# Patient Record
Sex: FEMALE | Race: WHITE | NOT HISPANIC OR LATINO | Employment: FULL TIME | URBAN - METROPOLITAN AREA
[De-identification: names, ages, dates, MRNs, and addresses within clinical notes are randomized per-mention and may not be internally consistent; named-entity substitution may affect disease eponyms.]

---

## 2017-01-02 ENCOUNTER — MYC REFILL (OUTPATIENT)
Dept: FAMILY MEDICINE | Facility: CLINIC | Age: 57
End: 2017-01-02

## 2017-01-02 DIAGNOSIS — N95.1 MENOPAUSAL SYNDROME (HOT FLASHES): Primary | ICD-10-CM

## 2017-01-02 NOTE — TELEPHONE ENCOUNTER
Message from Naveggt:  Original authorizing provider: STEPHANIE RODRIGUEZ NP, APRN CNP    Radha Rodriguez would like a refill of the following medications:  estrogens, conjugated, (PREMARIN) 0.45 MG tablet [STEPHANIE RODRIGUEZ NP, APRN CNP]    Preferred pharmacy: [id number] - [place], [place] - [address] AT [facility] [address]    Comment:  Please send to [facility] in [place]. Address is [address] [facility] [address] # [phone]. I've been out for a week. Thank You! Radha Rodriguez

## 2017-01-02 NOTE — TELEPHONE ENCOUNTER
Patient is long overdue for office visit. She has used her courtesy refill. Please advise further refills. Thank you.  Mi Meza RN

## 2017-01-10 ENCOUNTER — OFFICE VISIT (OUTPATIENT)
Dept: FAMILY MEDICINE | Facility: CLINIC | Age: 57
End: 2017-01-10
Payer: COMMERCIAL

## 2017-01-10 ENCOUNTER — RADIANT APPOINTMENT (OUTPATIENT)
Dept: GENERAL RADIOLOGY | Facility: CLINIC | Age: 57
End: 2017-01-10
Attending: INTERNAL MEDICINE
Payer: COMMERCIAL

## 2017-01-10 VITALS
HEART RATE: 79 BPM | HEIGHT: 63 IN | WEIGHT: 183 LBS | BODY MASS INDEX: 32.43 KG/M2 | TEMPERATURE: 98.5 F | SYSTOLIC BLOOD PRESSURE: 116 MMHG | OXYGEN SATURATION: 98 % | DIASTOLIC BLOOD PRESSURE: 79 MMHG

## 2017-01-10 DIAGNOSIS — M25.561 PAIN OF MENISCUS OF RIGHT KNEE: Primary | ICD-10-CM

## 2017-01-10 DIAGNOSIS — M25.561 ACUTE PAIN OF RIGHT KNEE: ICD-10-CM

## 2017-01-10 PROCEDURE — 99214 OFFICE O/P EST MOD 30 MIN: CPT | Performed by: INTERNAL MEDICINE

## 2017-01-10 PROCEDURE — 73560 X-RAY EXAM OF KNEE 1 OR 2: CPT | Mod: RT

## 2017-01-10 RX ORDER — DICLOFENAC SODIUM 75 MG/1
75 TABLET, DELAYED RELEASE ORAL 2 TIMES DAILY PRN
Qty: 60 TABLET | Refills: 5 | Status: SHIPPED | OUTPATIENT
Start: 2017-01-10 | End: 2017-03-29

## 2017-01-10 NOTE — NURSING NOTE
"Chief Complaint   Patient presents with     Musculoskeletal Problem     right knee       Initial /79 mmHg  Pulse 79  Temp(Src) 98.5  F (36.9  C) (Oral)  Ht 5' 3\" (1.6 m)  Wt 183 lb (83.008 kg)  BMI 32.43 kg/m2  SpO2 98% Estimated body mass index is 32.43 kg/(m^2) as calculated from the following:    Height as of this encounter: 5' 3\" (1.6 m).    Weight as of this encounter: 183 lb (83.008 kg).  BP completed using cuff size: regular    Lorenzo Yuan MA      "

## 2017-01-10 NOTE — PATIENT INSTRUCTIONS
This summary includes the important diagnoses, test, medications and other important parts of your medical history.  Below are a few good we sites you can use to learn more about these.     Www.Okeyko.org : Up to date and easily searchable information on multiple topics.  Www.Okeyko."[employer]/Pharmacy/c_539084.asp : [facility] $4.99 medications  Www.medlineplus.gov : medication info, interactive tutorials, watch real surgeries online  Www.familydoctor.org : good info from the Academy of Family Physicians  Www.Timelinerinic.com : good info from the [facility]  Www.cdc.gov : public health info, travel advisories, epidemics (H1N1)  Www.aap.org : children's health info, normal development, vaccinations  Www.health.[facility].[place].us : [place] dept of heat, public health issues in [place], N1N1    Based on your medical history and these are the current health maintenance or preventive care services that you are due for (some may have been done at this visit:)  Health Maintenance Due   Topic Date Due     LIPID SCREEN Q5 YR FEMALE (SYSTEM ASSIGNED)  07/18/2016     MAMMO Q1 YR INBASKET MESSAGE  08/14/2016     INFLUENZA VACCINE (SYSTEM ASSIGNED)  09/01/2016     =================================================================================  Normal Values   Blood pressure  <140/90 for most adults    <130/80 for some chronic diseases (ask your care team about yours)    BMI (body mass index)  18.5-25 kg/m2 (based on height and weight)     Thank you for visiting [facility]    Normal or non-critical lab and imaging results will be communicated to you by MyChart, letter or phone within 7 days.  If you do not hear from us within 10 days, please call the clinic. If you have a critical or abnormal lab result, we will notify you by phone as soon as possible.     If you have any questions regarding your visit please contact:     Team Comfort:   Clinic Hours Telephone Number   Dr. Kristopher Reaves  Min Odom  Eloisa Oliveira   7am-5pm  Monday - Friday [phone]  [facility]   Pharmacy 8am-8pm Monday-Thursday      8am-6pm Friday  9am-5pm Saturday-Sunday [phone]   Urgent Care 11am-8pm Monday-Friday        9am-5pm Saturday-Sunday [phone]     After hours, weekend or if you need to make an appointment with your primary provider please call [phone].   After Hours nurse advise: call [facility]: [phone]    Medication Refills:  Call your pharmacy and they will forward the refill to us. Please allow 3 business days for your refills to be completed.    Use Primary Data (secure email communication and access to your chart) to send your primary care provider a message or make an appointment. Ask someone on your Team how to sign up for Primary Data. To log on to [employer] or for more information in [employer] please visit the website at [email or web address].  As of October 8, 2013, all password changes, disabled accounts, or ID changes in Primary Data/MyHealth will be done by our Access Services Department.   If you need help with your account or password, call: [phone]. Clinic staff no longer has the ability to change passwords.     Knee Pain with Possible Torn Meniscus    The meniscus is a tough cartilage pad that cushions the inside of the knee joint. It helps absorb the shock from movement. It also spreads the weight of your body evenly across the knee joint. This prevents excess wear and tear to the bones of that joint.  The most common causes of meniscal tears are injuries, especially related to sports and degenerative disease that happens with aging.  A meniscus tear commonly happens during a twisting injury when the knee is bent. This causes pain, swelling, reduced movement of the knee, and trouble walking. There may be popping, clicking, joint locking or inability to completely straighten the knee. Ligaments of the knee may also be injured.  A torn meniscus is  diagnosed by physical exam and X-rays. In the case of a severe injury, the knee may be too painful to examine fully. A more accurate exam can be done after the initial swelling goes down. An MRI may be ordered to make a final diagnosis.  If your healthcare provider suspects a meniscal injury, you will treat your knee with ice and rest and preventing movement of the knee. A splint or knee brace that keeps your leg straight may be put on to protect the joint. Depending on the severity of the injury, surgery may be needed. A cartilage injury may take 4-12 weeks to heal depending on how bad it is.  Home care    Stay off the injured leg as much as possible until you can walk on it without pain. If you have a lot of pain when walking, crutches or a walker may be prescribed. (These can be rented or bought at many pharmacies and surgical or orthopedic supply stores). Follow your healthcare provider's advice about when to begin putting weight on that leg.    Keep your leg elevated to reduce pain and swelling. When sleeping, place a pillow under the injured leg. When sitting, support the injured leg so it is level with your waist. This is very important during the first 48 hours.    Apply an ice pack over the injured area for 15 to 20 minutes every 3 to 6 hours. You should do this for the first 24 to 48 hours. You can make an ice pack by filling a plastic bag that seals at the top with ice cubes and then wrapping it with a thin towel. Continue to use ice packs for relief of pain and swelling as needed. As the ice melts, be careful to avoid getting your wrap, splint, or cast wet. After 48 hours, apply heat (warm shower or warm bath) for 15 to 20 minutes several times a day, or alternate ice and heat. You can place the ice pack directly over the splint. If you have to wear a hook-and-loop knee brace, you can open it to apply the ice pack, or heat, directly to the knee. Never put ice directly on the skin. Always wrap the ice in  a towel or other type of cloth.    You may use over-the-counter pain medicine to control pain, unless another pain medicine was prescribed. If you have chronic liver or kidney disease or ever had a stomach ulcer, talk with your healthcare provider before using these medicines.    If you were given a splint, keep it dry at all times. Bathe with your splint out of the water. Protect it with a large plastic bag that is rubber-banded at the top end. If a fiberglass splint gets wet, you can dry it with a hair dryer. If you have a hook-and-loop knee brace, you can remove this to bathe, unless told otherwise.    Check with your healthcare provider before returning to sports or full work duties.  Follow-up care  Follow up with your healthcare provider, or as advised. This is usually within 1-2 weeks. Further testing may be required to check the extent of your injury.  If X-rays were taken, you will be told of any new findings that may affect your care.  Call 911  Call 911 if you have:     Shortness of breath    Chest pain  When to seek medical advice  Call your healthcare provider right away if any of these occur:    Toes or foot gets swollen, cold, blue, numb, or tingly    Pain or swelling spreads over the knee or calf    Warmth or redness appears over the knee or calf    Fever of 100.4 F (38 C) or above lasting for 24 to 48 hours    8071-9853 [employer]. [address] [place] [address]. All rights reserved. This information is not intended as a substitute for professional medical care. Always follow your healthcare professional's instructions.        Treating Meniscus Problems  Pre-op checklist    Don't eat or drink10 hours before surgery.    Arrange for someone to drive you home after surgery.    Tell your doctor if you take any medicine, supplements, or herbal remedies.   The type of surgery you have depends on the nature of your tear. its size and location. Your surgeon may use arthroscopy, a  method that involves putting a tiny camera inside your knee, so that your doctor can clearly see your joint. Arthroscopy requires only small incisions (cuts), and you can usually go home the same day as surgery. During surgery, you may have local anesthesia (your doctor numbs your knee with medicine), a regional block (numbing your body from the waist down), or general anesthesia (your doctor gives you drugs to put you into a deep sleep so you do't feel pain.)         Repaired meniscus.     Repair  For certain tears, your surgeon will try to repair the meniscus. He or she will sew torn edges so they can heal properly. Or your surgeon will use special fasteners to repair damage. In some cases, repairs may require another incision at the back or side of your knee.  Removal  In most cases, your surgeon will remove the damaged part of your meniscus. The meniscus won't completely grow back, so your doctor will remove as little tissue as possible. Other tissue, called the articular cartilage, will take over the role as shock absorber for your knee joint.  After surgery  You'll spend some time in the recovery area and can go home when you've recovered from the anesthesia. Your knee will be bandaged, and you may have stitches, steri-strips, or staples. You may need crutches to keep weight off the knee and may have a splint for support.    9418-6414 [address], [place] [address]. All rights reserved. This information is not intended as a substitute for professional medical care. Always follow your healthcare professional's instructions. This information has been modified by your health care provider with permission from the publisher.      PATIENT INSTRUCTIONS:    1) Call [phone] to schedule MRI of right knee.

## 2017-01-10 NOTE — PROGRESS NOTES
SUBJECTIVE:                                                    Radha Rodriguez is a 56 year old female who presents to clinic today for the following health issues:    Joint Pain     Onset: 10 days     Description:   Location: medial aspect of right knee  Character: Sharp and throbbing    Intensity: severe    Progression of Symptoms: worse    Accompanying Signs & Symptoms:  Denies any swelling.   History:   Previous similar pain: no       Precipitating factors: Worse when supine or when sitting.  Trauma or overuse: no     Alleviating factors:  Improved by: nothing       Therapies Tried and outcome: ibuprofen, hot bath, massage and ice, no improvement          Problem list and histories reviewed & adjusted, as indicated.  Additional history: as documented    Patient Active Problem List   Diagnosis     Non-healing lesion on left nose     Viral warts     FEMALE STRESS INCONTINENCE post-hysterectomy     HYPERHIDROSIS on axilla and soles of feet     Distal sacrum pain     Malaise and fatigue     Abdominal pain, right lower quadrant     Dermatitis due to cosmetics     Contact dermatitis and other eczema, due to unspecified cause     Acquired acanthosis nigricans     LFT abnormalities and Urobilnogen high     Female pelvic pain     CARDIOVASCULAR SCREENING; LDL GOAL LESS THAN 160     Finger pain     Mechanical problems with limbs     Vitamin D deficiency     Inflammatory arthritis     High risk medications (not anticoagulants) long-term use     Osteoarthritis     Menopausal syndrome (hot flashes)     Past Surgical History   Procedure Laterality Date     Surgical history of -        Tubal Ligation     Surgical history of -        Appy     Surgical history of -        Tonsils     Surgical history of -   12/94     Anal Fistulotomy     Hysterectomy, vaginal  1/1/2000     TVH, fibroids (still has ovaries)     C replacement,ureter,bowel segment  10/01/2008     Part of her ureter was repaired.     Colonoscopy  10/14/2011      Procedure:COLONOSCOPY; Colonoscopy; Surgeon:SCOTTY LEDEZMA; Location:[facility]       Social History   Substance Use Topics     Smoking status: Never Smoker      Smokeless tobacco: Never Used     Alcohol Use: Yes      Comment: Occasional     Family History   Problem Relation Age of Onset     HEART DISEASE Father      Heart attack     C.A.D. Father      age 50     Hypertension Father      CANCER Maternal Grandfather      CANCER Paternal Grandfather      DIABETES No family hx of      Hypertension No family hx of      CEREBROVASCULAR DISEASE No family hx of      Breast Cancer No family hx of      Cancer - colorectal No family hx of      Prostate Cancer No family hx of          Current Outpatient Prescriptions   Medication Sig Dispense Refill     estrogens, conjugated, (PREMARIN) 0.45 MG tablet Take 1 tablet (0.45 mg) by mouth daily Last refill until seen 1/2/17 30 tablet 0     methotrexate 2.5 MG tablet Eight tablets once a week 40 tablet 5     folic acid (FOLVITE) 1 MG tablet Take 2 tablets (2 mg) by mouth daily 180 tablet 3     IBUPROFEN PO        Allergies   Allergen Reactions     Sulfa Drugs Rash     Clindamycin Rash     Codeine Hives     Recent Labs   Lab Test  12/12/16   1008  05/23/16   1157  11/23/15   1200   01/31/14   1534   07/18/11   1145  10/31/08   0806   LDL   --    --    --    --    --    --   130*  137*   HDL   --    --    --    --    --    --   41*  37*   TRIG   --    --    --    --    --    --   68  104   ALT  38  38  41   < >   --    < >   --   50   CR  0.80  0.73  0.76   < >   --    < >   --    --    GFRESTIMATED  75  83  79   < >   --    < >   --    --    GFRESTBLACK  >90   GFR Calc    >90   GFR Calc    >90   GFR Calc     < >   --    < >   --    --    POTASSIUM  4.4  4.2  3.9   < >   --    < >   --    --    TSH   --    --    --    --   1.24   --   0.56   --     < > = values in this interval not displayed.      BP Readings from Last 3  "Encounters:   01/10/17 116/79   12/12/16 120/74   10/31/16 122/81    Wt Readings from Last 3 Encounters:   01/10/17 183 lb (83.008 kg)   12/12/16 182 lb 3.2 oz (82.645 kg)   08/22/16 184 lb (83.462 kg)                  Labs reviewed in EPIC  Problem list, Medication list, Allergies, and Medical/Social/Surgical histories reviewed in [facility] and updated as appropriate.    ROS:  C: NEGATIVE for fever, chills, change in weight  I: NEGATIVE for worrisome rashes, moles or lesions  E: NEGATIVE for vision changes or irritation  E/M: NEGATIVE for ear, mouth and throat problems  R: NEGATIVE for significant cough or SOB  B: NEGATIVE for masses, tenderness or discharge  CV: NEGATIVE for chest pain, palpitations or peripheral edema  GI: NEGATIVE for nausea, abdominal pain, heartburn, or change in bowel habits  : NEGATIVE for frequency, dysuria, or hematuria  MUSCULOSKELETAL: POSITIVE for non-specific inflammatory arthritis (negative cyclic citrullinated peptide antibody and negative rheumatoid factor).  N: NEGATIVE for weakness, dizziness or paresthesias  E: NEGATIVE for temperature intolerance, skin/hair changes  H: NEGATIVE for bleeding problems  P: NEGATIVE for changes in mood or affect    OBJECTIVE:                                                    /79 mmHg  Pulse 79  Temp(Src) 98.5  F (36.9  C) (Oral)  Ht 5' 3\" (1.6 m)  Wt 183 lb (83.008 kg)  BMI 32.43 kg/m2  SpO2 98%  Body mass index is 32.43 kg/(m^2).  GENERAL: healthy, alert and no distress  EYES: Eyes grossly normal to inspection  MS: Tenderness on palpation of medial aspect of right knee without any swelling.  SKIN: no suspicious lesions or rashes  NEURO: Normal strength and tone, mentation intact and speech normal  PSYCH: mentation appears normal, affect normal/bright    Diagnostic Test Results:  Pending.     ASSESSMENT/PLAN:                                                            (M25.561) Pain of meniscus of right knee  (primary encounter " diagnosis)  Comment: Suspected based on limited exam. Recommend NSAIDs other than over-the-counter form.  Official x-ray results show linear density at the lateral tibial area, suspicious for avulsion fracture. However, the tender point on right knee does not correspond x-ray finding. Proceed with MRI of right knee for suspected medial meniscus tear.  Plan: MR Knee Right w/o Contrast, ORTHOPEDICS ADULT         REFERRAL, diclofenac (VOLTAREN) 75 MG EC tablet            (M25.561) Acute pain of right knee  Comment: Official x-ray results show linear density at the lateral tibial area, suspicious for avulsion fracture. However, the tender point on right knee does not correspond x-ray finding. Proceed with MRI of right knee for suspected medial meniscus tear.  Plan: XR Knee Standing Right 2 Views              FUTURE APPOINTMENTS:       - Follow-up visit with this provider if right knee pain worsens.    Temo Fletcher MD  [facility]

## 2017-01-10 NOTE — MR AVS SNAPSHOT
After Visit Summary   1/10/2017    Radha Rodriguez    MRN: [id number]           Patient Information     Date Of Birth          1960        Visit Information        Provider Department      1/10/2017 4:20 PM Temo Fletcher MD [facility]        Today's Diagnoses     Pain of meniscus of right knee    -  1     Acute pain of right knee           Care Instructions    This summary includes the important diagnoses, test, medications and other important parts of your medical history.  Below are a few good we sites you can use to learn more about these.     Www.[facility].org : Up to date and easily searchable information on multiple topics.  Www.[facility].ETC Education/Pharmacy/[email or web address] : [facility] $4.99 medications  Www.Webtrekk.gov : medication info, interactive tutorials, watch real surgeries online  Www.familydoctor.org : good info from the Academy of Family Physicians  Www.Enubila.A and A Travel Service : good info from the [facility]  Www.cdc.gov : public health info, travel advisories, epidemics (H1N1)  Www.aap.org : children's health info, normal development, vaccinations  Www.health.[facility].[place].us : [place] dept of heatlh, public health issues in [place], N1N1    Based on your medical history and these are the current health maintenance or preventive care services that you are due for (some may have been done at this visit:)  Health Maintenance Due   Topic Date Due     LIPID SCREEN Q5 YR FEMALE (SYSTEM ASSIGNED)  07/18/2016     MAMMO Q1 YR INBASKET MESSAGE  08/14/2016     INFLUENZA VACCINE (SYSTEM ASSIGNED)  09/01/2016     =================================================================================  Normal Values   Blood pressure  <140/90 for most adults    <130/80 for some chronic diseases (ask your care team about yours)    BMI (body mass index)  18.5-25 kg/m2 (based on height and weight)     Thank you for visiting [facility]    Normal or non-critical lab and imaging  results will be communicated to you by MyChart, letter or phone within 7 days.  If you do not hear from us within 10 days, please call the clinic. If you have a critical or abnormal lab result, we will notify you by phone as soon as possible.     If you have any questions regarding your visit please contact:     Team Comfort:   Clinic Hours Telephone Number   Dr. Kristopher Oliveira   7am-5pm  Monday - Friday [phone]  [facility]   Pharmacy 8am-8pm Monday-Thursday      8am-6pm Friday  9am-5pm Saturday-Sunday [phone]   Urgent Care 11am-8pm Monday-Friday        9am-5pm Saturday-Sunday [phone]     After hours, weekend or if you need to make an appointment with your primary provider please call [phone].   After Hours nurse advise: call [facility]: [phone]    Medication Refills:  Call your pharmacy and they will forward the refill to us. Please allow 3 business days for your refills to be completed.    Use Happlink (secure email communication and access to your chart) to send your primary care provider a message or make an appointment. Ask someone on your Team how to sign up for Happlink. To log on to [facility] or for more information in [place] please visit the website at [email or web address].  As of October 8, 2013, all password changes, disabled accounts, or ID changes in Happlink/MyHealth will be done by our Access Services Department.   If you need help with your account or password, call: [phone]. Clinic staff no longer has the ability to change passwords.     Knee Pain with Possible Torn Meniscus    The meniscus is a tough cartilage pad that cushions the inside of the knee joint. It helps absorb the shock from movement. It also spreads the weight of your body evenly across the knee joint. This prevents excess wear and tear to the bones of that joint.  The most common causes of meniscal tears are  injuries, especially related to sports and degenerative disease that happens with aging.  A meniscus tear commonly happens during a twisting injury when the knee is bent. This causes pain, swelling, reduced movement of the knee, and trouble walking. There may be popping, clicking, joint locking or inability to completely straighten the knee. Ligaments of the knee may also be injured.  A torn meniscus is diagnosed by physical exam and X-rays. In the case of a severe injury, the knee may be too painful to examine fully. A more accurate exam can be done after the initial swelling goes down. An MRI may be ordered to make a final diagnosis.  If your healthcare provider suspects a meniscal injury, you will treat your knee with ice and rest and preventing movement of the knee. A splint or knee brace that keeps your leg straight may be put on to protect the joint. Depending on the severity of the injury, surgery may be needed. A cartilage injury may take 4-12 weeks to heal depending on how bad it is.  Home care    Stay off the injured leg as much as possible until you can walk on it without pain. If you have a lot of pain when walking, crutches or a walker may be prescribed. (These can be rented or bought at many pharmacies and surgical or orthopedic supply stores). Follow your healthcare provider's advice about when to begin putting weight on that leg.    Keep your leg elevated to reduce pain and swelling. When sleeping, place a pillow under the injured leg. When sitting, support the injured leg so it is level with your waist. This is very important during the first 48 hours.    Apply an ice pack over the injured area for 15 to 20 minutes every 3 to 6 hours. You should do this for the first 24 to 48 hours. You can make an ice pack by filling a plastic bag that seals at the top with ice cubes and then wrapping it with a thin towel. Continue to use ice packs for relief of pain and swelling as needed. As the ice melts, be  careful to avoid getting your wrap, splint, or cast wet. After 48 hours, apply heat (warm shower or warm bath) for 15 to 20 minutes several times a day, or alternate ice and heat. You can place the ice pack directly over the splint. If you have to wear a hook-and-loop knee brace, you can open it to apply the ice pack, or heat, directly to the knee. Never put ice directly on the skin. Always wrap the ice in a towel or other type of cloth.    You may use over-the-counter pain medicine to control pain, unless another pain medicine was prescribed. If you have chronic liver or kidney disease or ever had a stomach ulcer, talk with your healthcare provider before using these medicines.    If you were given a splint, keep it dry at all times. Bathe with your splint out of the water. Protect it with a large plastic bag that is rubber-banded at the top end. If a fiberglass splint gets wet, you can dry it with a hair dryer. If you have a hook-and-loop knee brace, you can remove this to bathe, unless told otherwise.    Check with your healthcare provider before returning to sports or full work duties.  Follow-up care  Follow up with your healthcare provider, or as advised. This is usually within 1-2 weeks. Further testing may be required to check the extent of your injury.  If X-rays were taken, you will be told of any new findings that may affect your care.  Call 911  Call 911 if you have:     Shortness of breath    Chest pain  When to seek medical advice  Call your healthcare provider right away if any of these occur:    Toes or foot gets swollen, cold, blue, numb, or tingly    Pain or swelling spreads over the knee or calf    Warmth or redness appears over the knee or calf    Fever of 100.4 F (38 C) or above lasting for 24 to 48 hours    4622-1222 [employer]. [address] [place] [address], [place] [address]. All rights reserved. This information is not intended as a substitute for professional medical care. Always  follow your healthcare professional's instructions.        Treating Meniscus Problems  Pre-op checklist    Don't eat or drink10 hours before surgery.    Arrange for someone to drive you home after surgery.    Tell your doctor if you take any medicine, supplements, or herbal remedies.   The type of surgery you have depends on the nature of your tear. its size and location. Your surgeon may use arthroscopy, a method that involves putting a tiny camera inside your knee, so that your doctor can clearly see your joint. Arthroscopy requires only small incisions (cuts), and you can usually go home the same day as surgery. During surgery, you may have local anesthesia (your doctor numbs your knee with medicine), a regional block (numbing your body from the waist down), or general anesthesia (your doctor gives you drugs to put you into a deep sleep so you do't feel pain.)         Repaired meniscus.     Repair  For certain tears, your surgeon will try to repair the meniscus. He or she will sew torn edges so they can heal properly. Or your surgeon will use special fasteners to repair damage. In some cases, repairs may require another incision at the back or side of your knee.  Removal  In most cases, your surgeon will remove the damaged part of your meniscus. The meniscus won't completely grow back, so your doctor will remove as little tissue as possible. Other tissue, called the articular cartilage, will take over the role as shock absorber for your knee joint.  After surgery  You'll spend some time in the recovery area and can go home when you've recovered from the anesthesia. Your knee will be bandaged, and you may have stitches, steri-strips, or staples. You may need crutches to keep weight off the knee and may have a splint for support.    8931-8918 [facility], [address] [place] [address], [place] [address]. All rights reserved. This information is not intended as a substitute for professional medical care. Always follow your  healthcare professional's instructions. This information has been modified by your health care provider with permission from the publisher.      PATIENT INSTRUCTIONS:    1) Call [phone] to schedule MRI of right knee.        Follow-ups after your visit        Additional Services     ORTHOPEDICS ADULT REFERRAL       Your provider has referred you to: [facility]: [facility] [phone]   [email or web address]    Please be aware that coverage of these services is subject to the terms and limitations of your health insurance plan.  Call member services at your health plan with any benefit or coverage questions.      Please bring the following to your appointment:    >>   Any x-rays, CTs or MRIs which have been performed.  Contact the facility where they were done to arrange for  prior to your scheduled appointment.    >>   List of current medications   >>   This referral request   >>   Any documents/labs given to you for this referral                  Your next 10 appointments already scheduled     James 10, 2017  4:20 PM   [facility]    [address] [place] [id number] [phone]           Bring a current list of meds and any records pertaining to this visit.  For Physicals, please bring immunization records and any forms needing to be filled out.  Please arrive 10 minutes early to complete paperwork.            Feb 20, 2017  9:45 AM   Return Visit with Yobani Mccray MD   [facility]    [address] [place] [address] [phone]              Future tests that were ordered for you today     Open Future Orders        Priority Expected Expires Ordered    MR Knee Right w/o Contrast Routine  1/10/2018  "1/10/2017            Who to contact     If you have questions or need follow up information about today's clinic visit or your schedule please contact [facility] directly at [phone].  Normal or non-critical lab and imaging results will be communicated to you by MyChart, letter or phone within 4 business days after the clinic has received the results. If you do not hear from us within 7 days, please contact the clinic through Skouthart or phone. If you have a critical or abnormal lab result, we will notify you by phone as soon as possible.  Submit refill requests through [email or web address] or call your pharmacy and they will forward the refill request to us. Please allow 3 business days for your refill to be completed.          Additional Information About Your Visit        [email or web address] Information     [email or web address] gives you secure access to your electronic health record. If you see a primary care provider, you can also send messages to your care team and make appointments. If you have questions, please call your primary care clinic.  If you do not have a primary care provider, please call [phone] and they will assist you.        Care EveryWhere ID     This is your Care EveryWhere ID. This could be used by other organizations to access your [place] medical records  [id number]        Your Vitals Were     Pulse Temperature Height BMI (Body Mass Index) Pulse Oximetry       79 98.5  F (36.9  C) (Oral) 5' 3\" (1.6 m) 32.43 kg/m2 98%        Blood Pressure from Last 3 Encounters:   01/10/17 116/79   12/12/16 120/74   10/31/16 122/81    Weight from Last 3 Encounters:   01/10/17 183 lb (83.008 kg)   12/12/16 182 lb 3.2 oz (82.645 kg)   08/22/16 184 lb (83.462 kg)              We Performed the Following     ORTHOPEDICS ADULT REFERRAL     XR Knee Standing Right 2 Views          Today's Medication Changes          These changes are accurate as of: 1/10/17  4:07 PM.  If you have any questions, ask your nurse or doctor. "               Start taking these medicines.        Dose/Directions    diclofenac 75 MG EC tablet   Commonly known as:  VOLTAREN   Used for:  Pain of meniscus of right knee   Started by:  Temo Fletcher MD        Dose:  75 mg   Take 1 tablet (75 mg) by mouth 2 times daily as needed for moderate pain Take with food. Do not take Ibuprofen or Aleve.   Quantity:  60 tablet   Refills:  5            Where to get your medicines      These medications were sent to [id number] - [place], [place] - [address] AT [facility] [address], [facility] [address]     Phone:  [phone]    - diclofenac 75 MG EC tablet             Primary Care Provider Office Phone # Fax #    Temo Fletcher [phone]       [facility] [address]  [facility] [address]        Thank you!     Thank you for choosing [facility]  for your care. Our goal is always to provide you with excellent care. Hearing back from our patients is one way we can continue to improve our services. Please take a few minutes to complete the written survey that you may receive in the mail after your visit with us. Thank you!             Your Updated Medication List - Protect others around you: Learn how to safely use, store and throw away your medicines at www.disposemymeds.org.          This list is accurate as of: 1/10/17  4:07 PM.  Always use your most recent med list.                   Brand Name Dispense Instructions for use    diclofenac 75 MG EC tablet    VOLTAREN    60 tablet    Take 1 tablet (75 mg) by mouth 2 times daily as needed for moderate pain Take with food. Do not take Ibuprofen or Aleve.       estrogens (conjugated) 0.45 MG tablet    PREMARIN    30 tablet    Take 1 tablet (0.45 mg) by mouth daily Last refill until seen 1/2/17       folic acid 1 MG tablet    FOLVITE    180 tablet    Take 2 tablets (2 mg) by mouth daily       IBUPROFEN PO           methotrexate 2.5 MG tablet CHEMO     40 tablet    Eight tablets once a week

## 2017-01-11 ENCOUNTER — TELEPHONE (OUTPATIENT)
Dept: FAMILY MEDICINE | Facility: CLINIC | Age: 57
End: 2017-01-11

## 2017-01-11 NOTE — TELEPHONE ENCOUNTER
Notes Recorded by Temo Fletcher MD on 1/11/2017 at 1:23 PM  Official x-ray results show linear density at the lateral tibial area, suspicious for avulsion fracture. However, the tender point on right knee does not correspond to this x-ray finding. Proceed with MRI of right knee for suspected medial meniscus tear.    This writer attempted to contact Radha on 01/11/2017.    Was call answered?  No.  Left message on voicemail with information to call me back.    If patient calls back, please Contact Clinic RN team. If no one available, send encounter message.    Yamilka Pantoja RN

## 2017-01-13 ENCOUNTER — HOSPITAL ENCOUNTER (OUTPATIENT)
Dept: MRI IMAGING | Facility: CLINIC | Age: 57
Discharge: HOME | End: 2017-01-13
Attending: INTERNAL MEDICINE | Admitting: INTERNAL MEDICINE
Payer: COMMERCIAL

## 2017-01-13 DIAGNOSIS — M25.561 PAIN OF MENISCUS OF RIGHT KNEE: ICD-10-CM

## 2017-01-13 PROCEDURE — 73721 MRI JNT OF LWR EXTRE W/O DYE: CPT | Mod: RT

## 2017-01-17 ENCOUNTER — PRE VISIT (OUTPATIENT)
Dept: NURSING | Facility: CLINIC | Age: 57
End: 2017-01-17

## 2017-01-17 ENCOUNTER — TELEPHONE (OUTPATIENT)
Dept: FAMILY MEDICINE | Facility: CLINIC | Age: 57
End: 2017-01-17

## 2017-01-17 DIAGNOSIS — S83.241A: Primary | ICD-10-CM

## 2017-01-17 NOTE — TELEPHONE ENCOUNTER
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 Iliotibial band friction syndrome is mainly due to overuse injury of the lateral knee.Recommend Orthopedics consultation ( orders sent).    (call patient)    This writer attempted to contact Radha on 01/17/2017.    Was call answered?  No.  Left message on voicemail with information to call me back.    If patient calls back, please Contact Clinic RN team. If no one available, send encounter message.    Ame Oconnell RN

## 2017-01-17 NOTE — TELEPHONE ENCOUNTER
Patient called back and was informed of the results.    Ame Oconnell RN, [facility] Triage

## 2017-01-17 NOTE — TELEPHONE ENCOUNTER
PREVISIT INFORMATION                                                    Radha Rodriguez scheduled for future visit at [facility].    Patient is scheduled to see Dr. Chavez on 01/19/2017  Reason for visit: Right knee pain/injury  Referring provider Dr. Fletcher  Has patient seen previous specialist? No  Medical Records:  Available in chart.  Patient was previously seen at a [facility] facility.    REVIEW                                                      New patient packet mailed to patient: No  Medication reconciliation complete: Yes      Current Outpatient Prescriptions   Medication Sig Dispense Refill     diclofenac (VOLTAREN) 75 MG EC tablet Take 1 tablet (75 mg) by mouth 2 times daily as needed for moderate pain Take with food. Do not take Ibuprofen or Aleve. 60 tablet 5     estrogens, conjugated, (PREMARIN) 0.45 MG tablet Take 1 tablet (0.45 mg) by mouth daily Last refill until seen 1/2/17 30 tablet 0     methotrexate 2.5 MG tablet Eight tablets once a week 40 tablet 5     folic acid (FOLVITE) 1 MG tablet Take 2 tablets (2 mg) by mouth daily 180 tablet 3     IBUPROFEN PO          Allergies: Sulfa drugs; Clindamycin; and Codeine    Pt reports being seen for : Right knee pain- no injury  1. Do you have recent xrays (if not seen in EPIC)? Yes - Xray are in EPIC.  2. Do you have any MRI's (if not seen in EPIC)?Yes - Confirmed in EPIC.  3. Have you had surgery in the past for the same issue?No.   4. Are you being referred by another provider? Yes: Dr. Fletcher  If yes-Records in Epic or being obtained by: In Epic.  5. Is this work comp or MVA related? No.      PLAN/FOLLOW-UP NEEDED                                                      Previsit review complete.  Patient will see provider at future scheduled appointment.     Patient Reminders Given:  Please, make sure you bring an updated list of your medications.   If you are having a procedure, please,  present 15 minutes early.  If you need to cancel or reschedule,please call [phone].    Imelda Carter

## 2017-01-19 ENCOUNTER — OFFICE VISIT (OUTPATIENT)
Dept: ORTHOPEDICS | Facility: CLINIC | Age: 57
End: 2017-01-19
Payer: COMMERCIAL

## 2017-01-19 VITALS
WEIGHT: 183.2 LBS | BODY MASS INDEX: 30.52 KG/M2 | HEART RATE: 71 BPM | DIASTOLIC BLOOD PRESSURE: 74 MMHG | SYSTOLIC BLOOD PRESSURE: 120 MMHG | OXYGEN SATURATION: 98 % | HEIGHT: 65 IN

## 2017-01-19 DIAGNOSIS — M25.561 CHRONIC PAIN OF RIGHT KNEE: Primary | ICD-10-CM

## 2017-01-19 DIAGNOSIS — G89.29 CHRONIC PAIN OF RIGHT KNEE: Primary | ICD-10-CM

## 2017-01-19 PROCEDURE — 99203 OFFICE O/P NEW LOW 30 MIN: CPT | Mod: 25 | Performed by: ORTHOPAEDIC SURGERY

## 2017-01-19 PROCEDURE — 20610 DRAIN/INJ JOINT/BURSA W/O US: CPT | Mod: RT | Performed by: ORTHOPAEDIC SURGERY

## 2017-01-19 ASSESSMENT — PAIN SCALES - GENERAL: PAINLEVEL: MODERATE PAIN (5)

## 2017-01-19 NOTE — NURSING NOTE
"Radha Rodriguez's goals for this visit include: Find a solution for right knee pain  She requests these members of her care team be copied on today's visit information: yes    PCP: Temo Fletcher    Referring Provider:  Referred Self, MD  No address on file    Chief Complaint   Patient presents with     Consult     Knee Pain     Right knee meniscus consult       Initial /74 mmHg  Pulse 71  Ht 1.638 m (5' 4.5\")  Wt 83.099 kg (183 lb 3.2 oz)  BMI 30.97 kg/m2  SpO2 98% Estimated body mass index is 30.97 kg/(m^2) as calculated from the following:    Height as of this encounter: 1.638 m (5' 4.5\").    Weight as of this encounter: 83.099 kg (183 lb 3.2 oz).  BP completed using cuff size: large    "

## 2017-01-19 NOTE — PROGRESS NOTES
Patient seen and examined. Agree with history, physical and imaging as well as Assessment and Plan as detailed by Dr. De La Torre.    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 If injection helps, can repeat 2-3 times per year, no lifetime max. If not, will consider arthroscopy.    After written informed consent obtained and signed, after sufficient prepping and sterile technique, 40 mg of kenalog and , 8 cc of 1% lidocaine were injected without complication into the right knee. The patient tolerated the injection well and a sterile dressing was applied.      I was present with the resident during the history and exam.  I discussed the case with the resident and agree with the findings as documented in the assessment and plan.

## 2017-01-19 NOTE — PATIENT INSTRUCTIONS
Thanks for coming today.  Ortho/Sports Medicine Clinic  [address] [place] [address]    To schedule future appointments in Ortho Clinic, you may call [phone].    To schedule ordered imaging by your Provider: Call [facility] at [phone]    [employer] available online at:   [email or web address]    Please call if any further questions or concerns [phone] and ask for the Orthopedic Department. Clinic hours 8 am to 5 pm.    Return to clinic if symptoms worsen.

## 2017-01-19 NOTE — PROGRESS NOTES
REFERRING PROVIDER:  Dr. Temo Fletcher.      CHIEF COMPLAINT:  Right knee pain.      HISTORY OF PRESENT ILLNESS:  Ms. Radha Rodriguez is a pleasant 56-year-old female who presents with right-sided knee pain.  She denies any history of previous trauma to her knee in the past; however, in December of this past year she started having symptoms of aching and throbbing in her knee.  She feels like it is interfering with both her job and her life.  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  She feels like it is worse at night when she has a significant throbbing pain in her right knee.  She has tried ice and ibuprofen 600 mg 4 times daily, and is now on diclofenac.  These have only offered mild improvement.  She feels like her pain is made worse by twisting and lying down.  She has had x-rays and MRI, and she is here for treatment recommendations.      PAST MEDICAL HISTORY:  Irritable bowel syndrome, headaches and gastroenteritis.      PAST SURGICAL HISTORY:  Includes appendectomy, hysterectomy, and tonsils and adenoids.  No bleeding issues with those surgeries.      SOCIAL HISTORY:  She lives in [place].  She works as a dental hygienist.  She has never smoked.  She drinks 1 drink per week.      REVIEW OF SYSTEMS:  Negative for chest pain, shortness of breath, numbness, tingling, nausea, vomiting or weight loss.  She does have positive right-sided knee joint pain.      FAMILY HISTORY:  Significant for arthritis.      ALLERGIES:  Include clindamycin, sulfa drugs and codeine.      PHYSICAL EXAMINATION:  On exam, this is a pleasant female in no acute distress.  Skin is non-diaphoretic, non-labored breathing.  With examination of her right knee, she has a mild effusion that is present.  There is no bruising or surgical scars.  She gets to full extension and 125 degrees of flexion.  Stable to varus and valgus stress.  Anterior, posterior and Lachman's are normal.  She does have  some mild posterior medial joint line pain.  No significant lateral joint line pain.  Neurovascularly intact distally.      IMAGING:  X-rays reviewed and show a small density adjacent to the lateral tibial spine.  No significant medial or lateral joint space narrowing on the radiographs.  On the MRI that is reviewed, she has some medial compartment cartilage thinning with an associated posterior horn medial meniscal tear.  The remainder of the knee ACL, PCL, collateral ligaments, and lateral compartment appear to be without any pathology.      ASSESSMENT AND PLAN:  Ms. Radha Rodriguez is a 56-year-old female with right-sided knee pain, consistent with mild medial compartment osteoarthritis with also a small medial meniscal tear.  It is difficult to say which one is actually causing her symptoms of knee pain; however, we will offer her an injection into her knee to help manage her symptoms.      PLAN:  At this point, Ms. Rodriguez will get an injection of her knee today, and we will see how she does going forward.  If she does well, we can continue offering her injections.  If these are not being helpful, then we would consider diagnostic arthroscopy with partial medial meniscectomy.      PROCEDURE NOTE:  The patient's right knee was prepped and draped in routine sterile fashion.  She had consented for an injection of her right knee with steroid, and understand the risks and benefits including infection and thinning of the skin.  Despite these risks, she elected to go forward with the injection.  The medication was injection into her right knee without any adverse events; 9 cc of 1% lidocaine without epinephrine and 40 mg of Kenalog.   Dictated by Jose Luis De La Torre MD, Resident    I have personally examined this patient and have reviewed the clinical presentation and progress note with the resident.  I agree with the treatment plan as outlined.  The plan was formulated with the resident on the day of the resident's dictation.

## 2017-01-24 ENCOUNTER — TELEPHONE (OUTPATIENT)
Dept: ORTHOPEDICS | Facility: CLINIC | Age: 57
End: 2017-01-24

## 2017-01-24 NOTE — TELEPHONE ENCOUNTER
[facility] Call Center    Phone Message    Name of Caller: Radha    Phone Number: Cell number on file:    Telephone Information:   Mobile [phone]       Best time to return call: any    May a detailed message be left on voicemail: yes    Relation to patient: Self    Reason for Call: Other: Patient is returning clinics phone call. Please advise     Action Taken: Message routed to:  Adult Clinics: Orthopedics p [id number]

## 2017-01-24 NOTE — TELEPHONE ENCOUNTER
Patient states that she received a Cortisone injection on 01/19/2017. The pain is now worse and is waking her up 8-9 times a night. Radha is icing her knee but said that she was told not to elevate it and to be using it. I let Radha know that I would route her concerns to Dr. Chavez and his nurse.     Message routed to Jenn Rykert.

## 2017-01-24 NOTE — TELEPHONE ENCOUNTER
[facility] Call Center    Phone Message    Name of Caller: Radha    Phone Number: Cell number on file:    Telephone Information:   Mobile [phone]       Best time to return call: ANY    May a detailed message be left on voicemail: yes    Relation to patient: Self    Reason for Call: Other: Patient's pain is still terrible, even worse. Patient is up up to 8 times a night, can't sleep. Want's to know how to help with pain in the mean time until she can have possible surgery. As of yet she has had no relief,     Action Taken: Message routed to:  Adult Clinics: Orthopedics p [id number]

## 2017-01-24 NOTE — TELEPHONE ENCOUNTER
Called Radha and left a message.  Need to find out what she has been taking/doing to help the pain.  Let her know steroids typically take up to 2 weeks to work.  We can ask Dr. Chavez if he would have any other suggestions.  Fauzia Vance RN

## 2017-01-26 NOTE — PROGRESS NOTES
Patient called, doing poorly. Pain continues. Patient interested in arthroscopy.    OK to schedule.     I will call her to discuss and answer questions after schedule arranged.

## 2017-01-26 NOTE — TELEPHONE ENCOUNTER
Patient called wanting to check on what to do next.  Talked with her she states that she continues to wake up 4-6 times per night with throbbing pain and when she moves her leg she gets a sharp stabbing pain on the inside of her right knee about 1 in below the patella.  Currently taking IBU 600mg q6 hrs and icing.  They both help but do not take the pain away completely and does not help with the stabbing pain at night time. This was also happening before her injection but is worse since then.  She is not using a brace and has been doing the same amount of activity. She is wondering what she can do to help with this pain.  Will talk with Dr. Chavez and call patient back.  Fauzia Vance RN    Talked with Dr. Chavez. He would recommend a surgery if the injection is not helping and she is having an increase in pain or she can wait it out as it should get better after the injection takes fulls effect.    Called Radha, she would like to proceed with surgery.  Let Dr. Chavez know and he will place orders.  Fauzia Vance RN

## 2017-01-27 ENCOUNTER — TELEPHONE (OUTPATIENT)
Dept: ORTHOPEDICS | Facility: CLINIC | Age: 57
End: 2017-01-27

## 2017-01-27 NOTE — TELEPHONE ENCOUNTER
Procedure: Examination Under Anesthesia.  Knee arthroscopy, Partial meniscectomy..  Facility: [facility] or [facility]  Length: 60minute(s)  Surgeon: Scott WISE  Anesthesia: Choice  BMI: There is no weight on file to calculate BMI. (If over 43 for general or 45 for MAC cannot be scheduled at [facility])   Pre-op Appointments needed: H&P within 30 days  Post-op appointments needed:1 week   provider only 6 weeks   provider only   needed:  No  Surgery packet/instructions given to patient?  No  Special Considerations: none  Fauzia Vance RN

## 2017-01-30 NOTE — TELEPHONE ENCOUNTER
Date Scheduled: 2-23-17  Facility: [facility]  Surgeon: Dr. Chavez   Post-op appointment scheduled: 3-2-17 at 10:30   scheduled?: No  Surgery packet/instructions confirmed received?  Yes, mailed  Special Considerations:

## 2017-02-07 ENCOUNTER — MYC REFILL (OUTPATIENT)
Dept: FAMILY MEDICINE | Facility: CLINIC | Age: 57
End: 2017-02-07

## 2017-02-07 DIAGNOSIS — N95.1 MENOPAUSAL SYNDROME (HOT FLASHES): ICD-10-CM

## 2017-02-07 NOTE — TELEPHONE ENCOUNTER
Message from [place]:  Original authorizing provider: STEPHANIE RODRIGUEZ NP, APRN CNP    Radha Rodriguez would like a refill of the following medications:  estrogens, conjugated, (PREMARIN) 0.45 MG tablet [STEPHANIE RODRIGUEZ NP, APRN CNP]    Preferred pharmacy: [facility] [id number] [place] [address] - [address] AT [facility] [address]    Comment:  The last time this was filled it was only for 30 days, its the same price for 90 days, could it please be filled for that. Thank You Radha Rodriguez

## 2017-02-16 NOTE — TELEPHONE ENCOUNTER
Received message from the patient that she would like to cancel the surgery as her knee is feeling much better. She would still like to talk with a nurse to make sure that there aren't any problems with waiting. I left a message for the patient that I have cancelled her surgery and her post op appointment and that she can reschedule at anytime. I also stated that I would send a message to Dr. Chavez's nurse to give her a call to discuss.  Gwen Pantoja, Surgery Scheduling Coordinator

## 2017-02-17 NOTE — TELEPHONE ENCOUNTER
Called and left a message.  Let patient know that she could reschedule the surgery anytime, there is no time limit on when this needs to be done.  If she has not been seen in a while, she may need to see Dr. Chavez again.  She can call and let us know what she would like to reschedule.  Fauzia Vance RN

## 2017-03-29 ENCOUNTER — OFFICE VISIT (OUTPATIENT)
Dept: RHEUMATOLOGY | Facility: CLINIC | Age: 57
End: 2017-03-29
Payer: COMMERCIAL

## 2017-03-29 VITALS
SYSTOLIC BLOOD PRESSURE: 134 MMHG | TEMPERATURE: 98.1 F | BODY MASS INDEX: 30.59 KG/M2 | WEIGHT: 181 LBS | RESPIRATION RATE: 16 BRPM | DIASTOLIC BLOOD PRESSURE: 84 MMHG | HEART RATE: 69 BPM

## 2017-03-29 DIAGNOSIS — M19.90 INFLAMMATORY ARTHRITIS: Primary | ICD-10-CM

## 2017-03-29 DIAGNOSIS — M15.0 PRIMARY OSTEOARTHRITIS INVOLVING MULTIPLE JOINTS: ICD-10-CM

## 2017-03-29 LAB
ALBUMIN SERPL-MCNC: 3.7 G/DL (ref 3.4–5)
ALP SERPL-CCNC: 64 U/L (ref 40–150)
ALT SERPL W P-5'-P-CCNC: 31 U/L (ref 0–50)
ANION GAP SERPL CALCULATED.3IONS-SCNC: 6 MMOL/L (ref 3–14)
AST SERPL W P-5'-P-CCNC: 14 U/L (ref 0–45)
BASOPHILS # BLD AUTO: 0 10E9/L (ref 0–0.2)
BASOPHILS NFR BLD AUTO: 0.6 %
BILIRUB SERPL-MCNC: 0.5 MG/DL (ref 0.2–1.3)
BUN SERPL-MCNC: 13 MG/DL (ref 7–30)
CALCIUM SERPL-MCNC: 8.7 MG/DL (ref 8.5–10.1)
CHLORIDE SERPL-SCNC: 108 MMOL/L (ref 94–109)
CO2 SERPL-SCNC: 29 MMOL/L (ref 20–32)
CREAT SERPL-MCNC: 0.68 MG/DL (ref 0.52–1.04)
DIFFERENTIAL METHOD BLD: ABNORMAL
EOSINOPHIL # BLD AUTO: 0.2 10E9/L (ref 0–0.7)
EOSINOPHIL NFR BLD AUTO: 2.5 %
ERYTHROCYTE [DISTWIDTH] IN BLOOD BY AUTOMATED COUNT: 13.8 % (ref 10–15)
GFR SERPL CREATININE-BSD FRML MDRD: ABNORMAL ML/MIN/1.7M2
GLUCOSE SERPL-MCNC: 85 MG/DL (ref 70–99)
HCT VFR BLD AUTO: 41.3 % (ref 35–47)
HGB BLD-MCNC: 13.9 G/DL (ref 11.7–15.7)
LYMPHOCYTES # BLD AUTO: 1.3 10E9/L (ref 0.8–5.3)
LYMPHOCYTES NFR BLD AUTO: 18.3 %
MCH RBC QN AUTO: 33.3 PG (ref 26.5–33)
MCHC RBC AUTO-ENTMCNC: 33.7 G/DL (ref 31.5–36.5)
MCV RBC AUTO: 99 FL (ref 78–100)
MONOCYTES # BLD AUTO: 0.5 10E9/L (ref 0–1.3)
MONOCYTES NFR BLD AUTO: 7.2 %
NEUTROPHILS # BLD AUTO: 5.2 10E9/L (ref 1.6–8.3)
NEUTROPHILS NFR BLD AUTO: 71.4 %
PLATELET # BLD AUTO: 226 10E9/L (ref 150–450)
POTASSIUM SERPL-SCNC: 4.5 MMOL/L (ref 3.4–5.3)
PROT SERPL-MCNC: 6.7 G/DL (ref 6.8–8.8)
RBC # BLD AUTO: 4.17 10E12/L (ref 3.8–5.2)
SODIUM SERPL-SCNC: 143 MMOL/L (ref 133–144)
WBC # BLD AUTO: 7.3 10E9/L (ref 4–11)

## 2017-03-29 PROCEDURE — 85025 COMPLETE CBC W/AUTO DIFF WBC: CPT | Performed by: INTERNAL MEDICINE

## 2017-03-29 PROCEDURE — 20600 DRAIN/INJ JOINT/BURSA W/O US: CPT | Mod: FA | Performed by: INTERNAL MEDICINE

## 2017-03-29 PROCEDURE — 80053 COMPREHEN METABOLIC PANEL: CPT | Performed by: INTERNAL MEDICINE

## 2017-03-29 PROCEDURE — 36415 COLL VENOUS BLD VENIPUNCTURE: CPT | Performed by: INTERNAL MEDICINE

## 2017-03-29 PROCEDURE — 99213 OFFICE O/P EST LOW 20 MIN: CPT | Mod: 25 | Performed by: INTERNAL MEDICINE

## 2017-03-29 RX ORDER — METHYLPREDNISOLONE ACETATE 80 MG/ML
20 INJECTION, SUSPENSION INTRA-ARTICULAR; INTRALESIONAL; INTRAMUSCULAR; SOFT TISSUE ONCE
Qty: 0.25 ML | Refills: 0 | OUTPATIENT
Start: 2017-03-29 | End: 2017-03-29

## 2017-03-29 RX ORDER — LIDOCAINE HYDROCHLORIDE 10 MG/ML
0.25 INJECTION, SOLUTION INFILTRATION; PERINEURAL ONCE
Qty: 2 ML | Refills: 0 | OUTPATIENT
Start: 2017-03-29 | End: 2017-03-29

## 2017-03-29 NOTE — NURSING NOTE
"Chief Complaint   Patient presents with     RECHECK     Inflammatory arthritis       Initial /84 (BP Location: Right arm, Patient Position: Chair, Cuff Size: Adult Large)  Pulse 69  Temp 98.1  F (36.7  C) (Oral)  Resp 16  Wt 181 lb (82.1 kg)  BMI 30.59 kg/m2 Estimated body mass index is 30.59 kg/(m^2) as calculated from the following:    Height as of 1/19/17: 5' 4.5\" (1.638 m).    Weight as of this encounter: 181 lb (82.1 kg).  Medication Reconciliation: complete   Hayley Horta LPN    "

## 2017-03-29 NOTE — NURSING NOTE
The following medication was given:     MEDICATION: 1% lidocaine  ROUTE: Intra-articular (given with steroid)  SITE: LEFT THUMB  DOSE: .25ML  LOT: [id number]  :  Lot18  EXPIRATION DATE:  07/2017  NDC#: 89225-521-24  Drawn by Hayley Horta LPN  / Administered by Dr. Mccray/ Documented by Hayley Horta LPN    The following medication was given:     MEDICATION: Methylprednisolone 80  ROUTE: Intra- articular  SITE: LEFT THUMB  DOSE: .25ML  LOT #: [id number]  : Nutricate  EXPIRATION DATE: 02/2018  NDC#: 1721-0066-78  Medication was drawn by Hayley Horta LPN   and administered by Dr. Mccray.   Documented by Hayley Horta LPN

## 2017-03-29 NOTE — PROGRESS NOTES
HISTORY OF PRESENT ILLNESS:  Ms. Radha Rodriguez is seen back in followup for her inflammatory arthritis.  She really has no complaints other than the base of her left thumb which she aggravated when she was bowling and after some discussion she would like to have it injected.  I think the first time I am looking at her hands they look somewhat better, really not seeing some of the swelling I am used to seeing on the fourth and fifth finger on the left hand.  She denies any side effects of methotrexate as nausea, vomiting or stomatitis.  There is no progressive dyspnea or chronic cough.      PHYSICAL EXAMINATION:   VITAL SIGNS:  Blood pressure is 134/84, pulse 69, weight 181.   MUSCULOSKELETAL:  There is no synovitis noted of the wrists, MCPs, or PIPs.  There is bony hypertrophy and tenderness to the base of the left thumb at the CMC joint.  There is also a bony hypertrophy consistent with osteoarthritis of several DIP and PIP joints.      IMPRESSION:     1.  Inflammatory arthritis, stable.   2.  Generalized osteoarthritis.      RECOMMENDATIONS:   1.  Continue methotrexate 20 mg weekly.   2.  Continue folic acid.   3.  Check methotrexate labs today.   4.  Follow up with me in 3 months.   5.  I will inject the thumb today.      PROCEDURE NOTE:  After informed verbal consent was obtained, under sterile technique, after the use of ethyl chloride for anesthetic, injected 40 mg of Medrol with 0.25 cc of lidocaine into the left first CMC joint personally without complications.  The patient tolerated the procedure well.  Routine follow up in 3 months.         CINTIA HAQ MD             D: 2017 12:40   T: 2017 14:22   MT: [id number]      Name:     RADHA RODRIGUEZ   MRN:      [id number]        Account:      [id number]   :      1960           Visit Date:   2017      Document: [id number]

## 2017-05-10 ENCOUNTER — OFFICE VISIT (OUTPATIENT)
Dept: FAMILY MEDICINE | Facility: CLINIC | Age: 57
End: 2017-05-10
Payer: COMMERCIAL

## 2017-05-10 VITALS
SYSTOLIC BLOOD PRESSURE: 120 MMHG | BODY MASS INDEX: 30.99 KG/M2 | OXYGEN SATURATION: 99 % | HEART RATE: 70 BPM | DIASTOLIC BLOOD PRESSURE: 80 MMHG | WEIGHT: 183.4 LBS | TEMPERATURE: 97.8 F

## 2017-05-10 DIAGNOSIS — L03.031 PARONYCHIA OF GREAT TOE, RIGHT: Primary | ICD-10-CM

## 2017-05-10 LAB
GRAM STN SPEC: ABNORMAL
MICRO REPORT STATUS: ABNORMAL
SPECIMEN SOURCE: ABNORMAL

## 2017-05-10 PROCEDURE — 87186 SC STD MICRODIL/AGAR DIL: CPT | Performed by: PHYSICIAN ASSISTANT

## 2017-05-10 PROCEDURE — 87077 CULTURE AEROBIC IDENTIFY: CPT | Performed by: PHYSICIAN ASSISTANT

## 2017-05-10 PROCEDURE — 87070 CULTURE OTHR SPECIMN AEROBIC: CPT | Performed by: PHYSICIAN ASSISTANT

## 2017-05-10 PROCEDURE — 87205 SMEAR GRAM STAIN: CPT | Performed by: PHYSICIAN ASSISTANT

## 2017-05-10 PROCEDURE — 99214 OFFICE O/P EST MOD 30 MIN: CPT | Performed by: PHYSICIAN ASSISTANT

## 2017-05-10 RX ORDER — GINSENG 100 MG
CAPSULE ORAL 2 TIMES DAILY
Qty: 14 G | Refills: 0 | Status: SHIPPED | OUTPATIENT
Start: 2017-05-10 | End: 2017-07-05

## 2017-05-10 RX ORDER — AMOXICILLIN 500 MG/1
CAPSULE ORAL
Refills: 0 | COMMUNITY
Start: 2017-05-03 | End: 2017-07-05

## 2017-05-10 RX ORDER — DOXYCYCLINE HYCLATE 100 MG
100 TABLET ORAL 2 TIMES DAILY
Qty: 20 TABLET | Refills: 0 | Status: SHIPPED | OUTPATIENT
Start: 2017-05-10 | End: 2017-07-05

## 2017-05-10 NOTE — PROGRESS NOTES
SUBJECTIVE:                                                    Radha Rodriguez is a 57 year old female who presents to clinic today for the following health issues:    Concern - Infected toe     Onset: 1-2 weeks    Description:   After the pedicure- Infected right toe by nail, was given drops by nail salon but the drops did not improve, has throbbing pain     Intensity: mild, moderate    Progression of Symptoms:      Accompanying Signs & Symptoms:    Redness, swelling, yellow pus comes out        Previous history of similar problem:   no    Precipitating factors:   Worsened by: nothing    Alleviating factors:  Improved by: ibuprofen       Therapies Tried and outcome: epsom salt, peroxide do not improve symptoms, ibuprofen helps with pain    Started after she had a pedicure.  She also recently had a tooth pulled and was put on 10 day course of amoxicillin 500 mg TID (finished yesterday) and medrol dose pack (has also completed this).     Infection is worsening and pain is worsening.         Problem list and histories reviewed & adjusted, as indicated.  Additional history: as documented    Patient Active Problem List   Diagnosis     Non-healing lesion on left nose     Viral warts     FEMALE STRESS INCONTINENCE post-hysterectomy     HYPERHIDROSIS on axilla and soles of feet     Distal sacrum pain     Malaise and fatigue     Abdominal pain, right lower quadrant     Dermatitis due to cosmetics     Contact dermatitis and other eczema, due to unspecified cause     Acquired acanthosis nigricans     LFT abnormalities and Urobilnogen high     Female pelvic pain     CARDIOVASCULAR SCREENING; LDL GOAL LESS THAN 160     Finger pain     Mechanical problems with limbs     Vitamin D deficiency     Inflammatory arthritis     High risk medications (not anticoagulants) long-term use     Osteoarthritis     Menopausal syndrome (hot flashes)     Past Surgical History:   Procedure Laterality Date     C REPLACEMENT,URETER,BOWEL SEGMENT   10/01/2008    Part of her ureter was repaired.     COLONOSCOPY  10/14/2011    Procedure:COLONOSCOPY; Colonoscopy; Surgeon:SCOTTY LEDEZMA; Location:[facility]     HYSTERECTOMY, VAGINAL  1/1/2000    TVH, fibroids (still has ovaries)     SURGICAL HISTORY OF -       Tubal Ligation     SURGICAL HISTORY OF -       Appy     SURGICAL HISTORY OF -       Tonsils     SURGICAL HISTORY OF -   12/94    Anal Fistulotomy       Social History   Substance Use Topics     Smoking status: Never Smoker     Smokeless tobacco: Never Used     Alcohol use Yes      Comment: Occasional     Family History   Problem Relation Age of Onset     HEART DISEASE Father      Heart attack     C.A.D. Father      age 50     Hypertension Father      CANCER Maternal Grandfather      CANCER Paternal Grandfather      DIABETES No family hx of      Hypertension No family hx of      CEREBROVASCULAR DISEASE No family hx of      Breast Cancer No family hx of      Cancer - colorectal No family hx of      Prostate Cancer No family hx of          Current Outpatient Prescriptions   Medication Sig Dispense Refill     amoxicillin (AMOXIL) 500 MG capsule TK 1 C PO TID UNTIL FINISHED  0     doxycycline (VIBRA-TABS) 100 MG tablet Take 1 tablet (100 mg) by mouth 2 times daily 20 tablet 0     bacitracin 500 UNIT/GM OINT Apply topically 2 times daily 14 g 0     methotrexate 2.5 MG tablet CHEMO Eight tablets once a week 40 tablet 5     estrogens, conjugated, (PREMARIN) 0.45 MG tablet Take 1 tablet (0.45 mg) by mouth daily Last refill until seen 1/2/17 90 tablet 0     folic acid (FOLVITE) 1 MG tablet Take 2 tablets (2 mg) by mouth daily 180 tablet 3     IBUPROFEN PO        BP Readings from Last 3 Encounters:   05/10/17 120/80   03/29/17 134/84   01/19/17 120/74    Wt Readings from Last 3 Encounters:   05/10/17 183 lb 6.4 oz (83.2 kg)   03/29/17 181 lb (82.1 kg)   01/19/17 183 lb 3.2 oz (83.1 kg)                    Reviewed and updated as needed this visit by clinical  staff  Tobacco  Allergies  Meds  Med Hx  Surg Hx  Fam Hx  Soc Hx      Reviewed and updated as needed this visit by Provider         ROS:  Constitutional, HEENT, cardiovascular, pulmonary, gi and gu systems are negative, except as otherwise noted.    OBJECTIVE:                                                    /80 (BP Location: Left arm, Patient Position: Chair, Cuff Size: Adult Regular)  Pulse 70  Temp 97.8  F (36.6  C) (Oral)  Wt 183 lb 6.4 oz (83.2 kg)  SpO2 99%  BMI 30.99 kg/m2  Body mass index is 30.99 kg/(m^2).  GENERAL: healthy, alert and no distress  Right great toe with purulent drainage, swelling redness and tender to the touch along medial border of nail.  Full ROM of toe.      ASSESSMENT/PLAN:                                                          1. Paronychia of great toe, right  Recommend frequent warm water soaks to keep area clean, apply antibiotic ointment BID and monitor for signs of worsening infection.  I am concerned that this infection is worsening despite being on amoxicillin, so will cover for MRSA with doxy.  The steroid likely didn't help with the infection as well.   See patient instructions for home care recommendations.  Reviewed signs and symptoms that indicate worsening infection, she is to follow-up should those arise.     - Wound Culture Aerobic Bacterial  - Gram stain  - doxycycline (VIBRA-TABS) 100 MG tablet; Take 1 tablet (100 mg) by mouth 2 times daily  Dispense: 20 tablet; Refill: 0  - bacitracin 500 UNIT/GM OINT; Apply topically 2 times daily  Dispense: 14 g; Refill: 0    FUTURE APPOINTMENTS:       - Follow-up visit if no improvement as anticipated.     Marti Galvan PA-C  [facility]

## 2017-05-10 NOTE — NURSING NOTE
"No chief complaint on file.      Initial /80 (BP Location: Left arm, Patient Position: Chair, Cuff Size: Adult Regular)  Pulse 70  Temp 97.8  F (36.6  C) (Oral)  Wt 183 lb 6.4 oz (83.2 kg)  SpO2 99%  BMI 30.99 kg/m2 Estimated body mass index is 30.99 kg/(m^2) as calculated from the following:    Height as of 1/19/17: 5' 4.5\" (1.638 m).    Weight as of this encounter: 183 lb 6.4 oz (83.2 kg).  Medication Reconciliation: complete   Tigist Casas CMA      "

## 2017-05-10 NOTE — MR AVS SNAPSHOT
After Visit Summary   5/10/2017    Radha Rodriguez    MRN: [id number]           Patient Information     Date Of Birth          1960        Visit Information        Provider Department      5/10/2017 10:00 AM Marti Galvan PA-C [facility]        Today's Diagnoses     Paronychia of great toe, left    -  1       Follow-ups after your visit        Who to contact     If you have questions or need follow up information about today's clinic visit or your schedule please contact [facility] directly at [phone].  Normal or non-critical lab and imaging results will be communicated to you by CurTranhart, letter or phone within 4 business days after the clinic has received the results. If you do not hear from us within 7 days, please contact the clinic through Wavesatt or phone. If you have a critical or abnormal lab result, we will notify you by phone as soon as possible.  Submit refill requests through IQuum or call your pharmacy and they will forward the refill request to us. Please allow 3 business days for your refill to be completed.          Additional Information About Your Visit        MyChart Information     IQuum gives you secure access to your electronic health record. If you see a primary care provider, you can also send messages to your care team and make appointments. If you have questions, please call your primary care clinic.  If you do not have a primary care provider, please call [phone] and they will assist you.        Care EveryWhere ID     This is your Care EveryWhere ID. This could be used by other organizations to access your [place] medical records  [id number]        Your Vitals Were     Pulse Temperature Pulse Oximetry BMI (Body Mass Index)          70 97.8  F (36.6  C) (Oral) 99% 30.99 kg/m2         Blood Pressure from Last 3 Encounters:   05/10/17 120/80   03/29/17 134/84   01/19/17 120/74    Weight from Last 3 Encounters:    05/10/17 183 lb 6.4 oz (83.2 kg)   03/29/17 181 lb (82.1 kg)   01/19/17 183 lb 3.2 oz (83.1 kg)              We Performed the Following     Gram stain     Wound Culture Aerobic Bacterial          Today's Medication Changes          These changes are accurate as of: 5/10/17 10:47 AM.  If you have any questions, ask your nurse or doctor.               Start taking these medicines.        Dose/Directions    bacitracin 500 UNIT/GM Oint   Used for:  Paronychia of great toe, left   Started by:  Marti Galvan PA-C        Apply topically 2 times daily   Quantity:  14 g   Refills:  0       doxycycline 100 MG tablet   Commonly known as:  VIBRA-TABS   Used for:  Paronychia of great toe, left   Started by:  Marti Galvan PA-C        Dose:  100 mg   Take 1 tablet (100 mg) by mouth 2 times daily   Quantity:  20 tablet   Refills:  0            Where to get your medicines      These medications were sent to [facility] - [place], [place] - [address] AT [facility] [address], [facility] [address]     Phone:  [phone]     bacitracin 500 UNIT/GM Oint    doxycycline 100 MG tablet                Primary Care Provider Office Phone # Fax #    Temo Fletcher [phone]       [facility] [address]  [facility] [address]        Thank you!     Thank you for choosing [facility]  for your care. Our goal is always to provide you with excellent care. Hearing back from our patients is one way we can continue to improve our services. Please take a few minutes to complete the written survey that you may receive in the mail after your visit with us. Thank you!             Your Updated Medication List - Protect others around you: Learn how to safely use, store and throw away your medicines at www.disposemymeds.org.          This list is accurate as of: 5/10/17 10:47 AM.  Always use your most recent med list.                    Brand Name Dispense Instructions for use    amoxicillin 500 MG capsule    AMOXIL     TK 1 C PO TID UNTIL FINISHED       bacitracin 500 UNIT/GM Oint     14 g    Apply topically 2 times daily       doxycycline 100 MG tablet    VIBRA-TABS    20 tablet    Take 1 tablet (100 mg) by mouth 2 times daily       estrogens (conjugated) 0.45 MG tablet    PREMARIN    90 tablet    Take 1 tablet (0.45 mg) by mouth daily Last refill until seen 1/2/17       folic acid 1 MG tablet    FOLVITE    180 tablet    Take 2 tablets (2 mg) by mouth daily       IBUPROFEN PO          methotrexate 2.5 MG tablet CHEMO     40 tablet    Eight tablets once a week

## 2017-05-12 LAB
BACTERIA SPEC CULT: ABNORMAL
MICRO REPORT STATUS: ABNORMAL
MICROORGANISM SPEC CULT: ABNORMAL
SPECIMEN SOURCE: ABNORMAL

## 2017-05-16 ENCOUNTER — MYC MEDICAL ADVICE (OUTPATIENT)
Dept: FAMILY MEDICINE | Facility: CLINIC | Age: 57
End: 2017-05-16

## 2017-05-25 DIAGNOSIS — N95.1 MENOPAUSAL SYNDROME (HOT FLASHES): ICD-10-CM

## 2017-05-26 NOTE — TELEPHONE ENCOUNTER
Premarin 0.45mg      Last Written Prescription Date: 02/07/2017 #90 x 0  Last filled 02/08/2017  Last Office Visit with [facility], [facility] or [facility] prescribing provider: 04/01/2016 DIMPLE Martines       BP Readings from Last 3 Encounters:   05/10/17 120/80   03/29/17 134/84   01/19/17 120/74     Date of last Breast Exam: 06/27/2012

## 2017-05-26 NOTE — TELEPHONE ENCOUNTER
Routing refill request to provider for review/approval because:  Patient needs to be seen because it has been more than 1 year since last office visit.    Nabila Sherwood RN

## 2017-07-05 ENCOUNTER — OFFICE VISIT (OUTPATIENT)
Dept: RHEUMATOLOGY | Facility: CLINIC | Age: 57
End: 2017-07-05
Payer: COMMERCIAL

## 2017-07-05 ENCOUNTER — NURSE TRIAGE (OUTPATIENT)
Dept: NURSING | Facility: CLINIC | Age: 57
End: 2017-07-05

## 2017-07-05 VITALS
RESPIRATION RATE: 14 BRPM | HEART RATE: 93 BPM | SYSTOLIC BLOOD PRESSURE: 117 MMHG | BODY MASS INDEX: 31.77 KG/M2 | WEIGHT: 188 LBS | TEMPERATURE: 98.2 F | DIASTOLIC BLOOD PRESSURE: 69 MMHG

## 2017-07-05 DIAGNOSIS — M15.0 PRIMARY OSTEOARTHRITIS INVOLVING MULTIPLE JOINTS: Primary | ICD-10-CM

## 2017-07-05 DIAGNOSIS — M19.90 INFLAMMATORY ARTHRITIS: ICD-10-CM

## 2017-07-05 LAB
ALBUMIN SERPL-MCNC: 3.5 G/DL (ref 3.4–5)
ALP SERPL-CCNC: 69 U/L (ref 40–150)
ALT SERPL W P-5'-P-CCNC: 38 U/L (ref 0–50)
ANION GAP SERPL CALCULATED.3IONS-SCNC: 5 MMOL/L (ref 3–14)
AST SERPL W P-5'-P-CCNC: 27 U/L (ref 0–45)
BASOPHILS # BLD AUTO: 0.1 10E9/L (ref 0–0.2)
BASOPHILS NFR BLD AUTO: 0.6 %
BILIRUB SERPL-MCNC: 0.3 MG/DL (ref 0.2–1.3)
BUN SERPL-MCNC: 16 MG/DL (ref 7–30)
CALCIUM SERPL-MCNC: 8.9 MG/DL (ref 8.5–10.1)
CHLORIDE SERPL-SCNC: 105 MMOL/L (ref 94–109)
CO2 SERPL-SCNC: 29 MMOL/L (ref 20–32)
CREAT SERPL-MCNC: 0.66 MG/DL (ref 0.52–1.04)
DIFFERENTIAL METHOD BLD: ABNORMAL
EOSINOPHIL # BLD AUTO: 0.3 10E9/L (ref 0–0.7)
EOSINOPHIL NFR BLD AUTO: 2.9 %
ERYTHROCYTE [DISTWIDTH] IN BLOOD BY AUTOMATED COUNT: 13 % (ref 10–15)
GFR SERPL CREATININE-BSD FRML MDRD: ABNORMAL ML/MIN/1.7M2
GLUCOSE SERPL-MCNC: 119 MG/DL (ref 70–99)
HCT VFR BLD AUTO: 36 % (ref 35–47)
HGB BLD-MCNC: 11.9 G/DL (ref 11.7–15.7)
LYMPHOCYTES # BLD AUTO: 1.6 10E9/L (ref 0.8–5.3)
LYMPHOCYTES NFR BLD AUTO: 17.4 %
MCH RBC QN AUTO: 32.2 PG (ref 26.5–33)
MCHC RBC AUTO-ENTMCNC: 33.1 G/DL (ref 31.5–36.5)
MCV RBC AUTO: 98 FL (ref 78–100)
MONOCYTES # BLD AUTO: 0.5 10E9/L (ref 0–1.3)
MONOCYTES NFR BLD AUTO: 5.7 %
NEUTROPHILS # BLD AUTO: 6.6 10E9/L (ref 1.6–8.3)
NEUTROPHILS NFR BLD AUTO: 73.4 %
PLATELET # BLD AUTO: 210 10E9/L (ref 150–450)
POTASSIUM SERPL-SCNC: 3.5 MMOL/L (ref 3.4–5.3)
PROT SERPL-MCNC: 6.4 G/DL (ref 6.8–8.8)
RBC # BLD AUTO: 3.69 10E12/L (ref 3.8–5.2)
SODIUM SERPL-SCNC: 139 MMOL/L (ref 133–144)
WBC # BLD AUTO: 9 10E9/L (ref 4–11)

## 2017-07-05 PROCEDURE — 80053 COMPREHEN METABOLIC PANEL: CPT | Performed by: INTERNAL MEDICINE

## 2017-07-05 PROCEDURE — 20600 DRAIN/INJ JOINT/BURSA W/O US: CPT | Mod: TA | Performed by: INTERNAL MEDICINE

## 2017-07-05 PROCEDURE — 36415 COLL VENOUS BLD VENIPUNCTURE: CPT | Performed by: INTERNAL MEDICINE

## 2017-07-05 PROCEDURE — 85025 COMPLETE CBC W/AUTO DIFF WBC: CPT | Performed by: INTERNAL MEDICINE

## 2017-07-05 PROCEDURE — 99213 OFFICE O/P EST LOW 20 MIN: CPT | Mod: 25 | Performed by: INTERNAL MEDICINE

## 2017-07-05 RX ORDER — METHYLPREDNISOLONE ACETATE 80 MG/ML
20 INJECTION, SUSPENSION INTRA-ARTICULAR; INTRALESIONAL; INTRAMUSCULAR; SOFT TISSUE ONCE
Qty: 1 ML | Refills: 0 | OUTPATIENT
Start: 2017-07-05 | End: 2017-07-05

## 2017-07-05 RX ORDER — LIDOCAINE HYDROCHLORIDE 10 MG/ML
0.25 INJECTION, SOLUTION INFILTRATION; PERINEURAL ONCE
Qty: 2 ML | Refills: 0 | OUTPATIENT
Start: 2017-07-05 | End: 2017-07-05

## 2017-07-05 RX ORDER — FOLIC ACID 1 MG/1
2 TABLET ORAL DAILY
Qty: 180 TABLET | Refills: 3 | Status: SHIPPED | OUTPATIENT
Start: 2017-07-05 | End: 2017-10-11

## 2017-07-05 NOTE — NURSING NOTE
"Chief Complaint   Patient presents with     Recheck Medication     inflammatory arthritis       Initial /69 (BP Location: Left arm, Patient Position: Chair)  Pulse 93  Temp 98.2  F (36.8  C) (Oral)  Resp 14  Wt 188 lb (85.3 kg)  BMI 31.77 kg/m2 Estimated body mass index is 31.77 kg/(m^2) as calculated from the following:    Height as of 1/19/17: 5' 4.5\" (1.638 m).    Weight as of this encounter: 188 lb (85.3 kg).  Medication Reconciliation: complete   Hayley Horta LPN    "

## 2017-07-05 NOTE — TELEPHONE ENCOUNTER
Caller had an injection of methoprednisolone injected in wrist today; 1 1/2 hr later has severe wrist pain and distal thumb numbness and tingling;  Advised to apply warm pack and take analgesia; if pain persits after  2 hrs or worsens , call back.  Additional Information    Hand or wrist pain (all triage questions negative)    Protocols used: HAND AND WRIST PAIN-ADULT-  Geri Chu RN  FNA

## 2017-07-05 NOTE — NURSING NOTE
The following medication was given:     MEDICATION: 1% lidocaine  ROUTE: Intra-articular (given with steroid)  SITE: LEFT THUMB  DOSE: .25ML  LOT: [id number]  :  [employer]  EXPIRATION DATE:  11/2018  NDC#: 10391-206-94  Drawn by Hayley Horta LPN  / Administered by Dr. Mccray/ Documented by Hayley Horta LPN    The following medication was given:     MEDICATION: Methylprednisolone 80  ROUTE: Intra- articular  SITE: LEFT THUMB  DOSE: .25ML  LOT #: [id number]  : [employer]  EXPIRATION DATE: 02/2018  NDC#: 7861-1411-39  Medication was drawn by Hayley Horta LPN   and administered by Dr. Mccray.   Documented by Hayley Horta LPN

## 2017-07-05 NOTE — PROGRESS NOTES
Radha is seen back in follow-up for her Osteoarthritis and inflammatory arthritis. She is having pain especially at the left second DIP joint which started after she was doing a lot of staining. Also her left thumb is hurting. She's noticed perhaps a more nodules present in her hands and still has a little bit swelling in the MCP joints. 2 months ago she actually thought she was doing great over the last month there is an more pain so which she attributes to recent activity levels.    She denies any side effects from the methotrexate such as nausea, vomiting, diarrhea, some otitis. There is no progressive dyspnea or chronic cough. She has never been on the top dose of methotrexate and is willing to do this today. She would rather not be on prednisone but would like an injection for her thumb. She is due for labs today too.    Physical exam: Blood pressure 117/69, pulse 93, weight 188. There is tenderness and bony hypertrophy of the left first CMC joint. She clearly has a few more Heberden's nodules especially of the left second DIP joint where it looks like there is probably a small synovial cyst present that is resolving. She has Heberden's nodules of the bilateral second and third DIP joints. She is also getting a Maye's nodule at the right third PIP joint. There is some mild soft tissue swelling bilaterally without much tenderness of the 2-3 MCP joints. There may be synovitis of the right second PIP joint. Skin without lesions.    Impression: #1 inflammatory arthritis; #2 generalized Osteoarthritis    Recommendations: I will inject the thumb today.    Increase methotrexate to 25 mg weekly with folic acid 1 mg daily    Check labs today.  Follow-up an approximate 6-8 weeks    Procedure note: After informed verbal consent was obtained, under sterile technique, after the use of ethyl chloride for anesthetic, injected 40 mg of Medrol with a quarter cc of lidocaine into the left first CMC joint without  complications. Patient tolerated procedure well.

## 2017-07-06 ENCOUNTER — E-VISIT (OUTPATIENT)
Dept: FAMILY MEDICINE | Facility: CLINIC | Age: 57
End: 2017-07-06
Payer: COMMERCIAL

## 2017-07-06 DIAGNOSIS — H10.33 ACUTE BACTERIAL CONJUNCTIVITIS OF BOTH EYES: Primary | ICD-10-CM

## 2017-07-06 PROCEDURE — 99212 OFFICE O/P EST SF 10 MIN: CPT | Performed by: INTERNAL MEDICINE

## 2017-07-06 RX ORDER — GENTAMICIN SULFATE 3 MG/ML
2 SOLUTION/ DROPS OPHTHALMIC 4 TIMES DAILY
Qty: 5 ML | Refills: 2 | Status: SHIPPED | OUTPATIENT
Start: 2017-07-06 | End: 2017-07-12

## 2017-07-07 ENCOUNTER — OFFICE VISIT (OUTPATIENT)
Dept: OPHTHALMOLOGY | Facility: CLINIC | Age: 57
End: 2017-07-07
Payer: COMMERCIAL

## 2017-07-07 ENCOUNTER — OFFICE VISIT (OUTPATIENT)
Dept: FAMILY MEDICINE | Facility: CLINIC | Age: 57
End: 2017-07-07
Payer: COMMERCIAL

## 2017-07-07 ENCOUNTER — RADIANT APPOINTMENT (OUTPATIENT)
Dept: GENERAL RADIOLOGY | Facility: CLINIC | Age: 57
End: 2017-07-07
Attending: INTERNAL MEDICINE
Payer: COMMERCIAL

## 2017-07-07 VITALS
OXYGEN SATURATION: 98 % | SYSTOLIC BLOOD PRESSURE: 131 MMHG | TEMPERATURE: 99.1 F | HEIGHT: 65 IN | HEART RATE: 85 BPM | DIASTOLIC BLOOD PRESSURE: 81 MMHG | BODY MASS INDEX: 30.49 KG/M2 | WEIGHT: 183 LBS

## 2017-07-07 DIAGNOSIS — H10.33 ACUTE BACTERIAL CONJUNCTIVITIS OF BOTH EYES: Primary | ICD-10-CM

## 2017-07-07 DIAGNOSIS — H66.003 ACUTE SUPPURATIVE OTITIS MEDIA OF BOTH EARS WITHOUT SPONTANEOUS RUPTURE OF TYMPANIC MEMBRANES, RECURRENCE NOT SPECIFIED: ICD-10-CM

## 2017-07-07 DIAGNOSIS — H16.9 BILATERAL KERATITIS: ICD-10-CM

## 2017-07-07 DIAGNOSIS — J01.10 ACUTE NON-RECURRENT FRONTAL SINUSITIS: ICD-10-CM

## 2017-07-07 DIAGNOSIS — H10.33 ACUTE CONJUNCTIVITIS OF BOTH EYES, UNSPECIFIED ACUTE CONJUNCTIVITIS TYPE: Primary | ICD-10-CM

## 2017-07-07 PROCEDURE — 92002 INTRM OPH EXAM NEW PATIENT: CPT | Performed by: OPHTHALMOLOGY

## 2017-07-07 PROCEDURE — 99214 OFFICE O/P EST MOD 30 MIN: CPT | Performed by: INTERNAL MEDICINE

## 2017-07-07 PROCEDURE — 70220 X-RAY EXAM OF SINUSES: CPT

## 2017-07-07 RX ORDER — NEOMYCIN SULFATE, POLYMYXIN B SULFATE AND DEXAMETHASONE 3.5; 10000; 1 MG/ML; [USP'U]/ML; MG/ML
1 SUSPENSION/ DROPS OPHTHALMIC 4 TIMES DAILY
Qty: 1 BOTTLE | Refills: 1 | Status: SHIPPED | OUTPATIENT
Start: 2017-07-07 | End: 2017-10-11

## 2017-07-07 RX ORDER — CIPROFLOXACIN HYDROCHLORIDE 3.5 MG/ML
2 SOLUTION/ DROPS TOPICAL 4 TIMES DAILY
Qty: 5 ML | Refills: 1 | Status: SHIPPED | OUTPATIENT
Start: 2017-07-07 | End: 2017-07-12

## 2017-07-07 ASSESSMENT — VISUAL ACUITY
OS_SC+: -1
OD_SC: 20/70
OS_SC: 20/100
METHOD: SNELLEN - LINEAR
OD_PH_SC: 20/50-2
OD_SC+: -1
OS_PH_SC: 20/60-1

## 2017-07-07 NOTE — MR AVS SNAPSHOT
After Visit Summary   7/7/2017    Radha Rodriguez    MRN: [id number]           Patient Information     Date Of Birth          1960        Visit Information        Provider Department      7/7/2017 2:45 PM Woody Aly MD [facility]        Today's Diagnoses     Acute conjunctivitis of both eyes, unspecified acute conjunctivitis type    -  1      Care Instructions    Stop Gentamicin drops.  Start Pj/Dex drops both eyes four times daily.  Cold packs to eyes three times daily.  Use good hygiene.  OK to resume work when no significant mattering of eyes.  Woody Aly M.D.            Follow-ups after your visit        Your next 10 appointments already scheduled     Sep 13, 2017  9:30 AM CDT   [facility]    [address] [id number] [phone]              Who to contact     If you have questions or need follow up information about today's clinic visit or your schedule please contact [facility] directly at [phone].  Normal or non-critical lab and imaging results will be communicated to you by [place] Emergency Registryhart, letter or phone within 4 business days after the clinic has received the results. If you do not hear from us within 7 days, please contact the clinic through [employer] or phone. If you have a critical or abnormal lab result, we will notify you by phone as soon as possible.  Submit refill requests through [employer] or call your pharmacy and they will forward the refill request to us. Please allow 3 business days for your refill to be completed.          Additional Information About Your Visit        US Emergency Registryhart Information     [employer] gives you secure access to your electronic health record. If you see a primary care provider, you can also send messages to your care team and make appointments. If you have questions, please call your primary care clinic.  If you  do not have a primary care provider, please call [phone] and they will assist you.        Care EveryWhere ID     This is your Care EveryWhere ID. This could be used by other organizations to access your [facility] medical records  [id number]         Blood Pressure from Last 3 Encounters:   07/07/17 131/81   07/05/17 117/69   05/10/17 120/80    Weight from Last 3 Encounters:   07/07/17 83 kg (183 lb)   07/05/17 85.3 kg (188 lb)   05/10/17 83.2 kg (183 lb 6.4 oz)              Today, you had the following     No orders found for display         Today's Medication Changes          These changes are accurate as of: 7/7/17  3:58 PM.  If you have any questions, ask your nurse or doctor.               Start taking these medicines.        Dose/Directions    amoxicillin-clavulanate 875-125 MG per tablet   Commonly known as:  AUGMENTIN   Used for:  Acute non-recurrent frontal sinusitis, Acute suppurative otitis media of both ears without spontaneous rupture of tympanic membranes, recurrence not specified   Started by:  Temo Fletcher MD        Dose:  1 tablet   Take 1 tablet by mouth 2 times daily for 10 days   Quantity:  20 tablet   Refills:  1       ciprofloxacin 0.3 % ophthalmic solution   Commonly known as:  CILOXAN   Used for:  Acute bacterial conjunctivitis of both eyes   Started by:  Temo Fletcher MD        Dose:  2 drop   Place 2 drops into both eyes 4 times daily for 7 days   Quantity:  5 mL   Refills:  1       neomycin-polymyxin-dexamethasone 3.5-96187-2.1 Susp ophthalmic susp   Commonly known as:  MAXITROL   Used for:  Acute conjunctivitis of both eyes, unspecified acute conjunctivitis type   Started by:  Woody Aly MD        Dose:  1 drop   Place 1 drop into both eyes 4 times daily   Quantity:  1 Bottle   Refills:  1            Where to get your medicines      These medications were sent to [facility] [place] [place]  [place], [place] - [address]  [address]  [place] [address]     Phone:  [phone]     amoxicillin-clavulanate 875-125 MG per tablet    ciprofloxacin 0.3 % ophthalmic solution         These medications were sent to [facility] [place], [place] - [address]  [address], [place] [address]     Phone:  [phone]     neomycin-polymyxin-dexamethasone 3.5-92239-9.1 Susp ophthalmic susp                Primary Care Provider    None       No address on file        Equal Access to Services     [facility] : Hadii aad ku hadasho Soomaali, waaxda luqadaha, qaybta kaalmada adeegyada, waxay idiin hayaan adeeg kharanaty laobi . So [facility] [phone].    ATENCIÓN: Si habla español, tiene a guerra disposición servicios gratuitos de asistencia lingüística. [facility] [phone].    We comply with applicable federal civil rights laws and [place] laws. We do not discriminate on the basis of race, color, national origin, age, disability sex, sexual orientation or gender identity.            Thank you!     Thank you for choosing [facility]  for your care. Our goal is always to provide you with excellent care. Hearing back from our patients is one way we can continue to improve our services. Please take a few minutes to complete the written survey that you may receive in the mail after your visit with us. Thank you!             Your Updated Medication List - Protect others around you: Learn how to safely use, store and throw away your medicines at www.disposemymeds.org.          This list is accurate as of: 7/7/17  3:58 PM.  Always use your most recent med list.                   Brand Name Dispense Instructions for use Diagnosis    amoxicillin-clavulanate 875-125 MG per tablet    AUGMENTIN    20 tablet    Take 1 tablet by mouth 2 times daily for 10 days    Acute non-recurrent frontal sinusitis, Acute suppurative otitis media of both ears without spontaneous rupture of tympanic membranes, recurrence not specified        ciprofloxacin 0.3 % ophthalmic solution    CILOXAN    5 mL    Place 2 drops into both eyes 4 times daily for 7 days    Acute bacterial conjunctivitis of both eyes       estrogens (conjugated) 0.45 MG tablet    PREMARIN    90 tablet    Take 1 tablet (0.45 mg) by mouth daily Last refill until seen  5/26/17    Menopausal syndrome (hot flashes)       folic acid 1 MG tablet    FOLVITE    180 tablet    Take 2 tablets (2 mg) by mouth daily    Inflammatory arthritis       gentamicin 0.3 % ophthalmic solution    GARAMYCIN    5 mL    Place 2 drops into both eyes 4 times daily for 7 days    Acute bacterial conjunctivitis of both eyes       IBUPROFEN PO           methotrexate 2.5 MG tablet CHEMO     50 tablet    10 tablets once a week    Inflammatory arthritis       neomycin-polymyxin-dexamethasone 3.5-70255-2.1 Susp ophthalmic susp    MAXITROL    1 Bottle    Place 1 drop into both eyes 4 times daily    Acute conjunctivitis of both eyes, unspecified acute conjunctivitis type

## 2017-07-07 NOTE — MR AVS SNAPSHOT
After Visit Summary   7/7/2017    Radha Rodriguez    MRN: [id number]           Patient Information     Date Of Birth          1960        Visit Information        Provider Department      7/7/2017 1:20 PM Temo Fletcher MD [facility]        Today's Diagnoses     Acute bacterial conjunctivitis of both eyes    -  1    Bilateral keratitis        Acute non-recurrent frontal sinusitis        Acute suppurative otitis media of both ears without spontaneous rupture of tympanic membranes, recurrence not specified          Care Instructions    This summary includes the important diagnoses, test, medications and other important parts of your medical history.  Below are a few good we sites you can use to learn more about these.     Www.[employer].Predictry : Up to date and easily searchable information on multiple topics.  Www.[employer].Predictry/Pharmacy/c_539084.asp : [facility] $4.99 medications  Www.The [employer].gov : medication info, interactive tutorials, watch real surgeries online  Www.familydoctor.org : good info from the Academy of Family Physicians  Www.Material Mix.Zweemie : good info from the [facility]  Www.cdc.gov : public health info, travel advisories, epidemics (H1N1)  Www.aap.org : children's health info, normal development, vaccinations  Www.health.state.[place].us : [place] dept of heatlh, public health issues in [place], N1N1    Based on your medical history and these are the current health maintenance or preventive care services that you are due for (some may have been done at this visit:)  Health Maintenance Due   Topic Date Due     ADVANCE DIRECTIVE PLANNING Q5 YRS  03/20/1980     LIPID SCREEN Q5 YR FEMALE (SYSTEM ASSIGNED)  07/18/2016     MAMMO Q1 YR  08/14/2016     =================================================================================  Normal Values   Blood pressure  <140/90 for most adults    <130/80 for some chronic diseases (ask your care team about yours)    BMI (body  mass index)  18.5-25 kg/m2 (based on height and weight)     Thank you for visiting [facility]    Normal or non-critical lab and imaging results will be communicated to you by MyChart, letter or phone within 7 days.  If you do not hear from us within 10 days, please call the clinic. If you have a critical or abnormal lab result, we will notify you by phone as soon as possible.     If you have any questions regarding your visit please contact:     Team Comfort:   Clinic Hours Telephone Number   Dr. Kristopher Oliveira   7am-5pm  Monday - Friday [phone]  [facility]   Pharmacy 8am-8pm Monday-Thursday      8am-6pm Friday  9am-5pm Saturday-Sunday [phone]   Urgent Care 11am-8pm Monday-Friday        9am-5pm Saturday-Sunday [phone]     After hours, weekend or if you need to make an appointment with your primary provider please call [phone].   After Hours nurse advise: call [facility]: [phone]    Medication Refills:  Call your pharmacy and they will forward the refill to us. Please allow 3 business days for your refills to be completed.    Use [employer] (secure email communication and access to your chart) to send your primary care provider a message or make an appointment. Ask someone on your Team how to sign up for [employer]. To log on to FriendFeed or for more information in [employer] please visit the website at [email or web address].  As of October 8, 2013, all password changes, disabled accounts, or ID changes in Nexmo/MyHealth will be done by our Access Services Department.   If you need help with your account or password, call: [phone]. Clinic staff no longer has the ability to change passwords.     What Is Conjunctivitis?    Conjunctivitis is an irritation or infection. It affects the membrane that covers the white of your eye and the inside of your eyelid (conjunctiva). It can happen  to one or both eyes. The membrane swells and the blood vessels enlarge (dilate). This makes your eye red. That's why conjunctivitis is sometimes called red eye or pink eye.  What are the symptoms?  If you have one or more of these symptoms, see an eye doctor:    Redness in and around your eye    Eyes that are puffy and sore    Itching, burning, or stinging eyes    Watery eyes or discharge from your eye    Eyelids that are crusty or stuck together when you wake up in the morning    Pink color in the whites of one or both eyes  Getting treatment quickly can help prevent damage to your eyes.  How is it diagnosed?  Conjunctivitis is usually a minor eye infection. But it can sometimes become a more serious problem. Some more serious eye diseases have symptoms that look like conjunctivitis. So it's important for an eye doctor to diagnose you. Your eye doctor will ask about your symptoms and any medicines you take. He or she will ask about any illnesses or medical conditions you may have. The doctor will also check your eyes with a hand-held light and a special microscope called a slit lamp.  Date Last Reviewed: 6/11/2015 2000-2017 [employer]. [address] [place] [address]. All rights reserved. This information is not intended as a substitute for professional medical care. Always follow your healthcare professional's instructions.                Follow-ups after your visit        Additional Services     OPHTHALMOLOGY ADULT REFERRAL       Your provider has referred you to: [facility]: [facility] [phone]   [email or web address]    Please be aware that coverage of these services is subject to the terms and limitations of your health insurance plan.  Call member services at your health plan with any benefit or coverage questions.      Please bring the following with you to your appointment:    (1) Any X-Rays, CTs or MRIs which have been performed.  Contact  the facility where they were done to arrange for  prior to your scheduled appointment.    (2) List of current medications  (3) This referral request   (4) Any documents/labs given to you for this referral                  Your next 10 appointments already scheduled     Jul 07, 2017  2:45 PM CDT   [facility]    [address]  [place] [address]   [phone]            Sep 13, 2017  9:30 AM CDT   [facility]    [address] [address] [phone]              Who to contact     If you have questions or need follow up information about today's clinic visit or your schedule please contact [facility] directly at [phone].  Normal or non-critical lab and imaging results will be communicated to you by MyChart, letter or phone within 4 business days after the clinic has received the results. If you do not hear from us within 7 days, please contact the clinic through [employer] or phone. If you have a critical or abnormal lab result, we will notify you by phone as soon as possible.  Submit refill requests through [employer] or call your pharmacy and they will forward the refill request to us. Please allow 3 business days for your refill to be completed.          Additional Information About Your Visit        [employer] Information     [employer] gives you secure access to your electronic health record. If you see a primary care provider, you can also send messages to your care team and make appointments. If you have questions, please call your primary care clinic.  If you do not have a primary care provider, please call [phone] and they will assist you.        Care EveryWhere ID     This is your Care EveryWhere ID. This could be used by other organizations to access your [facility]  "records  [id number]        Your Vitals Were     Pulse Temperature Height Pulse Oximetry BMI (Body Mass Index)       85 99.1  F (37.3  C) (Oral) 5' 4.5\" (1.638 m) 98% 30.93 kg/m2        Blood Pressure from Last 3 Encounters:   07/07/17 131/81   07/05/17 117/69   05/10/17 120/80    Weight from Last 3 Encounters:   07/07/17 183 lb (83 kg)   07/05/17 188 lb (85.3 kg)   05/10/17 183 lb 6.4 oz (83.2 kg)              We Performed the Following     OPHTHALMOLOGY ADULT REFERRAL     XR Sinus Complete G/E 3 Views          Today's Medication Changes          These changes are accurate as of: 7/7/17  1:59 PM.  If you have any questions, ask your nurse or doctor.               Start taking these medicines.        Dose/Directions    amoxicillin-clavulanate 875-125 MG per tablet   Commonly known as:  AUGMENTIN   Used for:  Acute non-recurrent frontal sinusitis, Acute suppurative otitis media of both ears without spontaneous rupture of tympanic membranes, recurrence not specified   Started by:  Temo Fletcher MD        Dose:  1 tablet   Take 1 tablet by mouth 2 times daily for 10 days   Quantity:  20 tablet   Refills:  1       ciprofloxacin 0.3 % ophthalmic solution   Commonly known as:  CILOXAN   Used for:  Acute bacterial conjunctivitis of both eyes   Started by:  Temo Fletcher MD        Dose:  2 drop   Place 2 drops into both eyes 4 times daily for 7 days   Quantity:  5 mL   Refills:  1            Where to get your medicines      These medications were sent to [facility] [place], [place] - [address]  [address], [facility] [address]     Phone:  [phone]     amoxicillin-clavulanate 875-125 MG per tablet    ciprofloxacin 0.3 % ophthalmic solution                Primary Care Provider    None       No address on file        Equal Access to Services     MICHELLE SAM AH: Lidya Andrade, mamadou culp, qaybta kaaljuan a carbajal, antwan lozada " laobi shaikh. So [facility] [phone].    ATENCIÓN: Si habla hilton, tiene a guerra disposición servicios gratuitos de asistencia lingüística. Celso sanz [phone].    We comply with applicable federal civil rights laws and [place] laws. We do not discriminate on the basis of race, color, national origin, age, disability sex, sexual orientation or gender identity.            Thank you!     Thank you for choosing [facility]  for your care. Our goal is always to provide you with excellent care. Hearing back from our patients is one way we can continue to improve our services. Please take a few minutes to complete the written survey that you may receive in the mail after your visit with us. Thank you!             Your Updated Medication List - Protect others around you: Learn how to safely use, store and throw away your medicines at www.disposemymeds.org.          This list is accurate as of: 7/7/17  1:59 PM.  Always use your most recent med list.                   Brand Name Dispense Instructions for use Diagnosis    amoxicillin-clavulanate 875-125 MG per tablet    AUGMENTIN    20 tablet    Take 1 tablet by mouth 2 times daily for 10 days    Acute non-recurrent frontal sinusitis, Acute suppurative otitis media of both ears without spontaneous rupture of tympanic membranes, recurrence not specified       ciprofloxacin 0.3 % ophthalmic solution    CILOXAN    5 mL    Place 2 drops into both eyes 4 times daily for 7 days    Acute bacterial conjunctivitis of both eyes       estrogens (conjugated) 0.45 MG tablet    PREMARIN    90 tablet    Take 1 tablet (0.45 mg) by mouth daily Last refill until seen  5/26/17    Menopausal syndrome (hot flashes)       folic acid 1 MG tablet    FOLVITE    180 tablet    Take 2 tablets (2 mg) by mouth daily    Inflammatory arthritis       gentamicin 0.3 % ophthalmic solution    GARAMYCIN    5 mL    Place 2 drops into both eyes 4 times daily for 7 days    Acute bacterial  conjunctivitis of both eyes       IBUPROFEN PO           methotrexate 2.5 MG tablet CHEMO     50 tablet    10 tablets once a week    Inflammatory arthritis

## 2017-07-07 NOTE — PATIENT INSTRUCTIONS
Stop Gentamicin drops.  Start Pj/Dex drops both eyes four times daily.  Cold packs to eyes three times daily.  Use good hygiene.  OK to resume work when no significant mattering of eyes.  Woody Aly M.D.

## 2017-07-07 NOTE — PROGRESS NOTES
SUBJECTIVE:                                                    Radha Rodriguez is a 57 year old female who presents to clinic today for the following health issues:    Eye(s) Problem  Onset: 1 day    Description:   Location: bilateral  Pain: YES  Redness: YES    Accompanying Signs & Symptoms:  Discharge/mattering: YES  Swelling: YES  Visual changes: YES- blurry  Fever: no  Nasal Congestion: YES  Bothered by bright lights: YES    History:   Trauma: no   Foreign body exposure: no    Precipitating factors:   Wearing contacts: no    Alleviating factors:  Improved by:   Therapies Tried and outcome: Gentamicin eye drops, no relief    Upper respiratory tract symptoms      Duration: acute    Description (location/character/radiation): sinus pressure, nasal congestion, red eyes and bilateral ear pain    Intensity:  moderate    Accompanying signs and symptoms: denies any fever/chills or diarrhea    History (similar episodes/previous evaluation): none    Precipitating or alleviating factors: allergies versus viral infection.    Therapies tried and outcome: none         Problem list and histories reviewed & adjusted, as indicated.  Additional history: as documented    Patient Active Problem List   Diagnosis     Non-healing lesion on left nose     Viral warts     FEMALE STRESS INCONTINENCE post-hysterectomy     HYPERHIDROSIS on axilla and soles of feet     Distal sacrum pain     Malaise and fatigue     Abdominal pain, right lower quadrant     Dermatitis due to cosmetics     Contact dermatitis and other eczema, due to unspecified cause     Acquired acanthosis nigricans     LFT abnormalities and Urobilnogen high     Female pelvic pain     CARDIOVASCULAR SCREENING; LDL GOAL LESS THAN 160     Finger pain     Mechanical problems with limbs     Vitamin D deficiency     Inflammatory arthritis     High risk medications (not anticoagulants) long-term use     Osteoarthritis     Menopausal syndrome (hot flashes)     Past Surgical History:    Procedure Laterality Date     C REPLACEMENT,URETER,BOWEL SEGMENT  10/01/2008    Part of her ureter was repaired.     CATARACT IOL, RT/LT       COLONOSCOPY  10/14/2011    Procedure:COLONOSCOPY; Colonoscopy; Surgeon:SCOTTY LEDEZMA; Location:[facility]     ENHANCE LASER REFRACTIVE BILATERAL EXISTING PT IN PARAMETERS       HYSTERECTOMY, VAGINAL  1/1/2000    TVH, fibroids (still has ovaries)     SURGICAL HISTORY OF -       Tubal Ligation     SURGICAL HISTORY OF -       Appy     SURGICAL HISTORY OF -       Tonsils     SURGICAL HISTORY OF -   12/94    Anal Fistulotomy       Social History   Substance Use Topics     Smoking status: Never Smoker     Smokeless tobacco: Never Used     Alcohol use Yes      Comment: Occasional     Family History   Problem Relation Age of Onset     HEART DISEASE Father      Heart attack     C.A.D. Father      age 50     Hypertension Father      CANCER Maternal Grandfather      CANCER Paternal Grandfather      DIABETES No family hx of      CEREBROVASCULAR DISEASE No family hx of      Breast Cancer No family hx of      Cancer - colorectal No family hx of      Prostate Cancer No family hx of          Allergies   Allergen Reactions     Sulfa Drugs Rash     Clindamycin Rash     Codeine Hives     Recent Labs   Lab Test  07/05/17   1639  03/29/17   1148  12/12/16   1008   01/31/14   1534   07/18/11   1145   LDL   --    --    --    --    --    --   130*   HDL   --    --    --    --    --    --   41*   TRIG   --    --    --    --    --    --   68   ALT  38  31  38   < >   --    < >   --    CR  0.66  0.68  0.80   < >   --    < >   --    GFRESTIMATED  >90  Non  GFR Calc    >90  Non  GFR Calc    75   < >   --    < >   --    GFRESTBLACK  >90   GFR Calc    >90   GFR Calc    >90   GFR Calc     < >   --    < >   --    POTASSIUM  3.5  4.5  4.4   < >   --    < >   --    TSH   --    --    --    --   1.24   --   0.56    < > =  "values in this interval not displayed.      BP Readings from Last 3 Encounters:   07/07/17 131/81   07/05/17 117/69   05/10/17 120/80    Wt Readings from Last 3 Encounters:   07/07/17 183 lb (83 kg)   07/05/17 188 lb (85.3 kg)   05/10/17 183 lb 6.4 oz (83.2 kg)                    ROS:  C: NEGATIVE for fever, chills, change in weight  I: NEGATIVE for worrisome rashes, moles or lesions  EYES: As above.  ENT/MOUTH: NEGATIVE for epistaxis and hoarseness  R: NEGATIVE for significant cough or SOB  B: NEGATIVE for masses, tenderness or discharge  CV: NEGATIVE for chest pain, palpitations or peripheral edema  GI: NEGATIVE for nausea, abdominal pain, heartburn, or change in bowel habits  : NEGATIVE for frequency, dysuria, or hematuria  M: NEGATIVE for significant arthralgias or myalgia  N: NEGATIVE for weakness, dizziness or paresthesias  E: NEGATIVE for temperature intolerance, skin/hair changes  H: NEGATIVE for bleeding problems  P: NEGATIVE for changes in mood or affect    OBJECTIVE:     /81 (BP Location: Left arm, Patient Position: Chair, Cuff Size: Adult Regular)  Pulse 85  Temp 99.1  F (37.3  C) (Oral)  Ht 5' 4.5\" (1.638 m)  Wt 183 lb (83 kg)  SpO2 98%  BMI 30.93 kg/m2  Body mass index is 30.93 kg/(m^2).  GENERAL: alert, in mild distress and over weight  EYES: eyelids- are swollen and conjunctiva/corneas- conjunctival injection OU  HENT: both ears: mucopurulent effusion, oropharynx clear and oral mucous membranes moist  NECK: no adenopathy and thyroid normal to palpation  RESP: lungs clear to auscultation - no rales, rhonchi or wheezes  CV: regular rate and rhythm, normal S1 S2, no S3 or S4, no murmur, click or rub, no peripheral edema and peripheral pulses strong  MS: no gross musculoskeletal defects noted, no edema  SKIN: no suspicious lesions or rashes  NEURO: Normal strength and tone, mentation intact and speech normal  PSYCH: mentation appears normal, affect normal/bright    Diagnostic Test " Results:  Pending.    ASSESSMENT/PLAN:       (H10.33) Acute bacterial conjunctivitis of both eyes  (primary encounter diagnosis)  Comment: Now questionable due to complaints of blurred vision. Lack of response to Gentamicin eye drops.  Plan:  ciprofloxacin (CILOXAN) 0.3 % ophthalmic solution            (H16.9) Bilateral keratitis  Comment: most probable condition due associated blurred vision.  Plan: OPHTHALMOLOGY ADULT REFERRAL    (J01.10) Acute non-recurrent frontal sinusitis  Comment: Most probable condition, after sinus x-rays was further reviewed by this provider, notwithstanding official results.  Plan: XR Sinus Complete G/E 3 Views,         amoxicillin-clavulanate (AUGMENTIN) 875-125 MG         per tablet            (H66.003) Acute suppurative otitis media of both ears without spontaneous rupture of tympanic membranes, recurrence not specified  Comment: Presence of mucopurulent effusion bilaterally. Consequence of sinusitis.  Plan: amoxicillin-clavulanate (AUGMENTIN) 875-125 MG         per tablet              Follow-up visit if condition worsens.    Temo Fletcher MD  [facility]

## 2017-07-07 NOTE — PROGRESS NOTES
Current Eye Medications: Gentamycin used QID       Subjective: was around someone with Pink eye 1 week ago, 4 year old.  Eyes started to be red and itchy yesterday.  Has a double ear infection and sinus infection, got Augmentin today, hasn't started yet. Did E visit, was given Gentamycin drops, used four times and eyes are more swollen today. 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    Had refractive lens exchanges with [place].     Objective:  See Ophthalmology Exam.       Assessment:  Conjunctivitis versus reaction to Gentamicin drops.      Plan:  Stop Gentamicin drops.  Start Pj/Dex drops both eyes four times daily.  Cold packs to eyes three times daily.  Use good hygiene.  OK to resume work when no significant mattering of eyes.  Woody Aly M.D.

## 2017-07-07 NOTE — NURSING NOTE
"Chief Complaint   Patient presents with     Eye Problem     both eye swollen and blurry vision       Initial /81 (BP Location: Left arm, Patient Position: Chair, Cuff Size: Adult Regular)  Pulse 85  Temp 99.1  F (37.3  C) (Oral)  Ht 5' 4.5\" (1.638 m)  Wt 183 lb (83 kg)  SpO2 98%  BMI 30.93 kg/m2 Estimated body mass index is 30.93 kg/(m^2) as calculated from the following:    Height as of this encounter: 5' 4.5\" (1.638 m).    Weight as of this encounter: 183 lb (83 kg).  Medication Reconciliation: complete     Pa Hansel Yuan MA      "

## 2017-07-07 NOTE — PATIENT INSTRUCTIONS
This summary includes the important diagnoses, test, medications and other important parts of your medical history.  Below are a few good we sites you can use to learn more about these.     Www.Zomato.org : Up to date and easily searchable information on multiple topics.  Www.Zomato.org/Pharmacy/c_539084.asp : [facility] $4.99 medications  Www.medlineplus.gov : medication info, interactive tutorials, watch real surgeries online  Www.familydoctor.org : good info from the Academy of Family Physicians  Www.Vidtelinic.com : good info from the [facility]  Www.cdc.gov : public health info, travel advisories, epidemics (H1N1)  Www.aap.org : children's health info, normal development, vaccinations  Www.health.[facility].[place].us : [place] dept of heat, public health issues in [place], N1N1    Based on your medical history and these are the current health maintenance or preventive care services that you are due for (some may have been done at this visit:)  Health Maintenance Due   Topic Date Due     ADVANCE DIRECTIVE PLANNING Q5 YRS  03/20/1980     LIPID SCREEN Q5 YR FEMALE (SYSTEM ASSIGNED)  07/18/2016     MAMMO Q1 YR  08/14/2016     =================================================================================  Normal Values   Blood pressure  <140/90 for most adults    <130/80 for some chronic diseases (ask your care team about yours)    BMI (body mass index)  18.5-25 kg/m2 (based on height and weight)     Thank you for visiting [facility]    Normal or non-critical lab and imaging results will be communicated to you by MyChart, letter or phone within 7 days.  If you do not hear from us within 10 days, please call the clinic. If you have a critical or abnormal lab result, we will notify you by phone as soon as possible.     If you have any questions regarding your visit please contact:     Team Comfort:   Clinic Hours Telephone Number   Dr. Kristopher Love  Joshua Odom  Eloisa Oliveira   7am-5pm  Monday - Friday [phone]  Mario LUA   Pharmacy 8am-8pm Monday-Thursday      8am-6pm Friday  9am-5pm Saturday-Sunday [phone]   Urgent Care 11am-8pm Monday-Friday        9am-5pm Saturday-Sunday [phone]     After hours, weekend or if you need to make an appointment with your primary provider please call [phone].   After Hours nurse advise: call [facility]: [phone]    Medication Refills:  Call your pharmacy and they will forward the refill to us. Please allow 3 business days for your refills to be completed.    Use [employer] (secure email communication and access to your chart) to send your primary care provider a message or make an appointment. Ask someone on your Team how to sign up for [employer]. To log on to [employer] or for more information in [employer] please visit the website at [email or web address].  As of October 8, 2013, all password changes, disabled accounts, or ID changes in [employer]/MyHealth will be done by our Access Services Department.   If you need help with your account or password, call: [phone]. Clinic staff no longer has the ability to change passwords.     What Is Conjunctivitis?    Conjunctivitis is an irritation or infection. It affects the membrane that covers the white of your eye and the inside of your eyelid (conjunctiva). It can happen to one or both eyes. The membrane swells and the blood vessels enlarge (dilate). This makes your eye red. That's why conjunctivitis is sometimes called red eye or pink eye.  What are the symptoms?  If you have one or more of these symptoms, see an eye doctor:    Redness in and around your eye    Eyes that are puffy and sore    Itching, burning, or stinging eyes    Watery eyes or discharge from your eye    Eyelids that are crusty or stuck together when you wake up in the morning    Pink color in the whites of one or both eyes  Getting treatment quickly can help prevent  damage to your eyes.  How is it diagnosed?  Conjunctivitis is usually a minor eye infection. But it can sometimes become a more serious problem. Some more serious eye diseases have symptoms that look like conjunctivitis. So it's important for an eye doctor to diagnose you. Your eye doctor will ask about your symptoms and any medicines you take. He or she will ask about any illnesses or medical conditions you may have. The doctor will also check your eyes with a hand-held light and a special microscope called a slit lamp.  Date Last Reviewed: 6/11/2015 2000-2017 The [employer]. [address] [place] [address]. All rights reserved. This information is not intended as a substitute for professional medical care. Always follow your healthcare professional's instructions.

## 2017-07-08 ENCOUNTER — HEALTH MAINTENANCE LETTER (OUTPATIENT)
Age: 57
End: 2017-07-08

## 2017-07-12 ENCOUNTER — TELEPHONE (OUTPATIENT)
Dept: OPHTHALMOLOGY | Facility: CLINIC | Age: 57
End: 2017-07-12

## 2017-07-12 ENCOUNTER — OFFICE VISIT (OUTPATIENT)
Dept: OPHTHALMOLOGY | Facility: CLINIC | Age: 57
End: 2017-07-12
Payer: COMMERCIAL

## 2017-07-12 DIAGNOSIS — H16.149 PUNCTATE KERATOPATHY: Primary | ICD-10-CM

## 2017-07-12 DIAGNOSIS — H10.33 ACUTE CONJUNCTIVITIS OF BOTH EYES, UNSPECIFIED ACUTE CONJUNCTIVITIS TYPE: ICD-10-CM

## 2017-07-12 PROCEDURE — 92012 INTRM OPH EXAM EST PATIENT: CPT | Performed by: OPHTHALMOLOGY

## 2017-07-12 ASSESSMENT — VISUAL ACUITY
METHOD: SNELLEN - LINEAR
OD_PH_SC: 20/30
OD_SC+: -1
OS_SC: 20/150
OS_PH_SC: 20/50-2
OD_SC: 20/60

## 2017-07-12 NOTE — TELEPHONE ENCOUNTER
7/12/17    Radha called and was seen last week and was told that it would take about 10 days for her eyes to get better and they were until yesterday when her eyes were becoming blurry and she is getting concerend since today it is getting worse then yesterday.  Please give her a call and assess this with her.    Sofia Fitch  Clinical

## 2017-07-12 NOTE — TELEPHONE ENCOUNTER
Patient called, is still using her Pj/dex drops QID both eyes and cold compresses TID.  The vision, redness and swelling of the eyes was better on Monday then yesterday woke with sticky eyelids and vision decrease.  Now today the eyes are swollen again and the vision is very poor.  Like before she had her lenses replaced. Asked her to get a ride in if she is that blurred and she will be here by 11:15 am to see Dr. Aly

## 2017-07-12 NOTE — MR AVS SNAPSHOT
After Visit Summary   7/12/2017    Radha Rodriguez    MRN: [id number]           Patient Information     Date Of Birth          1960        Visit Information        Provider Department      7/12/2017 11:00 AM Woody Aly MD [facility]        Care Instructions    Stop Pj/Dex drops.  Use frequent preservative free artificial tears.  Continue intermittent cold packs.  Return visit if problems.  Woody Aly M.D.            Follow-ups after your visit        Your next 10 appointments already scheduled     Sep 13, 2017  9:30 AM CDT   Return Visit with [facility]ccray MD   [facility]    [address] [address] [phone]              Who to contact     If you have questions or need follow up information about today's clinic visit or your schedule please contact [facility] directly at [phone].  Normal or non-critical lab and imaging results will be communicated to you by SunLinkhart, letter or phone within 4 business days after the clinic has received the results. If you do not hear from us within 7 days, please contact the clinic through [employer] or phone. If you have a critical or abnormal lab result, we will notify you by phone as soon as possible.  Submit refill requests through FetchBack or call your pharmacy and they will forward the refill request to us. Please allow 3 business days for your refill to be completed.          Additional Information About Your Visit        SunLinkhart Information     FetchBack gives you secure access to your electronic health record. If you see a primary care provider, you can also send messages to your care team and make appointments. If you have questions, please call your primary care clinic.  If you do not have a primary care provider, please call [phone] and they will assist you.        Care EveryWhere ID     This is your Care EveryWhere ID. This could  be used by other organizations to access your [place] medical records  [id number]         Blood Pressure from Last 3 Encounters:   07/07/17 131/81   07/05/17 117/69   05/10/17 120/80    Weight from Last 3 Encounters:   07/07/17 83 kg (183 lb)   07/05/17 85.3 kg (188 lb)   05/10/17 83.2 kg (183 lb 6.4 oz)              Today, you had the following     No orders found for display       Primary Care Provider    None       No address on file        Equal Access to Services     MICHELLE SAM : Hadii aad ku hadasho Soomaali, waaxda luqadaha, qaybta kaalmada adepriscillayadalila, antwan abreu . So [facility] [phone].    ATENCIÓN: Si habla español, tiene a guerra disposición servicios gratuitos de asistencia lingüística. Llame al [phone].    We comply with applicable federal civil rights laws and [place] laws. We do not discriminate on the basis of race, color, national origin, age, disability sex, sexual orientation or gender identity.            Thank you!     Thank you for choosing [facility]  for your care. Our goal is always to provide you with excellent care. Hearing back from our patients is one way we can continue to improve our services. Please take a few minutes to complete the written survey that you may receive in the mail after your visit with us. Thank you!             Your Updated Medication List - Protect others around you: Learn how to safely use, store and throw away your medicines at www.disposemymeds.org.          This list is accurate as of: 7/12/17 11:59 AM.  Always use your most recent med list.                   Brand Name Dispense Instructions for use Diagnosis    amoxicillin-clavulanate 875-125 MG per tablet    AUGMENTIN    20 tablet    Take 1 tablet by mouth 2 times daily for 10 days    Acute non-recurrent frontal sinusitis, Acute suppurative otitis media of both ears without spontaneous rupture of tympanic membranes, recurrence not specified       estrogens  (conjugated) 0.45 MG tablet    PREMARIN    90 tablet    Take 1 tablet (0.45 mg) by mouth daily Last refill until seen  5/26/17    Menopausal syndrome (hot flashes)       folic acid 1 MG tablet    FOLVITE    180 tablet    Take 2 tablets (2 mg) by mouth daily    Inflammatory arthritis       IBUPROFEN PO           methotrexate 2.5 MG tablet CHEMO     50 tablet    10 tablets once a week    Inflammatory arthritis       neomycin-polymyxin-dexamethasone [id number] Susp ophthalmic susp    MAXITROL    1 Bottle    Place 1 drop into both eyes 4 times daily    Acute conjunctivitis of both eyes, unspecified acute conjunctivitis type

## 2017-07-12 NOTE — PROGRESS NOTES
Current Eye Medications:  Pj/dex QID both eyes, cold compresses BID to TID both eyes     Subjective: here for MD check today for vision decrease. Still using her Pj/dex drops QID both eyes and cold compresses TID.  The vision, redness and swelling of the eyes was better on Monday then yesterday woke with sticky eyelids and vision decrease.  Now today the eyes are swollen again and the vision is very poor.  Like before she had her lenses replaced.      Objective:  See Ophthalmology Exam.       Assessment:  Punctate keratopathy both eyes: reaction to drops versus recent conjunctivitis.      Plan: Stop Pj/Dex drops.  Use frequent preservative free artificial tears.  Continue intermittent cold packs.  Return visit if problems.  Woody Aly M.D.

## 2017-07-12 NOTE — PATIENT INSTRUCTIONS
Stop Pj/Dex drops.  Use frequent preservative free artificial tears.  Continue intermittent cold packs.  Return visit if problems.  Woody Aly M.D.

## 2017-07-15 PROBLEM — H10.33 ACUTE CONJUNCTIVITIS OF BOTH EYES, UNSPECIFIED ACUTE CONJUNCTIVITIS TYPE: Status: ACTIVE | Noted: 2017-07-15

## 2017-07-15 ASSESSMENT — SLIT LAMP EXAM - LIDS
COMMENTS: MILD EDEMA
COMMENTS: MILD EDEMA

## 2017-07-15 ASSESSMENT — CUP TO DISC RATIO
OD_RATIO: 0.1
OS_RATIO: 0.1

## 2017-07-15 ASSESSMENT — EXTERNAL EXAM - RIGHT EYE: OD_EXAM: NORMAL

## 2017-07-15 ASSESSMENT — EXTERNAL EXAM - LEFT EYE: OS_EXAM: NORMAL

## 2017-07-29 ASSESSMENT — EXTERNAL EXAM - LEFT EYE: OS_EXAM: NO ENLARGED PAN

## 2017-07-29 ASSESSMENT — EXTERNAL EXAM - RIGHT EYE: OD_EXAM: NO ENLARGED PAN

## 2017-08-03 ENCOUNTER — OFFICE VISIT (OUTPATIENT)
Dept: ORTHOPEDICS | Facility: CLINIC | Age: 57
End: 2017-08-03
Payer: COMMERCIAL

## 2017-08-03 VITALS — SYSTOLIC BLOOD PRESSURE: 119 MMHG | HEART RATE: 73 BPM | OXYGEN SATURATION: 97 % | DIASTOLIC BLOOD PRESSURE: 75 MMHG

## 2017-08-03 DIAGNOSIS — M25.561 CHRONIC PAIN OF RIGHT KNEE: Primary | ICD-10-CM

## 2017-08-03 DIAGNOSIS — G89.29 CHRONIC PAIN OF RIGHT KNEE: Primary | ICD-10-CM

## 2017-08-03 PROCEDURE — 99213 OFFICE O/P EST LOW 20 MIN: CPT | Mod: 25 | Performed by: ORTHOPAEDIC SURGERY

## 2017-08-03 PROCEDURE — 20610 DRAIN/INJ JOINT/BURSA W/O US: CPT | Mod: RT | Performed by: ORTHOPAEDIC SURGERY

## 2017-08-03 ASSESSMENT — PAIN SCALES - GENERAL: PAINLEVEL: MODERATE PAIN (5)

## 2017-08-03 NOTE — PROGRESS NOTES
HISTORY OF PRESENT ILLNESS:  Radha Rodriguez returns to my clinic today for interval followup.  She is a very pleasant 57-year-old woman who I performed a corticosteroid injection to her knee in January of this year, excellent relief.  Very happy.  Much improved.  It took her about 3 weeks to get better but it was tremendously effective.  She would like a repeat injection today.      PHYSICAL EXAMINATION:  A pleasant woman in no acute distress, articulate and interactive.  Visual inspection shows no redness, no drainage, no suggestion of infection.  Ligament stable, neurovascularly intact.      CLINICAL ASSESSMENT:  Medial compartment osteoarthritis.      PLAN:  I had a long discussion today with Radha.  At this time, I offered a repeat injection.      PROCEDURE:  After informed consent was obtained, under sterile technique, 40 mg of Kenalog were injected without complication into her right knee.  The patient tolerated the injection well.  Sterile dressings were applied.        I will plan to see her again on an as-needed basis.  She can continue to have a 3-4 injections per year.  No lifetime max.

## 2017-08-03 NOTE — MR AVS SNAPSHOT
After Visit Summary   8/3/2017    Radha Rodriguez    MRN: [id number]           Patient Information     Date Of Birth          1960        Visit Information        Provider Department      8/3/2017 3:15 PM Favio Chavez MD [facility]        Today's Diagnoses     Chronic pain of right knee    -  1      Care Instructions    Thanks for coming today.  Ortho/Sports Medicine Clinic  [address] [place] [address]    To schedule future appointments in Ortho Clinic, you may call [phone].    To schedule ordered imaging by your Provider: Call [facility] at [phone]    [employer] available online at:   [email or web address]    Please call if any further questions or concerns [phone] and ask for the Orthopedic Department. Clinic hours 8 am to 5 pm.    Return to clinic if symptoms worsen.            Follow-ups after your visit        Your next 10 appointments already scheduled     Sep 13, 2017  9:30 AM CDT   Return Visit with Yobani Mccray MD   [facility]    [address] [facility] [address] [phone]              Who to contact     If you have questions or need follow up information about today's clinic visit or your schedule please contact [facility] directly at [phone].  Normal or non-critical lab and imaging results will be communicated to you by MyChart, letter or phone within 4 business days after the clinic has received the results. If you do not hear from us within 7 days, please contact the clinic through MyChart or phone. If you have a critical or abnormal lab result, we will notify you by phone as soon as possible.  Submit refill requests through [employer] or call your pharmacy and they will forward the refill request to us. Please allow 3 business days for your refill to be completed.          Additional Information About Your Visit         "[employer]hart Information     knowNormal gives you secure access to your electronic health record. If you see a primary care provider, you can also send messages to your care team and make appointments. If you have questions, please call your primary care clinic.  If you do not have a primary care provider, please call [phone] and they will assist you.      knowNormal is an electronic gateway that provides easy, online access to your medical records. With knowNormal, you can request a clinic appointment, read your test results, renew a prescription or communicate with your care team.     To access your existing account, please contact your [facility] or call [phone] for assistance.        Care EveryWhere ID     This is your Care EveryWhere ID. This could be used by other organizations to access your [place] medical records  [id number]        Your Vitals Were     Pulse Pulse Oximetry                73 97%           Blood Pressure from Last 3 Encounters:   08/03/17 119/75   07/07/17 131/81   07/05/17 117/69    Weight from Last 3 Encounters:   07/07/17 83 kg (183 lb)   07/05/17 85.3 kg (188 lb)   05/10/17 83.2 kg (183 lb 6.4 oz)              We Performed the Following     DRAIN/INJECT LARGE JOINT/BURSA     KENALOG PER 10 MG        Primary Care Provider Office Phone # Fax #    Temo Fletcher [phone]       [facility] [address]  [facility] [address]        Equal Access to Services     MICHELLE SAM AH: Hadii aad ku hadasho Soomaali, waaxda luqadaha, qaybta kaalmada adeegyada, waxgaurav lorelei shaikh. So [facility] [phone].    ATENCIÓN: Si habla español, tiene a guerra disposición servicios gratuitos de asistencia lingüística. Llame al [phone].    We comply with applicable federal civil rights laws and [place] laws. We do not discriminate on the basis of race, color, national origin, age, disability sex, sexual  orientation or gender identity.            Thank you!     Thank you for choosing [facility]  for your care. Our goal is always to provide you with excellent care. Hearing back from our patients is one way we can continue to improve our services. Please take a few minutes to complete the written survey that you may receive in the mail after your visit with us. Thank you!             Your Updated Medication List - Protect others around you: Learn how to safely use, store and throw away your medicines at www.disposemymeds.org.          This list is accurate as of: 8/3/17 11:59 PM.  Always use your most recent med list.                   Brand Name Dispense Instructions for use Diagnosis    estrogens (conjugated) 0.45 MG tablet    PREMARIN    90 tablet    Take 1 tablet (0.45 mg) by mouth daily Last refill until seen  5/26/17    Menopausal syndrome (hot flashes)       folic acid 1 MG tablet    FOLVITE    180 tablet    Take 2 tablets (2 mg) by mouth daily    Inflammatory arthritis       IBUPROFEN PO           methotrexate 2.5 MG tablet CHEMO     50 tablet    10 tablets once a week    Inflammatory arthritis       neomycin-polymyxin-dexamethasone [id number] Susp ophthalmic susp    MAXITROL    1 Bottle    Place 1 drop into both eyes 4 times daily    Acute conjunctivitis of both eyes, unspecified acute conjunctivitis type

## 2017-08-03 NOTE — NURSING NOTE
"Radha Rodriguez's goals for this visit include: Right knee pain possible injections  She requests these members of her care team be copied on today's visit information: yes    PCP: Temo lFetcher    Referring Provider:  No referring provider defined for this encounter.    Chief Complaint   Patient presents with     RECHECK     Right knee possible injection       Initial /75  Pulse 73  SpO2 97% Estimated body mass index is 30.93 kg/(m^2) as calculated from the following:    Height as of 7/7/17: 1.638 m (5' 4.5\").    Weight as of 7/7/17: 83 kg (183 lb).  Medication Reconciliation: complete    "

## 2017-08-03 NOTE — PATIENT INSTRUCTIONS
Thanks for coming today.  Ortho/Sports Medicine Clinic  [address] [place] [address]    To schedule future appointments in Ortho Clinic, you may call [phone].    To schedule ordered imaging by your Provider: Call [facility] at [phone]    Ask The Doctor available online at:   [email or web address]    Please call if any further questions or concerns [phone] and ask for the Orthopedic Department. Clinic hours 8 am to 5 pm.    Return to clinic if symptoms worsen.

## 2017-08-28 DIAGNOSIS — M19.90 INFLAMMATORY ARTHRITIS: ICD-10-CM

## 2017-08-28 NOTE — TELEPHONE ENCOUNTER
Called pt left message to return call.  Please inform pt that she has refills remaining on her Methotrexate.    Samira Mcknight  [facility] RN

## 2017-08-28 NOTE — TELEPHONE ENCOUNTER
Methotrexate         Last Written Prescription Date:  07-05-17  Last Fill Quantity: 50,   # refills: 5  Last Office Visit with [facility], [facility] or [facility] prescribing provider: 07-05-17  Future Office visit:    Next 5 appointments (look out 90 days)     Sep 13, 2017  9:30 AM CDT   Return Visit with Yobani Mccray MD   [facility] ([facility])    [address] [facility] [address]   [phone]                   Routing refill request to provider for review/approval because:  Drug not on the [facility], [facility] or [facility] refill protocol or controlled substance    Samira Mcknight  [facility] RN

## 2017-10-11 ENCOUNTER — RADIANT APPOINTMENT (OUTPATIENT)
Dept: GENERAL RADIOLOGY | Facility: CLINIC | Age: 57
End: 2017-10-11
Attending: INTERNAL MEDICINE
Payer: COMMERCIAL

## 2017-10-11 ENCOUNTER — OFFICE VISIT (OUTPATIENT)
Dept: RHEUMATOLOGY | Facility: CLINIC | Age: 57
End: 2017-10-11
Payer: COMMERCIAL

## 2017-10-11 VITALS
OXYGEN SATURATION: 97 % | RESPIRATION RATE: 14 BRPM | WEIGHT: 186 LBS | BODY MASS INDEX: 31.43 KG/M2 | HEART RATE: 67 BPM | SYSTOLIC BLOOD PRESSURE: 127 MMHG | DIASTOLIC BLOOD PRESSURE: 70 MMHG

## 2017-10-11 DIAGNOSIS — M19.90 INFLAMMATORY ARTHRITIS: ICD-10-CM

## 2017-10-11 LAB
ALBUMIN SERPL-MCNC: 3.9 G/DL (ref 3.4–5)
ALP SERPL-CCNC: 57 U/L (ref 40–150)
ALT SERPL W P-5'-P-CCNC: 44 U/L (ref 0–50)
ANION GAP SERPL CALCULATED.3IONS-SCNC: 5 MMOL/L (ref 3–14)
AST SERPL W P-5'-P-CCNC: 28 U/L (ref 0–45)
BASOPHILS # BLD AUTO: 0.1 10E9/L (ref 0–0.2)
BASOPHILS NFR BLD AUTO: 0.7 %
BILIRUB SERPL-MCNC: 0.7 MG/DL (ref 0.2–1.3)
BUN SERPL-MCNC: 18 MG/DL (ref 7–30)
CALCIUM SERPL-MCNC: 9.2 MG/DL (ref 8.5–10.1)
CHLORIDE SERPL-SCNC: 105 MMOL/L (ref 94–109)
CO2 SERPL-SCNC: 28 MMOL/L (ref 20–32)
CREAT SERPL-MCNC: 0.73 MG/DL (ref 0.52–1.04)
DIFFERENTIAL METHOD BLD: NORMAL
EOSINOPHIL # BLD AUTO: 0.2 10E9/L (ref 0–0.7)
EOSINOPHIL NFR BLD AUTO: 2.2 %
ERYTHROCYTE [DISTWIDTH] IN BLOOD BY AUTOMATED COUNT: 12.9 % (ref 10–15)
GFR SERPL CREATININE-BSD FRML MDRD: 82 ML/MIN/1.7M2
GLUCOSE SERPL-MCNC: 86 MG/DL (ref 70–99)
HCT VFR BLD AUTO: 39.4 % (ref 35–47)
HGB BLD-MCNC: 13.2 G/DL (ref 11.7–15.7)
LYMPHOCYTES # BLD AUTO: 1.4 10E9/L (ref 0.8–5.3)
LYMPHOCYTES NFR BLD AUTO: 15.5 %
MCH RBC QN AUTO: 32.8 PG (ref 26.5–33)
MCHC RBC AUTO-ENTMCNC: 33.5 G/DL (ref 31.5–36.5)
MCV RBC AUTO: 98 FL (ref 78–100)
MONOCYTES # BLD AUTO: 0.4 10E9/L (ref 0–1.3)
MONOCYTES NFR BLD AUTO: 4.2 %
NEUTROPHILS # BLD AUTO: 6.8 10E9/L (ref 1.6–8.3)
NEUTROPHILS NFR BLD AUTO: 77.4 %
PLATELET # BLD AUTO: 216 10E9/L (ref 150–450)
POTASSIUM SERPL-SCNC: 4.1 MMOL/L (ref 3.4–5.3)
PROT SERPL-MCNC: 6.9 G/DL (ref 6.8–8.8)
RBC # BLD AUTO: 4.03 10E12/L (ref 3.8–5.2)
SODIUM SERPL-SCNC: 138 MMOL/L (ref 133–144)
WBC # BLD AUTO: 8.8 10E9/L (ref 4–11)

## 2017-10-11 PROCEDURE — 73110 X-RAY EXAM OF WRIST: CPT | Mod: LT

## 2017-10-11 PROCEDURE — 80053 COMPREHEN METABOLIC PANEL: CPT | Performed by: INTERNAL MEDICINE

## 2017-10-11 PROCEDURE — 36415 COLL VENOUS BLD VENIPUNCTURE: CPT | Performed by: INTERNAL MEDICINE

## 2017-10-11 PROCEDURE — 20600 DRAIN/INJ JOINT/BURSA W/O US: CPT | Mod: FA | Performed by: INTERNAL MEDICINE

## 2017-10-11 PROCEDURE — 99213 OFFICE O/P EST LOW 20 MIN: CPT | Mod: 25 | Performed by: INTERNAL MEDICINE

## 2017-10-11 PROCEDURE — 85025 COMPLETE CBC W/AUTO DIFF WBC: CPT | Performed by: INTERNAL MEDICINE

## 2017-10-11 PROCEDURE — 73130 X-RAY EXAM OF HAND: CPT | Mod: LT

## 2017-10-11 RX ORDER — FOLIC ACID 1 MG/1
2 TABLET ORAL DAILY
Qty: 180 TABLET | Refills: 3 | Status: SHIPPED | OUTPATIENT
Start: 2017-10-11 | End: 2018-04-24

## 2017-10-11 NOTE — NURSING NOTE
"Chief Complaint   Patient presents with     Recheck Medication     inflammatory arthritis       Initial /70 (BP Location: Right arm, Patient Position: Chair)  Pulse 67  Resp 14  Wt 186 lb (84.4 kg)  SpO2 97%  BMI 31.43 kg/m2 Estimated body mass index is 31.43 kg/(m^2) as calculated from the following:    Height as of 7/7/17: 5' 4.5\" (1.638 m).    Weight as of this encounter: 186 lb (84.4 kg).  Medication Reconciliation: complete   Hayley Horta LPN    "

## 2017-10-11 NOTE — NURSING NOTE
The following medication was given:     MEDICATION: Methylprednisolone 80  ROUTE: Intra- articular  SITE: LEFT THUMB  DOSE: .25ML  LOT #: [id number]  : [employer]  EXPIRATION DATE: 02/2018  NDC#: 7163-0254-31  Medication was drawn by Hayley Horta LPN   and administered by Dr. Mccray.   Documented by Hayley Horta LPN    The following medication was given:     MEDICATION: 1% lidocaine  ROUTE: Intra-articular (given with steroid)  SITE: LEFT THUMB  DOSE: .25ML  LOT: [id number]  :  [employer]  EXPIRATION DATE:  03/2019  NDC#: 65499-469-47  Drawn by Hayley Horta LPN  / Administered by Dr. Mccray/ Documented by Hayley Horta LPN

## 2017-10-12 NOTE — PROGRESS NOTES
RHEUMATOLOGY SPECIALTY CLINIC FOLLOW-UP NOTE      SUBJECTIVE:  The patient Radha Rodriguez is seen in follow-up for her inflammatory arthritis and osteoarthritis.  She is having a great deal of pain in her left thumb and also notes that her  is getting weaker, especially on the left.  She has difficulty making a fist.  She remains on methotrexate 25 mg weekly without nausea, vomiting, diarrhea or stomatitis.  There is no progressive dyspnea or chronic cough.  We reviewed that we both felt that the methotrexate has been helpful for some of her symptoms, but clearly she has gotten worse since last seen.  Etiology could be worsening osteoarthritis or her inflammatory arthritis.      OBJECTIVE:    VITAL SIGNS:  Blood pressure 127/70, pulse 67, weight 186 pounds.   JOINTS:  There is more osteoarthritis with bony hypertrophy and tenderness of the left first carpometacarpal joint.  There is more osteoarthritis of the left fourth and fifth proximal interphalangeal joints.  I do not appreciate synovitis today.        IMAGING:  X-rays were performed and reviewed independently.  Joints all look pretty good.  There are certainly no signs of erosive disease.  I do think there is osteoarthritic spurring and joint space loss occurring of the left first carpometacarpal joint.      IMPRESSION:     1.  Osteoarthritis of the left thumb and other joints.   2.  Inflammatory arthritis that I think is reasonably well controlled today.      RECOMMENDATIONS:   1.  Continue methotrexate 25 mg weekly.   2.  Continue folic acid 1 mg daily.   3.  Check methotrexate laboratory studies today.   4.  We will inject the left thumb today.   4.  Follow up with me in 3 months.   5.  We have not checked x-rays for a while, and I suggested we do that to see if there are signs of progression.      PROCEDURE NOTE:  After informed verbal consent was obtained from the patient and under sterile technique and after the use of ethyl chloride for anesthetic  I injected 40 mg of Medrol and 0.5 cc of lidocaine into the left first carpometacarpal joint without complications.  The patient tolerated the procedure well.         CINTIA HAQ MD             D: 10/11/2017 12:52   T: 10/12/2017 06:13   MT: [id number]      Name:     DOUG LANCE   MRN:      [id number]        Account:      [id number]   :      1960           Visit Date:   10/11/2017      Document: [id number]       cc: Temo Fletcher MD

## 2017-10-13 RX ORDER — METHYLPREDNISOLONE ACETATE 80 MG/ML
20 INJECTION, SUSPENSION INTRA-ARTICULAR; INTRALESIONAL; INTRAMUSCULAR; SOFT TISSUE ONCE
Qty: 0.25 ML | Refills: 0 | OUTPATIENT
Start: 2017-10-13 | End: 2017-10-13

## 2017-10-13 RX ORDER — LIDOCAINE HYDROCHLORIDE 10 MG/ML
0.25 INJECTION, SOLUTION INFILTRATION; PERINEURAL ONCE
Qty: 20.25 ML | Refills: 0 | OUTPATIENT
Start: 2017-10-13 | End: 2017-10-13

## 2017-12-14 ENCOUNTER — OFFICE VISIT (OUTPATIENT)
Dept: FAMILY MEDICINE | Facility: CLINIC | Age: 57
End: 2017-12-14
Payer: COMMERCIAL

## 2017-12-14 VITALS
HEIGHT: 65 IN | BODY MASS INDEX: 31.29 KG/M2 | OXYGEN SATURATION: 98 % | HEART RATE: 75 BPM | WEIGHT: 187.8 LBS | DIASTOLIC BLOOD PRESSURE: 85 MMHG | SYSTOLIC BLOOD PRESSURE: 132 MMHG | TEMPERATURE: 98.4 F

## 2017-12-14 DIAGNOSIS — M19.90 INFLAMMATORY ARTHRITIS: ICD-10-CM

## 2017-12-14 DIAGNOSIS — J01.90 ACUTE SINUSITIS WITH SYMPTOMS > 10 DAYS: Primary | ICD-10-CM

## 2017-12-14 DIAGNOSIS — Z28.21 INFLUENZA VACCINATION DECLINED BY PATIENT: ICD-10-CM

## 2017-12-14 DIAGNOSIS — Z12.31 VISIT FOR SCREENING MAMMOGRAM: ICD-10-CM

## 2017-12-14 DIAGNOSIS — Z13.6 CARDIOVASCULAR SCREENING; LDL GOAL LESS THAN 160: ICD-10-CM

## 2017-12-14 PROCEDURE — 99213 OFFICE O/P EST LOW 20 MIN: CPT | Performed by: NURSE PRACTITIONER

## 2017-12-14 NOTE — LETTER
[address] [address]  Phone: [phone]    December 14, 2017        Radha Rodriguez  [address]  [facility] [address]          To whom it may concern:    RE: Radha Rodriguez    Patient was seen and treated today at our clinic and missed work.  Patient may return to work 12/18/17 with the following:  No working or lifting restrictions    Please contact me for questions or concerns.      Sincerely,        PHONG Cantu CNP

## 2017-12-14 NOTE — MR AVS SNAPSHOT
After Visit Summary   12/14/2017    Radha Rodriguez    MRN: [id number]           Patient Information     Date Of Birth          1960        Visit Information        Provider Department      12/14/2017 1:20 PM Elaine Tejeda APRN CNP [facility]        Today's Diagnoses     Acute sinusitis with symptoms > 10 days    -  1    Inflammatory arthritis        Visit for screening mammogram        CARDIOVASCULAR SCREENING; LDL GOAL LESS THAN 160        Influenza vaccination declined by patient          Care Instructions    At [facility], we strive to deliver an exceptional experience to you, every time we see you.  If you receive a survey in the mail, please send us back your thoughts. We really do value your feedback.    Based on your medical history, these are the current health maintenance/preventive care services that you are due for (some may have been done at this visit.)  Health Maintenance Due   Topic Date Due     LIPID SCREEN Q5 YR FEMALE (SYSTEM ASSIGNED)  07/18/2016     MAMMO Q1 YR  08/14/2016     ADVANCE DIRECTIVE PLANNING Q5 YRS  03/20/2017     INFLUENZA VACCINE (SYSTEM ASSIGNED)  09/01/2017         Suggested websites for health information:  Www.[employer].NeuString : Up to date and easily searchable information on multiple topics.  Www.medlineplus.gov : medication info, interactive tutorials, watch real surgeries online  Www.familydoctor.org : good info from the Academy of Family Physicians  Www.cdc.gov : public health info, travel advisories, epidemics (H1N1)  Www.aap.org : children's health info, normal development, vaccinations  Www.health.[facility].[place].us : [place] dept of health, public health issues in [place], N1N1    Your care team:                            Family Medicine Internal Medicine   MD Temo Lott MD Shantel Branch-Fleming, MD Katya Georgiev PA-C Nam Ho, MD Pediatrics   SURESH Morton, DAMIÁN Kent  MD Daija Rubio CNP, MD Deborah Mielke, MD Kim Thein, APRN CNP      Clinic hours: Monday - Thursday 7 am-7 pm; Fridays 7 am-5 pm.   Urgent care: Monday - Friday 11 am-9 pm; Saturday and Sunday 9 am-5 pm.  Pharmacy : Monday -Thursday 8 am-8 pm; Friday 8 am-6 pm; Saturday and Sunday 9 am-5 pm.     Clinic: [phone]   Pharmacy: [phone]      Sinusitis (Antibiotic Treatment)    The sinuses are air-filled spaces within the bones of the face. They connect to the inside of the nose. Sinusitis is an inflammation of the tissue lining the sinus cavity. Sinus inflammation can occur during a cold. It can also be due to allergies to pollens and other particles in the air. Sinusitis can cause symptoms of sinus congestion and fullness. A sinus infection causes fever, headache and facial pain. There is often green or yellow drainage from the nose or into the back of the throat (post-nasal drip). You have been given antibiotics to treat this condition.  Home care:    Take the full course of antibiotics as instructed. Do not stop taking them, even if you feel better.    Drink plenty of water, hot tea, and other liquids. This may help thin mucus. It also may promote sinus drainage.    Heat may help soothe painful areas of the face. Use a towel soaked in hot water. Or,  the shower and direct the hot spray onto your face. Using a vaporizer along with a menthol rub at night may also help.     An expectorant containing guaifenesin may help thin the mucus and promote drainage from the sinuses.    Over-the-counter decongestants may be used unless a similar medicine was prescribed. Nasal sprays work the fastest. Use one that contains phenylephrine or oxymetazoline. First blow the nose gently. Then use the spray. Do not use these medicines more often than directed on the label or symptoms may get worse. You may also use tablets containing pseudoephedrine. Avoid products that combine  ingredients, because side effects may be increased. Read labels. You can also ask the pharmacist for help. (NOTE: Persons with high blood pressure should not use decongestants. They can raise blood pressure.)    Over-the-counter antihistamines may help if allergies contributed to your sinusitis.      Do not use nasal rinses or irrigation during an acute sinus infection, unless told to by your health care provider. Rinsing may spread the infection to other sinuses.    Use acetaminophen or ibuprofen to control pain, unless another pain medicine was prescribed. (If you have chronic liver or kidney disease or ever had a stomach ulcer, talk with your doctor before using these medicines. Aspirin should never be used in anyone under 18 years of age who is ill with a fever. It may cause severe liver damage.)    Don't smoke. This can worsen symptoms.  Follow-up care  Follow up with your healthcare provider or our staff if you are not improving within the next week.  When to seek medical advice  Call your healthcare provider if any of these occur:    Facial pain or headache becoming more severe    Stiff neck    Unusual drowsiness or confusion    Swelling of the forehead or eyelids    Vision problems, including blurred or double vision    Fever of 100.4 F (38 C) or higher, or as directed by your healthcare provider    Seizure    Breathing problems    Symptoms not resolving within 10 days  Date Last Reviewed: 4/13/2015 2000-2017 [employer]. [address] [place] [address], [place] [address]. All rights reserved. This information is not intended as a substitute for professional medical care. Always follow your healthcare professional's instructions.                Follow-ups after your visit        Future tests that were ordered for you today     Open Future Orders        Priority Expected Expires Ordered    MA SCREENING DIGITAL BILAT - Future  (s+30) Routine  12/14/2018 12/14/2017            Who to contact     If you  "have questions or need follow up information about today's clinic visit or your schedule please contact [facility] [place] directly at [phone].  Normal or non-critical lab and imaging results will be communicated to you by [employer], letter or phone within 4 business days after the clinic has received the results. If you do not hear from us within 7 days, please contact the clinic through [employer] or phone. If you have a critical or abnormal lab result, we will notify you by phone as soon as possible.  Submit refill requests through [employer] or call your pharmacy and they will forward the refill request to us. Please allow 3 business days for your refill to be completed.          Additional Information About Your Visit        [employer] Information     [employer] gives you secure access to your electronic health record. If you see a primary care provider, you can also send messages to your care team and make appointments. If you have questions, please call your primary care clinic.  If you do not have a primary care provider, please call [phone] and they will assist you.        Care EveryWhere ID     This is your Care EveryWhere ID. This could be used by other organizations to access your [place] medical records  [id number]        Your Vitals Were     Pulse Temperature Height Pulse Oximetry BMI (Body Mass Index)       75 98.4  F (36.9  C) (Oral) 5' 4.5\" (1.638 m) 98% 31.74 kg/m2        Blood Pressure from Last 3 Encounters:   12/14/17 132/85   10/11/17 127/70   08/03/17 119/75    Weight from Last 3 Encounters:   12/14/17 187 lb 12.8 oz (85.2 kg)   10/11/17 186 lb (84.4 kg)   07/07/17 183 lb (83 kg)              We Performed the Following     Lipid panel reflex to direct LDL Fasting          Today's Medication Changes          These changes are accurate as of: 12/14/17  1:33 PM.  If you have any questions, ask your nurse or doctor.               Start taking these medicines.        Dose/Directions    " amoxicillin-clavulanate 875-125 MG per tablet   Commonly known as:  AUGMENTIN   Used for:  Acute sinusitis with symptoms > 10 days   Started by:  Elaine Tejeda APRN CNP        Dose:  1 tablet   Take 1 tablet by mouth 2 times daily   Quantity:  20 tablet   Refills:  0            Where to get your medicines      These medications were sent to [facility] - [facility], [place] - [address] AT [facility] [address], [place] [address]     Phone:  [phone]     amoxicillin-clavulanate 875-125 MG per tablet                Primary Care Provider Office Phone # Fax #    Temo Fletcher [phone] [address]  [facility] [address]        Equal Access to Services     [facility]: Hadii aleksey lomas hadasho Soomaali, waaxda luqadaha, qaybta kaalmada adeegyada, antwan abreu . So [facility] [phone].    ATENCIÓN: Si habla español, tiene a guerra disposición servicios gratuitos de asistencia lingüística. LlGrand [facility] [phone].    We comply with applicable federal civil rights laws and [place] laws. We do not discriminate on the basis of race, color, national origin, age, disability, sex, sexual orientation, or gender identity.            Thank you!     Thank you for choosing [facility]  for your care. Our goal is always to provide you with excellent care. Hearing back from our patients is one way we can continue to improve our services. Please take a few minutes to complete the written survey that you may receive in the mail after your visit with us. Thank you!             Your Updated Medication List - Protect others around you: Learn how to safely use, store and throw away your medicines at www.disposemymeds.org.          This list is accurate as of: 12/14/17  1:33 PM.  Always use your most recent med list.                   Brand Name Dispense Instructions for use Diagnosis    amoxicillin-clavulanate  875-125 MG per tablet    AUGMENTIN    20 tablet    Take 1 tablet by mouth 2 times daily    Acute sinusitis with symptoms > 10 days       folic acid 1 MG tablet    FOLVITE    180 tablet    Take 2 tablets (2 mg) by mouth daily    Inflammatory arthritis       IBUPROFEN PO           methotrexate 2.5 MG tablet CHEMO     50 tablet    10 tablets once a week    Inflammatory arthritis       PREMARIN 0.45 MG tablet   Generic drug:  estrogens (conjugated)     90 tablet    TAKE 1 TABLET(0.45 MG) BY MOUTH DAILY    Menopausal syndrome (hot flashes)

## 2017-12-14 NOTE — NURSING NOTE
"Chief Complaint   Patient presents with     URI       Initial /85 (BP Location: Left arm, Patient Position: Sitting, Cuff Size: Adult Regular)  Pulse 75  Temp 98.4  F (36.9  C) (Oral)  Ht 5' 4.5\" (1.638 m)  Wt 187 lb 12.8 oz (85.2 kg)  SpO2 98%  BMI 31.74 kg/m2 Estimated body mass index is 31.74 kg/(m^2) as calculated from the following:    Height as of this encounter: 5' 4.5\" (1.638 m).    Weight as of this encounter: 187 lb 12.8 oz (85.2 kg).  Medication Reconciliation: complete   Mimi Alexis MA      "

## 2017-12-14 NOTE — PATIENT INSTRUCTIONS
At [facility], we strive to deliver an exceptional experience to you, every time we see you.  If you receive a survey in the mail, please send us back your thoughts. We really do value your feedback.    Based on your medical history, these are the current health maintenance/preventive care services that you are due for (some may have been done at this visit.)  Health Maintenance Due   Topic Date Due     LIPID SCREEN Q5 YR FEMALE (SYSTEM ASSIGNED)  07/18/2016     MAMMO Q1 YR  08/14/2016     ADVANCE DIRECTIVE PLANNING Q5 YRS  03/20/2017     INFLUENZA VACCINE (SYSTEM ASSIGNED)  09/01/2017         Suggested websites for health information:  Www.[email or web address] : Up to date and easily searchable information on multiple topics.  Www.medlineplus.gov : medication info, interactive tutorials, watch real surgeries online  Www.familydoctor.org : good info from the Academy of Family Physicians  Www.cdc.gov : public health info, travel advisories, epidemics (H1N1)  Www.aap.org : children's health info, normal development, vaccinations  Www.health.[email or web address].[place].us : [place] dept of health, public health issues in [place], N1N1    Your care team:                            Family Medicine Internal Medicine   MD Temo Lott MD Shantel Branch-Fleming, MD Katya Georgiev PA-C Nam Ho, MD Pediatrics   SURESH Morton, DAMIÁN Kent APRERI CNP   MD Daija Ortiz MD Deborah Mielke, MD Kim Thein, APRN [facility]      Clinic hours: Monday - Thursday 7 am-7 pm; Fridays 7 am-5 pm.   Urgent care: Monday - Friday 11 am-9 pm; Saturday and Sunday 9 am-5 pm.  Pharmacy : Monday -Thursday 8 am-8 pm; Friday 8 am-6 pm; Saturday and Sunday 9 am-5 pm.     Clinic: [phone]   Pharmacy: [phone]      Sinusitis (Antibiotic Treatment)    The sinuses are air-filled spaces within the bones of the face. They connect to the inside of the nose. Sinusitis is an inflammation of the  tissue lining the sinus cavity. Sinus inflammation can occur during a cold. It can also be due to allergies to pollens and other particles in the air. Sinusitis can cause symptoms of sinus congestion and fullness. A sinus infection causes fever, headache and facial pain. There is often green or yellow drainage from the nose or into the back of the throat (post-nasal drip). You have been given antibiotics to treat this condition.  Home care:    Take the full course of antibiotics as instructed. Do not stop taking them, even if you feel better.    Drink plenty of water, hot tea, and other liquids. This may help thin mucus. It also may promote sinus drainage.    Heat may help soothe painful areas of the face. Use a towel soaked in hot water. Or,  the shower and direct the hot spray onto your face. Using a vaporizer along with a menthol rub at night may also help.     An expectorant containing guaifenesin may help thin the mucus and promote drainage from the sinuses.    Over-the-counter decongestants may be used unless a similar medicine was prescribed. Nasal sprays work the fastest. Use one that contains phenylephrine or oxymetazoline. First blow the nose gently. Then use the spray. Do not use these medicines more often than directed on the label or symptoms may get worse. You may also use tablets containing pseudoephedrine. Avoid products that combine ingredients, because side effects may be increased. Read labels. You can also ask the pharmacist for help. (NOTE: Persons with high blood pressure should not use decongestants. They can raise blood pressure.)    Over-the-counter antihistamines may help if allergies contributed to your sinusitis.      Do not use nasal rinses or irrigation during an acute sinus infection, unless told to by your health care provider. Rinsing may spread the infection to other sinuses.    Use acetaminophen or ibuprofen to control pain, unless another pain medicine was prescribed. (If  you have chronic liver or kidney disease or ever had a stomach ulcer, talk with your doctor before using these medicines. Aspirin should never be used in anyone under 18 years of age who is ill with a fever. It may cause severe liver damage.)    Don't smoke. This can worsen symptoms.  Follow-up care  Follow up with your healthcare provider or our staff if you are not improving within the next week.  When to seek medical advice  Call your healthcare provider if any of these occur:    Facial pain or headache becoming more severe    Stiff neck    Unusual drowsiness or confusion    Swelling of the forehead or eyelids    Vision problems, including blurred or double vision    Fever of 100.4 F (38 C) or higher, or as directed by your healthcare provider    Seizure    Breathing problems    Symptoms not resolving within 10 days  Date Last Reviewed: 4/13/2015 2000-2017 [employer]. [address] [place] [address], [place] [address]. All rights reserved. This information is not intended as a substitute for professional medical care. Always follow your healthcare professional's instructions.

## 2017-12-14 NOTE — PROGRESS NOTES
SUBJECTIVE:   Radha Rodriguez is a 57 year old female who presents to clinic today for the following health issues:      ENT Symptoms             Symptoms: cc Present Absent Comment   Fever/Chills  x  Low grade fever, chills, and sweats    Fatigue  x     Muscle Aches  x     Eye Irritation   x    Sneezing  x     Nasal Octavio/Drg  x  Thick green, occasionally bloody   Sinus Pressure/Pain  x     Loss of smell  x     Dental pain   x    Sore Throat  x     Swollen Glands  x     Ear Pain/Fullness  x  Right ear    Cough  x  Vomited twice after coughing    Wheeze  x     Chest Pain  x  Tightness and burning    Shortness of breath  x     Rash   x    Other         Symptom duration:  Three weeks    Symptom severity:  Moderate    Treatments tried:  Day/Niteqiul, cough drops   Contacts:  Yes- work      Patient had URI but it got much worse about 4 days ago. She had posttussive vomiitntg 2 days ago, complains of significant fatigue, is not sleeping well due to difficultyb reathing, is sleeping in a chair to help with breathing. No history of asthma or JOAN. Patient continues on MTX  For inflammatory arthritis.      Problem list and histories reviewed & adjusted, as indicated.  Additional history: as documented    Patient Active Problem List   Diagnosis     Non-healing lesion on left nose     Viral warts     FEMALE STRESS INCONTINENCE post-hysterectomy     HYPERHIDROSIS on axilla and soles of feet     Distal sacrum pain     Malaise and fatigue     Abdominal pain, right lower quadrant     Dermatitis due to cosmetics     Contact dermatitis and other eczema, due to unspecified cause     Acquired acanthosis nigricans     LFT abnormalities and Urobilnogen high     Female pelvic pain     CARDIOVASCULAR SCREENING; LDL GOAL LESS THAN 160     Finger pain     Mechanical problems with limbs     Vitamin D deficiency     Inflammatory arthritis     High risk medications (not anticoagulants) long-term use     Osteoarthritis     Menopausal syndrome  "(hot flashes)     Acute conjunctivitis of both eyes, unspecified acute conjunctivitis type     Past Surgical History:   Procedure Laterality Date     C REPLACEMENT,URETER,BOWEL SEGMENT  10/01/2008    Part of her ureter was repaired.     CATARACT IOL, RT/LT       COLONOSCOPY  10/14/2011    Procedure:COLONOSCOPY; Colonoscopy; Surgeon:SCOTTY LEDEZMA; Location:[facility]     ENHANCE LASER REFRACTIVE BILATERAL EXISTING PT IN PARAMETERS       HYSTERECTOMY, VAGINAL  1/1/2000    TVH, fibroids (still has ovaries)     SURGICAL HISTORY OF -       Tubal Ligation     SURGICAL HISTORY OF -       Appy     SURGICAL HISTORY OF -       Tonsils     SURGICAL HISTORY OF -   12/94    Anal Fistulotomy       Social History   Substance Use Topics     Smoking status: Never Smoker     Smokeless tobacco: Never Used     Alcohol use Yes      Comment: Occasional     Family History   Problem Relation Age of Onset     HEART DISEASE Father      Heart attack     C.A.D. Father      age 50     Hypertension Father      CANCER Maternal Grandfather      CANCER Paternal Grandfather      DIABETES No family hx of      CEREBROVASCULAR DISEASE No family hx of      Breast Cancer No family hx of      Cancer - colorectal No family hx of      Prostate Cancer No family hx of          BP Readings from Last 3 Encounters:   12/14/17 132/85   10/11/17 127/70   08/03/17 119/75    Wt Readings from Last 3 Encounters:   12/14/17 187 lb 12.8 oz (85.2 kg)   10/11/17 186 lb (84.4 kg)   07/07/17 183 lb (83 kg)                  Labs reviewed in EPIC        Reviewed and updated as needed this visit by clinical staff       Reviewed and updated as needed this visit by Provider         ROS:  Constitutional, HEENT, cardiovascular, pulmonary, gi and gu systems are negative, except as otherwise noted.      OBJECTIVE:   /85 (BP Location: Left arm, Patient Position: Sitting, Cuff Size: Adult Regular)  Pulse 75  Temp 98.4  F (36.9  C) (Oral)  Ht 5' 4.5\" (1.638 m)  Wt 187 " "lb 12.8 oz (85.2 kg)  SpO2 98%  BMI 31.74 kg/m2  Body mass index is 31.74 kg/(m^2).  GENERAL: healthy, alert and no distress  EYES: Eyes grossly normal to inspection, PERRL and conjunctivae and sclerae normal  HENT: ear canals and TM's normal, nose and mouth without ulcers or lesions  NECK: no adenopathy, no asymmetry, masses, or scars and thyroid normal to palpation  RESP: lungs clear to auscultation - no rales, rhonchi or wheezes  CV: regular rate and rhythm, normal S1 S2, no S3 or S4, no murmur, click or rub, no peripheral edema and peripheral pulses strong  ABDOMEN: soft, nontender, no hepatosplenomegaly, no masses and bowel sounds normal  MS: no gross musculoskeletal defects noted, no edema  SKIN: no suspicious lesions or rashes  NEURO: Normal strength and tone, mentation intact and speech normal  PSYCH: mentation appears normal, affect normal/bright    Diagnostic Test Results:  none     ASSESSMENT/PLAN:       BP Screening:   Last 3 BP Readings:    BP Readings from Last 3 Encounters:   12/14/17 132/85   10/11/17 127/70   08/03/17 119/75       The following was recommended to the patient:  Re-screen BP within a year and recommended lifestyle modifications  BMI:   Estimated body mass index is 31.74 kg/(m^2) as calculated from the following:    Height as of this encounter: 5' 4.5\" (1.638 m).    Weight as of this encounter: 187 lb 12.8 oz (85.2 kg).   Weight management plan: Discussed healthy diet and exercise guidelines and patient will follow up in 12 months in clinic to re-evaluate.    Declined immunizations: Influenza due to Conscientious objector      1. Acute sinusitis with symptoms > 10 days  Patient wlll hold MTX while on Augmentin. SINUSITIS    Antibiotics indicated, see orders.  We discussed the pathophysiology of sinus pressure/sinusitis and treatment options with emphasis on healthy lifestyle and opening up natural drainage passages.  I discussed the risks and benefits of various over the counter and " prescription treatments including short courses of decongestant nasal sprays.  If new, worsening or persistent symptoms, the patient is to call or return for a recheck.      - amoxicillin-clavulanate (AUGMENTIN) 875-125 MG per tablet; Take 1 tablet by mouth 2 times daily  Dispense: 20 tablet; Refill: 0    2. Visit for screening mammogram    - MA SCREENING DIGITAL BILAT - Future  (s+30); Future    3. CARDIOVASCULAR SCREENING; LDL GOAL LESS THAN 160    - Lipid panel reflex to direct LDL Fasting    4. Influenza vaccination declined by patient        See Patient Instructions    PHONG Cantu [facility]

## 2017-12-18 ENCOUNTER — OFFICE VISIT (OUTPATIENT)
Dept: FAMILY MEDICINE | Facility: CLINIC | Age: 57
End: 2017-12-18
Payer: COMMERCIAL

## 2017-12-18 VITALS
OXYGEN SATURATION: 97 % | DIASTOLIC BLOOD PRESSURE: 76 MMHG | BODY MASS INDEX: 31.4 KG/M2 | WEIGHT: 185.8 LBS | TEMPERATURE: 98.6 F | SYSTOLIC BLOOD PRESSURE: 135 MMHG | HEART RATE: 83 BPM

## 2017-12-18 DIAGNOSIS — J20.9 ACUTE BRONCHITIS WITH SYMPTOMS > 10 DAYS: Primary | ICD-10-CM

## 2017-12-18 PROCEDURE — 99213 OFFICE O/P EST LOW 20 MIN: CPT | Performed by: PHYSICIAN ASSISTANT

## 2017-12-18 RX ORDER — PREDNISONE 20 MG/1
40 TABLET ORAL DAILY
Qty: 10 TABLET | Refills: 0 | Status: SHIPPED | OUTPATIENT
Start: 2017-12-18 | End: 2017-12-23

## 2017-12-18 RX ORDER — AZITHROMYCIN 250 MG/1
TABLET, FILM COATED ORAL
Qty: 6 TABLET | Refills: 0 | Status: SHIPPED | OUTPATIENT
Start: 2017-12-18 | End: 2018-01-24

## 2017-12-18 RX ORDER — ALBUTEROL SULFATE 90 UG/1
2 AEROSOL, METERED RESPIRATORY (INHALATION) EVERY 4 HOURS PRN
Qty: 1 INHALER | Refills: 0 | Status: SHIPPED | OUTPATIENT
Start: 2017-12-18 | End: 2018-01-24

## 2017-12-18 RX ORDER — GUAIFENESIN AND DEXTROMETHORPHAN HYDROBROMIDE 1200; 60 MG/1; MG/1
1 TABLET, EXTENDED RELEASE ORAL 2 TIMES DAILY
Qty: 28 TABLET | Refills: 0 | Status: SHIPPED | OUTPATIENT
Start: 2017-12-18 | End: 2018-01-24

## 2017-12-18 NOTE — PROGRESS NOTES
SUBJECTIVE:   Radha Rodriguez is a 57 year old female who presents to clinic today for the following health issues:  ENT Symptoms             Symptoms: cc Present Absent Comment   Fever/Chills  x     Fatigue  x     Muscle Aches  x     Eye Irritation   x    Sneezing  x     Nasal Octavio/Drg  x     Sinus Pressure/Pain  x     Loss of smell  x     Dental pain   x    Sore Throat  x     Swollen Glands  x     Ear Pain/Fullness  x     Cough  x     Wheeze  x     Chest Pain  x     Shortness of breath  x     Rash   x    Other         Symptom duration:  follow up- symptoms worsened since last visit   Symptom severity:  severe   Treatments tried:  Amoxicillin- took for 5 days getting worse   Contacts:  follow up   Sleep helps but during the day its hard to do anything.            Problem list and histories reviewed & adjusted, as indicated.  Additional history: as documented    Patient Active Problem List   Diagnosis     Non-healing lesion on left nose     Viral warts     FEMALE STRESS INCONTINENCE post-hysterectomy     HYPERHIDROSIS on axilla and soles of feet     Distal sacrum pain     Malaise and fatigue     Abdominal pain, right lower quadrant     Dermatitis due to cosmetics     Contact dermatitis and other eczema, due to unspecified cause     Acquired acanthosis nigricans     LFT abnormalities and Urobilnogen high     Female pelvic pain     CARDIOVASCULAR SCREENING; LDL GOAL LESS THAN 160     Finger pain     Mechanical problems with limbs     Vitamin D deficiency     Inflammatory arthritis     High risk medications (not anticoagulants) long-term use     Osteoarthritis     Menopausal syndrome (hot flashes)     Acute conjunctivitis of both eyes, unspecified acute conjunctivitis type     Past Surgical History:   Procedure Laterality Date     C REPLACEMENT,URETER,BOWEL SEGMENT  10/01/2008    Part of her ureter was repaired.     CATARACT IOL, RT/LT       COLONOSCOPY  10/14/2011    Procedure:COLONOSCOPY; Colonoscopy; Surgeon:IRINA  SCOTTY FARRIS; Location:[facility]     ENHANCE LASER REFRACTIVE BILATERAL EXISTING PT IN PARAMETERS       HYSTERECTOMY, VAGINAL  1/1/2000    TVH, fibroids (still has ovaries)     SURGICAL HISTORY OF -       Tubal Ligation     SURGICAL HISTORY OF -       Appy     SURGICAL HISTORY OF -       Tonsils     SURGICAL HISTORY OF -   12/94    Anal Fistulotomy       Social History   Substance Use Topics     Smoking status: Never Smoker     Smokeless tobacco: Never Used     Alcohol use Yes      Comment: Occasional     Family History   Problem Relation Age of Onset     HEART DISEASE Father      Heart attack     C.A.D. Father      age 50     Hypertension Father      CANCER Maternal Grandfather      CANCER Paternal Grandfather      DIABETES No family hx of      CEREBROVASCULAR DISEASE No family hx of      Breast Cancer No family hx of      Cancer - colorectal No family hx of      Prostate Cancer No family hx of          Current Outpatient Prescriptions   Medication Sig Dispense Refill     azithromycin (ZITHROMAX) 250 MG tablet Two tablets first day, then one tablet daily for four days. 6 tablet 0     predniSONE (DELTASONE) 20 MG tablet Take 2 tablets (40 mg) by mouth daily for 5 days 10 tablet 0     albuterol (PROAIR HFA/PROVENTIL HFA/VENTOLIN HFA) 108 (90 BASE) MCG/ACT Inhaler Inhale 2 puffs into the lungs every 4 hours as needed for shortness of breath / dyspnea or wheezing 1 Inhaler 0     Dextromethorphan-Guaifenesin  MG TB12 Take 1 tablet by mouth 2 times daily 28 tablet 0     amoxicillin-clavulanate (AUGMENTIN) 875-125 MG per tablet Take 1 tablet by mouth 2 times daily 20 tablet 0     folic acid (FOLVITE) 1 MG tablet Take 2 tablets (2 mg) by mouth daily 180 tablet 3     methotrexate 2.5 MG tablet CHEMO 10 tablets once a week 50 tablet 5     PREMARIN 0.45 MG tablet TAKE 1 TABLET(0.45 MG) BY MOUTH DAILY 90 tablet 3     IBUPROFEN PO        Allergies   Allergen Reactions     Sulfa Drugs Rash     Clindamycin Rash      Codeine Hives       ROS:  Constitutional, HEENT, cardiovascular, pulmonary, GI, , musculoskeletal, neuro, skin, endocrine and psych systems are negative, except as otherwise noted.      OBJECTIVE:   /76 (BP Location: Right arm, Patient Position: Chair, Cuff Size: Adult Regular)  Pulse 83  Temp 98.6  F (37  C) (Oral)  Wt 185 lb 12.8 oz (84.3 kg)  SpO2 97%  BMI 31.4 kg/m2  Body mass index is 31.4 kg/(m^2).  GENERAL: healthy, alert and no distress  EYES: Eyes grossly normal to inspection, PERRL and conjunctivae and sclerae normal  HENT: normal cephalic/atraumatic, ear canals and TM's normal, nasal mucosa edematous , rhinorrhea yellow, oropharynx clear and oral mucous membranes moist  NECK: no adenopathy, no asymmetry, masses, or scars and thyroid normal to palpation  RESP: no rales , no rhonchi and expiratory wheezes throughout  CV: regular rate and rhythm, normal S1 S2, no S3 or S4, no murmur, click or rub, no peripheral edema and peripheral pulses strong  ABDOMEN: soft, nontender, no hepatosplenomegaly, no masses and bowel sounds normal  MS: no gross musculoskeletal defects noted, no edema    Diagnostic Test Results:  none     ASSESSMENT/PLAN:       ICD-10-CM    1. Acute bronchitis with symptoms > 10 days J20.9 azithromycin (ZITHROMAX) 250 MG tablet     predniSONE (DELTASONE) 20 MG tablet     albuterol (PROAIR HFA/PROVENTIL HFA/VENTOLIN HFA) 108 (90 BASE) MCG/ACT Inhaler     Dextromethorphan-Guaifenesin  MG TB12     Azithromycin 2 tablet today then 1 tablet daily for 4 more days  Prednisone 40 mg (2 tablet) daily for 5 days   Mucinxe DM 1 tablet twice a day for 1-2 weeks as needed   Albuterol 2 puffs every 4 hours as needed for shortness of breath or cough  Follow up in 5 days or sooner as needed with primary care provider       Brittany Oliveira PA-C  [facility]

## 2017-12-18 NOTE — MR AVS SNAPSHOT
"              After Visit Summary   12/18/2017    Radha Rodriguez    MRN: [id number]           Patient Information     Date Of Birth          1960        Visit Information        Provider Department      12/18/2017 3:00 PM Brittany Oliveira PA-C [facility]        Today's Diagnoses     Acute bronchitis with symptoms > 10 days    -  1      Care Instructions    Azithromycin 2 tablet today then 1 tablet daily for 4 more days  Prednisone 40 mg (2 tablet) daily for 5 days   Mucinxe DM 1 tablet twice a day for 1-2 weeks as needed   Albuterol 2 puffs every 4 hours as needed for shortness of breath or cough  Follow up in 5 days or sooner as needed with primary care provider                 Follow-ups after your visit        Your next 10 appointments already scheduled     Feb 10, 2018  9:15 AM CST   (Arrive by 9:00 AM)   MA SCREENING DIGITAL BILATERAL with BKMA1   [facility] ([facility])    [address] [place] [id number] [phone]           Do not use any powder, lotion or deodorant under your arms or on your breast. If you do, we will ask you to remove it before your exam.  Wear comfortable, two-piece clothing.  If you have any allergies, tell your care team.  Bring any previous mammograms from other facilities or have them mailed to the breast center. Three-dimensional (3D) mammograms are available at [facility] locations in [facility], [facility], [place], [place], and [place]. [facility] locations include [place] and [facility] in [place]. Benefits of 3D mammograms include: - Improved rate of cancer detection - Decreases your chance of having to go back for more tests, which means fewer: - \"False-positive\" results (This means that there is an abnormal area but it isn't cancer.) - Invasive testing procedures, such as a biopsy or surgery - Can provide clearer images of the " breast if you have dense breast tissue. 3D mammography is an optional exam that anyone can have with a 2D mammogram. It doesn't replace or take the place of a 2D mammogram. 2D mammograms remain an effective screening test for all women.  Not all insurance companies cover the cost of a 3D mammogram. Check with your insurance.              Who to contact     If you have questions or need follow up information about today's clinic visit or your schedule please contact [facility] directly at [phone].  Normal or non-critical lab and imaging results will be communicated to you by Corporate Timeshart, letter or phone within 4 business days after the clinic has received the results. If you do not hear from us within 7 days, please contact the clinic through Snagsta or phone. If you have a critical or abnormal lab result, we will notify you by phone as soon as possible.  Submit refill requests through Snagsta or call your pharmacy and they will forward the refill request to us. Please allow 3 business days for your refill to be completed.          Additional Information About Your Visit        Snagsta Information     Snagsta gives you secure access to your electronic health record. If you see a primary care provider, you can also send messages to your care team and make appointments. If you have questions, please call your primary care clinic.  If you do not have a primary care provider, please call [phone] and they will assist you.        Care EveryWhere ID     This is your Care EveryWhere ID. This could be used by other organizations to access your [facility] medical records  [id number]        Your Vitals Were     Pulse Temperature Pulse Oximetry BMI (Body Mass Index)          83 98.6  F (37  C) (Oral) 97% 31.4 kg/m2         Blood Pressure from Last 3 Encounters:   12/18/17 135/76   12/14/17 132/85   10/11/17 127/70    Weight from Last 3 Encounters:   12/18/17 185 lb 12.8 oz (84.3 kg)   12/14/17 187 lb  12.8 oz (85.2 kg)   10/11/17 186 lb (84.4 kg)              Today, you had the following     No orders found for display         Today's Medication Changes          These changes are accurate as of: 12/18/17  3:45 PM.  If you have any questions, ask your nurse or doctor.               Start taking these medicines.        Dose/Directions    albuterol 108 (90 BASE) MCG/ACT Inhaler   Commonly known as:  PROAIR HFA/PROVENTIL HFA/VENTOLIN HFA   Used for:  Acute bronchitis with symptoms > 10 days   Started by:  Brittany Oliveira PA-C        Dose:  2 puff   Inhale 2 puffs into the lungs every 4 hours as needed for shortness of breath / dyspnea or wheezing   Quantity:  1 Inhaler   Refills:  0       azithromycin 250 MG tablet   Commonly known as:  ZITHROMAX   Used for:  Acute bronchitis with symptoms > 10 days   Started by:  Brittany Oliveira PA-C        Two tablets first day, then one tablet daily for four days.   Quantity:  6 tablet   Refills:  0       Dextromethorphan-Guaifenesin  MG Tb12   Used for:  Acute bronchitis with symptoms > 10 days   Started by:  Brittany Oliveira PA-C        Dose:  1 tablet   Take 1 tablet by mouth 2 times daily   Quantity:  28 tablet   Refills:  0       predniSONE 20 MG tablet   Commonly known as:  DELTASONE   Used for:  Acute bronchitis with symptoms > 10 days   Started by:  Brittany Oliveira PA-C        Dose:  40 mg   Take 2 tablets (40 mg) by mouth daily for 5 days   Quantity:  10 tablet   Refills:  0            Where to get your medicines      These medications were sent to [facility] - [place], [place] - [address] AT [facility] [address], [facility] [address]     Phone:  [phone]     albuterol 108 (90 BASE) MCG/ACT Inhaler    azithromycin 250 MG tablet    Dextromethorphan-Guaifenesin  MG Tb12    predniSONE 20 MG tablet                Primary Care Provider Office Phone #  Fax #    Temo Fletcher [phone]       [address]  [facility] [address]        Equal Access to Services     MICHELLE SAM : Hadii aad ku hadlarryxiao Somarianne, waaxda luqadaha, qaybta kaalmada adeadanda, antwan dominicin hayaatamiko kerrpriscilla lozada lois shaikh. So [facility] [phone].    ATENCIÓN: Si habla español, tiene a guerra disposición servicios gratuitos de asistencia lingüística. Llame al [phone].    We comply with applicable federal civil rights laws and [place] laws. We do not discriminate on the basis of race, color, national origin, age, disability, sex, sexual orientation, or gender identity.            Thank you!     Thank you for choosing [facility]  for your care. Our goal is always to provide you with excellent care. Hearing back from our patients is one way we can continue to improve our services. Please take a few minutes to complete the written survey that you may receive in the mail after your visit with us. Thank you!             Your Updated Medication List - Protect others around you: Learn how to safely use, store and throw away your medicines at www.disposemymeds.org.          This list is accurate as of: 12/18/17  3:45 PM.  Always use your most recent med list.                   Brand Name Dispense Instructions for use Diagnosis    albuterol 108 (90 BASE) MCG/ACT Inhaler    PROAIR HFA/PROVENTIL HFA/VENTOLIN HFA    1 Inhaler    Inhale 2 puffs into the lungs every 4 hours as needed for shortness of breath / dyspnea or wheezing    Acute bronchitis with symptoms > 10 days       amoxicillin-clavulanate 875-125 MG per tablet    AUGMENTIN    20 tablet    Take 1 tablet by mouth 2 times daily    Acute sinusitis with symptoms > 10 days       azithromycin 250 MG tablet    ZITHROMAX    6 tablet    Two tablets first day, then one tablet daily for four days.    Acute bronchitis with symptoms > 10 days       Dextromethorphan-Guaifenesin  MG Tb12     28 tablet     Take 1 tablet by mouth 2 times daily    Acute bronchitis with symptoms > 10 days       folic acid 1 MG tablet    FOLVITE    180 tablet    Take 2 tablets (2 mg) by mouth daily    Inflammatory arthritis       IBUPROFEN PO           methotrexate 2.5 MG tablet CHEMO     50 tablet    10 tablets once a week    Inflammatory arthritis       predniSONE 20 MG tablet    DELTASONE    10 tablet    Take 2 tablets (40 mg) by mouth daily for 5 days    Acute bronchitis with symptoms > 10 days       PREMARIN 0.45 MG tablet   Generic drug:  estrogens (conjugated)     90 tablet    TAKE 1 TABLET(0.45 MG) BY MOUTH DAILY    Menopausal syndrome (hot flashes)

## 2017-12-18 NOTE — PATIENT INSTRUCTIONS
Azithromycin 2 tablet today then 1 tablet daily for 4 more days  Prednisone 40 mg (2 tablet) daily for 5 days   Mucinxe DM 1 tablet twice a day for 1-2 weeks as needed   Albuterol 2 puffs every 4 hours as needed for shortness of breath or cough  Follow up in 5 days or sooner as needed with primary care provider

## 2018-01-13 ENCOUNTER — RADIANT APPOINTMENT (OUTPATIENT)
Dept: MAMMOGRAPHY | Facility: CLINIC | Age: 58
End: 2018-01-13
Payer: COMMERCIAL

## 2018-01-13 DIAGNOSIS — Z12.31 VISIT FOR SCREENING MAMMOGRAM: ICD-10-CM

## 2018-01-13 PROCEDURE — 77067 SCR MAMMO BI INCL CAD: CPT | Mod: TC

## 2018-01-24 ENCOUNTER — OFFICE VISIT (OUTPATIENT)
Dept: FAMILY MEDICINE | Facility: CLINIC | Age: 58
End: 2018-01-24
Payer: COMMERCIAL

## 2018-01-24 ENCOUNTER — RADIANT APPOINTMENT (OUTPATIENT)
Dept: GENERAL RADIOLOGY | Facility: CLINIC | Age: 58
End: 2018-01-24
Attending: INTERNAL MEDICINE
Payer: COMMERCIAL

## 2018-01-24 VITALS
TEMPERATURE: 99.4 F | HEART RATE: 89 BPM | DIASTOLIC BLOOD PRESSURE: 86 MMHG | HEIGHT: 65 IN | WEIGHT: 191 LBS | SYSTOLIC BLOOD PRESSURE: 137 MMHG | BODY MASS INDEX: 31.82 KG/M2 | OXYGEN SATURATION: 98 %

## 2018-01-24 DIAGNOSIS — R09.1 PLEURISY: ICD-10-CM

## 2018-01-24 DIAGNOSIS — R41.3 MEMORY CHANGES: ICD-10-CM

## 2018-01-24 DIAGNOSIS — M19.90 INFLAMMATORY ARTHRITIS: ICD-10-CM

## 2018-01-24 DIAGNOSIS — J01.01 ACUTE RECURRENT MAXILLARY SINUSITIS: Primary | ICD-10-CM

## 2018-01-24 LAB
CRP SERPL-MCNC: 9.1 MG/L (ref 0–8)
T4 FREE SERPL-MCNC: 0.9 NG/DL (ref 0.76–1.46)
TSH SERPL DL<=0.005 MIU/L-ACNC: 0.82 MU/L (ref 0.4–4)
VIT B12 SERPL-MCNC: 624 PG/ML (ref 193–986)

## 2018-01-24 PROCEDURE — 70220 X-RAY EXAM OF SINUSES: CPT | Mod: FY

## 2018-01-24 PROCEDURE — 99214 OFFICE O/P EST MOD 30 MIN: CPT | Performed by: INTERNAL MEDICINE

## 2018-01-24 PROCEDURE — 84439 ASSAY OF FREE THYROXINE: CPT | Performed by: INTERNAL MEDICINE

## 2018-01-24 PROCEDURE — 99000 SPECIMEN HANDLING OFFICE-LAB: CPT | Performed by: INTERNAL MEDICINE

## 2018-01-24 PROCEDURE — 83921 ORGANIC ACID SINGLE QUANT: CPT | Mod: 90 | Performed by: INTERNAL MEDICINE

## 2018-01-24 PROCEDURE — 84443 ASSAY THYROID STIM HORMONE: CPT | Performed by: INTERNAL MEDICINE

## 2018-01-24 PROCEDURE — 71046 X-RAY EXAM CHEST 2 VIEWS: CPT | Mod: FY

## 2018-01-24 PROCEDURE — 36415 COLL VENOUS BLD VENIPUNCTURE: CPT | Performed by: INTERNAL MEDICINE

## 2018-01-24 PROCEDURE — 82607 VITAMIN B-12: CPT | Performed by: INTERNAL MEDICINE

## 2018-01-24 PROCEDURE — 86140 C-REACTIVE PROTEIN: CPT | Performed by: INTERNAL MEDICINE

## 2018-01-24 RX ORDER — UREA 10 %
500 LOTION (ML) TOPICAL DAILY
COMMUNITY
End: 2018-10-05

## 2018-01-24 RX ORDER — AZITHROMYCIN 250 MG/1
TABLET, FILM COATED ORAL
Qty: 6 TABLET | Refills: 5 | Status: SHIPPED | OUTPATIENT
Start: 2018-01-24 | End: 2018-04-20

## 2018-01-24 RX ORDER — AZITHROMYCIN 250 MG/1
TABLET, FILM COATED ORAL
Qty: 6 TABLET | Refills: 3 | Status: SHIPPED | OUTPATIENT
Start: 2018-01-24 | End: 2018-04-20

## 2018-01-24 RX ORDER — METHYLPREDNISOLONE 4 MG
TABLET, DOSE PACK ORAL
Qty: 21 TABLET | Refills: 5 | Status: SHIPPED | OUTPATIENT
Start: 2018-01-24 | End: 2018-04-20

## 2018-01-24 RX ORDER — CETIRIZINE HYDROCHLORIDE 10 MG/1
10 TABLET ORAL AT BEDTIME
COMMUNITY
End: 2018-10-05

## 2018-01-24 NOTE — MR AVS SNAPSHOT
After Visit Summary   1/24/2018    Radha Rodriguez    MRN: [id number]           Patient Information     Date Of Birth          1960        Visit Information        Provider Department      1/24/2018 10:00 AM Temo Fletcher MD [facility]        Today's Diagnoses     Acute recurrent maxillary sinusitis    -  1    Inflammatory arthritis        Memory changes        Pleurisy           Follow-ups after your visit        Follow-up notes from your care team     Return in about 2 months (around 3/24/2018).      Your next 10 appointments already scheduled     Apr 24, 2018  3:00 PM CDT   New Visit with Abdoul Eli MD   [facility] ([facility])    [address] [place] [id number] [phone]              Future tests that were ordered for you today     Open Future Orders        Priority Expected Expires Ordered    CT Head w/o Contrast Routine  1/24/2019 1/24/2018            Who to contact     If you have questions or need follow up information about today's clinic visit or your schedule please contact [facility] directly at [phone].  Normal or non-critical lab and imaging results will be communicated to you by CINEPASShart, letter or phone within 4 business days after the clinic has received the results. If you do not hear from us within 7 days, please contact the clinic through CINEPASShart or phone. If you have a critical or abnormal lab result, we will notify you by phone as soon as possible.  Submit refill requests through You Software or call your pharmacy and they will forward the refill request to us. Please allow 3 business days for your refill to be completed.          Additional Information About Your Visit        CINEPASShart Information     You Software gives you secure access to your electronic health record. If you see a primary care provider, you can also send messages to your care team and make  "appointments. If you have questions, please call your primary care clinic.  If you do not have a primary care provider, please call [phone] and they will assist you.        Care EveryWhere ID     This is your Care EveryWhere ID. This could be used by other organizations to access your [facility] medical records  [id number]        Your Vitals Were     Pulse Temperature Height Pulse Oximetry BMI (Body Mass Index)       89 99.4  F (37.4  C) (Oral) 5' 4.5\" (1.638 m) 98% 32.28 kg/m2        Blood Pressure from Last 3 Encounters:   01/24/18 137/86   12/18/17 135/76   12/14/17 132/85    Weight from Last 3 Encounters:   01/24/18 191 lb (86.6 kg)   12/18/17 185 lb 12.8 oz (84.3 kg)   12/14/17 187 lb 12.8 oz (85.2 kg)              We Performed the Following     CRP, inflammation     Methylmalonic acid     T4, free     TSH     Vitamin B12     XR Chest 2 Views     XR Sinus Complete G/E 3 Views          Today's Medication Changes          These changes are accurate as of 1/24/18 11:02 AM.  If you have any questions, ask your nurse or doctor.               Start taking these medicines.        Dose/Directions    * azithromycin 250 MG tablet   Commonly known as:  ZITHROMAX   Used for:  Acute recurrent maxillary sinusitis   Started by:  Temo Fletcher MD        Two tablets first day, then one tablet daily for four days.   Quantity:  6 tablet   Refills:  3       * azithromycin 250 MG tablet   Commonly known as:  ZITHROMAX   Used for:  Acute recurrent maxillary sinusitis   Started by:  Temo Fletcher MD        Two tablets first day, then one tablet daily for four days.   Quantity:  6 tablet   Refills:  5       methylPREDNISolone 4 MG tablet   Commonly known as:  MEDROL DOSEPAK   Used for:  Inflammatory arthritis   Started by:  Temo Fletcher MD        Follow package instructions   Quantity:  21 tablet   Refills:  5       * Notice:  This list has 2 medication(s) that are the same as other medications prescribed " for you. Read the directions carefully, and ask your doctor or other care provider to review them with you.         Where to get your medicines      These medications were sent to [facility] - [place], [place] - [address] AT [facility] [address], [place] [address]     Phone:  [phone]     azithromycin 250 MG tablet    azithromycin 250 MG tablet    methylPREDNISolone 4 MG tablet                Primary Care Provider Office Phone # Fax #    Temo Fletcher [phone] [address]  [facility] [place] [address]        Equal Access to Services     [facility] : Hadii aad cesilia hadasho Sohomerali, waaxda luqadaha, qaybta kaalmada adeegyada, antwan abreu . So [facility] [phone].    ATENCIÓN: Si habla español, tiene a guerra disposición servicios gratuitos de asistencia lingüística. Llame al [phone].    We comply with applicable federal civil rights laws and [place] laws. We do not discriminate on the basis of race, color, national origin, age, disability, sex, sexual orientation, or gender identity.            Thank you!     Thank you for choosing [facility]  for your care. Our goal is always to provide you with excellent care. Hearing back from our patients is one way we can continue to improve our services. Please take a few minutes to complete the written survey that you may receive in the mail after your visit with us. Thank you!             Your Updated Medication List - Protect others around you: Learn how to safely use, store and throw away your medicines at www.disposemymeds.org.          This list is accurate as of 1/24/18 11:02 AM.  Always use your most recent med list.                   Brand Name Dispense Instructions for use Diagnosis    * azithromycin 250 MG tablet    ZITHROMAX    6 tablet    Two tablets first day, then one tablet daily for four days.    Acute recurrent maxillary  sinusitis       * azithromycin 250 MG tablet    ZITHROMAX    6 tablet    Two tablets first day, then one tablet daily for four days.    Acute recurrent maxillary sinusitis       cetirizine 10 MG tablet    zyrTEC     Take 10 mg by mouth nightly as needed for allergies        cyanocolbalamin 500 MCG tablet    vitamin  B-12     Take 500 mcg by mouth daily        folic acid 1 MG tablet    FOLVITE    180 tablet    Take 2 tablets (2 mg) by mouth daily    Inflammatory arthritis       IBUPROFEN PO           methotrexate 2.5 MG tablet CHEMO     50 tablet    10 tablets once a week    Inflammatory arthritis       methylPREDNISolone 4 MG tablet    MEDROL DOSEPAK    21 tablet    Follow package instructions    Inflammatory arthritis       PREMARIN 0.45 MG tablet   Generic drug:  estrogens (conjugated)     90 tablet    TAKE 1 TABLET(0.45 MG) BY MOUTH DAILY    Menopausal syndrome (hot flashes)       * Notice:  This list has 2 medication(s) that are the same as other medications prescribed for you. Read the directions carefully, and ask your doctor or other care provider to review them with you.

## 2018-01-24 NOTE — PROGRESS NOTES
SUBJECTIVE:   Radha Rodriguez is a 57 year old female who presents to clinic today for the following health issues:    Acute Illness   Acute illness concerns: sick  Onset: since Novemeber    Fever: no    Chills/Sweats: YES    Headache (location?): no    Sinus Pressure:YES    Conjunctivitis:  no    Ear Pain: no    Rhinorrhea: YES    Congestion: YES    Sore Throat: no     Cough: YES    Wheeze: YES tender on left side    Decreased Appetite: no    Nausea: no    Vomiting: no    Diarrhea:  no    Dysuria/Freq.: no    Fatigue/Achiness: YES    Sick/Strep Exposure: YES-      Therapies Tried and outcome: ibuprofen, no relief. She was treated for sinusitis twice in the past 2 months.      Memory concerns      Duration: one year    Description (location/character/radiation): Cannot remember details of conversation or sometimes in the middle of the conversation (difficulty word-finding).    Intensity:  mild    Accompanying signs and symptoms: denies any headaches, dizziness, blurred vision or falls.    History (similar episodes/previous evaluation): None    Precipitating or alleviating factors: NO thyroid disorders, no diabetes nor history of Vitamin B12 deficiency.    Therapies tried and outcome: None       Joint or Musculoskeletal Pain  Duration of complaint: chronic   Description:   Location: both hands  Character: Dull ache  Intensity: moderate  Progression of Symptoms: worse  Accompanying Signs & Symptoms: Other symptoms: swelling  History: Previous similar pain: YES    Precipitating factors: Trauma: No. Patient unspecified inflammatory arthritis. Methotrexate was held when she developed recurrent sinus infections that started last year (treated three time with antibiotics last 7/7/17, 12/14/17 and 12/1/17.  Alleviating factors: Improved by: nothing  Therapies Tried and outcome: Augmentin x 2 and Azithromycin x 1.    Patient Active Problem List   Diagnosis     Non-healing lesion on left nose     Viral warts      FEMALE STRESS INCONTINENCE post-hysterectomy     HYPERHIDROSIS on axilla and soles of feet     Distal sacrum pain     Malaise and fatigue     Abdominal pain, right lower quadrant     Dermatitis due to cosmetics     Contact dermatitis and other eczema, due to unspecified cause     Acquired acanthosis nigricans     LFT abnormalities and Urobilnogen high     Female pelvic pain     CARDIOVASCULAR SCREENING; LDL GOAL LESS THAN 160     Finger pain     Mechanical problems with limbs     Vitamin D deficiency     Inflammatory arthritis     High risk medications (not anticoagulants) long-term use     Osteoarthritis     Menopausal syndrome (hot flashes)     Acute conjunctivitis of both eyes, unspecified acute conjunctivitis type     Past Surgical History:   Procedure Laterality Date     C REPLACEMENT,URETER,BOWEL SEGMENT  10/01/2008    Part of her ureter was repaired.     CATARACT IOL, RT/LT       COLONOSCOPY  10/14/2011    Procedure:COLONOSCOPY; Colonoscopy; Surgeon:SCOTTY LEDEZMA; Location:[facility]     ENHANCE LASER REFRACTIVE BILATERAL EXISTING PT IN PARAMETERS       HYSTERECTOMY, VAGINAL  1/1/2000    TVH, fibroids (still has ovaries)     SURGICAL HISTORY OF -       Tubal Ligation     SURGICAL HISTORY OF -       Appy     SURGICAL HISTORY OF -       Tonsils     SURGICAL HISTORY OF -   12/94    Anal Fistulotomy       Social History   Substance Use Topics     Smoking status: Never Smoker     Smokeless tobacco: Never Used     Alcohol use Yes      Comment: Occasional     Family History   Problem Relation Age of Onset     HEART DISEASE Father      Heart attack     C.A.D. Father      age 50     Hypertension Father      CANCER Maternal Grandfather      Alzheimer Disease Maternal Grandfather      CANCER Paternal Grandfather      DIABETES No family hx of      CEREBROVASCULAR DISEASE No family hx of      Breast Cancer No family hx of      Cancer - colorectal No family hx of      Prostate Cancer No family hx of           Allergies   Allergen Reactions     Sulfa Drugs Rash     Clindamycin Rash     Codeine Hives     Recent Labs   Lab Test  01/24/18   1040  10/11/17   1207  07/05/17   1639  03/29/17   1148   01/31/14   1534   07/18/11   1145   LDL   --    --    --    --    --    --    --   130*   HDL   --    --    --    --    --    --    --   41*   TRIG   --    --    --    --    --    --    --   68   ALT   --   44  38  31   < >   --    < >   --    CR   --   0.73  0.66  0.68   < >   --    < >   --    GFRESTIMATED   --   82  >90  Non  GFR Calc    >90  Non  GFR Calc     < >   --    < >   --    GFRESTBLACK   --   >90  >90  African American GFR Calc    >90   GFR Calc     < >   --    < >   --    POTASSIUM   --   4.1  3.5  4.5   < >   --    < >   --    TSH  0.82   --    --    --    --   1.24   --   0.56    < > = values in this interval not displayed.      BP Readings from Last 3 Encounters:   01/24/18 137/86   12/18/17 135/76   12/14/17 132/85    Wt Readings from Last 3 Encounters:   01/24/18 191 lb (86.6 kg)   12/18/17 185 lb 12.8 oz (84.3 kg)   12/14/17 187 lb 12.8 oz (85.2 kg)               ROS:  C: NEGATIVE for fever, chills, change in weight  I: NEGATIVE for worrisome rashes, moles or lesions  E: NEGATIVE for vision changes or irritation  E/M: NEGATIVE for ear, mouth and throat problems  R: NEGATIVE for significant cough or SOB  CV: NEGATIVE for chest pain, palpitations or peripheral edema  GI: NEGATIVE for nausea, abdominal pain, heartburn, or change in bowel habits  : NEGATIVE for frequency, dysuria, or hematuria  M: NEGATIVE for significant arthralgias or myalgia  N: NEGATIVE for weakness, dizziness or paresthesias  E: NEGATIVE for temperature intolerance, skin/hair changes  H: NEGATIVE for bleeding problems  P: NEGATIVE for changes in mood or affect    OBJECTIVE:     /86 (BP Location: Left arm, Patient Position: Chair, Cuff Size: Adult Regular)  Pulse 89  Temp 99.4  F  "(37.4  C) (Oral)  Ht 5' 4.5\" (1.638 m)  Wt 191 lb (86.6 kg)  SpO2 98%  BMI 32.28 kg/m2  Body mass index is 32.28 kg/(m^2).  GENERAL: healthy, alert and no distress  EYES: Eyes grossly normal to inspection, PERRL and conjunctivae and sclerae normal  HENT: ear canals and TM's normal, nose and mouth without ulcers or lesions  NECK: no adenopathy, no asymmetry, masses, or scars and thyroid normal to palpation  RESP: lungs clear to auscultation - no rales, rhonchi or wheezes  CV: regular rate and rhythm, normal S1 S2, no S3 or S4, no murmur, click or rub, no peripheral edema and peripheral pulses strong  ABDOMEN: soft, nontender, no hepatosplenomegaly, no masses and bowel sounds normal  MS: no gross musculoskeletal defects noted, no edema  SKIN: no suspicious lesions or rashes  NEURO: Normal strength and tone, mentation intact and speech normal  PSYCH: mentation appears normal, affect normal/bright    Diagnostic Test Results:  Results for orders placed or performed in visit on 01/24/18   XR Sinus Complete G/E 3 Views    Narrative    XR SINUS COMPLETE G/E 3 VW 1/24/2018 10:39 AM    COMPARISON: 7/7/2017    HISTORY: Sinusitis.      Impression    IMPRESSION: No air-fluid levels are seen in the sinuses.    ALANA WISE MD   XR Chest 2 Views    Narrative    XR CHEST 2 VW 1/24/2018 10:54 AM    COMPARISON: 2/4/2013    HISTORY: Pleurisy      Impression    IMPRESSION: Cardiac silhouette and pulmonary vasculature are within  normal limits. No focal airspace disease, pleural effusion or  pneumothorax.     ALANA WISE MD   Vitamin B12   Result Value Ref Range    Vitamin B12 624 193 - 986 pg/mL   TSH   Result Value Ref Range    TSH 0.82 0.40 - 4.00 mU/L   CRP, inflammation   Result Value Ref Range    CRP Inflammation 9.1 (H) 0.0 - 8.0 mg/L   Methylmalonic acid   Result Value Ref Range    Methylmalonic Acid 0.15 0.00 - 0.40 umol/L   T4, free   Result Value Ref Range    T4 Free 0.90 0.76 - 1.46 ng/dL       ASSESSMENT/PLAN: "     (J01.01) Acute recurrent maxillary sinusitis  (primary encounter diagnosis)  Comment:   Plan: XR Sinus Complete G/E 3 Views, azithromycin         (ZITHROMAX) 250 MG tablet, azithromycin         (ZITHROMAX) 250 MG tablet            (M19.90) Inflammatory arthritis  Comment:   Plan: CRP, inflammation, methylPREDNISolone (MEDROL         DOSEPAK) 4 MG tablet          (R41.3) Memory changes  Comment:   Plan: Vitamin B12, TSH, Methylmalonic acid, CT Head         w/o Contrast, T4, free, CANCELED: T4 free            (R09.1) Pleurisy  Comment:   Plan: XR Chest 2 Views            Follow-up visit if condition worsens.      Temo Fletcher MD  [facility]

## 2018-01-26 LAB — METHYLMALONATE SERPL-SCNC: 0.15 UMOL/L (ref 0–0.4)

## 2018-01-31 ENCOUNTER — RADIANT APPOINTMENT (OUTPATIENT)
Dept: CT IMAGING | Facility: CLINIC | Age: 58
End: 2018-01-31
Attending: INTERNAL MEDICINE
Payer: COMMERCIAL

## 2018-01-31 DIAGNOSIS — R41.3 MEMORY CHANGES: ICD-10-CM

## 2018-01-31 PROCEDURE — 70450 CT HEAD/BRAIN W/O DYE: CPT | Performed by: RADIOLOGY

## 2018-03-01 ENCOUNTER — TELEPHONE (OUTPATIENT)
Dept: FAMILY MEDICINE | Facility: CLINIC | Age: 58
End: 2018-03-01

## 2018-03-01 NOTE — TELEPHONE ENCOUNTER
1st attempt to reach patient and schedule Neurology referral; lvm.    Shira Ghotra  [facility]   [phone]

## 2018-04-20 ENCOUNTER — OFFICE VISIT (OUTPATIENT)
Dept: FAMILY MEDICINE | Facility: CLINIC | Age: 58
End: 2018-04-20
Payer: COMMERCIAL

## 2018-04-20 VITALS
OXYGEN SATURATION: 98 % | DIASTOLIC BLOOD PRESSURE: 80 MMHG | SYSTOLIC BLOOD PRESSURE: 124 MMHG | TEMPERATURE: 98.9 F | WEIGHT: 192.4 LBS | BODY MASS INDEX: 32.52 KG/M2 | HEART RATE: 77 BPM

## 2018-04-20 DIAGNOSIS — J01.00 ACUTE NON-RECURRENT MAXILLARY SINUSITIS: Primary | ICD-10-CM

## 2018-04-20 DIAGNOSIS — H10.9 BACTERIAL CONJUNCTIVITIS OF LEFT EYE: ICD-10-CM

## 2018-04-20 DIAGNOSIS — Z13.29 SCREENING FOR THYROID DISORDER: ICD-10-CM

## 2018-04-20 LAB — T4 FREE SERPL-MCNC: 0.93 NG/DL (ref 0.76–1.46)

## 2018-04-20 PROCEDURE — 84443 ASSAY THYROID STIM HORMONE: CPT | Performed by: INTERNAL MEDICINE

## 2018-04-20 PROCEDURE — 84439 ASSAY OF FREE THYROXINE: CPT | Performed by: INTERNAL MEDICINE

## 2018-04-20 PROCEDURE — 36415 COLL VENOUS BLD VENIPUNCTURE: CPT | Performed by: INTERNAL MEDICINE

## 2018-04-20 PROCEDURE — 86376 MICROSOMAL ANTIBODY EACH: CPT | Performed by: INTERNAL MEDICINE

## 2018-04-20 PROCEDURE — 99214 OFFICE O/P EST MOD 30 MIN: CPT | Performed by: INTERNAL MEDICINE

## 2018-04-20 RX ORDER — GENTAMICIN SULFATE 3 MG/ML
2 SOLUTION/ DROPS OPHTHALMIC 4 TIMES DAILY
Qty: 5 ML | Refills: 1 | Status: SHIPPED | OUTPATIENT
Start: 2018-04-20 | End: 2018-04-27

## 2018-04-20 RX ORDER — PREDNISONE 20 MG/1
40 TABLET ORAL DAILY
Qty: 14 TABLET | Refills: 3 | Status: SHIPPED | OUTPATIENT
Start: 2018-04-20 | End: 2018-04-25

## 2018-04-20 NOTE — PATIENT INSTRUCTIONS
PATIENT INSTRUCTIONS:    1) Hold your Methotrexate next Wednesday so you can facilitate your recovery from your acute bronchitis and acute sinusitis.

## 2018-04-20 NOTE — NURSING NOTE
"Chief Complaint   Patient presents with     Cold Symptoms       Initial BP (!) 156/94 (BP Location: Left arm, Patient Position: Chair, Cuff Size: Adult Regular)  Pulse 77  Temp 98.9  F (37.2  C) (Oral)  Wt 192 lb 6.4 oz (87.3 kg)  SpO2 98%  BMI 32.52 kg/m2 Estimated body mass index is 32.52 kg/(m^2) as calculated from the following:    Height as of 1/24/18: 5' 4.5\" (1.638 m).    Weight as of this encounter: 192 lb 6.4 oz (87.3 kg).  Medication Reconciliation: complete       Nkechi Colorado MA  9:47 AM 4/20/2018    "

## 2018-04-20 NOTE — PROGRESS NOTES
SUBJECTIVE:   Radha Rodriguez is a 58 year old female who presents to clinic today for the following health issues:      RESPIRATORY SYMPTOMS      Duration: 4-5 days    Description  nasal congestion, rhinorrhea, sore throat, facial pain/pressure, watery eyes, cough, fever-low grade, chills, ear pain both and headache    Severity: moderate    Accompanying signs and symptoms: None    History (predisposing factors):  none    Precipitating or alleviating factors: None    Therapies tried and outcome:  rest and fluids, theraflu      Problem list and histories reviewed & adjusted, as indicated.  Additional history: as documented    Patient Active Problem List   Diagnosis     Non-healing lesion on left nose     Viral warts     FEMALE STRESS INCONTINENCE post-hysterectomy     HYPERHIDROSIS on axilla and soles of feet     Distal sacrum pain     Malaise and fatigue     Abdominal pain, right lower quadrant     Dermatitis due to cosmetics     Contact dermatitis and other eczema, due to unspecified cause     Acquired acanthosis nigricans     LFT abnormalities and Urobilnogen high     Female pelvic pain     CARDIOVASCULAR SCREENING; LDL GOAL LESS THAN 160     Finger pain     Mechanical problems with limbs     Vitamin D deficiency     Inflammatory arthritis     High risk medications (not anticoagulants) long-term use     Osteoarthritis     Menopausal syndrome (hot flashes)     Acute conjunctivitis of both eyes, unspecified acute conjunctivitis type     Past Surgical History:   Procedure Laterality Date     C REPLACEMENT,URETER,BOWEL SEGMENT  10/01/2008    Part of her ureter was repaired.     CATARACT IOL, RT/LT       COLONOSCOPY  10/14/2011    Procedure:COLONOSCOPY; Colonoscopy; Surgeon:SCOTTY LEDEZMA; Location:[facility]     ENHANCE LASER REFRACTIVE BILATERAL EXISTING PT IN PARAMETERS       HYSTERECTOMY, VAGINAL  1/1/2000    TVH, fibroids (still has ovaries)     SURGICAL HISTORY OF -       Tubal Ligation     SURGICAL  HISTORY OF -       Appy     SURGICAL HISTORY OF -       Tonsils     SURGICAL HISTORY OF -   12/94    Anal Fistulotomy       Social History   Substance Use Topics     Smoking status: Never Smoker     Smokeless tobacco: Never Used     Alcohol use Yes      Comment: Occasional     Family History   Problem Relation Age of Onset     HEART DISEASE Father      Heart attack     C.A.D. Father      age 50     Hypertension Father      CANCER Maternal Grandfather      Alzheimer Disease Maternal Grandfather      CANCER Paternal Grandfather      DIABETES No family hx of      CEREBROVASCULAR DISEASE No family hx of      Breast Cancer No family hx of      Cancer - colorectal No family hx of      Prostate Cancer No family hx of          Allergies   Allergen Reactions     Sulfa Drugs Rash     Clindamycin Rash     Codeine Hives     Recent Labs   Lab Test  01/24/18   1040  10/11/17   1207  07/05/17   1639  03/29/17   1148   01/31/14   1534   07/18/11   1145   LDL   --    --    --    --    --    --    --   130*   HDL   --    --    --    --    --    --    --   41*   TRIG   --    --    --    --    --    --    --   68   ALT   --   44  38  31   < >   --    < >   --    CR   --   0.73  0.66  0.68   < >   --    < >   --    GFRESTIMATED   --   82  >90  Non  GFR Calc    >90  Non  GFR Calc     < >   --    < >   --    GFRESTBLACK   --   >90  >90  African American GFR Calc    >90   GFR Calc     < >   --    < >   --    POTASSIUM   --   4.1  3.5  4.5   < >   --    < >   --    TSH  0.82   --    --    --    --   1.24   --   0.56    < > = values in this interval not displayed.      BP Readings from Last 3 Encounters:   04/20/18 124/80   01/24/18 137/86   12/18/17 135/76    Wt Readings from Last 3 Encounters:   04/20/18 192 lb 6.4 oz (87.3 kg)   01/24/18 191 lb (86.6 kg)   12/18/17 185 lb 12.8 oz (84.3 kg)               ROS:  CONSTITUTIONAL: NEGATIVE for fever, chills, change in weight  INTEGUMENTARY/SKIN:  NEGATIVE for worrisome rashes, moles or lesions  EYES: POSITIVE for purulent drainage of left eye.  ENT/MOUTH: NEGATIVE for earache , epistaxis, hoarseness and vertigo  RESP:NEGATIVE for hemoptysis, pleurisy and wheezing  CV: NEGATIVE for chest pain, palpitations or peripheral edema  GI: NEGATIVE for nausea, abdominal pain, heartburn, or change in bowel habits  : NEGATIVE for frequency, dysuria, or hematuria  MUSCULOSKELETAL: NEGATIVE for significant arthralgias or myalgia  NEURO: NEGATIVE for weakness, dizziness or paresthesias  ENDOCRINE: NEGATIVE for temperature intolerance, skin/hair changes  HEME: NEGATIVE for bleeding problems  PSYCHIATRIC: NEGATIVE for changes in mood or affect    OBJECTIVE:     /80 (BP Location: Left arm, Patient Position: Chair, Cuff Size: Adult Regular)  Pulse 77  Temp 98.9  F (37.2  C) (Oral)  Wt 192 lb 6.4 oz (87.3 kg)  SpO2 98%  BMI 32.52 kg/m2  Body mass index is 32.52 kg/(m^2).  GENERAL: alert, in mild distress and fatigued  EYES: Eyes grossly normal to inspection and conjunctivae and sclerae normal  HENT: normal cephalic/atraumatic and oral mucous membranes moist  NECK: no adenopathy  RESP: lungs clear to auscultation - no rales, rhonchi or wheezes  CV: regular rate and rhythm, normal S1 S2, no S3 or S4, no murmur, click or rub, no peripheral edema and peripheral pulses strong  MS: no gross musculoskeletal defects noted, no edema  SKIN: no suspicious lesions or rashes  NEURO: Normal strength and tone, mentation intact and speech normal  PSYCH: mentation appears normal, affect normal/bright    Diagnostic Test Results:  Results for orders placed or performed in visit on 04/20/18   T4, free   Result Value Ref Range    T4 Free 0.93 0.76 - 1.46 ng/dL       ASSESSMENT/PLAN:       (J01.00) Acute non-recurrent maxillary sinusitis  (primary encounter diagnosis)  Comment: Most probable cause of patient's upper respiratory tract symptoms. Since patient is immunosuppressed,  recommend empirical treatment with antibiotics. Hold Methotrexate this coming week until her condition improves.  Plan: amoxicillin-clavulanate (AUGMENTIN) 875-125 MG         per tablet, predniSONE (DELTASONE) 20 MG tablet            (H10.9) Bacterial conjunctivitis of left eye  Comment: No reason to delay treatment that can lead to keratitis.  Plan: gentamicin (GARAMYCIN) 0.3 % ophthalmic         solution            (Z13.29) Screening for thyroid disorder  Comment: Necessary due to weight gain and fatigue.  Plan: Thyroid peroxidase antibody, TSH, T4, free, TSH          Follow-up visit if condition worsens.      Temo Fletcher MD  [facility]

## 2018-04-20 NOTE — MR AVS SNAPSHOT
After Visit Summary   4/20/2018    Radha Rodriguez    MRN: [id number]           Patient Information     Date Of Birth          1960        Visit Information        Provider Department      4/20/2018 9:40 AM Justine, Temo Grimes MD [facility]        Today's Diagnoses     Acute non-recurrent maxillary sinusitis    -  1    Acute bronchitis due to other specified organisms        Screening for thyroid disorder          Care Instructions    PATIENT INSTRUCTIONS:    1) Hold your Methotrexate next Wednesday so you can facilitate your recovery from your acute bronchitis and acute sinusitis.          Follow-ups after your visit        Your next 10 appointments already scheduled     Apr 24, 2018  3:00 PM CDT   New Visit with Abdoul Eli [facility]   [facility]    [address] [place] [id number] [phone]              Future tests that were ordered for you today     Open Future Orders        Priority Expected Expires Ordered    TSH Routine  4/20/2019 4/20/2018            Who to contact     If you have questions or need follow up information about today's clinic visit or your schedule please contact [facility] directly at [phone].  Normal or non-critical lab and imaging results will be communicated to you by MyChart, letter or phone within 4 business days after the clinic has received the results. If you do not hear from us within 7 days, please contact the clinic through MyChart or phone. If you have a critical or abnormal lab result, we will notify you by phone as soon as possible.  Submit refill requests through [employer] or call your pharmacy and they will forward the refill request to us. Please allow 3 business days for your refill to be completed.          Additional Information About Your Visit        MyChart Information     [employer] gives you secure access to your electronic health  record. If you see a primary care provider, you can also send messages to your care team and make appointments. If you have questions, please call your primary care clinic.  If you do not have a primary care provider, please call [phone] and they will assist you.        Care EveryWhere ID     This is your Care EveryWhere ID. This could be used by other organizations to access your [facility] medical records  [id number]        Your Vitals Were     Pulse Temperature Pulse Oximetry BMI (Body Mass Index)          77 98.9  F (37.2  C) (Oral) 98% 32.52 kg/m2         Blood Pressure from Last 3 Encounters:   04/20/18 124/80   01/24/18 137/86   12/18/17 135/76    Weight from Last 3 Encounters:   04/20/18 192 lb 6.4 oz (87.3 kg)   01/24/18 191 lb (86.6 kg)   12/18/17 185 lb 12.8 oz (84.3 kg)              We Performed the Following     T4, free     Thyroid peroxidase antibody          Today's Medication Changes          These changes are accurate as of 4/20/18 10:26 AM.  If you have any questions, ask your nurse or doctor.               Start taking these medicines.        Dose/Directions    amoxicillin-clavulanate 875-125 MG per tablet   Commonly known as:  AUGMENTIN   Used for:  Acute non-recurrent maxillary sinusitis, Acute bronchitis due to other specified organisms        Dose:  1 tablet   Take 1 tablet by mouth 2 times daily   Quantity:  20 tablet   Refills:  3       predniSONE 20 MG tablet   Commonly known as:  DELTASONE   Used for:  Acute non-recurrent maxillary sinusitis, Acute bronchitis due to other specified organisms        Dose:  40 mg   Take 2 tablets (40 mg) by mouth daily for 5 days Then take 1 tablet once a day x 3 days and then 1/2 tablet once a day x 2 days.   Quantity:  14 tablet   Refills:  3            Where to get your medicines      These medications were sent to [facility] [place] - [address]  [address], [place] [address]     Phone:   [phone]     amoxicillin-clavulanate 875-125 MG per tablet    predniSONE 20 MG tablet                Primary Care Provider Office Phone # Fax #    Temo Fletcher [phone]       [address]  [facility] [address]        Equal Access to Services     [facility] FLACO : Hadii aad ku hadasho Soomaali, waaxda luqadaha, qaybta kaalmada adeegyada, waxay lorelei hayenriquen adepriscilla pereztomasnaty abreu . So [facility] [phone].    ATENCIÓN: Si habla español, tiene a guerra disposición servicios gratuitos de asistencia lingüística. Llame al [phone].    We comply with applicable federal civil rights laws and [place] laws. We do not discriminate on the basis of race, color, national origin, age, disability, sex, sexual orientation, or gender identity.            Thank you!     Thank you for choosing [facility]  for your care. Our goal is always to provide you with excellent care. Hearing back from our patients is one way we can continue to improve our services. Please take a few minutes to complete the written survey that you may receive in the mail after your visit with us. Thank you!             Your Updated Medication List - Protect others around you: Learn how to safely use, store and throw away your medicines at www.disposemymeds.org.          This list is accurate as of 4/20/18 10:26 AM.  Always use your most recent med list.                   Brand Name Dispense Instructions for use Diagnosis    amoxicillin-clavulanate 875-125 MG per tablet    AUGMENTIN    20 tablet    Take 1 tablet by mouth 2 times daily    Acute non-recurrent maxillary sinusitis, Acute bronchitis due to other specified organisms       cetirizine 10 MG tablet    zyrTEC     Take 10 mg by mouth At Bedtime        cyanocolbalamin 500 MCG tablet    vitamin  B-12     Take 500 mcg by mouth daily        folic acid 1 MG tablet    FOLVITE    180 tablet    Take 2 tablets (2 mg) by mouth daily    Inflammatory arthritis        IBUPROFEN PO           methotrexate 2.5 MG tablet CHEMO     50 tablet    10 tablets once a week    Inflammatory arthritis       predniSONE 20 MG tablet    DELTASONE    14 tablet    Take 2 tablets (40 mg) by mouth daily for 5 days Then take 1 tablet once a day x 3 days and then 1/2 tablet once a day x 2 days.    Acute non-recurrent maxillary sinusitis, Acute bronchitis due to other specified organisms       PREMARIN 0.45 MG tablet   Generic drug:  estrogens (conjugated)     90 tablet    TAKE 1 TABLET(0.45 MG) BY MOUTH DAILY    Menopausal syndrome (hot flashes)

## 2018-04-23 LAB — TSH SERPL DL<=0.005 MIU/L-ACNC: 1.23 MU/L (ref 0.4–4)

## 2018-04-24 ENCOUNTER — OFFICE VISIT (OUTPATIENT)
Dept: RHEUMATOLOGY | Facility: CLINIC | Age: 58
End: 2018-04-24
Payer: COMMERCIAL

## 2018-04-24 VITALS
HEART RATE: 94 BPM | DIASTOLIC BLOOD PRESSURE: 78 MMHG | WEIGHT: 192.6 LBS | SYSTOLIC BLOOD PRESSURE: 124 MMHG | OXYGEN SATURATION: 99 % | RESPIRATION RATE: 14 BRPM | HEIGHT: 65 IN | BODY MASS INDEX: 32.09 KG/M2

## 2018-04-24 DIAGNOSIS — M19.90 INFLAMMATORY ARTHRITIS: ICD-10-CM

## 2018-04-24 LAB
ALBUMIN SERPL-MCNC: 3.9 G/DL (ref 3.4–5)
ALBUMIN UR-MCNC: NEGATIVE MG/DL
ALP SERPL-CCNC: 70 U/L (ref 40–150)
ALT SERPL W P-5'-P-CCNC: 36 U/L (ref 0–50)
APPEARANCE UR: CLEAR
AST SERPL W P-5'-P-CCNC: 14 U/L (ref 0–45)
BASOPHILS # BLD AUTO: 0 10E9/L (ref 0–0.2)
BASOPHILS NFR BLD AUTO: 0.3 %
BILIRUB DIRECT SERPL-MCNC: <0.1 MG/DL (ref 0–0.2)
BILIRUB SERPL-MCNC: 0.3 MG/DL (ref 0.2–1.3)
BILIRUB UR QL STRIP: NEGATIVE
COLOR UR AUTO: YELLOW
CREAT SERPL-MCNC: 0.66 MG/DL (ref 0.52–1.04)
CREAT UR-MCNC: 120 MG/DL
CRP SERPL-MCNC: <2.9 MG/L (ref 0–8)
DIFFERENTIAL METHOD BLD: ABNORMAL
EOSINOPHIL # BLD AUTO: 0 10E9/L (ref 0–0.7)
EOSINOPHIL NFR BLD AUTO: 0.2 %
ERYTHROCYTE [DISTWIDTH] IN BLOOD BY AUTOMATED COUNT: 13.3 % (ref 10–15)
ERYTHROCYTE [SEDIMENTATION RATE] IN BLOOD BY WESTERGREN METHOD: 6 MM/H (ref 0–30)
GFR SERPL CREATININE-BSD FRML MDRD: >90 ML/MIN/1.7M2
GLUCOSE UR STRIP-MCNC: NEGATIVE MG/DL
HCT VFR BLD AUTO: 39.3 % (ref 35–47)
HGB BLD-MCNC: 13.2 G/DL (ref 11.7–15.7)
HGB UR QL STRIP: NEGATIVE
KETONES UR STRIP-MCNC: NEGATIVE MG/DL
LEUKOCYTE ESTERASE UR QL STRIP: NEGATIVE
LYMPHOCYTES # BLD AUTO: 1.1 10E9/L (ref 0.8–5.3)
LYMPHOCYTES NFR BLD AUTO: 11.2 %
MCH RBC QN AUTO: 32 PG (ref 26.5–33)
MCHC RBC AUTO-ENTMCNC: 33.6 G/DL (ref 31.5–36.5)
MCV RBC AUTO: 95 FL (ref 78–100)
MONOCYTES # BLD AUTO: 0.4 10E9/L (ref 0–1.3)
MONOCYTES NFR BLD AUTO: 4.4 %
NEUTROPHILS # BLD AUTO: 8.4 10E9/L (ref 1.6–8.3)
NEUTROPHILS NFR BLD AUTO: 83.9 %
NITRATE UR QL: NEGATIVE
PH UR STRIP: 5.5 PH (ref 5–7)
PLATELET # BLD AUTO: 287 10E9/L (ref 150–450)
PROT SERPL-MCNC: 6.9 G/DL (ref 6.8–8.8)
PROT UR-MCNC: <0.05 G/L
PROT/CREAT 24H UR: NORMAL G/G CR (ref 0–0.2)
RBC # BLD AUTO: 4.12 10E12/L (ref 3.8–5.2)
SOURCE: NORMAL
SP GR UR STRIP: >1.03 (ref 1–1.03)
THYROPEROXIDASE AB SERPL-ACNC: <10 IU/ML
UROBILINOGEN UR STRIP-ACNC: 0.2 EU/DL (ref 0.2–1)
WBC # BLD AUTO: 10 10E9/L (ref 4–11)

## 2018-04-24 PROCEDURE — 99214 OFFICE O/P EST MOD 30 MIN: CPT | Performed by: INTERNAL MEDICINE

## 2018-04-24 PROCEDURE — 86255 FLUORESCENT ANTIBODY SCREEN: CPT | Mod: 90 | Performed by: INTERNAL MEDICINE

## 2018-04-24 PROCEDURE — 86140 C-REACTIVE PROTEIN: CPT | Performed by: INTERNAL MEDICINE

## 2018-04-24 PROCEDURE — 99000 SPECIMEN HANDLING OFFICE-LAB: CPT | Performed by: INTERNAL MEDICINE

## 2018-04-24 PROCEDURE — 83876 ASSAY MYELOPEROXIDASE: CPT | Performed by: INTERNAL MEDICINE

## 2018-04-24 PROCEDURE — 85025 COMPLETE CBC W/AUTO DIFF WBC: CPT | Performed by: INTERNAL MEDICINE

## 2018-04-24 PROCEDURE — 82565 ASSAY OF CREATININE: CPT | Performed by: INTERNAL MEDICINE

## 2018-04-24 PROCEDURE — 84156 ASSAY OF PROTEIN URINE: CPT | Performed by: INTERNAL MEDICINE

## 2018-04-24 PROCEDURE — 80076 HEPATIC FUNCTION PANEL: CPT | Performed by: INTERNAL MEDICINE

## 2018-04-24 PROCEDURE — 85652 RBC SED RATE AUTOMATED: CPT | Performed by: INTERNAL MEDICINE

## 2018-04-24 PROCEDURE — 87340 HEPATITIS B SURFACE AG IA: CPT | Performed by: INTERNAL MEDICINE

## 2018-04-24 PROCEDURE — 83516 IMMUNOASSAY NONANTIBODY: CPT | Performed by: INTERNAL MEDICINE

## 2018-04-24 PROCEDURE — 36415 COLL VENOUS BLD VENIPUNCTURE: CPT | Performed by: INTERNAL MEDICINE

## 2018-04-24 PROCEDURE — 86704 HEP B CORE ANTIBODY TOTAL: CPT | Performed by: INTERNAL MEDICINE

## 2018-04-24 PROCEDURE — 81003 URINALYSIS AUTO W/O SCOPE: CPT | Performed by: INTERNAL MEDICINE

## 2018-04-24 RX ORDER — METHOTREXATE 2.5 MG/1
25 TABLET ORAL WEEKLY
Qty: 120 TABLET | Refills: 1 | Status: SHIPPED | OUTPATIENT
Start: 2018-04-24 | End: 2018-08-07

## 2018-04-24 RX ORDER — FOLIC ACID 1 MG/1
2 TABLET ORAL DAILY
Qty: 180 TABLET | Refills: 3 | Status: SHIPPED | OUTPATIENT
Start: 2018-04-24 | End: 2019-04-09

## 2018-04-24 NOTE — NURSING NOTE
Blood pressure rechecked after visit      124/78  Mi Irwin CMA  4/24/2018 3:52 PM

## 2018-04-24 NOTE — PATIENT INSTRUCTIONS
Rheumatology    Dr. Abdoul Eli         [facility]   (Monday)  [address]  [place] [address]       [facility]   (Tuesday)  [address]  [place] [address]    [facility]   (Wed., Thurs., and Friday)  [address] [place] [address]    Phone number: [phone]  Thank you for choosing [facility].  Mi Irwin CMA

## 2018-04-24 NOTE — PROGRESS NOTES
Rheumatology Clinic Visit      Radha Rodriguez MRN# [id number]   YOB: 1960 Age: 58 year old      Date of visit: 4/24/18   PCP: Dr. Temo Fletcher    Chief Complaint   Patient presents with:  Consult: Patient states she feels good today but had been on steroids for bronchitis. Swelling in hands      Assessment and Plan     1. Inflammatory arthritis: Previously followed by Rojas Vasques and Sh[facility]ia.  Established care with me on 4/24/2018.  Previously on Humira (ineffective), HCQ (ineffective; used with MTX).  Currently on MTX 25mg once weekly with worsening symptoms when reduced in the past.  Also on prednisone for URI. Holding MTX while on augmentin that is reasonable.  Arthritis doing well right now but this could be due to prednisone use. Question if arthritis has any relation to hx of suspected Crohn's Disease (reportedly fistula requiring surgery, and colonoscopies showing significant scaring in the intestine) or frequent URIs in the past few months.   - Continue methotrexate 25mg once weekly (okay to hold a couple doses during augmentin use)  - Continue folic 2mg daily  - Labs today: CBC, Cr, Hepatic Panel, ESR, CRP, ANCA, MPO, PR3, UA, Uprotein:creatinine  - Labs in 3 months: CBC, Creatinine, Hepatic Panel, ESR, CRP    # Methotrexate Risks and Benefits: The risks and benefits of methotrexate were discussed in detail and the patient verbalized understanding.  The risks discussed include, but are not limited to, the risk for hypersensitivity, anaphylaxis, anaphylactoid reactions, infections, bone marrow suppression, renal toxicity, hepatotoxicity, pulmonary toxicity, malignancy, impaired fertility, GI upset, alopecia, and oral and nasal sores.  Folic acid supplementation is recommended during methotrexate therapy to help prevent some of the side effects. Pregnancy prevention and planning was discussed; it is recommended that women of childbearing potential use reliable contraception during therapy.  The  risks of taking both methotrexate and alcohol were reviewed; complete alcohol avoidance was discussed.  Routine laboratory monitoring is required during methotrexate therapy. Taking MTX once weekly, all within a 24 hour period was stressed and the patient verbalized this instruction back to me.  I encouraged reviewing the package insert and asking any questions about the medication.    Ms. Rodriguez verbalized agreement with and understanding of the rational for the diagnosis and treatment plan.  All questions were answered to best of my ability and the patient's satisfaction. Ms. Rodriguez was advised to contact the clinic with any questions that may arise after the clinic visit.      More than 50% of the face-to-face time was spent counseling the patient.  Total face-to-face time was at least 25 minutes.    Thank you for involving me in the care of the patient    Return to clinic: 3 months      HPI   Radha Rodriguez is a 58 year old female with a past medical history significant for osteoarthritis, inflammatory arthritis who is seen for follow-up of inflammatory arthritis.    Today, Ms. Rodriguez reports that she has previously been on Humira (ineffective), HCQ (ineffective; used with MTX). Currently on MTX 25mg once weekly; when this medication was stopped in the past she had worsening symptoms.  Has had 5 episodes of upper respiratory infections in the last several months.  Currently on prednisone and says that most of her joints feel good right now.  Morning stiffness for no more than 10 minutes.  She also notes having history of bowel issues where colonoscopies showed scarring in the intestines and reportedly there was question of Crohn's in the past.  She has occasional blood that is on the toilet paper.  She does not notice a correlation between abdominal symptoms and worsening joint symptoms.  History of needing a fistula surgery in the past.  No frequent nosebleeds.  No blood in the urine.  No hemoptysis or  hematemesis.       Denies fevers, chills, nausea, vomiting, constipation, diarrhea. No abdominal pain. No chest pain/pressure, palpitations, or shortness of breath. No LE swelling. No neck pain. No oral sores.  Chronic recurrent nasal sore for years, per patient.  No rash. No sicca symptoms. No photosensitivity or photophobia. No eye pain or redness. No history of inflammatory eye disease.    Tobacco: none  EtOH: occasional  Drugs: none    ROS   GEN: No fevers, chills, night sweats, fatigue, or weight change  SKIN: No itching, rashes, sores  HEENT: No epistaxis.   CV: No chest pain, pressure, palpitations, or dyspnea on exertion.  PULM: No SOB, wheeze, cough.  GI: No nausea, vomiting, constipation, diarrhea. No blood in stool. No abdominal pain.  : No blood in urine.  MSK: See HPI.  NEURO: No numbness, tingling, or weakness.  EXT: No LE swelling  PSYCH: Negative    Active Problem List     Patient Active Problem List   Diagnosis     Non-healing lesion on left nose     Viral warts     FEMALE STRESS INCONTINENCE post-hysterectomy     HYPERHIDROSIS on axilla and soles of feet     Distal sacrum pain     Malaise and fatigue     Abdominal pain, right lower quadrant     Dermatitis due to cosmetics     Contact dermatitis and other eczema, due to unspecified cause     Acquired acanthosis nigricans     LFT abnormalities and Urobilnogen high     Female pelvic pain     CARDIOVASCULAR SCREENING; LDL GOAL LESS THAN 160     Finger pain     Mechanical problems with limbs     Vitamin D deficiency     Inflammatory arthritis     High risk medications (not anticoagulants) long-term use     Osteoarthritis     Menopausal syndrome (hot flashes)     Acute conjunctivitis of both eyes, unspecified acute conjunctivitis type     Past Medical History     Past Medical History:   Diagnosis Date     Arthritis      Headache(784.0)      Irritable bowel syndrome      Other and unspecified noninfectious gastroenteritis and colitis(558.9)     chronic      Past Surgical History     Past Surgical History:   Procedure Laterality Date     C REPLACEMENT,URETER,BOWEL SEGMENT  10/01/2008    Part of her ureter was repaired.     CATARACT IOL, RT/LT       COLONOSCOPY  10/14/2011    Procedure:COLONOSCOPY; Colonoscopy; Surgeon:SCOTTY LEDEZMA; Location:[facility]     ENHANCE LASER REFRACTIVE BILATERAL EXISTING PT IN PARAMETERS       HYSTERECTOMY, VAGINAL  1/1/2000    TVH, fibroids (still has ovaries)     SURGICAL HISTORY OF -       Tubal Ligation     SURGICAL HISTORY OF -       Appy     SURGICAL HISTORY OF -       Tonsils     SURGICAL HISTORY OF -   12/94    Anal Fistulotomy     Allergy     Allergies   Allergen Reactions     Sulfa Drugs Rash     Clindamycin Rash     Codeine Hives     Current Medication List     Current Outpatient Prescriptions   Medication Sig     amoxicillin-clavulanate (AUGMENTIN) 875-125 MG per tablet Take 1 tablet by mouth 2 times daily     cetirizine (ZYRTEC) 10 MG tablet Take 10 mg by mouth At Bedtime      cyanocolbalamin (VITAMIN  B-12) 500 MCG tablet Take 500 mcg by mouth daily     folic acid (FOLVITE) 1 MG tablet Take 2 tablets (2 mg) by mouth daily     gentamicin (GARAMYCIN) 0.3 % ophthalmic solution Place 2 drops Into the left eye 4 times daily for 7 days     IBUPROFEN PO      methotrexate sodium 2.5 MG TABS Take 25 mg by mouth once a week . Take all 10 tablets on the same day of each week.     predniSONE (DELTASONE) 20 MG tablet Take 2 tablets (40 mg) by mouth daily for 5 days Then take 1 tablet once a day x 3 days and then 1/2 tablet once a day x 2 days.     PREMARIN 0.45 MG tablet TAKE 1 TABLET(0.45 MG) BY MOUTH DAILY     [DISCONTINUED] methotrexate 2.5 MG tablet CHEMO 10 tablets once a week     No current facility-administered medications for this visit.          Social History   See HPI    Family History     Family History   Problem Relation Age of Onset     HEART DISEASE Father      Heart attack     C.A.D. Father      age 50      "Hypertension Father      CANCER Maternal Grandfather      Alzheimer Disease Maternal Grandfather      CANCER Paternal Grandfather      DIABETES No family hx of      CEREBROVASCULAR DISEASE No family hx of      Breast Cancer No family hx of      Cancer - colorectal No family hx of      Prostate Cancer No family hx of        Physical Exam     Temp Readings from Last 3 Encounters:   04/20/18 98.9  F (37.2  C) (Oral)   01/24/18 99.4  F (37.4  C) (Oral)   12/18/17 98.6  F (37  C) (Oral)     BP Readings from Last 5 Encounters:   04/24/18 124/78   04/20/18 124/80   01/24/18 137/86   12/18/17 135/76   12/14/17 132/85     Pulse Readings from Last 1 Encounters:   04/24/18 94     Resp Readings from Last 1 Encounters:   04/24/18 14     Estimated body mass index is 32.55 kg/(m^2) as calculated from the following:    Height as of this encounter: 1.638 m (5' 4.5\").    Weight as of this encounter: 87.4 kg (192 lb 9.6 oz).    GEN: NAD  HEENT: MMM. No oral lesions.  Anicteric, noninjected sclera  CV: S1, S2. RRR. No m/r/g.  PULM: CTA bilaterally. No w/c.  ABD: +BS.   MSK: Mildly tender to palpation over the left first CMC that did not have significant squaring at that joint.  Heberden's nodes present.  MCPs and PIPs without swelling or tenderness to palpation.  Wrists, elbows, shoulders, knees, ankles, and MTPs without swelling or tenderness to palpation.  No dactylitis.  Hips mildly tender to palpation.       NEURO: UE and LE strengths 5/5 and equal bilaterally.   SKIN: No rash  EXT: Nonpitting bilateral lower extremity edema distal to the knees  PSYCH: Alert. Appropriate.    Labs / Imaging (select studies)     RF/CCP  Recent Labs   Lab Test  06/01/15   1139  01/31/14   1534  06/06/13   1341   CCPABY  <20  Interpretation:  Negative    <20 Interpretation:  Negative   --    RHF  <20   --   7     VALERIE  Recent Labs   Lab Test  11/25/13   1350  06/06/13   1341   RITU  <1.0 Interpretation:  Negative  <1.0 Interpretation:  Negative "     CBC  Recent Labs   Lab Test  10/11/17   1207  07/05/17   1639  03/29/17   1148   WBC  8.8  9.0  7.3   RBC  4.03  3.69*  4.17   HGB  13.2  11.9  13.9   HCT  39.4  36.0  41.3   MCV  98  98  99   RDW  12.9  13.0  13.8   PLT  216  210  226   MCH  32.8  32.2  33.3*   MCHC  33.5  33.1  33.7   NEUTROPHIL  77.4  73.4  71.4   LYMPH  15.5  17.4  18.3   MONOCYTE  4.2  5.7  7.2   EOSINOPHIL  2.2  2.9  2.5   BASOPHIL  0.7  0.6  0.6   ANEU  6.8  6.6  5.2   ALYM  1.4  1.6  1.3   EDWARD  0.4  0.5  0.5   AEOS  0.2  0.3  0.2   ABAS  0.1  0.1  0.0     CMP  Recent Labs   Lab Test  10/11/17   1207  07/05/17   1639  03/29/17   1148  12/12/16   1008  05/23/16   1157  11/23/15   1200   NA  138  139  143  142  141  138   POTASSIUM  4.1  3.5  4.5  4.4  4.2  3.9   CHLORIDE  105  105  108  105  106  104   CO2  28  29  29  32  28  29   ANIONGAP  5  5  6  5  7  5   GLC  86  119*  85  98  81  92   BUN  18  16  13  16  13  15   CR  0.73  0.66  0.68  0.80  0.73  0.76   GFRESTIMATED  82  >90  Non  GFR Calc    >90  Non  GFR Calc    75  83  79   GFRESTBLACK  >90  >90  African American GFR Calc    >90   GFR Calc    >90   GFR Calc    >90   GFR Calc    >90   GFR Calc     ENEIDA  9.2  8.9  8.7  9.7  8.8  9.1   BILITOTAL  0.7  0.3  0.5  0.5  0.5  0.4   ALBUMIN  3.9  3.5  3.7  4.0  3.7  3.8   PROTTOTAL  6.9  6.4*  6.7*  7.1  6.9  6.9   ALKPHOS  57  69  64  62  62  67   AST  28  27  14  22  19  24   ALT  44  38  31  38  38  41     Calcium/VitaminD  Recent Labs   Lab Test  10/11/17   1207  07/05/17   1639  03/29/17   1148   09/18/14   1129  04/29/14   1522  11/25/13   1350   ENEIDA  9.2  8.9  8.7   < >   --    --    --    VITDT   --    --    --    --   38  42  19*    < > = values in this interval not displayed.     ESR/CRP  Recent Labs   Lab Test  01/24/18   1040  06/01/15   1139  11/25/13   1350  06/06/13   1341  02/04/13   1651   SED   --   4  14  6   --    CRP  9.1*    --    --   7.6  <5.0     CK/Aldolase  Recent Labs   Lab Test  01/31/14   1534   CKT  60     TSH/T4  Recent Labs   Lab Test  04/20/18   1030  01/24/18   1040  01/31/14   1534   TSH  1.23  0.82  1.24   T4  0.93  0.90  0.93     Hepatitis C  Recent Labs   Lab Test  06/01/15   1139   HCVAB  Nonreactive   Assay performance characteristics have not been established for newborns,   infants, and children       Tuberculosis Screening  Recent Labs   Lab Test  06/01/15   1139   TBRSLT  Negative   TBAGN  0.00     Immunization History     Immunization History   Administered Date(s) Administered     HepB 01/24/1991, 02/28/1991, 09/05/1991     TD (ADULT, 7+) 03/12/1990, 01/01/2000     TDAP Vaccine (Adacel) 09/10/2008     TDAP Vaccine (Boostrix) 09/10/2008     Tdap (Adacel,Boostrix) 03/01/2011          Chart documentation done in part with Dragon Voice recognition Software. Although reviewed after completion, some word and grammatical error may remain.    Abdoul Eli MD

## 2018-04-24 NOTE — NURSING NOTE
"Chief Complaint   Patient presents with     Consult     Patient states she feels good today but had been on steroids for bronchitis. Swelling in hands       Initial /77  Pulse 94  Resp 14  Ht 1.638 m (5' 4.5\")  Wt 87.4 kg (192 lb 9.6 oz)  SpO2 99%  BMI 32.55 kg/m2 Estimated body mass index is 32.55 kg/(m^2) as calculated from the following:    Height as of this encounter: 1.638 m (5' 4.5\").    Weight as of this encounter: 87.4 kg (192 lb 9.6 oz).  BP completed using cuff size: regular         RAPID3 (0-30) Cumulative Score  4.3          RAPID3 Weighted Score (divide #4 by 3 and that is the weighted score)  1.43       "

## 2018-04-24 NOTE — MR AVS SNAPSHOT
After Visit Summary   4/24/2018    Radha Rodriguez    MRN: [id number]           Patient Information     Date Of Birth          1960        Visit Information        Provider Department      4/24/2018 3:00 PM Abdoul Eli MD [facility]        Today's Diagnoses     Inflammatory arthritis          Care Instructions    Rheumatology    Dr. Abdoul Quiroz [facility]   (Monday)  [address]  [place] [address]       [facility]   (Tuesday)  [address] [place] [address]    [facility]   (Wed., Thurs., and Friday)  [address] [place] [address]    Phone number: [phone]  Thank you for choosing [facility].  Mi Irwin CMA            Follow-ups after your visit        Your next 10 appointments already scheduled     Aug 01, 2018 10:00 AM CDT   LAB with BK LAB   [facility] ([facility]    [address] [facility] [id number] [phone]           Please do not eat 10-12 hours before your appointment if you are coming in fasting for labs on lipids, cholesterol, or glucose (sugar). This does not apply to pregnant women. Water, hot tea and black coffee (with nothing added) are okay. Do not drink other fluids, diet soda or chew gum.            Aug 07, 2018  3:40 PM CDT   Return Visit with Abdoul Eli MD   [facility] ([facility])    [address] [facility] [id number] [phone]              Future tests that were ordered for you today     Open Future Orders        Priority Expected Expires Ordered    CBC with platelets differential Routine 7/19/2018 8/22/2018 4/24/2018    Creatinine Routine 7/19/2018 8/22/2018 4/24/2018    CRP inflammation Routine 7/19/2018 8/22/2018 4/24/2018    Erythrocyte sedimentation rate auto Routine 7/19/2018 8/22/2018 4/24/2018    Hepatic panel Routine 7/19/2018 8/22/2018 4/24/2018     "        Who to contact     If you have questions or need follow up information about today's clinic visit or your schedule please contact [facility] [place] directly at [phone].  Normal or non-critical lab and imaging results will be communicated to you by MyChart, letter or phone within 4 business days after the clinic has received the results. If you do not hear from us within 7 days, please contact the clinic through [employer] or phone. If you have a critical or abnormal lab result, we will notify you by phone as soon as possible.  Submit refill requests through [employer] or call your pharmacy and they will forward the refill request to us. Please allow 3 business days for your refill to be completed.          Additional Information About Your Visit        [employer] Information     [employer] gives you secure access to your electronic health record. If you see a primary care provider, you can also send messages to your care team and make appointments. If you have questions, please call your primary care clinic.  If you do not have a primary care provider, please call [phone] and they will assist you.        Care EveryWhere ID     This is your Care EveryWhere ID. This could be used by other organizations to access your [facility] medical records  [id number]        Your Vitals Were     Pulse Respirations Height Pulse Oximetry BMI (Body Mass Index)       94 14 1.638 m (5' 4.5\") 99% 32.55 kg/m2        Blood Pressure from Last 3 Encounters:   04/24/18 150/77   04/20/18 124/80   01/24/18 137/86    Weight from Last 3 Encounters:   04/24/18 87.4 kg (192 lb 9.6 oz)   04/20/18 87.3 kg (192 lb 6.4 oz)   01/24/18 86.6 kg (191 lb)              We Performed the Following     Antineutrophil cytoplasmic Shanon IgG     CBC with platelets differential     Creatinine     CRP inflammation     Erythrocyte sedimentation rate auto     Hepatic panel     Hepatitis B core antibody     Hepatitis B surface antigen     " Myeloperoxidase Antibody IgG     Protein  random urine with Creat Ratio     Proteinase 3 Antibody IgG     UA reflex to Microscopic and Culture          Today's Medication Changes          These changes are accurate as of 4/24/18  3:50 PM.  If you have any questions, ask your nurse or doctor.               These medicines have changed or have updated prescriptions.        Dose/Directions    methotrexate sodium 2.5 MG Tabs   This may have changed:    - how much to take  - how to take this  - when to take this  - additional instructions   Used for:  Inflammatory arthritis   Changed by:  Abdoul Eli MD        Dose:  25 mg   Take 25 mg by mouth once a week . Take all 10 tablets on the same day of each week.   Quantity:  120 tablet   Refills:  1            Where to get your medicines      These medications were sent to [id number] - [place], [place] - [address] AT [facility] [address], [place] [facility] [address]     Phone:  [phone]     folic acid 1 MG tablet    methotrexate sodium 2.5 MG Tabs                Primary Care Provider Office Phone # Fax #    Temo Fletcher -[phone]       [address]  [facility] [address]        Equal Access to Services     MICHELLE SAM AH: Hadii aleksey lomas hadasho Soomaali, waaxda luqadaha, qaybta kaalmada adeegyada, antwan shaikh. So [facility] [phone].    ATENCIÓN: Si habla español, tiene a guerra disposición servicios gratuitos de asistencia lingüística. Celso al [phone].    We comply with applicable federal civil rights laws and [place] laws. We do not discriminate on the basis of race, color, national origin, age, disability, sex, sexual orientation, or gender identity.            Thank you!     Thank you for choosing [facility]  for your care. Our goal is always to provide you with excellent care. Hearing back from our patients is one way we can continue to  improve our services. Please take a few minutes to complete the written survey that you may receive in the mail after your visit with us. Thank you!             Your Updated Medication List - Protect others around you: Learn how to safely use, store and throw away your medicines at www.disposemymeds.org.          This list is accurate as of 4/24/18  3:50 PM.  Always use your most recent med list.                   Brand Name Dispense Instructions for use Diagnosis    amoxicillin-clavulanate 875-125 MG per tablet    AUGMENTIN    20 tablet    Take 1 tablet by mouth 2 times daily    Acute non-recurrent maxillary sinusitis       cetirizine 10 MG tablet    zyrTEC     Take 10 mg by mouth At Bedtime        cyanocolbalamin 500 MCG tablet    vitamin  B-12     Take 500 mcg by mouth daily        folic acid 1 MG tablet    FOLVITE    180 tablet    Take 2 tablets (2 mg) by mouth daily    Inflammatory arthritis       gentamicin 0.3 % ophthalmic solution    GARAMYCIN    5 mL    Place 2 drops Into the left eye 4 times daily for 7 days    Bacterial conjunctivitis of left eye       IBUPROFEN PO           methotrexate sodium 2.5 MG Tabs     120 tablet    Take 25 mg by mouth once a week . Take all 10 tablets on the same day of each week.    Inflammatory arthritis       predniSONE 20 MG tablet    DELTASONE    14 tablet    Take 2 tablets (40 mg) by mouth daily for 5 days Then take 1 tablet once a day x 3 days and then 1/2 tablet once a day x 2 days.    Acute non-recurrent maxillary sinusitis       PREMARIN 0.45 MG tablet   Generic drug:  estrogens (conjugated)     90 tablet    TAKE 1 TABLET(0.45 MG) BY MOUTH DAILY    Menopausal syndrome (hot flashes)

## 2018-04-25 LAB
HBV CORE AB SERPL QL IA: NONREACTIVE
HBV SURFACE AG SERPL QL IA: NONREACTIVE
MYELOPEROXIDASE AB SER-ACNC: <0.2 AI (ref 0–0.9)
PROTEINASE3 IGG SER-ACNC: <0.2 AI (ref 0–0.9)

## 2018-04-27 LAB — ANCA IGG TITR SER IF: NORMAL {TITER}

## 2018-07-24 ENCOUNTER — OFFICE VISIT (OUTPATIENT)
Dept: FAMILY MEDICINE | Facility: CLINIC | Age: 58
End: 2018-07-24
Payer: COMMERCIAL

## 2018-07-24 VITALS
SYSTOLIC BLOOD PRESSURE: 119 MMHG | WEIGHT: 189 LBS | HEIGHT: 65 IN | BODY MASS INDEX: 31.49 KG/M2 | HEART RATE: 76 BPM | DIASTOLIC BLOOD PRESSURE: 73 MMHG | OXYGEN SATURATION: 98 % | TEMPERATURE: 98.4 F | RESPIRATION RATE: 18 BRPM

## 2018-07-24 DIAGNOSIS — L23.9 ALLERGIC CONTACT DERMATITIS, UNSPECIFIED TRIGGER: Primary | ICD-10-CM

## 2018-07-24 PROBLEM — H10.33 ACUTE CONJUNCTIVITIS OF BOTH EYES, UNSPECIFIED ACUTE CONJUNCTIVITIS TYPE: Status: RESOLVED | Noted: 2017-07-15 | Resolved: 2018-07-24

## 2018-07-24 PROCEDURE — 99214 OFFICE O/P EST MOD 30 MIN: CPT | Performed by: INTERNAL MEDICINE

## 2018-07-24 RX ORDER — HYDROXYZINE PAMOATE 25 MG/1
25 CAPSULE ORAL 3 TIMES DAILY PRN
Qty: 60 CAPSULE | Refills: 5 | Status: SHIPPED | OUTPATIENT
Start: 2018-07-24 | End: 2018-10-05

## 2018-07-24 RX ORDER — MONTELUKAST SODIUM 10 MG/1
10 TABLET ORAL AT BEDTIME
Qty: 30 TABLET | Refills: 2 | Status: SHIPPED | OUTPATIENT
Start: 2018-07-24 | End: 2018-10-05

## 2018-07-24 RX ORDER — TRIAMCINOLONE ACETONIDE 1 MG/G
CREAM TOPICAL
Qty: 453.6 G | Refills: 2 | Status: SHIPPED | OUTPATIENT
Start: 2018-07-24 | End: 2018-10-05

## 2018-07-24 ASSESSMENT — PAIN SCALES - GENERAL: PAINLEVEL: NO PAIN (0)

## 2018-07-24 NOTE — MR AVS SNAPSHOT
After Visit Summary   7/24/2018    Radha Rodriguez    MRN: [id number]           Patient Information     Date Of Birth          1960        Visit Information        Provider Department      7/24/2018 5:00 PM Temo Fletcher MD [facility]        Today's Diagnoses     Allergic contact dermatitis, unspecified trigger    -  1      Care Instructions    At [facility], we strive to deliver an exceptional experience to you, every time we see you.  If you receive a survey in the mail, please send us back your thoughts. We really do value your feedback.    Based on your medical history, these are the current health maintenance/preventive care services that you are due for (some may have been done at this visit.)  Health Maintenance Due   Topic Date Due     HIV SCREEN (SYSTEM ASSIGNED)  03/20/1980     LIPID SCREEN Q5 YR FEMALE (SYSTEM ASSIGNED)  07/18/2016     ADVANCE DIRECTIVE PLANNING Q5 YRS  03/20/2017       Suggested websites for health information:  Www.The Mother List : Up to date and easily searchable information on multiple topics.  Www.medlineplus.gov : medication info, interactive tutorials, watch real surgeries online  Www.familydoctor.org : good info from the Academy of Family Physicians  Www.cdc.gov : public health info, travel advisories, epidemics (H1N1)  Www.aap.org : children's health info, normal development, vaccinations  Www.health.[facility].[place].us : [place] dept of health, public health issues in [place], N1N1    Your care team:                            Family Medicine Internal Medicine   MD Temo Lott MD Shantel Branch-Fleming, MD Katya Georgiev PA-C Megan Hill, PHONG Lewis MD Pediatrics   SURESH Morton, MD Flory Lr APRN CNP   MD Daija Ortiz MD Deborah Mielke, MD Kim Thein, APRN [facility]      Clinic hours: Monday - Thursday 7 am-7 pm; Fridays 7 am-5 pm.    Urgent care: Monday - Friday 11 am-9 pm; Saturday and Sunday 9 am-5 pm.  Pharmacy : Monday -Thursday 8 am-8 pm; Friday 8 am-6 pm; Saturday and Sunday 9 am-5 pm.     Clinic: [phone]   Pharmacy: [phone]              Follow-ups after your visit        Your next 10 appointments already scheduled     Aug 01, 2018 10:00 AM CDT   [facility]    [address] [address] [phone]           Please do not eat 10-12 hours before your appointment if you are coming in fasting for labs on lipids, cholesterol, or glucose (sugar). This does not apply to pregnant women. Water, hot tea and black coffee (with nothing added) are okay. Do not drink other fluids, diet soda or chew gum.            Aug 07, 2018  3:40 PM CDT   [facility] with Abdoul Eli [facility]   [facility]    [address] [place] [id number] [phone]              Who to contact     If you have questions or need follow up information about today's clinic visit or your schedule please contact [facility] directly at [phone].  Normal or non-critical lab and imaging results will be communicated to you by 24tidyhart, letter or phone within 4 business days after the clinic has received the results. If you do not hear from us within 7 days, please contact the clinic through 24tidyhart or phone. If you have a critical or abnormal lab result, we will notify you by phone as soon as possible.  Submit refill requests through [employer] or call your pharmacy and they will forward the refill request to us. Please allow 3 business days for your refill to be completed.          Additional Information About Your Visit        [employer] Information     [employer] gives you secure access to your electronic health record. If you see a primary care provider, you can also send messages to  "your care team and make appointments. If you have questions, please call your primary care clinic.  If you do not have a primary care provider, please call [phone] and they will assist you.        Care EveryWhere ID     This is your Care EveryWhere ID. This could be used by other organizations to access your [facility] medical records  [id number]        Your Vitals Were     Pulse Temperature Respirations Height Pulse Oximetry BMI (Body Mass Index)    76 98.4  F (36.9  C) (Oral) 18 5' 4.5\" (1.638 m) 98% 31.94 kg/m2       Blood Pressure from Last 3 Encounters:   07/24/18 119/73   04/24/18 124/78   04/20/18 124/80    Weight from Last 3 Encounters:   07/24/18 189 lb (85.7 kg)   04/24/18 192 lb 9.6 oz (87.4 kg)   04/20/18 192 lb 6.4 oz (87.3 kg)              Today, you had the following     No orders found for display         Today's Medication Changes          These changes are accurate as of 7/24/18  5:23 PM.  If you have any questions, ask your nurse or doctor.               Start taking these medicines.        Dose/Directions    hydrOXYzine 25 MG capsule   Commonly known as:  VISTARIL   Used for:  Allergic contact dermatitis, unspecified trigger   Started by:  Temo Fletcher MD        Dose:  25 mg   Take 1 capsule (25 mg) by mouth 3 times daily as needed for itching   Quantity:  60 capsule   Refills:  5       montelukast 10 MG tablet   Commonly known as:  SINGULAIR   Used for:  Allergic contact dermatitis, unspecified trigger   Started by:  Temo Fletcher MD        Dose:  10 mg   Take 1 tablet (10 mg) by mouth At Bedtime   Quantity:  30 tablet   Refills:  2       ranitidine 150 MG tablet   Commonly known as:  ZANTAC   Used for:  Allergic contact dermatitis, unspecified trigger   Started by:  Temo Fletcher MD        Dose:  150 mg   Take 1 tablet (150 mg) by mouth 2 times daily   Quantity:  30 tablet   Refills:  2       triamcinolone 0.1 % cream   Commonly known as:  KENALOG   Used for:  " Allergic contact dermatitis, unspecified trigger   Started by:  Temo Fletcher MD        Apply sparingly to affected area three times daily as needed   Quantity:  453.6 g   Refills:  2            Where to get your medicines      These medications were sent to [id number] - [place], [place] - [address] AT [facility] [address], [place] [address]     Phone:  [phone]     hydrOXYzine 25 MG capsule    montelukast 10 MG tablet    ranitidine 150 MG tablet    triamcinolone 0.1 % cream                Primary Care Provider Office Phone # Fax #    Temo Fletcher [phone]       [address]  [place] [address]        Equal Access to Services     [facility]: Hadii aad ku hadasho Soomaali, waaxda luqadaha, qaybta kaalmada adeegyada, waxay lorelei haytamika abreu . So [facility] [phone].    ATENCIÓN: Si habla español, tiene a guerra disposición servicios gratuitos de asistencia lingüística. [place] [phone].    We comply with applicable federal civil rights laws and [place] laws. We do not discriminate on the basis of race, color, national origin, age, disability, sex, sexual orientation, or gender identity.            Thank you!     Thank you for choosing [facility]  for your care. Our goal is always to provide you with excellent care. Hearing back from our patients is one way we can continue to improve our services. Please take a few minutes to complete the written survey that you may receive in the mail after your visit with us. Thank you!             Your Updated Medication List - Protect others around you: Learn how to safely use, store and throw away your medicines at www.disposemymeds.org.          This list is accurate as of 7/24/18  5:23 PM.  Always use your most recent med list.                   Brand Name Dispense Instructions for use Diagnosis    amoxicillin-clavulanate 875-125 MG per  tablet    AUGMENTIN    20 tablet    Take 1 tablet by mouth 2 times daily    Acute non-recurrent maxillary sinusitis       cetirizine 10 MG tablet    zyrTEC     Take 10 mg by mouth At Bedtime        cyanocolbalamin 500 MCG tablet    vitamin  B-12     Take 500 mcg by mouth daily        folic acid 1 MG tablet    FOLVITE    180 tablet    Take 2 tablets (2 mg) by mouth daily    Inflammatory arthritis       hydrOXYzine 25 MG capsule    VISTARIL    60 capsule    Take 1 capsule (25 mg) by mouth 3 times daily as needed for itching    Allergic contact dermatitis, unspecified trigger       IBUPROFEN PO           methotrexate sodium 2.5 MG Tabs     120 tablet    Take 25 mg by mouth once a week . Take all 10 tablets on the same day of each week.    Inflammatory arthritis       montelukast 10 MG tablet    SINGULAIR    30 tablet    Take 1 tablet (10 mg) by mouth At Bedtime    Allergic contact dermatitis, unspecified trigger       PREMARIN 0.45 MG tablet   Generic drug:  estrogens (conjugated)     90 tablet    TAKE 1 TABLET(0.45 MG) BY MOUTH DAILY    Menopausal syndrome (hot flashes)       ranitidine 150 MG tablet    ZANTAC    30 tablet    Take 1 tablet (150 mg) by mouth 2 times daily    Allergic contact dermatitis, unspecified trigger       triamcinolone 0.1 % cream    KENALOG    453.6 g    Apply sparingly to affected area three times daily as needed    Allergic contact dermatitis, unspecified trigger

## 2018-07-24 NOTE — PROGRESS NOTES
SUBJECTIVE:   Radha Rodriguez is a 58 year old female who presents to clinic today for the following health issues:      Rash  Onset: about 1 week    Description:   Location: left arm  Character: raised, red  Itching (Pruritis): YES    Progression of Symptoms:  worsening and same    Accompanying Signs & Symptoms:  Fever: no   Body aches or joint pain: YES- right arm  Sore throat symptoms: no   Recent cold symptoms: no     History:   Previous similar rash: no     Precipitating factors: Cleaning house.  Exposure to similar rash: no   New exposures: None   Recent travel: no   Therapies Tried and outcome: benadryl,hydrocondsine,oneil cream, no releif      Problem list and histories reviewed & adjusted, as indicated.  Additional history: as documented    Patient Active Problem List   Diagnosis     FEMALE STRESS INCONTINENCE post-hysterectomy     HYPERHIDROSIS on axilla and soles of feet     Dermatitis due to cosmetics     Contact dermatitis and other eczema, due to unspecified cause     Acquired acanthosis nigricans     LFT abnormalities and Urobilnogen high     CARDIOVASCULAR SCREENING; LDL GOAL LESS THAN 160     Vitamin D deficiency     Inflammatory arthritis     High risk medications (not anticoagulants) long-term use     Osteoarthritis     Menopausal syndrome (hot flashes)     Past Surgical History:   Procedure Laterality Date     C REPLACEMENT,URETER,BOWEL SEGMENT  10/01/2008    Part of her ureter was repaired.     CATARACT IOL, RT/LT       COLONOSCOPY  10/14/2011    Procedure:COLONOSCOPY; Colonoscopy; Surgeon:SCOTTY LEDEZMA; Location:[facility]     ENHANCE LASER REFRACTIVE BILATERAL EXISTING PT IN PARAMETERS       HYSTERECTOMY, VAGINAL  1/1/2000    TVH, fibroids (still has ovaries)     SURGICAL HISTORY OF -       Tubal Ligation     SURGICAL HISTORY OF -       Appy     SURGICAL HISTORY OF -       Tonsils     SURGICAL HISTORY OF -   12/94    Anal Fistulotomy       Social History   Substance Use Topics      Smoking status: Never Smoker     Smokeless tobacco: Never Used     Alcohol use Yes      Comment: Occasional     Family History   Problem Relation Age of Onset     HEART DISEASE Father      Heart attack     C.A.D. Father      age 50     Hypertension Father      Cancer Maternal Grandfather      Alzheimer Disease Maternal Grandfather      Cancer Paternal Grandfather      Diabetes No family hx of      Cerebrovascular Disease No family hx of      Breast Cancer No family hx of      Cancer - colorectal No family hx of      Prostate Cancer No family hx of          Allergies   Allergen Reactions     Sulfa Drugs Rash     Clindamycin Rash     Codeine Hives     Recent Labs   Lab Test  04/24/18   1557  04/20/18   1030  01/24/18   1040  10/11/17   1207  07/05/17   1639   07/18/11   1145   LDL   --    --    --    --    --    --   130*   HDL   --    --    --    --    --    --   41*   TRIG   --    --    --    --    --    --   68   ALT  36   --    --   44  38   < >   --    CR  0.66   --    --   0.73  0.66   < >   --    GFRESTIMATED  >90   --    --   82  >90  Non  GFR Calc     < >   --    GFRESTBLACK  >90   --    --   >90  >90  African American GFR Calc     < >   --    POTASSIUM   --    --    --   4.1  3.5   < >   --    TSH   --   1.23  0.82   --    --    < >  0.56    < > = values in this interval not displayed.      BP Readings from Last 3 Encounters:   07/24/18 119/73   04/24/18 124/78   04/20/18 124/80    Wt Readings from Last 3 Encounters:   07/24/18 189 lb (85.7 kg)   04/24/18 192 lb 9.6 oz (87.4 kg)   04/20/18 192 lb 6.4 oz (87.3 kg)                    ROS:  CONSTITUTIONAL: NEGATIVE for fever, chills, change in weight  INTEGUMENTARY/SKIN: As above.  EYES: NEGATIVE for vision changes or irritation  ENT/MOUTH: NEGATIVE for ear, mouth and throat problems  RESP: NEGATIVE for significant cough or SOB  CV: NEGATIVE for chest pain, palpitations or peripheral edema  GI: NEGATIVE for nausea, abdominal pain, heartburn, or  "change in bowel habits  : NEGATIVE for frequency, dysuria, or hematuria  MUSCULOSKELETAL: NEGATIVE for significant arthralgias or myalgia  NEURO: NEGATIVE for weakness, dizziness or paresthesias  ENDOCRINE: NEGATIVE for temperature intolerance, skin/hair changes  HEME: NEGATIVE for bleeding problems  PSYCHIATRIC: NEGATIVE for changes in mood or affect    OBJECTIVE:     /73 (BP Location: Right arm, Patient Position: Sitting, Cuff Size: Adult Large)  Pulse 76  Temp 98.4  F (36.9  C) (Oral)  Resp 18  Ht 5' 4.5\" (1.638 m)  Wt 189 lb (85.7 kg)  SpO2 98%  BMI 31.94 kg/m2  Body mass index is 31.94 kg/(m^2).  GENERAL: healthy, alert and no distress  EYES: Eyes grossly normal to inspection, PERRL and conjunctivae and sclerae normal  HENT: ear canals and TM's normal, nose and mouth without ulcers or lesions  NECK: no adenopathy, no asymmetry, masses, or scars and thyroid normal to palpation  RESP: lungs clear to auscultation - no rales, rhonchi or wheezes  CV: regular rate and rhythm, normal S1 S2, no S3 or S4, no murmur, click or rub, no peripheral edema and peripheral pulses strong  ABDOMEN: soft, nontender, no hepatosplenomegaly, no masses and bowel sounds normal  MS: no gross musculoskeletal defects noted, no edema  SKIN: no suspicious lesions or rashes  NEURO: Normal strength and tone, mentation intact and speech normal  PSYCH: mentation appears normal, affect normal/bright    Diagnostic Test Results:  none     ASSESSMENT/PLAN:     (L23.9) Allergic contact dermatitis, unspecified trigger  (primary encounter diagnosis)  Comment:   Plan: triamcinolone (KENALOG) 0.1 % cream,         montelukast (SINGULAIR) 10 MG tablet,         ranitidine (ZANTAC) 150 MG tablet, hydrOXYzine         (VISTARIL) 25 MG capsule            Follow-up visit if condition worsens.      Temo Fletcher MD  [facility]  "

## 2018-07-24 NOTE — PATIENT INSTRUCTIONS
At [facility], we strive to deliver an exceptional experience to you, every time we see you.  If you receive a survey in the mail, please send us back your thoughts. We really do value your feedback.    Based on your medical history, these are the current health maintenance/preventive care services that you are due for (some may have been done at this visit.)  Health Maintenance Due   Topic Date Due     HIV SCREEN (SYSTEM ASSIGNED)  03/20/1980     LIPID SCREEN Q5 YR FEMALE (SYSTEM ASSIGNED)  07/18/2016     ADVANCE DIRECTIVE PLANNING Q5 YRS  03/20/2017       Suggested websites for health information:  Www.ponUp.Rentlytics : Up to date and easily searchable information on multiple topics.  Www.medlineplus.gov : medication info, interactive tutorials, watch real surgeries online  Www.familydoctor.org : good info from the Academy of Family Physicians  Www.cdc.gov : public health info, travel advisories, epidemics (H1N1)  Www.aap.org : children's health info, normal development, vaccinations  Www.health.[facility].mn.us : [place] dept of health, public health issues in [place], N1N1    Your care team:                            Family Medicine Internal Medicine   MD Temo Lott MD Shantel Branch-Fleming, MD Katya Georgiev PA-C Megan Hill, APRN DAMIÁN Lewis MD Pediatrics   Jeremy Rodriguez PAAL Borges, MD Flory Lr APRN CNP   MD Daija Ortiz MD Deborah Mielke, MD Kim Thein, APRN [facility]      Clinic hours: Monday - Thursday 7 am-7 pm; Fridays 7 am-5 pm.   Urgent care: Monday - Friday 11 am-9 pm; Saturday and Sunday 9 am-5 pm.  Pharmacy : Monday -Thursday 8 am-8 pm; Friday 8 am-6 pm; Saturday and Sunday 9 am-5 pm.     Clinic: [phone]   Pharmacy: [phone]

## 2018-07-26 ENCOUNTER — TELEPHONE (OUTPATIENT)
Dept: FAMILY MEDICINE | Facility: CLINIC | Age: 58
End: 2018-07-26

## 2018-07-26 NOTE — TELEPHONE ENCOUNTER
Spoke to patient she states she stopped Hydroxyzine due to fatigue and dizziness, she will think about the steroids and let us know.  Marycarmen WASHINGTON

## 2018-07-26 NOTE — TELEPHONE ENCOUNTER
Reason for Call:  Other RASH    Detailed comments: patient was seen on 7/24/2018 for a rash and was given 4 medications that is not working and making her not feel good. Also the rash is spreading    [facility] - [facility], [place] - [address] AT [facility] [address]    Phone Number Patient can be reached at: Home number on file [phone] (home)    Best Time: any    Can we leave a detailed message on this number? YES    Call taken on 7/26/2018 at 8:24 AM by Shirley Joiner

## 2018-07-26 NOTE — TELEPHONE ENCOUNTER
Inform patient that the best way to treat her condition is to take oral corticosteroids (Prednisone).  Patient refused oral corticosteroids during her clinic visit.

## 2018-08-01 DIAGNOSIS — M19.90 INFLAMMATORY ARTHRITIS: ICD-10-CM

## 2018-08-01 LAB
ALBUMIN SERPL-MCNC: 3.7 G/DL (ref 3.4–5)
ALP SERPL-CCNC: 64 U/L (ref 40–150)
ALT SERPL W P-5'-P-CCNC: 39 U/L (ref 0–50)
AST SERPL W P-5'-P-CCNC: 22 U/L (ref 0–45)
BASOPHILS # BLD AUTO: 0 10E9/L (ref 0–0.2)
BASOPHILS NFR BLD AUTO: 0.5 %
BILIRUB DIRECT SERPL-MCNC: <0.1 MG/DL (ref 0–0.2)
BILIRUB SERPL-MCNC: 0.4 MG/DL (ref 0.2–1.3)
CREAT SERPL-MCNC: 0.72 MG/DL (ref 0.52–1.04)
CRP SERPL-MCNC: 4 MG/L (ref 0–8)
DIFFERENTIAL METHOD BLD: NORMAL
EOSINOPHIL # BLD AUTO: 0.3 10E9/L (ref 0–0.7)
EOSINOPHIL NFR BLD AUTO: 4.2 %
ERYTHROCYTE [DISTWIDTH] IN BLOOD BY AUTOMATED COUNT: 13.4 % (ref 10–15)
ERYTHROCYTE [SEDIMENTATION RATE] IN BLOOD BY WESTERGREN METHOD: 6 MM/H (ref 0–30)
GFR SERPL CREATININE-BSD FRML MDRD: 84 ML/MIN/1.7M2
HCT VFR BLD AUTO: 38.3 % (ref 35–47)
HGB BLD-MCNC: 12.7 G/DL (ref 11.7–15.7)
LYMPHOCYTES # BLD AUTO: 1.1 10E9/L (ref 0.8–5.3)
LYMPHOCYTES NFR BLD AUTO: 17 %
MCH RBC QN AUTO: 31.7 PG (ref 26.5–33)
MCHC RBC AUTO-ENTMCNC: 33.2 G/DL (ref 31.5–36.5)
MCV RBC AUTO: 96 FL (ref 78–100)
MONOCYTES # BLD AUTO: 0.4 10E9/L (ref 0–1.3)
MONOCYTES NFR BLD AUTO: 6.4 %
NEUTROPHILS # BLD AUTO: 4.6 10E9/L (ref 1.6–8.3)
NEUTROPHILS NFR BLD AUTO: 71.9 %
PLATELET # BLD AUTO: 222 10E9/L (ref 150–450)
PROT SERPL-MCNC: 6.6 G/DL (ref 6.8–8.8)
RBC # BLD AUTO: 4.01 10E12/L (ref 3.8–5.2)
WBC # BLD AUTO: 6.4 10E9/L (ref 4–11)

## 2018-08-01 PROCEDURE — 86140 C-REACTIVE PROTEIN: CPT | Performed by: INTERNAL MEDICINE

## 2018-08-01 PROCEDURE — 36415 COLL VENOUS BLD VENIPUNCTURE: CPT | Performed by: INTERNAL MEDICINE

## 2018-08-01 PROCEDURE — 82565 ASSAY OF CREATININE: CPT | Performed by: INTERNAL MEDICINE

## 2018-08-01 PROCEDURE — 85025 COMPLETE CBC W/AUTO DIFF WBC: CPT | Performed by: INTERNAL MEDICINE

## 2018-08-01 PROCEDURE — 80076 HEPATIC FUNCTION PANEL: CPT | Performed by: INTERNAL MEDICINE

## 2018-08-01 PROCEDURE — 85652 RBC SED RATE AUTOMATED: CPT | Performed by: INTERNAL MEDICINE

## 2018-08-07 ENCOUNTER — OFFICE VISIT (OUTPATIENT)
Dept: RHEUMATOLOGY | Facility: CLINIC | Age: 58
End: 2018-08-07
Payer: COMMERCIAL

## 2018-08-07 VITALS
OXYGEN SATURATION: 99 % | WEIGHT: 190.6 LBS | RESPIRATION RATE: 16 BRPM | DIASTOLIC BLOOD PRESSURE: 84 MMHG | HEIGHT: 65 IN | HEART RATE: 74 BPM | BODY MASS INDEX: 31.75 KG/M2 | SYSTOLIC BLOOD PRESSURE: 138 MMHG

## 2018-08-07 DIAGNOSIS — M19.90 INFLAMMATORY ARTHRITIS: Primary | ICD-10-CM

## 2018-08-07 DIAGNOSIS — M77.11 RIGHT LATERAL EPICONDYLITIS: ICD-10-CM

## 2018-08-07 DIAGNOSIS — M18.12 PRIMARY OSTEOARTHRITIS OF FIRST CARPOMETACARPAL JOINT OF LEFT HAND: ICD-10-CM

## 2018-08-07 PROCEDURE — 99214 OFFICE O/P EST MOD 30 MIN: CPT | Mod: 25 | Performed by: INTERNAL MEDICINE

## 2018-08-07 PROCEDURE — 20600 DRAIN/INJ JOINT/BURSA W/O US: CPT | Mod: LT | Performed by: INTERNAL MEDICINE

## 2018-08-07 RX ORDER — METHYLPREDNISOLONE ACETATE 40 MG/ML
10 INJECTION, SUSPENSION INTRA-ARTICULAR; INTRALESIONAL; INTRAMUSCULAR; SOFT TISSUE ONCE
Qty: 0.25 ML | Refills: 0 | OUTPATIENT
Start: 2018-08-07 | End: 2018-08-07

## 2018-08-07 RX ORDER — METHOTREXATE 2.5 MG/1
25 TABLET ORAL WEEKLY
Qty: 120 TABLET | Refills: 2 | Status: SHIPPED | OUTPATIENT
Start: 2018-08-07 | End: 2019-04-09

## 2018-08-07 NOTE — PATIENT INSTRUCTIONS
Rheumatology    Dr. Abdoul Eli         [facility]   (Monday)  [address]  [place] [address]       [facility]   (Tuesday)  [address]  [place] [address]    [facility]   (Wed., Thurs., and Friday)  [address] [place] [address]    Phone number: [phone]  Thank you for choosing [facility].  Mi Irwin CMA

## 2018-08-07 NOTE — PROGRESS NOTES
Rheumatology Clinic Visit      Radha Rodriguez MRN# [id number]   YOB: 1960 Age: 58 year old      Date of visit: 8/07/18   PCP: Dr. Temo Fletcher    Chief Complaint   Patient presents with:  Arthritis: pain in right elbow and left thumb (would like injection if possible)      Assessment and Plan     1. Inflammatory arthritis: Previously followed by Rojas Vasques and Shazia.  Established care with me on 4/24/2018.  Previously on Humira (ineffective), HCQ (ineffective; used with MTX).  Currently on MTX 25mg once weekly with worsening symptoms when reduced in the past.  Question if arthritis has any relation to hx of suspected Crohn's Disease (reportedly fistula requiring surgery, and colonoscopies showing significant scaring in the intestine).  Doing well today and will maintain on methotrexate.  - Continue methotrexate 25mg once weekly (okay to hold a couple doses during augmentin use)  - Continue folic 2mg daily  - Labs in 3 months: CBC, Creatinine, Hepatic Panel, ESR, CRP    2.  Primary osteoarthritis of the left first carpometacarpal joint: Reportedly improved with steroid injections in the past.  The diagnosis and treatment options were discussed in detail today.  Steroid injection as documented in the procedure section    3.  Right lateral epicondylitis: Started about 2 months ago after kayaking.  Overuse injury more likely than inflammatory arthritis related.  Reviewed dx and tx options. Refer to PT.  - PT referral    Ms. Rodriguez verbalized agreement with and understanding of the rational for the diagnosis and treatment plan.  All questions were answered to best of my ability and the patient's satisfaction. Ms. Rodriguez was advised to contact the clinic with any questions that may arise after the clinic visit.      Thank you for involving me in the care of the patient    Return to clinic: 3-4 months      HPI   Radha Rodriguez is a 58 year old female with a past medical history significant for possible  Crohn's disease requiring fistula surgery in the past, osteoarthritis, inflammatory arthritis who is seen for follow-up of inflammatory arthritis.    Today, Ms. Rodriguez reports that she is doing well on methotrexate monotherapy.  Right lateral epicondyle pain for the past 2 months that started after going kayaking.  Also with pain in the left first carpometacarpal joint that has been injected the past with good benefit; she has found that she needs the injection infrequently.  She has not done any physical therapy for the elbow pain.  Tolerating methotrexate well without any side effects.       Denies fevers, chills, nausea, vomiting, constipation, diarrhea. No abdominal pain. No chest pain/pressure, palpitations, or shortness of breath. LE swelling worse at the end of the day but sometimes present at the beginning of the day too; she says that she will discuss with her PCP.  No neck pain. No oral sores.  Chronic recurrent nasal sore for years, per patient.  No rash. No sicca symptoms. No photosensitivity or photophobia. No eye pain or redness. No history of inflammatory eye disease.    Tobacco: none  EtOH: occasional  Drugs: none    ROS   GEN: No fevers, chills, night sweats, fatigue, or weight change  SKIN: No itching, rashes, sores  HEENT: No epistaxis.   CV: No chest pain, pressure, palpitations, or dyspnea on exertion.  PULM: No SOB, wheeze, cough.  GI: No nausea, vomiting, constipation, diarrhea. No blood in stool. No abdominal pain.  : No blood in urine.  MSK: See HPI.  NEURO: No numbness, tingling, or weakness.  EXT: LE edema unchanged   PSYCH: Negative    Active Problem List     Patient Active Problem List   Diagnosis     FEMALE STRESS INCONTINENCE post-hysterectomy     HYPERHIDROSIS on axilla and soles of feet     Dermatitis due to cosmetics     Contact dermatitis and other eczema, due to unspecified cause     Acquired acanthosis nigricans     LFT abnormalities and Urobilnogen high     CARDIOVASCULAR  SCREENING; LDL GOAL LESS THAN 160     Vitamin D deficiency     Inflammatory arthritis     High risk medications (not anticoagulants) long-term use     Osteoarthritis     Menopausal syndrome (hot flashes)     Past Medical History     Past Medical History:   Diagnosis Date     Arthritis      Headache(784.0)      Irritable bowel syndrome      Other and unspecified noninfectious gastroenteritis and colitis(558.9)     chronic     Past Surgical History     Past Surgical History:   Procedure Laterality Date     C REPLACEMENT,URETER,BOWEL SEGMENT  10/01/2008    Part of her ureter was repaired.     CATARACT IOL, RT/LT       COLONOSCOPY  10/14/2011    Procedure:COLONOSCOPY; Colonoscopy; Surgeon:SCOTTY LEDEZMA; Location:[facility]     ENHANCE LASER REFRACTIVE BILATERAL EXISTING PT IN PARAMETERS       HYSTERECTOMY, VAGINAL  1/1/2000    TVH, fibroids (still has ovaries)     SURGICAL HISTORY OF -       Tubal Ligation     SURGICAL HISTORY OF -       Appy     SURGICAL HISTORY OF -       Tonsils     SURGICAL HISTORY OF -   12/94    Anal Fistulotomy     Allergy     Allergies   Allergen Reactions     Sulfa Drugs Rash     Clindamycin Rash     Codeine Hives     Current Medication List     Current Outpatient Prescriptions   Medication Sig     folic acid (FOLVITE) 1 MG tablet Take 2 tablets (2 mg) by mouth daily     hydrOXYzine (VISTARIL) 25 MG capsule Take 1 capsule (25 mg) by mouth 3 times daily as needed for itching     IBUPROFEN PO      methotrexate sodium 2.5 MG TABS Take 25 mg by mouth once a week . Take all 10 tablets on the same day of each week.     PREMARIN 0.45 MG tablet TAKE 1 TABLET(0.45 MG) BY MOUTH DAILY     triamcinolone (KENALOG) 0.1 % cream Apply sparingly to affected area three times daily as needed     amoxicillin-clavulanate (AUGMENTIN) 875-125 MG per tablet Take 1 tablet by mouth 2 times daily (Patient not taking: Reported on 7/24/2018)     cetirizine (ZYRTEC) 10 MG tablet Take 10 mg by mouth At Bedtime       "cyanocolbalamin (VITAMIN  B-12) 500 MCG tablet Take 500 mcg by mouth daily     montelukast (SINGULAIR) 10 MG tablet Take 1 tablet (10 mg) by mouth At Bedtime (Patient not taking: Reported on 8/7/2018)     ranitidine (ZANTAC) 150 MG tablet Take 1 tablet (150 mg) by mouth 2 times daily (Patient not taking: Reported on 8/7/2018)     No current facility-administered medications for this visit.          Social History   See HPI    Family History     Family History   Problem Relation Age of Onset     HEART DISEASE Father      Heart attack     C.A.D. Father      age 50     Hypertension Father      Cancer Maternal Grandfather      Alzheimer Disease Maternal Grandfather      Cancer Paternal Grandfather      Diabetes No family hx of      Cerebrovascular Disease No family hx of      Breast Cancer No family hx of      Cancer - colorectal No family hx of      Prostate Cancer No family hx of        Physical Exam     Temp Readings from Last 3 Encounters:   07/24/18 98.4  F (36.9  C) (Oral)   04/20/18 98.9  F (37.2  C) (Oral)   01/24/18 99.4  F (37.4  C) (Oral)     BP Readings from Last 5 Encounters:   08/07/18 145/82   07/24/18 119/73   04/24/18 124/78   04/20/18 124/80   01/24/18 137/86     Pulse Readings from Last 1 Encounters:   08/07/18 74     Resp Readings from Last 1 Encounters:   08/07/18 16     Estimated body mass index is 32.21 kg/(m^2) as calculated from the following:    Height as of this encounter: 1.638 m (5' 4.5\").    Weight as of this encounter: 86.5 kg (190 lb 9.6 oz).    GEN: NAD  HEENT: MMM. No oral lesions.  Anicteric, noninjected sclera  CV: S1, S2. RRR. No m/r/g.  PULM: CTA bilaterally. No w/c.  ABD: +BS.   MSK: Mildly tender to palpation over the left first CMC that did not have significant squaring at that joint.  Heberden's nodes present.  MCPs and PIPs without swelling or tenderness to palpation.  Wrists, shoulders, knees, ankles, and MTPs without swelling or tenderness to palpation.  Right elbow tender to " palpation on the most lateral aspect and pain reproduced with extension of the right wrist.  Left elbow without swelling or tenderness to palpation.  No dactylitis.  Hips nontender to palpation.       NEURO: UE and LE strengths 5/5 and equal bilaterally.   SKIN: No rash  EXT: Nonpitting bilateral lower extremity edema distal to the knees  PSYCH: Alert. Appropriate.    Labs / Imaging (select studies)   RF/CCP  Recent Labs   Lab Test  06/01/15   1139  01/31/14   1534  06/06/13   1341   CCPABY  <20  Interpretation:  Negative    <20 Interpretation:  Negative   --    RHF  <20   --   7     CBC  Recent Labs   Lab Test  08/01/18   0954  04/24/18   1557  10/11/17   1207   WBC  6.4  10.0  8.8   RBC  4.01  4.12  4.03   HGB  12.7  13.2  13.2   HCT  38.3  39.3  39.4   MCV  96  95  98   RDW  13.4  13.3  12.9   PLT  222  287  216   MCH  31.7  32.0  32.8   MCHC  33.2  33.6  33.5   NEUTROPHIL  71.9  83.9  77.4   LYMPH  17.0  11.2  15.5   MONOCYTE  6.4  4.4  4.2   EOSINOPHIL  4.2  0.2  2.2   BASOPHIL  0.5  0.3  0.7   ANEU  4.6  8.4*  6.8   ALYM  1.1  1.1  1.4   EDWARD  0.4  0.4  0.4   AEOS  0.3  0.0  0.2   ABAS  0.0  0.0  0.1     CMP  Recent Labs   Lab Test  08/01/18   0954  04/24/18   1557  10/11/17   1207  07/05/17   1639  03/29/17   1148   NA   --    --   138  139  143   POTASSIUM   --    --   4.1  3.5  4.5   CHLORIDE   --    --   105  105  108   CO2   --    --   28  29  29   ANIONGAP   --    --   5  5  6   GLC   --    --   86  119*  85   BUN   --    --   18  16  13   CR  0.72  0.66  0.73  0.66  0.68   GFRESTIMATED  84  >90  82  >90  Non  GFR Calc    >90  Non  GFR Calc     GFRESTBLACK  >90  >90  >90  >90  African American GFR Calc    >90   GFR Calc     ENEIDA   --    --   9.2  8.9  8.7   BILITOTAL  0.4  0.3  0.7  0.3  0.5   ALBUMIN  3.7  3.9  3.9  3.5  3.7   PROTTOTAL  6.6*  6.9  6.9  6.4*  6.7*   ALKPHOS  64  70  57  69  64   AST  22  14  28  27  14   ALT  39  36  44  38  31      Calcium/VitaminD  Recent Labs   Lab Test  10/11/17   1207  07/05/17   1639  03/29/17   1148   09/18/14   1129  04/29/14   1522  11/25/13   1350   ENEIDA  9.2  8.9  8.7   < >   --    --    --    VITDT   --    --    --    --   38  42  19*    < > = values in this interval not displayed.     ESR/CRP  Recent Labs   Lab Test  08/01/18   0954  04/24/18   1557  01/24/18   1040  06/01/15   1139   SED  6  6   --   4   CRP  4.0  <2.9  9.1*   --      Hepatitis B  Recent Labs   Lab Test  04/24/18   1557   HBCAB  Nonreactive   HEPBANG  Nonreactive     Hepatitis C  Recent Labs   Lab Test  06/01/15   1139   HCVAB  Nonreactive   Assay performance characteristics have not been established for newborns,   infants, and children       Tuberculosis Screening  Recent Labs   Lab Test  06/01/15   1139   TBRSLT  Negative   TBAGN  0.00     Immunization History     Immunization History   Administered Date(s) Administered     HepB 01/24/1991, 02/28/1991, 09/05/1991     TD (ADULT, 7+) 03/12/1990, 01/01/2000     TDAP Vaccine (Adacel) 09/10/2008     TDAP Vaccine (Boostrix) 09/10/2008     Tdap (Adacel,Boostrix) 03/01/2011       Procedure     Procedure: Steroid injection of the left first carpometacarpal joint   Indication: Pain, osteoarthritis    The procedure was explained in detail. Risks including infection, pain, structural damage such as cartilage damage and tendon rupture, fat atrophy, skin hyper-/hypo-pigmentation, and medication reaction was explained. The need for rest of the affected joint for one week after the procedure was explained.  The option of not doing the procedure was also provided. All questions were answered and the patient consented to the procedure.     A time-out was performed and the correct patient, procedure, and laterality were verified.    The left first carpometacarpal joint was examined and location for injection was identified. The area was cleaned with chlorhexidine, twice.  Ethyl chloride was then used for  topical anaesthetic.  Then a mixture of lidocaine 1% 0.1 mL and Depo-Medrol 10mg was injected into the intra-articular space.     The patient tolerated the procedure well. No complications.    MEDICATION: Depo Medrol 40mg  LOT #: [id number]  : [employer]  EXPIRATION DATE: 02/01/2019  NDC#: 0058-7864-00    1% Lidocaine  : Hospira  Lot #: [id number]  EXPIRATION DATE: 10/10/2019  NDC: 1198-5200-76           Chart documentation done in part with Dragon Voice recognition Software. Although reviewed after completion, some word and grammatical error may remain.    Abdoul Eli MD

## 2018-08-07 NOTE — NURSING NOTE
Patient to follow up with Primary Care provider regarding elevated blood pressure.  Blood pressure rechecked after visit   138/84  Mi Irwin CMA Rheumatology  8/7/2018 4:18 PM

## 2018-08-07 NOTE — NURSING NOTE
The following medication was given:     MEDICATION: Depo Medrol 40mg  SITE:  first carpometacarpal joint of left hand  DOSE: 1 ml  LOT #: [id number]  :  [employer]  EXPIRATION DATE:  02/01/2019  NDC#: 0926-9096-71    1% Lidocaine  : Hospira  Lot #: [id number]  EXPIRATION DATE: 10/10/2019  NDC: 0386-7793-59

## 2018-08-07 NOTE — MR AVS SNAPSHOT
After Visit Summary   8/7/2018    Radha Rodriguez    MRN: [id number]           Patient Information     Date Of Birth          1960        Visit Information        Provider Department      8/7/2018 3:40 PM Abdoul Eli MD [facility]        Today's Diagnoses     Inflammatory arthritis    -  1    Right lateral epicondylitis        Primary osteoarthritis of first carpometacarpal joint of left hand          Care Instructions    Rheumatology    Dr. Abdoul [place]iroz [facility]   (Monday)  [address]  [place] [address]       [facility]   (Tuesday)  [address]  [place] [address]    [facility]   (Wed., Thurs., and Friday)  [address] [place] [address]    Phone number: [phone]  Thank you for choosing [facility].  Mi Irwin CMA            Follow-ups after your visit        Additional Services     [facility] PT, HAND, AND CHIROPRACTIC REFERRAL       **This order will print in the [facility] Scheduling Office**    Physical Therapy is available through:    *[facility]  *[facility]  *[facility]    Call one number to schedule at any of the above locations: [phone].    Your provider has referred you to: Physical Therapy at [facility] or [facility]    Indication/Reason for Referral: Elbow Pain; right lateral epicondylitis  Onset of Illness: 2 months  Therapy Orders: Evaluate and Treat  Special Programs: None  Special Request: None    Additional Comments for the Therapist or Chiropractor: none    Please be aware that coverage of these services is subject to the terms and limitations of your health insurance plan.  Call member services at your health plan with any benefit or coverage questions.      Please bring the following to your appointment:    *Your personal calendar for scheduling future appointments  *Comfortable clothing                  Your next 10 appointments already scheduled      Nov 07, 2018 10:00 AM CST   [facility]    [address] [id number] [phone]           Please do not eat 10-12 hours before your appointment if you are coming in fasting for labs on lipids, cholesterol, or glucose (sugar). This does not apply to pregnant women. Water, hot tea and black coffee (with nothing added) are okay. Do not drink other fluids, diet soda or chew gum.            Dec 18, 2018  3:40 PM CST   [facility] Visit with Abdoul Eli MD   [facility]    [address] [id number] [phone]              Future tests that were ordered for you today     Open Future Orders        Priority Expected Expires Ordered    CBC with platelets differential Routine 11/1/2018 12/5/2018 8/7/2018    Creatinine Routine 11/1/2018 12/5/2018 8/7/2018    Erythrocyte sedimentation rate auto Routine 11/1/2018 12/5/2018 8/7/2018    CRP inflammation Routine 11/1/2018 12/5/2018 8/7/2018    Hepatic panel Routine 11/1/2018 12/5/2018 8/7/2018            Who to contact     If you have questions or need follow up information about today's clinic visit or your schedule please contact [facility] directly at [phone].  Normal or non-critical lab and imaging results will be communicated to you by MyChart, letter or phone within 4 business days after the clinic has received the results. If you do not hear from us within 7 days, please contact the clinic through MyLikeshart or phone. If you have a critical or abnormal lab result, we will notify you by phone as soon as possible.  Submit refill requests through GetIntent or call your pharmacy and they will forward the refill request to us. Please allow 3 business days for your refill to be completed.          Additional Information About Your Visit        MyLikeshart Information     Get[employer] gives  "you secure access to your electronic health record. If you see a primary care provider, you can also send messages to your care team and make appointments. If you have questions, please call your primary care clinic.  If you do not have a primary care provider, please call [phone] and they will assist you.        Care EveryWhere ID     This is your Care EveryWhere ID. This could be used by other organizations to access your [facility] medical records  [id number]        Your Vitals Were     Pulse Respirations Height Pulse Oximetry BMI (Body Mass Index)       74 16 1.638 m (5' 4.5\") 99% 32.21 kg/m2        Blood Pressure from Last 3 Encounters:   08/07/18 145/82   07/24/18 119/73   04/24/18 124/78    Weight from Last 3 Encounters:   08/07/18 86.5 kg (190 lb 9.6 oz)   07/24/18 85.7 kg (189 lb)   04/24/18 87.4 kg (192 lb 9.6 oz)              We Performed the Following     [facility] PT, HAND, AND CHIROPRACTIC REFERRAL          Today's Medication Changes          These changes are accurate as of 8/7/18  4:14 PM.  If you have any questions, ask your nurse or doctor.               Stop taking these medicines if you haven't already. Please contact your care team if you have questions.     amoxicillin-clavulanate 875-125 MG per tablet   Commonly known as:  AUGMENTIN   Stopped by:  Abdoul Eli MD                Where to get your medicines      These medications were sent to [facility] - [place], [place] - [address] AT [facility] [address], [facility] [address]     Phone:  [phone]     methotrexate sodium 2.5 MG Tabs                Primary Care Provider Office Phone # Fax #    Temo Fletcher [phone]       [address]  [facility] [address]        Equal Access to Services     MICHELLE SAM AH: Lidya Andrade, wabaronda luqadaha, qaybta antwan juarezaan ah. [facility] [phone].    ATENCIÓN: " Si alejandro canela, tiene a guerra disposición servicios gratuitos de asistencia lingüística. Celso sanz [phone].    We comply with applicable federal civil rights laws and [place] laws. We do not discriminate on the basis of race, color, national origin, age, disability, sex, sexual orientation, or gender identity.            Thank you!     Thank you for choosing [facility]  for your care. Our goal is always to provide you with excellent care. Hearing back from our patients is one way we can continue to improve our services. Please take a few minutes to complete the written survey that you may receive in the mail after your visit with us. Thank you!             Your Updated Medication List - Protect others around you: Learn how to safely use, store and throw away your medicines at www.disposemymeds.org.          This list is accurate as of 8/7/18  4:14 PM.  Always use your most recent med list.                   Brand Name Dispense Instructions for use Diagnosis    cetirizine 10 MG tablet    zyrTEC     Take 10 mg by mouth At Bedtime        cyanocolbalamin 500 MCG tablet    vitamin  B-12     Take 500 mcg by mouth daily        folic acid 1 MG tablet    FOLVITE    180 tablet    Take 2 tablets (2 mg) by mouth daily    Inflammatory arthritis       hydrOXYzine 25 MG capsule    VISTARIL    60 capsule    Take 1 capsule (25 mg) by mouth 3 times daily as needed for itching    Allergic contact dermatitis, unspecified trigger       IBUPROFEN PO           methotrexate sodium 2.5 MG Tabs     120 tablet    Take 25 mg by mouth once a week . Take all 10 tablets on the same day of each week.    Inflammatory arthritis       montelukast 10 MG tablet    SINGULAIR    30 tablet    Take 1 tablet (10 mg) by mouth At Bedtime    Allergic contact dermatitis, unspecified trigger       PREMARIN 0.45 MG tablet   Generic drug:  estrogens (conjugated)     90 tablet    TAKE 1 TABLET(0.45 MG) BY MOUTH DAILY    Menopausal syndrome  (hot flashes)       ranitidine 150 MG tablet    ZANTAC    30 tablet    Take 1 tablet (150 mg) by mouth 2 times daily    Allergic contact dermatitis, unspecified trigger       triamcinolone 0.1 % cream    KENALOG    453.6 g    Apply sparingly to affected area three times daily as needed    Allergic contact dermatitis, unspecified trigger

## 2018-10-04 ENCOUNTER — TELEPHONE (OUTPATIENT)
Dept: FAMILY MEDICINE | Facility: CLINIC | Age: 58
End: 2018-10-04

## 2018-10-04 ENCOUNTER — OFFICE VISIT (OUTPATIENT)
Dept: URGENT CARE | Facility: URGENT CARE | Age: 58
End: 2018-10-04
Payer: COMMERCIAL

## 2018-10-04 VITALS
BODY MASS INDEX: 31.84 KG/M2 | HEART RATE: 81 BPM | TEMPERATURE: 98.6 F | SYSTOLIC BLOOD PRESSURE: 151 MMHG | WEIGHT: 188.4 LBS | DIASTOLIC BLOOD PRESSURE: 85 MMHG | OXYGEN SATURATION: 96 %

## 2018-10-04 DIAGNOSIS — J02.0 STREP THROAT: ICD-10-CM

## 2018-10-04 DIAGNOSIS — M79.604 PAIN IN BOTH LOWER EXTREMITIES: ICD-10-CM

## 2018-10-04 DIAGNOSIS — R50.9 FEVER, UNSPECIFIED FEVER CAUSE: ICD-10-CM

## 2018-10-04 DIAGNOSIS — R21 RASH AND NONSPECIFIC SKIN ERUPTION: Primary | ICD-10-CM

## 2018-10-04 DIAGNOSIS — M79.605 PAIN IN BOTH LOWER EXTREMITIES: ICD-10-CM

## 2018-10-04 LAB
DEPRECATED S PYO AG THROAT QL EIA: NORMAL
SPECIMEN SOURCE: NORMAL

## 2018-10-04 PROCEDURE — 87081 CULTURE SCREEN ONLY: CPT | Performed by: PHYSICIAN ASSISTANT

## 2018-10-04 PROCEDURE — 99214 OFFICE O/P EST MOD 30 MIN: CPT | Performed by: PHYSICIAN ASSISTANT

## 2018-10-04 PROCEDURE — 87880 STREP A ASSAY W/OPTIC: CPT | Performed by: PHYSICIAN ASSISTANT

## 2018-10-04 ASSESSMENT — ENCOUNTER SYMPTOMS
MYALGIAS: 1
ENDOCRINE NEGATIVE: 1
PALPITATIONS: 0
HEMATOLOGIC/LYMPHATIC NEGATIVE: 1
VOMITING: 0
WOUND: 0
COUGH: 0
ALLERGIC/IMMUNOLOGIC NEGATIVE: 1
CARDIOVASCULAR NEGATIVE: 1
DIARRHEA: 0
SHORTNESS OF BREATH: 0
JOINT SWELLING: 0
RHINORRHEA: 0
NECK PAIN: 0
FEVER: 1
LIGHT-HEADEDNESS: 0
BRUISES/BLEEDS EASILY: 0
SORE THROAT: 1
RESPIRATORY NEGATIVE: 1
BACK PAIN: 0
ARTHRALGIAS: 0
HEADACHES: 0
NECK STIFFNESS: 0
DIZZINESS: 0
CHILLS: 0
EYES NEGATIVE: 1
NAUSEA: 0
WEAKNESS: 0

## 2018-10-04 NOTE — TELEPHONE ENCOUNTER
Radha Rodriguez is a 58 year old female who calls with rash all over body, pain and aching in legs, increased swelling in ankles.    NURSING ASSESSMENT:  Description:  Patient c/o rash that started out on face yesterday and has now spread all over body today, does not itch. Small, red bumps, faint pink color. Patient spouse was recently ill with virus, thinks may have caught something from him. Benson like was losing voice yesterday, sore throat, ears hurt and fever yesterday of 101.2 F. Patient woke up this morning and went to work, no fever, but then noticed rash had spread all over body and legs started aching and very painful-like arthritis pain. Hard time bending knees today, also noticing an increase in swelling in ankles. Slight cough today and sore throat. Patient concerned about rash and leg pain.  Onset/duration:  yesterday  Precip. factors:  Inflammatory arthritis-takes methotrexate.   Associated symptoms:  See above. Denies any SOB, throat swelling, CP, wheezing, severe pain, fever, itching, weakness, trouble swallowing, facial swelling, blistering rash. Denies inability to walk, decreased mobility.  Last exam/Treatment:  7/24/18 with PCP  Allergies:   Allergies   Allergen Reactions     Sulfa Drugs Rash     Clindamycin Rash     Codeine Hives       MEDICATIONS:   Taking medication(s) as prescribed? Yes        NURSING PLAN: Nursing advice to patient Be seen in clinic today for evaluation of symptoms. Has weakened immune system and more susceptible to infection due to methotrexate use. Aching and painful legs, that seems to be worsening and rash spreading all over body.    RECOMMENDED DISPOSITION:  See in 4 hours - Recommend OV today/this evening. Primary care providers all booked for remainder of day, recommended UC visit because of this.   Will comply with recommendation: Yes, patient will come to [facility] now for walk-in appointment.    If further questions/concerns or if symptoms do not improve, worsen or  new symptoms develop, call your PCP or [facility] as soon as possible.  RECOMMENDED DISPOSITION:  See in 4 hours, another person to drive    Guideline used:  Telephone Triage Protocols for Nurses, Fifth Edition, Shannan Garcia RN

## 2018-10-04 NOTE — PROGRESS NOTES
Chief Complaint:    Chief Complaint   Patient presents with     Musculoskeletal Problem     Patient complains of pain in both legs and rash all over body        HPI: Radha Rodriguez is an 58 year old female who presents for evaluation and treatment of sore throat, fever, ear pain, bilateral leg pain and rash.  Patient states that this started yesterday and has worsened.  The leg pain has gotten much more uncomfortable in the past several hours.  She is currently on Methotrexate.  Her  has been ill for the past week with upper respiratory symptoms.      ROS:      Review of Systems   Constitutional: Positive for fever. Negative for chills.   HENT: Positive for ear pain and sore throat. Negative for congestion and rhinorrhea.    Eyes: Negative.    Respiratory: Negative.  Negative for cough and shortness of breath.    Cardiovascular: Negative.  Negative for chest pain and palpitations.   Gastrointestinal: Negative for diarrhea, nausea and vomiting.   Endocrine: Negative.    Genitourinary: Negative.    Musculoskeletal: Positive for myalgias. Negative for arthralgias, back pain, joint swelling, neck pain and neck stiffness.   Skin: Negative.  Negative for rash and wound.   Allergic/Immunologic: Negative.  Negative for immunocompromised state.   Neurological: Negative for dizziness, weakness, light-headedness and headaches.   Hematological: Negative.  Does not bruise/bleed easily.        Family History   Family History   Problem Relation Age of Onset     HEART DISEASE Father      Heart attack     C.A.D. 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 Yes     Social History Narrative        Surgical History:  Past Surgical History:   Procedure Laterality Date     C REPLACEMENT,URETER,BOWEL SEGMENT  10/01/2008    Part of her ureter was repaired.     CATARACT IOL, RT/LT       COLONOSCOPY  10/14/2011    Procedure:COLONOSCOPY; Colonoscopy; Surgeon:SCOTTY LEDEZMA; Location:[facility]     ENHANCE LASER REFRACTIVE BILATERAL EXISTING PT IN PARAMETERS       HYSTERECTOMY, VAGINAL  1/1/2000    TVH, fibroids (still has ovaries)     SURGICAL HISTORY OF -       Tubal Ligation     SURGICAL HISTORY OF -       Appy     SURGICAL HISTORY OF -       Tonsils     SURGICAL HISTORY OF -   12/94    Anal Fistulotomy        Problem List:  Patient Active Problem List   Diagnosis     FEMALE STRESS INCONTINENCE post-hysterectomy     HYPERHIDROSIS on axilla and soles of feet     Dermatitis due to cosmetics     Contact dermatitis and other eczema, due to unspecified cause     Acquired acanthosis nigricans     LFT abnormalities and Urobilnogen high     CARDIOVASCULAR SCREENING; LDL GOAL LESS THAN 160     Vitamin D deficiency     Inflammatory arthritis     High risk medications (not anticoagulants) long-term use     Osteoarthritis     Menopausal syndrome (hot flashes)     Right lateral epicondylitis        Allergies:  Allergies   Allergen Reactions     Sulfa Drugs Rash     Clindamycin Rash     Codeine Hives        Current Meds:    Current Outpatient Prescriptions:      cetirizine  (ZYRTEC) 10 MG tablet, Take 10 mg by mouth At Bedtime , Disp: , Rfl:      cyanocolbalamin (VITAMIN  B-12) 500 MCG tablet, Take 500 mcg by mouth daily, Disp: , Rfl:      folic acid (FOLVITE) 1 MG tablet, Take 2 tablets (2 mg) by mouth daily, Disp: 180 tablet, Rfl: 3     hydrOXYzine (VISTARIL) 25 MG capsule, Take 1 capsule (25 mg) by mouth 3 times daily as needed for itching, Disp: 60 capsule, Rfl: 5     IBUPROFEN PO, , Disp: , Rfl:      methotrexate sodium 2.5 MG TABS, Take 25 mg by mouth once a week . Take all 10 tablets on the same day of each week., Disp: 120 tablet, Rfl: 2     montelukast (SINGULAIR) 10 MG tablet, Take 1 tablet (10 mg) by mouth At Bedtime, Disp: 30 tablet, Rfl: 2     PREMARIN 0.45 MG tablet, TAKE 1 TABLET(0.45 MG) BY MOUTH DAILY, Disp: 90 tablet, Rfl: 3     ranitidine (ZANTAC) 150 MG tablet, Take 1 tablet (150 mg) by mouth 2 times daily, Disp: 30 tablet, Rfl: 2     triamcinolone (KENALOG) 0.1 % cream, Apply sparingly to affected area three times daily as needed, Disp: 453.6 g, Rfl: 2     PHYSICAL EXAM:     Vital signs noted and reviewed by Dylan Taveras  /85 (BP Location: Left arm, Patient Position: Chair, Cuff Size: Adult Regular)  Pulse 81  Temp 98.6  F (37  C) (Oral)  Wt 188 lb 6.4 oz (85.5 kg)  SpO2 96%  BMI 31.84 kg/m2     PEFR:    Physical Exam   Constitutional: She is oriented to person, place, and time. She appears well-developed and well-nourished. No distress.   HENT:   Head: Normocephalic and atraumatic.   Right Ear: Tympanic membrane, external ear and ear canal normal. Tympanic membrane is not erythematous, not retracted and not bulging.   Left Ear: Tympanic membrane, external ear and ear canal normal. Tympanic membrane is not erythematous, not retracted and not bulging.   Mouth/Throat: Posterior oropharyngeal erythema present. No oropharyngeal exudate, posterior oropharyngeal edema or tonsillar abscesses. Tonsils are 0 on the right. Tonsils are 0 on the left. No  tonsillar exudate.   Eyes: EOM are normal. Pupils are equal, round, and reactive to light.   Neck: Normal range of motion. Neck supple.   Cardiovascular: Normal rate, regular rhythm, normal heart sounds and intact distal pulses.  Exam reveals no gallop and no friction rub.    No murmur heard.  Pulmonary/Chest: Effort normal and breath sounds normal. No respiratory distress. She has no decreased breath sounds. She has no wheezes. She has no rhonchi. She has no rales.   Abdominal: Soft. Bowel sounds are normal. She exhibits no distension and no mass. There is no tenderness. There is no guarding.   Musculoskeletal: Normal range of motion. She exhibits no edema, tenderness or deformity.   Lymphadenopathy:     She has no cervical adenopathy.   Neurological: She is alert and oriented to person, place, and time. She has normal reflexes. No cranial nerve deficit.   Skin: Skin is warm and dry. Rash noted. She is not diaphoretic.   Sporadic maculopapular rash on torso, arms and legs.   Psychiatric: She has a normal mood and affect. Her behavior is normal. Judgment and thought content normal.   Nursing note and vitals reviewed.       Labs:          ASSESSMENT:     1. Rash and nonspecific skin eruption    2. Pain in both lower extremities    3. Fever, unspecified fever cause           PLAN:     RST was negative.  We will call with culture results only if positive.  Discussed options at this time. Patient is on Methotrexate.  Further lab work is needed, and this was discussed with the patient.  Patient would like further evaluation tonight as she is very uncomfortable with her leg pain.  I did offer to start blood work here tonight, and she would like to hold off on this.  She will go to the ED of her choice.  Patient verbalized understanding and agreed with this plan.  She was discharged in stable condition.     Dylan Taveras  10/4/2018, 6:14 PM

## 2018-10-04 NOTE — MR AVS SNAPSHOT
After Visit Summary   10/4/2018    Radha Rodriguez    MRN: [id number]           Patient Information     Date Of Birth          1960        Visit Information        Provider Department      10/4/2018 6:05 PM Dylan Taveras PA-C [facility]        Today's Diagnoses     Rash and nonspecific skin eruption    -  1    Pain in both lower extremities        Fever, unspecified fever cause           Follow-ups after your visit        Your next 10 appointments already scheduled     Nov 07, 2018 10:00 AM CST   LAB with BK LAB   [facility]    [address] [address]   [phone]           Please do not eat 10-12 hours before your appointment if you are coming in fasting for labs on lipids, cholesterol, or glucose (sugar). This does not apply to pregnant women. Water, hot tea and black coffee (with nothing added) are okay. Do not drink other fluids, diet soda or chew gum.            Dec 18, 2018  3:40 PM CST   Return Visit with Abdoul Eli MD   [facility]    [address] [place] [address]   [phone]              Who to contact     If you have questions or need follow up information about today's clinic visit or your schedule please contact [facility] directly at [phone].  Normal or non-critical lab and imaging results will be communicated to you by MyChart, letter or phone within 4 business days after the clinic has received the results. If you do not hear from us within 7 days, please contact the clinic through MyChart or phone. If you have a critical or abnormal lab result, we will notify you by phone as soon as possible.  Submit refill requests through [employer] or call your pharmacy and they will forward the refill request to us. Please allow 3 business days for your refill to be completed.           Additional Information About Your Visit        MyChart Information     [email or web address] gives you secure access to your electronic health record. If you see a primary care provider, you can also send messages to your care team and make appointments. If you have questions, please call your primary care clinic.  If you do not have a primary care provider, please call [phone] and they will assist you.        Care EveryWhere ID     This is your Care EveryWhere ID. This could be used by other organizations to access your [place] medical records  [id number]        Your Vitals Were     Pulse Temperature Pulse Oximetry BMI (Body Mass Index)          81 98.6  F (37  C) (Oral) 96% 31.84 kg/m2         Blood Pressure from Last 3 Encounters:   10/04/18 151/85   08/07/18 138/84   07/24/18 119/73    Weight from Last 3 Encounters:   10/04/18 188 lb 6.4 oz (85.5 kg)   08/07/18 190 lb 9.6 oz (86.5 kg)   07/24/18 189 lb (85.7 kg)              We Performed the Following     Strep, Rapid Screen        Primary Care Provider Office Phone # Fax #    Temo Fletcher [phone]       [address] [address] [facility] [address]        Equal Access to Services     MICHELLE SAM : Hadii aad ku hadasho Soomaali, waaxda luqadaha, qaybta kaalmada adeegyada, waxay idiin hayenriquen georgia lozada la'tamika ah. So [facility] [phone].    ATENCIÓN: Si habla español, tiene a guerra disposición servicios gratuitos de asistencia lingüística. Llame al [phone].    We comply with applicable federal civil rights laws and [place] laws. We do not discriminate on the basis of race, color, national origin, age, disability, sex, sexual orientation, or gender identity.            Thank you!     Thank you for choosing [facility]  for your care. Our goal is always to provide you with excellent care. Hearing back from our patients is one way we can continue to improve our services. Please take a few minutes to complete the written survey  that you may receive in the mail after your visit with us. Thank you!             Your Updated Medication List - Protect others around you: Learn how to safely use, store and throw away your medicines at www.disposemymeds.org.          This list is accurate as of 10/4/18  7:02 PM.  Always use your most recent med list.                   Brand Name Dispense Instructions for use Diagnosis    cetirizine 10 MG tablet    zyrTEC     Take 10 mg by mouth At Bedtime        cyanocolbalamin 500 MCG tablet    vitamin  B-12     Take 500 mcg by mouth daily        folic acid 1 MG tablet    FOLVITE    180 tablet    Take 2 tablets (2 mg) by mouth daily    Inflammatory arthritis       hydrOXYzine 25 MG capsule    VISTARIL    60 capsule    Take 1 capsule (25 mg) by mouth 3 times daily as needed for itching    Allergic contact dermatitis, unspecified trigger       IBUPROFEN PO           methotrexate sodium 2.5 MG Tabs     120 tablet    Take 25 mg by mouth once a week . Take all 10 tablets on the same day of each week.    Inflammatory arthritis       montelukast 10 MG tablet    SINGULAIR    30 tablet    Take 1 tablet (10 mg) by mouth At Bedtime    Allergic contact dermatitis, unspecified trigger       PREMARIN 0.45 MG tablet   Generic drug:  estrogens (conjugated)     90 tablet    TAKE 1 TABLET(0.45 MG) BY MOUTH DAILY    Menopausal syndrome (hot flashes)       ranitidine 150 MG tablet    ZANTAC    30 tablet    Take 1 tablet (150 mg) by mouth 2 times daily    Allergic contact dermatitis, unspecified trigger       triamcinolone 0.1 % cream    KENALOG    453.6 g    Apply sparingly to affected area three times daily as needed    Allergic contact dermatitis, unspecified trigger

## 2018-10-05 ENCOUNTER — RADIANT APPOINTMENT (OUTPATIENT)
Dept: MRI IMAGING | Facility: CLINIC | Age: 58
End: 2018-10-05
Attending: INTERNAL MEDICINE
Payer: COMMERCIAL

## 2018-10-05 ENCOUNTER — OFFICE VISIT (OUTPATIENT)
Dept: FAMILY MEDICINE | Facility: CLINIC | Age: 58
End: 2018-10-05
Payer: COMMERCIAL

## 2018-10-05 ENCOUNTER — RADIANT APPOINTMENT (OUTPATIENT)
Dept: GENERAL RADIOLOGY | Facility: CLINIC | Age: 58
End: 2018-10-05
Attending: INTERNAL MEDICINE
Payer: COMMERCIAL

## 2018-10-05 VITALS
HEART RATE: 68 BPM | DIASTOLIC BLOOD PRESSURE: 81 MMHG | TEMPERATURE: 98.3 F | WEIGHT: 188 LBS | SYSTOLIC BLOOD PRESSURE: 126 MMHG | BODY MASS INDEX: 31.77 KG/M2 | OXYGEN SATURATION: 99 %

## 2018-10-05 DIAGNOSIS — J01.90 ACUTE SINUSITIS, RECURRENCE NOT SPECIFIED, UNSPECIFIED LOCATION: ICD-10-CM

## 2018-10-05 DIAGNOSIS — J20.9 ACUTE BRONCHITIS, UNSPECIFIED ORGANISM: Primary | ICD-10-CM

## 2018-10-05 DIAGNOSIS — G37.3 ACUTE TRANSVERSE MYELITIS (H): ICD-10-CM

## 2018-10-05 DIAGNOSIS — M79.10 MYALGIA: ICD-10-CM

## 2018-10-05 DIAGNOSIS — M48.061 NEURAL FORAMINAL STENOSIS OF LUMBAR SPINE: ICD-10-CM

## 2018-10-05 DIAGNOSIS — R29.898 WEAKNESS OF BOTH LOWER EXTREMITIES: ICD-10-CM

## 2018-10-05 LAB
BACTERIA SPEC CULT: ABNORMAL
CK SERPL-CCNC: 67 U/L (ref 30–225)
CRP SERPL-MCNC: 9.9 MG/L (ref 0–8)
LDH SERPL L TO P-CCNC: 200 U/L (ref 81–234)
SPECIMEN SOURCE: ABNORMAL

## 2018-10-05 PROCEDURE — A9585 GADOBUTROL INJECTION: HCPCS

## 2018-10-05 PROCEDURE — 83615 LACTATE (LD) (LDH) ENZYME: CPT | Performed by: INTERNAL MEDICINE

## 2018-10-05 PROCEDURE — 83874 ASSAY OF MYOGLOBIN: CPT | Performed by: INTERNAL MEDICINE

## 2018-10-05 PROCEDURE — 99214 OFFICE O/P EST MOD 30 MIN: CPT | Performed by: INTERNAL MEDICINE

## 2018-10-05 PROCEDURE — 70220 X-RAY EXAM OF SINUSES: CPT | Mod: FY

## 2018-10-05 PROCEDURE — 82085 ASSAY OF ALDOLASE: CPT | Mod: 90 | Performed by: INTERNAL MEDICINE

## 2018-10-05 PROCEDURE — 36415 COLL VENOUS BLD VENIPUNCTURE: CPT | Performed by: INTERNAL MEDICINE

## 2018-10-05 PROCEDURE — 99000 SPECIMEN HANDLING OFFICE-LAB: CPT | Performed by: INTERNAL MEDICINE

## 2018-10-05 PROCEDURE — 86140 C-REACTIVE PROTEIN: CPT | Performed by: INTERNAL MEDICINE

## 2018-10-05 PROCEDURE — 82550 ASSAY OF CK (CPK): CPT | Performed by: INTERNAL MEDICINE

## 2018-10-05 PROCEDURE — 72158 MRI LUMBAR SPINE W/O & W/DYE: CPT | Mod: TC

## 2018-10-05 RX ORDER — AZITHROMYCIN 250 MG/1
TABLET, FILM COATED ORAL
Qty: 6 TABLET | Refills: 2 | Status: SHIPPED | OUTPATIENT
Start: 2018-10-05 | End: 2018-12-18

## 2018-10-05 RX ORDER — PREDNISONE 20 MG/1
TABLET ORAL
Qty: 18 TABLET | Refills: 1 | Status: SHIPPED | OUTPATIENT
Start: 2018-10-05 | End: 2019-08-14

## 2018-10-05 RX ORDER — OXYCODONE HYDROCHLORIDE 5 MG/1
5 TABLET ORAL EVERY 6 HOURS PRN
Qty: 30 TABLET | Refills: 0 | Status: SHIPPED | OUTPATIENT
Start: 2018-10-05 | End: 2019-12-31

## 2018-10-05 RX ORDER — GADOBUTROL 604.72 MG/ML
10 INJECTION INTRAVENOUS ONCE
Status: COMPLETED | OUTPATIENT
Start: 2018-10-05 | End: 2018-10-05

## 2018-10-05 RX ADMIN — GADOBUTROL 8.5 ML: 604.72 INJECTION INTRAVENOUS at 14:20

## 2018-10-05 NOTE — MR AVS SNAPSHOT
After Visit Summary   10/5/2018    Radha Rodriguez    MRN: [id number]           Patient Information     Date Of Birth          1960        Visit Information        Provider Department      10/5/2018 11:00 AM Temo Fletcher MD [facility]        Today's Diagnoses     Myositis of bilateral lower extremity, unspecified myositis type    -  1    Congestion of paranasal sinus        Upper respiratory tract infection, unspecified type           Follow-ups after your visit        Your next 10 appointments already scheduled     Nov 07, 2018 10:00 AM CST   LAB with BK LAB   [facility] ([facility])    [address] [facility] [address]   [phone]           Please do not eat 10-12 hours before your appointment if you are coming in fasting for labs on lipids, cholesterol, or glucose (sugar). This does not apply to pregnant women. Water, hot tea and black coffee (with nothing added) are okay. Do not drink other fluids, diet soda or chew gum.            Dec 18, 2018  3:40 PM CST   Return Visit with Abdoul Eli MD   [facility] ([facility])    [address] [facility] [address]   [phone]              Who to contact     If you have questions or need follow up information about today's clinic visit or your schedule please contact [facility] directly at [phone].  Normal or non-critical lab and imaging results will be communicated to you by MyChart, letter or phone within 4 business days after the clinic has received the results. If you do not hear from us within 7 days, please contact the clinic through MyChart or phone. If you have a critical or abnormal lab result, we will notify you by phone as soon as possible.  Submit refill requests through [employer] or call your pharmacy and they will forward the refill request to us. Please allow 3  business days for your refill to be completed.          Additional Information About Your Visit        [employer] Information     [employer] gives you secure access to your electronic health record. If you see a primary care provider, you can also send messages to your care team and make appointments. If you have questions, please call your primary care clinic.  If you do not have a primary care provider, please call [phone] and they will assist you.        Care EveryWhere ID     This is your Care EveryWhere ID. This could be used by other organizations to access your [place] medical records  [id number]        Your Vitals Were     Pulse Temperature Pulse Oximetry BMI (Body Mass Index)          68 98.3  F (36.8  C) (Oral) 99% 31.77 kg/m2         Blood Pressure from Last 3 Encounters:   10/05/18 126/81   10/04/18 151/85   08/07/18 138/84    Weight from Last 3 Encounters:   10/05/18 188 lb (85.3 kg)   10/04/18 188 lb 6.4 oz (85.5 kg)   08/07/18 190 lb 9.6 oz (86.5 kg)              We Performed the Following     Aldolase     CK total     CRP, inflammation     XR Sinus Complete G/E 3 Views          Today's Medication Changes          These changes are accurate as of 10/5/18 11:47 AM.  If you have any questions, ask your nurse or doctor.               Start taking these medicines.        Dose/Directions    azithromycin 250 MG tablet   Commonly known as:  ZITHROMAX   Used for:  Upper respiratory tract infection, unspecified type   Started by:  Temo Fletcher MD        Two tablets first day, then one tablet daily for four days.   Quantity:  6 tablet   Refills:  2       oxyCODONE IR 5 MG tablet   Commonly known as:  ROXICODONE   Used for:  Myositis of left lower extremity, unspecified myositis type   Started by:  Temo Fletcher MD        Dose:  5 mg   Take 1 tablet (5 mg) by mouth every 6 hours as needed for severe pain   Quantity:  30 tablet   Refills:  0            Where to get your medicines      Some of  these will need a paper prescription and others can be bought over the counter.  Ask your nurse if you have questions.     Bring a paper prescription for each of these medications     azithromycin 250 MG tablet    oxyCODONE IR 5 MG tablet               Information about OPIOIDS     PRESCRIPTION OPIOIDS: WHAT YOU NEED TO KNOW   We gave you an opioid (narcotic) pain medicine. It is important to manage your pain, but opioids are not always the best choice. You should first try all the other options your care team gave you. Take this medicine for as short a time (and as few doses) as possible.    Some activities can increase your pain, such as bandage changes or therapy sessions. It may help to take your pain medicine 30 to 60 minutes before these activities. Reduce your stress by getting enough sleep, working on hobbies you enjoy and practicing relaxation or meditation. Talk to your care team about ways to manage your pain beyond prescription opioids.    These medicines have risks:    DO NOT drive when on new or higher doses of pain medicine. These medicines can affect your alertness and reaction times, and you could be arrested for driving under the influence (DUI). If you need to use opioids long-term, talk to your care team about driving.    DO NOT operate heavy machinery    DO NOT do any other dangerous activities while taking these medicines.    DO NOT drink any alcohol while taking these medicines.     If the opioid prescribed includes acetaminophen, DO NOT take with any other medicines that contain acetaminophen. Read all labels carefully. Look for the word  acetaminophen  or  Tylenol.  Ask your pharmacist if you have questions or are unsure.    You can get addicted to pain medicines, especially if you have a history of addiction (chemical, alcohol or substance dependence). Talk to your care team about ways to reduce this risk.    All opioids tend to cause constipation. Drink plenty of water and eat foods that  have a lot of fiber, such as fruits, vegetables, prune juice, apple juice and high-fiber cereal. Take a laxative (Miralax, milk of magnesia, Colace, Senna) if you don t move your bowels at least every other day. Other side effects include upset stomach, sleepiness, dizziness, throwing up, tolerance (needing more of the medicine to have the same effect), physical dependence and slowed breathing.    Store your pills in a secure place, locked if possible. We will not replace any lost or stolen medicine. If you don t finish your medicine, please throw away (dispose) as directed by your pharmacist. The [place] Pollution Control Agency has more information about safe disposal: https://www.pca.[facility].[place].us/living-green/managing-unwanted-medications         Primary Care Provider Office Phone # Fax #    Temo Fletcher [phone]       [address]  [facility] [address]        Equal Access to Services     KAYLIE [facility] HospitalC[facility] : Hadii aleksey ku hadasho Soomaali, waaxda luqadaha, qaybta kaalmada adeegyada, antwan abreu . So [facility] [phone].    ATENCIÓN: Si habla español, tiene a guerra disposición servicios gratuitos de asistencia lingüística. Llame al [phone].    We comply with applicable federal civil rights laws and [place] laws. We do not discriminate on the basis of race, color, national origin, age, disability, sex, sexual orientation, or gender identity.            Thank you!     Thank you for choosing [facility]  for your care. Our goal is always to provide you with excellent care. Hearing back from our patients is one way we can continue to improve our services. Please take a few minutes to complete the written survey that you may receive in the mail after your visit with us. Thank you!             Your Updated Medication List - Protect others around you: Learn how to safely use, store and throw away your medicines at www.disposemymeds.org.           This list is accurate as of 10/5/18 11:47 AM.  Always use your most recent med list.                   Brand Name Dispense Instructions for use Diagnosis    azithromycin 250 MG tablet    ZITHROMAX    6 tablet    Two tablets first day, then one tablet daily for four days.    Upper respiratory tract infection, unspecified type       folic acid 1 MG tablet    FOLVITE    180 tablet    Take 2 tablets (2 mg) by mouth daily    Inflammatory arthritis       methotrexate sodium 2.5 MG Tabs     120 tablet    Take 25 mg by mouth once a week . Take all 10 tablets on the same day of each week.    Inflammatory arthritis       oxyCODONE IR 5 MG tablet    ROXICODONE    30 tablet    Take 1 tablet (5 mg) by mouth every 6 hours as needed for severe pain    Myositis of left lower extremity, unspecified myositis type       PREMARIN 0.45 MG tablet   Generic drug:  estrogens (conjugated)     90 tablet    TAKE 1 TABLET(0.45 MG) BY MOUTH DAILY    Menopausal syndrome (hot flashes)

## 2018-10-05 NOTE — PROGRESS NOTES
SUBJECTIVE:   Radha Rodriguez is a 58 year old female who presents to clinic today for the following health issues:    1) URI      Duration: one week    Description (location/character/radiation): sinus congestion associated with postnasal drainage.    Accompanying signs and symptoms: Denies any fever/chills nor epistaxis.    History (similar episodes/previous evaluation): Seen at ER last 10/4/2018    Precipitating or alleviating factors: immunosuppressed status.    Therapies tried and outcome: Patient not treated since she was diagnosed have viral upper respiratory infection.         2) Joint Pain    Onset: yesterday    Description:   Location: Bilateral leg pain, worse when walking.  Character: throbbing, tight     Intensity: 5/10, left side slightly worse than right, worse when walking    Progression of Symptoms: same    Accompanying Signs & Symptoms:  Intermittent weakness of both legs.    History:   Previous similar pain: no       Precipitating factors: upper respiratory tract infection    Alleviating factors:  Improved by: None  Therapies Tried and outcome: Percocet (minimal relief)      Problem list and histories reviewed & adjusted, as indicated.  Additional history: as documented    Patient Active Problem List   Diagnosis     FEMALE STRESS INCONTINENCE post-hysterectomy     HYPERHIDROSIS on axilla and soles of feet     Dermatitis due to cosmetics     Contact dermatitis and other eczema, due to unspecified cause     Acquired acanthosis nigricans     LFT abnormalities and Urobilnogen high     CARDIOVASCULAR SCREENING; LDL GOAL LESS THAN 160     Vitamin D deficiency     Inflammatory arthritis     High risk medications (not anticoagulants) long-term use     Osteoarthritis     Menopausal syndrome (hot flashes)     Right lateral epicondylitis     Past Surgical History:   Procedure Laterality Date     C REPLACEMENT,URETER,BOWEL SEGMENT  10/01/2008    Part of her ureter was repaired.     CATARACT IOL, RT/LT        COLONOSCOPY  10/14/2011    Procedure:COLONOSCOPY; Colonoscopy; Surgeon:CSOTTY LEDEZMA; Location:[facility]     ENHANCE LASER REFRACTIVE BILATERAL EXISTING PT IN PARAMETERS       HYSTERECTOMY, VAGINAL  1/1/2000    TVH, fibroids (still has ovaries)     SURGICAL HISTORY OF -       Tubal Ligation     SURGICAL HISTORY OF -       Appy     SURGICAL HISTORY OF -       Tonsils     SURGICAL HISTORY OF -   12/94    Anal Fistulotomy       Social History   Substance Use Topics     Smoking status: Never Smoker     Smokeless tobacco: Never Used     Alcohol use Yes      Comment: Occasional     Family History   Problem Relation Age of Onset     HEART DISEASE Father      Heart attack     C.A.D. Father      age 50     Hypertension Father      Cancer Maternal Grandfather      Alzheimer Disease Maternal Grandfather      Cancer Paternal Grandfather      Diabetes No family hx of      Cerebrovascular Disease No family hx of      Breast Cancer No family hx of      Cancer - colorectal No family hx of      Prostate Cancer No family hx of          Allergies   Allergen Reactions     Sulfa Drugs Rash     Clindamycin Rash     Codeine Hives     Recent Labs   Lab Test  08/01/18   0954  04/24/18   1557  04/20/18   1030  01/24/18   1040  10/11/17   1207  07/05/17   1639   07/18/11   1145   LDL   --    --    --    --    --    --    --   130*   HDL   --    --    --    --    --    --    --   41*   TRIG   --    --    --    --    --    --    --   68   ALT  39  36   --    --   44  38   < >   --    CR  0.72  0.66   --    --   0.73  0.66   < >   --    GFRESTIMATED  84  >90   --    --   82  >90  Non  GFR Calc     < >   --    GFRESTBLACK  >90  >90   --    --   >90  >90  African American GFR Calc     < >   --    POTASSIUM   --    --    --    --   4.1  3.5   < >   --    TSH   --    --   1.23  0.82   --    --    < >  0.56    < > = values in this interval not displayed.      BP Readings from Last 3 Encounters:   10/05/18 126/81   10/04/18  151/85   08/07/18 138/84    Wt Readings from Last 3 Encounters:   10/05/18 188 lb (85.3 kg)   10/04/18 188 lb 6.4 oz (85.5 kg)   08/07/18 190 lb 9.6 oz (86.5 kg)                      ROS:  CONSTITUTIONAL: NEGATIVE for fever, chills, change in weight  INTEGUMENTARY/SKIN: NEGATIVE for worrisome rashes, moles or lesions  EYES: NEGATIVE for vision changes or irritation  ENT/MOUTH: NEGATIVE for ear, mouth and throat problems  RESP: NEGATIVE for significant cough or SOB  CV: NEGATIVE for chest pain, palpitations or peripheral edema  GI: NEGATIVE for nausea, abdominal pain, heartburn, or change in bowel habits  : NEGATIVE for frequency, dysuria, or hematuria  MUSCULOSKELETAL: As above.  NEURO: NEGATIVE for HX CVA and HX TIA  ENDOCRINE: NEGATIVE for temperature intolerance, skin/hair changes  HEME: NEGATIVE for bleeding problems  PSYCHIATRIC: NEGATIVE for changes in mood or affect    OBJECTIVE:     /81 (BP Location: Left arm, Patient Position: Chair, Cuff Size: Adult Large)  Pulse 68  Temp 98.3  F (36.8  C) (Oral)  Wt 188 lb (85.3 kg)  SpO2 99%  BMI 31.77 kg/m2  Body mass index is 31.77 kg/(m^2).  GENERAL: alert, in mild distress and fatigued  EYES: Eyes grossly normal to inspection and conjunctivae and sclerae normal  HENT: normal cephalic/atraumatic, nasal mucosa edematous  and oral mucous membranes moist  NECK: no adenopathy  RESP: lungs clear to auscultation - no rales, rhonchi or wheezes  CV: regular rate and rhythm, normal S1 S2, no S3 or S4, no murmur, click or rub, no peripheral edema and peripheral pulses strong  MS: Tenderness on palpation of every muscle group in either lower extremity.  SKIN: no suspicious lesions or rashes  NEURO: Normal strength and tone, mentation intact and speech normal  PSYCH: mentation appears normal, affect normal/bright    Diagnostic Test Results:  Results for orders placed or performed in visit on 10/05/18   XR Sinus Complete G/E 3 Views    Narrative    XR SINUS COMPLETE  THREE OR MORE VIEWS   10/5/2018 12:15 PM     HISTORY: Congestion of paranasal sinus.    COMPARISON: None.      Impression    IMPRESSION: Normal.    ESEQUIEL ANDRADE MD   CK total   Result Value Ref Range    CK Total 67 30 - 225 U/L   Aldolase   Result Value Ref Range    Aldolase 4.4 1.5 - 8.1 U/L   CRP, inflammation   Result Value Ref Range    CRP Inflammation 9.9 (H) 0.0 - 8.0 mg/L   Myoglobin   Result Value Ref Range    Myoglobin 41 <120 ug/L   Lactate Dehydrogenase   Result Value Ref Range    Lactate Dehydrogenase 200 81 - 234 U/L       ASSESSMENT/PLAN:     1. Acute bronchitis, unspecified organism  Comment: Normal lung auscultation.  Plan: Start Augmentin 875 mg BID x 10 days.    2. Acute sinusitis, recurrence not specified, unspecified location  Comment: Most probable etiology of postnasal drainage, leading to acute bronchitis.  Plan: XR Sinus Complete G/E 3 Views    3. Myalgia of both legs.  Comment: Appears triggered by upper respiratory tract infection.  -Plan: -CK total  - Aldolase  - CRP, inflammation  - oxyCODONE IR (ROXICODONE) 5 MG tablet; Take 1 tablet (5 mg) by mouth every 6 hours as needed for severe pain  Dispense: 30 tablet; Refill: 0  - Myoglobin  - Lactate Dehydrogenase  - predniSONE (DELTASONE) 20 MG tablet; Take 3 tablets once a day x 3 days, then 2 tablets once a day x 2 days, then 1 tablet once daily x 3 days and then 1/2 tablet once a day x 2 days.  Dispense: 18 tablet; Refill: 1      4. Weakness of both lower extremities  Comments: Worrisome about possible acute transverse myelitis. Treat empirically with Prednisone.  Plan:- MR Lumbar Spine w/o & w Contrast; Future    5. Neural foraminal stenosis of lumbar spine  Comments: Suspected cause of condition #4.  - ORTHOPEDICS ADULT REFERRAL; Future    Follow-up visit if condition worsens.    Temo Fletcher MD  [facility]

## 2018-10-06 LAB
ALDOLASE SERPL-CCNC: 4.4 U/L (ref 1.5–8.1)
MYOGLOBIN SERPL-MCNC: 41 UG/L

## 2018-12-18 ENCOUNTER — OFFICE VISIT (OUTPATIENT)
Dept: RHEUMATOLOGY | Facility: CLINIC | Age: 58
End: 2018-12-18
Payer: COMMERCIAL

## 2018-12-18 VITALS
BODY MASS INDEX: 32.01 KG/M2 | DIASTOLIC BLOOD PRESSURE: 84 MMHG | TEMPERATURE: 98.1 F | OXYGEN SATURATION: 96 % | SYSTOLIC BLOOD PRESSURE: 142 MMHG | HEART RATE: 79 BPM | WEIGHT: 189.4 LBS

## 2018-12-18 DIAGNOSIS — M79.652 PAIN IN BOTH THIGHS: ICD-10-CM

## 2018-12-18 DIAGNOSIS — M18.12 PRIMARY OSTEOARTHRITIS OF FIRST CARPOMETACARPAL JOINT OF LEFT HAND: ICD-10-CM

## 2018-12-18 DIAGNOSIS — M79.651 PAIN IN BOTH THIGHS: ICD-10-CM

## 2018-12-18 DIAGNOSIS — M19.90 INFLAMMATORY ARTHRITIS: Primary | ICD-10-CM

## 2018-12-18 LAB
ALBUMIN SERPL-MCNC: 3.8 G/DL (ref 3.4–5)
ALP SERPL-CCNC: 63 U/L (ref 40–150)
ALT SERPL W P-5'-P-CCNC: 43 U/L (ref 0–50)
AST SERPL W P-5'-P-CCNC: 28 U/L (ref 0–45)
BASOPHILS # BLD AUTO: 0 10E9/L (ref 0–0.2)
BASOPHILS NFR BLD AUTO: 0.5 %
BILIRUB DIRECT SERPL-MCNC: <0.1 MG/DL (ref 0–0.2)
BILIRUB SERPL-MCNC: 0.2 MG/DL (ref 0.2–1.3)
CK SERPL-CCNC: 86 U/L (ref 30–225)
CREAT SERPL-MCNC: 0.76 MG/DL (ref 0.52–1.04)
CRP SERPL-MCNC: 3.1 MG/L (ref 0–8)
DIFFERENTIAL METHOD BLD: NORMAL
EOSINOPHIL # BLD AUTO: 0.3 10E9/L (ref 0–0.7)
EOSINOPHIL NFR BLD AUTO: 3.4 %
ERYTHROCYTE [DISTWIDTH] IN BLOOD BY AUTOMATED COUNT: 13.4 % (ref 10–15)
ERYTHROCYTE [SEDIMENTATION RATE] IN BLOOD BY WESTERGREN METHOD: 6 MM/H (ref 0–30)
GFR SERPL CREATININE-BSD FRML MDRD: 78 ML/MIN/1.7M2
HCT VFR BLD AUTO: 37.3 % (ref 35–47)
HGB BLD-MCNC: 12.5 G/DL (ref 11.7–15.7)
LYMPHOCYTES # BLD AUTO: 1.5 10E9/L (ref 0.8–5.3)
LYMPHOCYTES NFR BLD AUTO: 19.5 %
MCH RBC QN AUTO: 32.4 PG (ref 26.5–33)
MCHC RBC AUTO-ENTMCNC: 33.5 G/DL (ref 31.5–36.5)
MCV RBC AUTO: 97 FL (ref 78–100)
MONOCYTES # BLD AUTO: 0.6 10E9/L (ref 0–1.3)
MONOCYTES NFR BLD AUTO: 7.2 %
NEUTROPHILS # BLD AUTO: 5.3 10E9/L (ref 1.6–8.3)
NEUTROPHILS NFR BLD AUTO: 69.4 %
PLATELET # BLD AUTO: 235 10E9/L (ref 150–450)
PROT SERPL-MCNC: 6.8 G/DL (ref 6.8–8.8)
RBC # BLD AUTO: 3.86 10E12/L (ref 3.8–5.2)
WBC # BLD AUTO: 7.6 10E9/L (ref 4–11)

## 2018-12-18 PROCEDURE — 85652 RBC SED RATE AUTOMATED: CPT | Performed by: INTERNAL MEDICINE

## 2018-12-18 PROCEDURE — 36415 COLL VENOUS BLD VENIPUNCTURE: CPT | Performed by: INTERNAL MEDICINE

## 2018-12-18 PROCEDURE — 82565 ASSAY OF CREATININE: CPT | Performed by: INTERNAL MEDICINE

## 2018-12-18 PROCEDURE — 82550 ASSAY OF CK (CPK): CPT | Performed by: INTERNAL MEDICINE

## 2018-12-18 PROCEDURE — 82085 ASSAY OF ALDOLASE: CPT | Mod: 90 | Performed by: INTERNAL MEDICINE

## 2018-12-18 PROCEDURE — 99000 SPECIMEN HANDLING OFFICE-LAB: CPT | Performed by: INTERNAL MEDICINE

## 2018-12-18 PROCEDURE — 99214 OFFICE O/P EST MOD 30 MIN: CPT | Mod: 25 | Performed by: INTERNAL MEDICINE

## 2018-12-18 PROCEDURE — 85025 COMPLETE CBC W/AUTO DIFF WBC: CPT | Performed by: INTERNAL MEDICINE

## 2018-12-18 PROCEDURE — 86140 C-REACTIVE PROTEIN: CPT | Performed by: INTERNAL MEDICINE

## 2018-12-18 PROCEDURE — 80076 HEPATIC FUNCTION PANEL: CPT | Performed by: INTERNAL MEDICINE

## 2018-12-18 PROCEDURE — 20600 DRAIN/INJ JOINT/BURSA W/O US: CPT | Mod: LT | Performed by: INTERNAL MEDICINE

## 2018-12-18 RX ORDER — METHYLPREDNISOLONE ACETATE 40 MG/ML
20 INJECTION, SUSPENSION INTRA-ARTICULAR; INTRALESIONAL; INTRAMUSCULAR; SOFT TISSUE ONCE
Status: DISCONTINUED | OUTPATIENT
Start: 2018-12-18 | End: 2020-03-10

## 2018-12-18 ASSESSMENT — PAIN SCALES - GENERAL: PAINLEVEL: MODERATE PAIN (4)

## 2018-12-18 NOTE — NURSING NOTE
"Chief Complaint   Patient presents with     RECHECK       Initial BP (!) 155/91   Pulse 79   Temp 98.1  F (36.7  C) (Oral)   Wt 85.9 kg (189 lb 6.4 oz)   SpO2 96%   BMI 32.01 kg/m   Estimated body mass index is 32.01 kg/m  as calculated from the following:    Height as of 8/7/18: 1.638 m (5' 4.5\").    Weight as of this encounter: 85.9 kg (189 lb 6.4 oz).  BP completed using cuff size: large         RAPID3 (0-30) Cumulative Score  8.7          RAPID3 Weighted Score (divide #4 by 3 and that is the weighted score)  2.9         "

## 2018-12-18 NOTE — PROGRESS NOTES
Rheumatology Clinic Visit      Radha Rodriguez MRN# [id number]   YOB: 1960 Age: 58 year old      Date of visit: 12/18/18   PCP: Dr. Temo Fletcher    Chief Complaint   Patient presents with:  RECHECK: RA-left thumb pain.  Thigh pain started after hospitalization for strep, Bilateral foot itching started this summer and recently moved to the hands.       Assessment and Plan     1. Inflammatory arthritis: Previously followed by Rojas Vasques and [facility].  Established care with me on 4/24/2018.  Previously on Humira (ineffective), HCQ (ineffective; used with MTX).  Currently on MTX 25mg once weekly with worsening symptoms when reduced in the past.  Question if arthritis has any relation to hx of suspected Crohn's Disease (reportedly fistula requiring surgery, and colonoscopies showing significant scaring in the intestine).  Doing well today and will maintain on methotrexate.  - Continue methotrexate 25mg once weekly  - Continue folic 2mg daily  - Labs today and in 3 months: CBC, Creatinine, Hepatic Panel, ESR, CRP    2.  Primary osteoarthritis of the left first carpometacarpal joint: Reportedly improved with steroid injections in the past.  The diagnosis and treatment options were discussed in detail today.  Steroid injection as documented in the procedure section    3. Myalgias: 1 week of URI symptoms with sore throat, cold, and losing voice eventually found to be related to strep throat, followed by intense thigh aches that affected ability to ambulate.  Was seen in the ED at [facility] in early Oct.  CK and aldolase normal.  Prednisone taper helped.  Overall improved but still symptomatic and worse with activity.  She is planning to see orthopedic surgery at the recommendation of her PCP.  Normal exam today.  Possibly poststreptococcal myalgia, so I advised NSAIDs PRN. If no etiology found by labs today or orthopedic eval, then may consider EMG; she says that she will keep me informed.  - Labs today:  CK, aldolase.    4. Elevated blood pressure:  Patient to follow up with Primary Care provider regarding elevated blood pressure.     Ms. Rodriguez verbalized agreement with and understanding of the rational for the diagnosis and treatment plan.  All questions were answered to best of my ability and the patient's satisfaction. Ms. Rodriguez was advised to contact the clinic with any questions that may arise after the clinic visit.      Thank you for involving me in the care of the patient    Return to clinic: 3-4 months      HPI   Radha Rodriguez is a 58 year old female with a past medical history significant for possible Crohn's disease requiring fistula surgery in the past, osteoarthritis, inflammatory arthritis who is seen for follow-up of inflammatory arthritis.    Today, Ms. Rodriguez reports that her arthritis is doing well.  Methotrexate is tolerated well.  Morning stiffness for no more than 5 minutes.    Since last seen she had strep throat that was diagnosed by culture but not rapid strep test.  She developed shortly thereafter severe bilateral thigh muscle pain that affected her ability to ambulate.  CK and aldolase were normal.  She was evaluated at [facility] where there was no clear etiology found.  Myalgias significantly improved and then only in the last couple weeks that they worsen slightly.  Thighs ache more with ambulation and going up or down stairs.  Has been referred to orthopedic surgery and is planning to make an appointment soon.   Notes that prednisone was used after symptoms started with some improvement.     Denies fevers, chills, nausea, vomiting, constipation, diarrhea. No abdominal pain. No chest pain/pressure, palpitations, or shortness of breath. LE swelling worse at the end of the day but sometimes present at the beginning of the day too; she says that she will discuss with her PCP.  No neck pain. No oral sores.  Chronic recurrent nasal sore for years, per patient.  No rash. No sicca  symptoms. No photosensitivity or photophobia. No eye pain or redness. No history of inflammatory eye disease.    Tobacco: none  EtOH: occasional  Drugs: none    ROS   GEN: No fevers, chills, night sweats, fatigue, or weight change  SKIN: No itching, rashes, sores  HEENT: No epistaxis.   CV: No chest pain, pressure, palpitations, or dyspnea on exertion.  PULM: No SOB, wheeze, cough.  GI: No nausea, vomiting, constipation, diarrhea. No blood in stool. No abdominal pain.  : No blood in urine.  MSK: See HPI.  NEURO: No numbness or tingling  EXT: LE edema unchanged   PSYCH: Negative    Active Problem List     Patient Active Problem List   Diagnosis     FEMALE STRESS INCONTINENCE post-hysterectomy     HYPERHIDROSIS on axilla and soles of feet     Dermatitis due to cosmetics     Contact dermatitis and other eczema, due to unspecified cause     Acquired acanthosis nigricans     LFT abnormalities and Urobilnogen high     CARDIOVASCULAR SCREENING; LDL GOAL LESS THAN 160     Vitamin D deficiency     Inflammatory arthritis     High risk medications (not anticoagulants) long-term use     Osteoarthritis     Menopausal syndrome (hot flashes)     Right lateral epicondylitis     Past Medical History     Past Medical History:   Diagnosis Date     Arthritis      Headache(784.0)      Irritable bowel syndrome      Other and unspecified noninfectious gastroenteritis and colitis(558.9)     chronic     Past Surgical History     Past Surgical History:   Procedure Laterality Date     C REPLACEMENT,URETER,BOWEL SEGMENT  10/01/2008    Part of her ureter was repaired.     CATARACT IOL, RT/LT       COLONOSCOPY  10/14/2011    Procedure:COLONOSCOPY; Colonoscopy; Surgeon:SCOTTY LEDEZMA; Location:[facility]     ENHANCE LASER REFRACTIVE BILATERAL EXISTING PT IN PARAMETERS       HYSTERECTOMY, VAGINAL  1/1/2000    TVH, fibroids (still has ovaries)     SURGICAL HISTORY OF -       Tubal Ligation     SURGICAL HISTORY OF -       Appy     SURGICAL  HISTORY OF -       Tonsils     SURGICAL HISTORY OF -   12/94    Anal Fistulotomy     Allergy     Allergies   Allergen Reactions     Sulfa Drugs Rash     Clindamycin Rash     Codeine Hives     Current Medication List     Current Outpatient Medications   Medication Sig     folic acid (FOLVITE) 1 MG tablet Take 2 tablets (2 mg) by mouth daily     methotrexate sodium 2.5 MG TABS Take 25 mg by mouth once a week . Take all 10 tablets on the same day of each week.     PREMARIN 0.45 MG tablet TAKE ONE TABLET BY MOUTH EVERY DAY     oxyCODONE IR (ROXICODONE) 5 MG tablet Take 1 tablet (5 mg) by mouth every 6 hours as needed for severe pain (Patient not taking: Reported on 12/18/2018)     predniSONE (DELTASONE) 20 MG tablet Take 3 tablets once a day x 3 days, then 2 tablets once a day x 2 days, then 1 tablet once daily x 3 days and then 1/2 tablet once a day x 2 days. (Patient not taking: Reported on 12/18/2018.)     No current facility-administered medications for this visit.          Social History   See HPI    Family History     Family History   Problem Relation Age of Onset     Heart Disease Father         Heart attack     C.A.D. Father         age 50     Hypertension Father      Cancer Maternal Grandfather      Alzheimer Disease Maternal Grandfather      Cancer Paternal Grandfather      Diabetes No family hx of      Cerebrovascular Disease No family hx of      Breast Cancer No family hx of      Cancer - colorectal No family hx of      Prostate Cancer No family hx of        Physical Exam     Temp Readings from Last 3 Encounters:   12/18/18 98.1  F (36.7  C) (Oral)   10/05/18 98.3  F (36.8  C) (Oral)   10/04/18 98.6  F (37  C) (Oral)     BP Readings from Last 5 Encounters:   12/18/18 142/84   10/05/18 126/81   10/04/18 151/85   08/07/18 138/84   07/24/18 119/73     Pulse Readings from Last 1 Encounters:   12/18/18 79     Resp Readings from Last 1 Encounters:   08/07/18 16     Estimated body mass index is 32.01 kg/m  as  "calculated from the following:    Height as of 8/7/18: 1.638 m (5' 4.5\").    Weight as of this encounter: 85.9 kg (189 lb 6.4 oz).    GEN: NAD  HEENT: MMM. No oral lesions.  Anicteric, noninjected sclera  CV: S1, S2. RRR. No m/r/g.  PULM: CTA bilaterally. No w/c.  ABD: +BS.   MSK: Mildly tender to palpation over the left first CMC that did not have significant squaring at that joint.  Heberden's nodes present.  MCPs and PIPs without swelling or tenderness to palpation.  Wrists, elbows, shoulders, knees, ankles, and MTPs without swelling or tenderness to palpation.  Hips nontender to palpation.   Nontender to palpation of the bilateral thighs.  NEURO: UE and LE strengths 5/5 and equal bilaterally.   SKIN: No rash  EXT: Nonpitting bilateral lower extremity edema distal to the knees  PSYCH: Alert. Appropriate.    Labs / Imaging (select studies)   RF/CCP  Recent Labs   Lab Test 06/01/15  1139 01/31/14  1534 06/06/13  1341   CCPABY <20  Interpretation:  Negative   <20 Interpretation:  Negative  --    RHF <20  --  7     CBC  Recent Labs   Lab Test 08/01/18  0954 04/24/18  1557 10/11/17  1207   WBC 6.4 10.0 8.8   RBC 4.01 4.12 4.03   HGB 12.7 13.2 13.2   HCT 38.3 39.3 39.4   MCV 96 95 98   RDW 13.4 13.3 12.9    287 216   MCH 31.7 32.0 32.8   MCHC 33.2 33.6 33.5   NEUTROPHIL 71.9 83.9 77.4   LYMPH 17.0 11.2 15.5   MONOCYTE 6.4 4.4 4.2   EOSINOPHIL 4.2 0.2 2.2   BASOPHIL 0.5 0.3 0.7   ANEU 4.6 8.4* 6.8   ALYM 1.1 1.1 1.4   EDWARD 0.4 0.4 0.4   AEOS 0.3 0.0 0.2   ABAS 0.0 0.0 0.1     CMP  Recent Labs   Lab Test 08/01/18  0954 04/24/18  1557 10/11/17  1207 07/05/17  1639 03/29/17  1148   NA  --   --  138 139 143   POTASSIUM  --   --  4.1 3.5 4.5   CHLORIDE  --   --  105 105 108   CO2  --   --  28 29 29   ANIONGAP  --   --  5 5 6   GLC  --   --  86 119* 85   BUN  --   --  18 16 13   CR 0.72 0.66 0.73 0.66 0.68   GFRESTIMATED 84 >90 82 >90  Non  GFR Calc   >90  Non  GFR Calc     GFRESTBLACK " >90 >90 >90 >90   GFR Calc   >90   GFR Calc     ENEIDA  --   --  9.2 8.9 8.7   BILITOTAL 0.4 0.3 0.7 0.3 0.5   ALBUMIN 3.7 3.9 3.9 3.5 3.7   PROTTOTAL 6.6* 6.9 6.9 6.4* 6.7*   ALKPHOS 64 70 57 69 64   AST 22 14 28 27 14   ALT 39 36 44 38 31     Calcium/VitaminD  Recent Labs   Lab Test 10/11/17  1207 07/05/17  1639 03/29/17  1148  09/18/14  1129 04/29/14  1522 11/25/13  1350   ENEIDA 9.2 8.9 8.7   < >  --   --   --    VITDT  --   --   --   --  38 42 19*    < > = values in this interval not displayed.     ESR/CRP  Recent Labs   Lab Test 10/05/18  1214 08/01/18  0954 04/24/18  1557  06/01/15  1139   SED  --  6 6  --  4   CRP 9.9* 4.0 <2.9   < >  --     < > = values in this interval not displayed.     Lipid Panel  Recent Labs   Lab Test 07/18/11  1145   CHOL 184   TRIG 68   HDL 41*   *   VLDL 14   CHOLHDLRATIO 4.0     Hepatitis B  Recent Labs   Lab Test 04/24/18  1557   HBCAB Nonreactive   HEPBANG Nonreactive     Hepatitis C  Recent Labs   Lab Test 06/01/15  1139   HCVAB Nonreactive   Assay performance characteristics have not been established for newborns,   infants, and children       Tuberculosis Screening  Recent Labs   Lab Test 06/01/15  1139   TBRSLT Negative   TBAGN 0.00     Immunization History     Immunization History   Administered Date(s) Administered     HepB 01/24/1991, 02/28/1991, 09/05/1991     TD (ADULT, 7+) 03/12/1990, 01/01/2000     TDAP Vaccine (Adacel) 09/10/2008     TDAP Vaccine (Boostrix) 09/10/2008     Tdap (Adacel,Boostrix) 03/01/2011       Procedure     Procedure: Steroid injection of the left first carpometacarpal joint   Indication: Pain, osteoarthritis    The procedure was explained in detail. Risks including infection, pain, structural damage such as cartilage damage and tendon rupture, fat atrophy, skin hyper-/hypo-pigmentation, and medication reaction was explained. The need for rest of the affected joint for one week after the procedure was explained.  The  option of not doing the procedure was also provided. All questions were answered and the patient consented to the procedure.     A time-out was performed and the correct patient, procedure, and laterality were verified.    The left first carpometacarpal joint was examined and location for injection was identified. The area was cleaned with chlorhexidine, twice.  Ethyl chloride was then used for topical anaesthetic.  Then a mixture of lidocaine 1% 0.1 mL and Depo-Medrol 10mg was injected into the intra-articular space.     The patient tolerated the procedure well. No complications.    MEDICATION: Depo Medrol 40mg  LOT #: [id number]  : [employer]  EXPIRATION DATE: 02/2019  NDC#: 7150-3589-99    1% Lidocaine  : [employer]  Lot #: [id number]  EXPIRATION DATE: 06/01/2020  NDC: 0743-1304-79           Chart documentation done in part with Dragon Voice recognition Software. Although reviewed after completion, some word and grammatical error may remain.    Abdoul Eli MD

## 2018-12-20 LAB — ALDOLASE SERPL-CCNC: 3.8 U/L (ref 1.5–8.1)

## 2018-12-21 NOTE — RESULT ENCOUNTER NOTE
"[employer] message sent:  \"Ms. Rodriguez,    Your labs are normal.    It is possible that the leg symptoms you are experiencing are poststreptococcal myalgia, in which case taking scheduled NSAIDs such as naproxen 220 mg twice daily, or ibuprofen 200-400 mg 3 times daily may be effective.  If this is not effective then additional workup with an electromyogram / nerve conduction study would be beneficial.  Please let me know if symptoms persist after a couple weeks.    Sincerely,  Abdoul Eli MD  12/21/2018 5:38 AM\""

## 2019-03-27 DIAGNOSIS — M19.90 INFLAMMATORY ARTHRITIS: ICD-10-CM

## 2019-03-27 DIAGNOSIS — Z13.29 SCREENING FOR THYROID DISORDER: ICD-10-CM

## 2019-03-27 LAB
ALBUMIN SERPL-MCNC: 3.9 G/DL (ref 3.4–5)
ALP SERPL-CCNC: 60 U/L (ref 40–150)
ALT SERPL W P-5'-P-CCNC: 36 U/L (ref 0–50)
AST SERPL W P-5'-P-CCNC: 20 U/L (ref 0–45)
BASOPHILS # BLD AUTO: 0 10E9/L (ref 0–0.2)
BASOPHILS NFR BLD AUTO: 0.5 %
BILIRUB DIRECT SERPL-MCNC: 0.1 MG/DL (ref 0–0.2)
BILIRUB SERPL-MCNC: 0.4 MG/DL (ref 0.2–1.3)
CREAT SERPL-MCNC: 0.64 MG/DL (ref 0.52–1.04)
CRP SERPL-MCNC: 3 MG/L (ref 0–8)
DIFFERENTIAL METHOD BLD: NORMAL
EOSINOPHIL # BLD AUTO: 0.2 10E9/L (ref 0–0.7)
EOSINOPHIL NFR BLD AUTO: 2.5 %
ERYTHROCYTE [DISTWIDTH] IN BLOOD BY AUTOMATED COUNT: 13.8 % (ref 10–15)
ERYTHROCYTE [SEDIMENTATION RATE] IN BLOOD BY WESTERGREN METHOD: 6 MM/H (ref 0–30)
GFR SERPL CREATININE-BSD FRML MDRD: >90 ML/MIN/{1.73_M2}
HCT VFR BLD AUTO: 38 % (ref 35–47)
HGB BLD-MCNC: 12.9 G/DL (ref 11.7–15.7)
LYMPHOCYTES # BLD AUTO: 1.2 10E9/L (ref 0.8–5.3)
LYMPHOCYTES NFR BLD AUTO: 18.9 %
MCH RBC QN AUTO: 32.3 PG (ref 26.5–33)
MCHC RBC AUTO-ENTMCNC: 33.9 G/DL (ref 31.5–36.5)
MCV RBC AUTO: 95 FL (ref 78–100)
MONOCYTES # BLD AUTO: 0.4 10E9/L (ref 0–1.3)
MONOCYTES NFR BLD AUTO: 6.4 %
NEUTROPHILS # BLD AUTO: 4.7 10E9/L (ref 1.6–8.3)
NEUTROPHILS NFR BLD AUTO: 71.7 %
PLATELET # BLD AUTO: 238 10E9/L (ref 150–450)
PROT SERPL-MCNC: 6.4 G/DL (ref 6.8–8.8)
RBC # BLD AUTO: 3.99 10E12/L (ref 3.8–5.2)
TSH SERPL DL<=0.005 MIU/L-ACNC: 0.8 MU/L (ref 0.4–4)
WBC # BLD AUTO: 6.5 10E9/L (ref 4–11)

## 2019-03-27 PROCEDURE — 36415 COLL VENOUS BLD VENIPUNCTURE: CPT | Performed by: INTERNAL MEDICINE

## 2019-03-27 PROCEDURE — 85025 COMPLETE CBC W/AUTO DIFF WBC: CPT | Performed by: INTERNAL MEDICINE

## 2019-03-27 PROCEDURE — 80076 HEPATIC FUNCTION PANEL: CPT | Performed by: INTERNAL MEDICINE

## 2019-03-27 PROCEDURE — 85652 RBC SED RATE AUTOMATED: CPT | Performed by: INTERNAL MEDICINE

## 2019-03-27 PROCEDURE — 82565 ASSAY OF CREATININE: CPT | Performed by: INTERNAL MEDICINE

## 2019-03-27 PROCEDURE — 86140 C-REACTIVE PROTEIN: CPT | Performed by: INTERNAL MEDICINE

## 2019-03-27 PROCEDURE — 84443 ASSAY THYROID STIM HORMONE: CPT | Performed by: INTERNAL MEDICINE

## 2019-04-09 ENCOUNTER — OFFICE VISIT (OUTPATIENT)
Dept: RHEUMATOLOGY | Facility: CLINIC | Age: 59
End: 2019-04-09
Payer: COMMERCIAL

## 2019-04-09 VITALS
HEIGHT: 65 IN | WEIGHT: 190.6 LBS | DIASTOLIC BLOOD PRESSURE: 84 MMHG | RESPIRATION RATE: 16 BRPM | OXYGEN SATURATION: 98 % | TEMPERATURE: 98.4 F | BODY MASS INDEX: 31.75 KG/M2 | SYSTOLIC BLOOD PRESSURE: 139 MMHG | HEART RATE: 82 BPM

## 2019-04-09 DIAGNOSIS — M79.671 PAIN OF BOTH HEELS: ICD-10-CM

## 2019-04-09 DIAGNOSIS — M79.672 PAIN OF BOTH HEELS: ICD-10-CM

## 2019-04-09 DIAGNOSIS — M79.10 MYALGIA: ICD-10-CM

## 2019-04-09 DIAGNOSIS — M18.12 PRIMARY OSTEOARTHRITIS OF FIRST CARPOMETACARPAL JOINT OF LEFT HAND: ICD-10-CM

## 2019-04-09 DIAGNOSIS — Z79.899 HIGH RISK MEDICATION USE: ICD-10-CM

## 2019-04-09 DIAGNOSIS — M19.90 INFLAMMATORY ARTHRITIS: Primary | ICD-10-CM

## 2019-04-09 PROCEDURE — 99214 OFFICE O/P EST MOD 30 MIN: CPT | Performed by: INTERNAL MEDICINE

## 2019-04-09 RX ORDER — FOLIC ACID 1 MG/1
2 TABLET ORAL DAILY
Qty: 180 TABLET | Refills: 3 | Status: SHIPPED | OUTPATIENT
Start: 2019-04-09 | End: 2019-10-01

## 2019-04-09 RX ORDER — METHOTREXATE 2.5 MG/1
25 TABLET ORAL WEEKLY
Qty: 120 TABLET | Refills: 2 | Status: SHIPPED | OUTPATIENT
Start: 2019-04-09 | End: 2019-10-01

## 2019-04-09 ASSESSMENT — MIFFLIN-ST. JEOR: SCORE: 1442.5

## 2019-04-09 ASSESSMENT — PAIN SCALES - GENERAL: PAINLEVEL: MILD PAIN (3)

## 2019-04-09 NOTE — PROGRESS NOTES
Rheumatology Clinic Visit      Radha Rodriguez MRN# [id number]   YOB: 1960 Age: 59 year old      Date of visit: 4/09/19   PCP: Dr. Temo Fletcher    Chief Complaint   Patient presents with:  RECHECK: 4 month f/u inflammatory arthritis. Patient states she has been getting a lot of swelling in the left thumb pad. Swelling goes up and down.      Assessment and Plan     1. Inflammatory arthritis: Previously followed by Ro[facility] Vasques and [facility].  Established care with me on 4/24/2018.  Previously on Humira (ineffective), HCQ (ineffective; used with MTX).  Currently on MTX 25mg once weekly with worsening symptoms when reduced in the past.  Question if arthritis has any relation to hx of suspected Crohn's Disease (reportedly fistula requiring surgery, and colonoscopies showing significant scaring in the intestine).  Doing well today and will maintain on methotrexate.  - Continue methotrexate 25mg once weekly  - Continue folic 2mg daily  - Labs in 3 months: CBC, Creatinine, Hepatic Panel, ESR, CRP    2.  Primary osteoarthritis of the left first carpometacarpal joint: Reportedly improved with steroid injections in the past.  The diagnosis and treatment options were discussed again today.  Because she is doing okay we will delay the steroid injection until next month; advised that she call if she was having more pain requiring earlier steroid injection. Refer to hand therapy   - Hand therapy referral    3. Myalgias: 1 week of URI symptoms with sore throat, cold, and losing voice eventually found to be related to strep throat, followed by intense thigh aches that affected ability to ambulate.  Was seen in the ED at [facility] in early Oct.  CK and aldolase normal.  Prednisone taper helped.  Overall improved but still symptomatic and worse with activity.  Possibly poststreptococcal myalgia, so I advised NSAIDs PRN and NSAIDs do help when she takes them.  Symptoms have not changed since last seen.  Check NCS/EMG of  the right lower extremity for further evaluation.    4. Bilateral heel pain, plantar aspect: no clear etiology. Check x-rays and refer to podiatry  - Podiatry referral  - x-ray of both feet    Ms. Rodriguez verbalized agreement with and understanding of the rational for the diagnosis and treatment plan.  All questions were answered to best of my ability and the patient's satisfaction. Ms. Rodriguez was advised to contact the clinic with any questions that may arise after the clinic visit.      Thank you for involving me in the care of the patient    Return to clinic: 5/28/2019      HPI   Radha Rodriguez is a 59 year old female with a past medical history significant for possible Crohn's disease requiring fistula surgery in the past, osteoarthritis, inflammatory arthritis who is seen for follow-up of inflammatory arthritis.    Today, Ms. Rodriguez reports that her arthritis is doing well.  Methotrexate is tolerated well.  Morning stiffness for no more than 5 minutes.  She continues to have bilateral heel pain that is worse when she walks for any duration and does not extend any other part of her foot except for the plantar aspect of her bilateral heels.  She also has left first CMC joint pain but does not feel that she needs an injection yet.  She had a period last week that she felt like the injection will be beneficial but left first CMC joint improved.  Myalgias in her bilateral thighs persists, a constant dull ache that keeps her from doing much activity; no weakness; no swelling; no increased warmth.  She used NSAIDs for her bilateral thigh myalgias that improved when she took them but she does not always take them.    Denies fevers, chills, nausea, vomiting, constipation, diarrhea. No abdominal pain. No chest pain/pressure, palpitations, or shortness of breath. LE swelling worse at the end of the day but sometimes present at the beginning of the day too; she says that she will discuss with her PCP.  No neck pain. No oral  sores.  Chronic recurrent nasal sore for years, per patient.  No rash. No sicca symptoms. No photosensitivity or photophobia. No eye pain or redness. No history of inflammatory eye disease.    Tobacco: none  EtOH: occasional  Drugs: none    ROS   GEN: No fevers, chills, night sweats, fatigue, or weight change  SKIN: No itching, rashes, sores  HEENT: No epistaxis.   CV: No chest pain, pressure, palpitations, or dyspnea on exertion.  PULM: No SOB, wheeze, cough.  GI: No nausea, vomiting, constipation, diarrhea. No blood in stool. No abdominal pain.  : No blood in urine.  MSK: See HPI.  NEURO: No numbness or tingling  EXT: LE edema unchanged   PSYCH: Negative    Active Problem List     Patient Active Problem List   Diagnosis     FEMALE STRESS INCONTINENCE post-hysterectomy     HYPERHIDROSIS on axilla and soles of feet     Dermatitis due to cosmetics     Contact dermatitis and other eczema, due to unspecified cause     Acquired acanthosis nigricans     LFT abnormalities and Urobilnogen high     CARDIOVASCULAR SCREENING; LDL GOAL LESS THAN 160     Vitamin D deficiency     Inflammatory arthritis     High risk medications (not anticoagulants) long-term use     Osteoarthritis     Menopausal syndrome (hot flashes)     Right lateral epicondylitis     Past Medical History     Past Medical History:   Diagnosis Date     Arthritis      Headache(784.0)      Irritable bowel syndrome      Other and unspecified noninfectious gastroenteritis and colitis(558.9)     chronic     Past Surgical History     Past Surgical History:   Procedure Laterality Date     C REPLACEMENT,URETER,BOWEL SEGMENT  10/01/2008    Part of her ureter was repaired.     CATARACT IOL, RT/LT       COLONOSCOPY  10/14/2011    Procedure:COLONOSCOPY; Colonoscopy; Surgeon:SCOTTY LEDEZMA; Location:[facility]     ENHANCE LASER REFRACTIVE BILATERAL EXISTING PT IN PARAMETERS       HYSTERECTOMY, VAGINAL  1/1/2000    TVH, fibroids (still has ovaries)     SURGICAL  HISTORY OF -       Tubal Ligation     SURGICAL HISTORY OF -       Appy     SURGICAL HISTORY OF -       Tonsils     SURGICAL HISTORY OF -   12/94    Anal Fistulotomy     Allergy     Allergies   Allergen Reactions     Sulfa Drugs Rash     Clindamycin Rash     Codeine Hives     Current Medication List     Current Outpatient Medications   Medication Sig     folic acid (FOLVITE) 1 MG tablet Take 2 tablets (2 mg) by mouth daily     methotrexate sodium 2.5 MG TABS Take 25 mg by mouth once a week . Take all 10 tablets on the same day of each week.     PREMARIN 0.45 MG tablet TAKE ONE TABLET BY MOUTH EVERY DAY     oxyCODONE IR (ROXICODONE) 5 MG tablet Take 1 tablet (5 mg) by mouth every 6 hours as needed for severe pain (Patient not taking: Reported on 12/18/2018)     predniSONE (DELTASONE) 20 MG tablet Take 3 tablets once a day x 3 days, then 2 tablets once a day x 2 days, then 1 tablet once daily x 3 days and then 1/2 tablet once a day x 2 days. (Patient not taking: Reported on 12/18/2018.)     Current Facility-Administered Medications   Medication     methylPREDNISolone (DEPO-MEDROL) injection 20 mg         Social History   See HPI    Family History     Family History   Problem Relation Age of Onset     Heart Disease Father         Heart attack     C.A.D. Father         age 50     Hypertension Father      Cancer Maternal Grandfather      Alzheimer Disease Maternal Grandfather      Cancer Paternal Grandfather      Diabetes No family hx of      Cerebrovascular Disease No family hx of      Breast Cancer No family hx of      Cancer - colorectal No family hx of      Prostate Cancer No family hx of        Physical Exam     Temp Readings from Last 3 Encounters:   04/09/19 98.4  F (36.9  C) (Oral)   12/18/18 98.1  F (36.7  C) (Oral)   10/05/18 98.3  F (36.8  C) (Oral)     BP Readings from Last 5 Encounters:   04/09/19 139/84   12/18/18 142/84   10/05/18 126/81   10/04/18 151/85   08/07/18 138/84     Pulse Readings from Last 1  "Encounters:   04/09/19 82     Resp Readings from Last 1 Encounters:   04/09/19 16     Estimated body mass index is 32.21 kg/m  as calculated from the following:    Height as of this encounter: 1.638 m (5' 4.5\").    Weight as of this encounter: 86.5 kg (190 lb 9.6 oz).    GEN: NAD  HEENT: MMM. No oral lesions.  Anicteric, noninjected sclera  CV: S1, S2. RRR. No m/r/g.  PULM: CTA bilaterally. No w/c.  MSK: Mildly tender to palpation over the left first CMC joint.  Heberden's nodes present.  MCPs and PIPs without swelling or tenderness to palpation.  Wrists, elbows, shoulders, knees, ankles, and MTPs without swelling or tenderness to palpation.  Hips nontender to palpation.   Nontender to palpation of the bilateral thighs.  NEURO: UE and LE strengths 5/5 and equal bilaterally.   SKIN: No rash  EXT: Nonpitting bilateral lower extremity edema distal to the knees  PSYCH: Alert. Appropriate.    Labs / Imaging (select studies)   RF/CCP  Recent Labs   Lab Test 06/01/15  1139 01/31/14  1534 06/06/13  1341   CCPABY <20  Interpretation:  Negative   <20 Interpretation:  Negative  --    RHF <20  --  7     CBC  Recent Labs   Lab Test 03/27/19  1053 12/18/18  1619 08/01/18  0954   WBC 6.5 7.6 6.4   RBC 3.99 3.86 4.01   HGB 12.9 12.5 12.7   HCT 38.0 37.3 38.3   MCV 95 97 96   RDW 13.8 13.4 13.4    235 222   MCH 32.3 32.4 31.7   MCHC 33.9 33.5 33.2   NEUTROPHIL 71.7 69.4 71.9   LYMPH 18.9 19.5 17.0   MONOCYTE 6.4 7.2 6.4   EOSINOPHIL 2.5 3.4 4.2   BASOPHIL 0.5 0.5 0.5   ANEU 4.7 5.3 4.6   ALYM 1.2 1.5 1.1   EDWARD 0.4 0.6 0.4   AEOS 0.2 0.3 0.3   ABAS 0.0 0.0 0.0     CMP  Recent Labs   Lab Test 03/27/19  1053 12/18/18  1619 08/01/18  0954  10/11/17  1207 07/05/17  1639 03/29/17  1148   NA  --   --   --   --  138 139 143   POTASSIUM  --   --   --   --  4.1 3.5 4.5   CHLORIDE  --   --   --   --  105 105 108   CO2  --   --   --   --  28 29 29   ANIONGAP  --   --   --   --  5 5 6   GLC  --   --   --   --  86 119* 85   BUN  --   --   " --   --  18 16 13   CR 0.64 0.76 0.72   < > 0.73 0.66 0.68   GFRESTIMATED >90 78 84   < > 82 >90  Non  GFR Calc   >90  Non  GFR Calc     GFRESTBLACK >90 >90 >90   < > >90 >90   GFR Calc   >90   GFR Calc     ENEIDA  --   --   --   --  9.2 8.9 8.7   BILITOTAL 0.4 0.2 0.4   < > 0.7 0.3 0.5   ALBUMIN 3.9 3.8 3.7   < > 3.9 3.5 3.7   PROTTOTAL 6.4* 6.8 6.6*   < > 6.9 6.4* 6.7*   ALKPHOS 60 63 64   < > 57 69 64   AST 20 28 22   < > 28 27 14   ALT 36 43 39   < > 44 38 31    < > = values in this interval not displayed.     Calcium/VitaminD  Recent Labs   Lab Test 10/11/17  1207 07/05/17  1639 03/29/17  1148  09/18/14  1129 04/29/14  1522 11/25/13  1350   ENEIDA 9.2 8.9 8.7   < >  --   --   --    VITDT  --   --   --   --  38 42 19*    < > = values in this interval not displayed.     ESR/CRP  Recent Labs   Lab Test 03/27/19  1053 12/18/18  1619 10/05/18  1214 08/01/18  0954   SED 6 6  --  6   CRP 3.0 3.1 9.9* 4.0     Hepatitis B  Recent Labs   Lab Test 04/24/18  1557   HBCAB Nonreactive   HEPBANG Nonreactive     Hepatitis C  Recent Labs   Lab Test 06/01/15  1139   HCVAB Nonreactive   Assay performance characteristics have not been established for newborns,   infants, and children       Tuberculosis Screening  Recent Labs   Lab Test 06/01/15  1139   TBRSLT Negative   TBAGN 0.00     Immunization History     Immunization History   Administered Date(s) Administered     HepB 01/24/1991, 02/28/1991, 09/05/1991     TD (ADULT, 7+) 03/12/1990, 01/01/2000     TDAP Vaccine (Adacel) 09/10/2008     TDAP Vaccine (Boostrix) 09/10/2008     Tdap (Adacel,Boostrix) 03/01/2011          Chart documentation done in part with Dragon Voice recognition Software. Although reviewed after completion, some word and grammatical error may remain.    Abdoul Eli MD

## 2019-04-09 NOTE — PATIENT INSTRUCTIONS
Rheumatology    Dr. Abdoul Eli         [place] [facility]   (Monday)  [address]  [place] [address]       [facility]   (Tuesday)  [address] [place] [address]    [facility]   (Wed., Thurs., and Friday)  [address] [place] [address]    Phone number: [phone]  Thank you for choosing [facility].

## 2019-05-08 ENCOUNTER — OFFICE VISIT (OUTPATIENT)
Dept: PODIATRY | Facility: CLINIC | Age: 59
End: 2019-05-08
Payer: COMMERCIAL

## 2019-05-08 VITALS
DIASTOLIC BLOOD PRESSURE: 84 MMHG | HEART RATE: 96 BPM | WEIGHT: 190 LBS | BODY MASS INDEX: 32.11 KG/M2 | SYSTOLIC BLOOD PRESSURE: 136 MMHG

## 2019-05-08 DIAGNOSIS — M72.2 PLANTAR FASCIAL FIBROMATOSIS: Primary | ICD-10-CM

## 2019-05-08 PROCEDURE — 99243 OFF/OP CNSLTJ NEW/EST LOW 30: CPT | Performed by: PODIATRIST

## 2019-05-08 NOTE — PROGRESS NOTES
Subjective:    Patient is seen today in consult from Dr. Eli for bilateral heel pain R>L.  Started in 10/2018.  Points to the plantarmedial calcaneal tubercle.  Most painful upon rising in a.m. or after prolonged sitting.  Aggravated by activity and relieved by rest.  Has tried changing shoewear, OTC inserts, without much success.  Denies erythema, edema, ecchymosis, numbness, loss of strength.   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  Wears soft shoes at work.  Wears slippers/socks/barefoot around the house.  Patient has history of inflammatory arthritis.  She is taking methotrexate.    ROS:  A 10-point review of systems was performed and is positive for that noted in the HPI and as seen below.  All other areas are negative.        Allergies   Allergen Reactions     Sulfa Drugs Rash     Clindamycin Rash     Codeine Hives       Current Outpatient Medications   Medication Sig Dispense Refill     methotrexate sodium 2.5 MG TABS Take 10 tablets (25 mg) by mouth once a week . Take all 10 tablets on the same day of each week. 120 tablet 2     PREMARIN 0.45 MG tablet TAKE ONE TABLET BY MOUTH EVERY DAY 90 tablet 3     folic acid (FOLVITE) 1 MG tablet Take 2 tablets (2 mg) by mouth daily (Patient not taking: Reported on 5/8/2019) 180 tablet 3     oxyCODONE IR (ROXICODONE) 5 MG tablet Take 1 tablet (5 mg) by mouth every 6 hours as needed for severe pain (Patient not taking: Reported on 12/18/2018) 30 tablet 0     predniSONE (DELTASONE) 20 MG tablet Take 3 tablets once a day x 3 days, then 2 tablets once a day x 2 days, then 1 tablet once daily x 3 days and then 1/2 tablet once a day x 2 days. (Patient not taking: Reported on 12/18/2018.) 18 tablet 1       Patient Active Problem List   Diagnosis     FEMALE STRESS INCONTINENCE post-hysterectomy     HYPERHIDROSIS on axilla and soles of feet     Dermatitis due to cosmetics     Contact dermatitis and other eczema, due to unspecified cause     Acquired  acanthosis nigricans     LFT abnormalities and Urobilnogen high     CARDIOVASCULAR SCREENING; LDL GOAL LESS THAN 160     Vitamin D deficiency     Inflammatory arthritis     High risk medications (not anticoagulants) long-term use     Osteoarthritis     Menopausal syndrome (hot flashes)     Right lateral epicondylitis       Past Medical History:   Diagnosis Date     Arthritis      Headache(784.0)      Irritable bowel syndrome      Other and unspecified noninfectious gastroenteritis and colitis(558.9)     chronic       Past Surgical History:   Procedure Laterality Date     C REPLACEMENT,URETER,BOWEL SEGMENT  10/01/2008    Part of her ureter was repaired.     CATARACT IOL, RT/LT       COLONOSCOPY  10/14/2011    Procedure:COLONOSCOPY; Colonoscopy; Surgeon:SCOTTY LEDEZMA; Location:[facility]     ENHANCE LASER REFRACTIVE BILATERAL EXISTING PT IN PARAMETERS       HYSTERECTOMY, VAGINAL  1/1/2000    TVH, fibroids (still has ovaries)     SURGICAL HISTORY OF -       Tubal Ligation     SURGICAL HISTORY OF -       Appy     SURGICAL HISTORY OF -       Tonsils     SURGICAL HISTORY OF -   12/94    Anal Fistulotomy       Family History   Problem Relation Age of Onset     Heart Disease Father         Heart attack     C.A.D. Father         age 50     Hypertension Father      Cancer Maternal Grandfather      Alzheimer Disease Maternal Grandfather      Cancer Paternal Grandfather      Diabetes No family hx of      Cerebrovascular Disease No family hx of      Breast Cancer No family hx of      Cancer - colorectal No family hx of      Prostate Cancer No family hx of        Social History     Tobacco Use     Smoking status: Never Smoker     Smokeless tobacco: Never Used   Substance Use Topics     Alcohol use: Yes     Comment: Occasional         Objective:    Vitals: /84 (BP Location: Left arm)   Pulse 96   Wt 86.2 kg (190 lb)   BMI 32.11 kg/m    BMI: Body mass index is 32.11 kg/m .  Height: Data  Unavailable    Constitutional/ general:  Pt is in no apparent distress, appears well-nourished.  Cooperative with history and physical exam.     Psych:  The patient answered questions appropriately.  Normal affect.  Seems to have reasonable expectations, in terms of treatment.     Eyes:  Visual scanning/ tracking without deficit.     Ears:  Response to auditory stimuli is normal.  No hearing aid devices.  Auricles in proper alignment.     Lymphatic:  Popliteal lymph nodes not enlarged.     Lungs:  Non labored breathing, non labored speech. No cough.  No audible wheezing. Even, quiet breathing.       Vascular:  Pedal pulses are palpable bilaterally for both the DP and PT arteries.  CFT < 3 sec.  No edema.  Pedal hair growth noted.     Neuro:  Alert and oriented x 3. Coordinated gait.  Light touch sensation is intact to the L4, L5, S1 distributions. No obvious deficits.  No evidence of neurological-based weakness, spasticity, or contracture in the lower extremities.     Derm: Normal texture and turgor.  No erythema, ecchymosis, or cyanosis.  No open lesions.     Musculoskeletal:    Lower extremity muscle strength is normal.  Patient is ambulatory without an assistive device or brace.  No gross deformities.      Normal arch with weightbearing.   ROM is within normal limits.  Negative tinel's sign.  No side to side compression pain of the calcaneus.  Pain upon palpation to the bilateral plantarmedial  of the calcaneus R>L.  No erythema, edema, ecchymosis, or subcutaneous masses noted.  No pain on palpation or stressing any tendons.  Negative Tinel's sign        Assessment:  Plantar Fasciitis bilateral R>>L    Plan:   Discussed etiology and treatment options with the patient.  Pain seems to be more consistent with plantar fasciitis however her inflammatory arthritis could be playing a role in this.  The potential causes and nature of plantar fasciitis were discussed with the patient.  We reviewed the natural  history/prognosis of the condition and risks if left untreated.  These include chronic pain, other sites of pain due to gait changes, and potential plantar fascial rupture.      We discussed possible causes of the condition as it relates to the patients specific situation.      Conservative treatment options were reviewed:  appropriate shoes, avoidance of barefoot walking, inserts/orthoses, stretching, ice, massage, immobilization and NSAIDs.     We also reviewed the options of injection therapy and surgery.  However, it was made clear that surgery is only considered when conservative therapy fails.  The risks and benefits of injection therapy, and surgery were discussed.  Dispensed handout.       After thorough discussion and answering all questions, the patient elected to modifying activities, supportive shoes, ice, stretching, and not going barefoot.  Had a long discussion on good shoes at all times which the patient has not done yet.  Good house shoes at all times and I made recommendations.  Patient will get good work shoes and I made recommendations.  We gave patient plastic heel cups today to see if these help.  Discussed over-the-counter arch supports with patient and made recommendations.  Discussed orthotics and she will call me if she would like a pair.  For one week patient to take ibuprofen 600 mg tid after meals and will then stop.  Patient to stop medication if any GI upset or any other side effects.  Discussed other treatment options if she is not improving.  Return to clinic prn.  Thank you for allowing me participate in the care of this patient.        Gamal Olivares DPM, FACFAS

## 2019-05-08 NOTE — LETTER
5/8/2019         RE: Radha Rodriguez  [address]  [facility] [address]        Dear Colleague,    Thank you for referring your patient, Radha Rodriguez, to the [facility]. Please see a copy of my visit note below.    Subjective:    Patient is seen today in consult from Dr. Eli for bilateral heel pain R>L.  Started in 10/2018.  Points to the plantarmedial calcaneal tubercle.  Most painful upon rising in a.m. or after prolonged sitting.  Aggravated by activity and relieved by rest.  Has tried changing shoewear, OTC inserts, without much success.  Denies erythema, edema, ecchymosis, numbness, loss of strength.   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  Wears soft shoes at work.  Wears slippers/socks/barefoot around the house.  Patient has history of inflammatory arthritis.  She is taking methotrexate.    ROS:  A 10-point review of systems was performed and is positive for that noted in the HPI and as seen below.  All other areas are negative.        Allergies   Allergen Reactions     Sulfa Drugs Rash     Clindamycin Rash     Codeine Hives       Current Outpatient Medications   Medication Sig Dispense Refill     methotrexate sodium 2.5 MG TABS Take 10 tablets (25 mg) by mouth once a week . Take all 10 tablets on the same day of each week. 120 tablet 2     PREMARIN 0.45 MG tablet TAKE ONE TABLET BY MOUTH EVERY DAY 90 tablet 3     folic acid (FOLVITE) 1 MG tablet Take 2 tablets (2 mg) by mouth daily (Patient not taking: Reported on 5/8/2019) 180 tablet 3     oxyCODONE IR (ROXICODONE) 5 MG tablet Take 1 tablet (5 mg) by mouth every 6 hours as needed for severe pain (Patient not taking: Reported on 12/18/2018) 30 tablet 0     predniSONE (DELTASONE) 20 MG tablet Take 3 tablets once a day x 3 days, then 2 tablets once a day x 2 days, then 1 tablet once daily x 3 days and then 1/2 tablet once a day x 2 days. (Patient not taking: Reported on 12/18/2018.) 18 tablet 1        Patient Active Problem List   Diagnosis     FEMALE STRESS INCONTINENCE post-hysterectomy     HYPERHIDROSIS on axilla and soles of feet     Dermatitis due to cosmetics     Contact dermatitis and other eczema, due to unspecified cause     Acquired acanthosis nigricans     LFT abnormalities and Urobilnogen high     CARDIOVASCULAR SCREENING; LDL GOAL LESS THAN 160     Vitamin D deficiency     Inflammatory arthritis     High risk medications (not anticoagulants) long-term use     Osteoarthritis     Menopausal syndrome (hot flashes)     Right lateral epicondylitis       Past Medical History:   Diagnosis Date     Arthritis      Headache(784.0)      Irritable bowel syndrome      Other and unspecified noninfectious gastroenteritis and colitis(558.9)     chronic       Past Surgical History:   Procedure Laterality Date     C REPLACEMENT,URETER,BOWEL SEGMENT  10/01/2008    Part of her ureter was repaired.     CATARACT IOL, RT/LT       COLONOSCOPY  10/14/2011    Procedure:COLONOSCOPY; Colonoscopy; Surgeon:SCOTTY LEDEZMA; Location:[facility]     ENHANCE LASER REFRACTIVE BILATERAL EXISTING PT IN PARAMETERS       HYSTERECTOMY, VAGINAL  1/1/2000    TVH, fibroids (still has ovaries)     SURGICAL HISTORY OF -       Tubal Ligation     SURGICAL HISTORY OF -       Appy     SURGICAL HISTORY OF -       Tonsils     SURGICAL HISTORY OF -   12/94    Anal Fistulotomy       Family History   Problem Relation Age of Onset     Heart Disease Father         Heart attack     C.A.D. Father         age 50     Hypertension Father      Cancer Maternal Grandfather      Alzheimer Disease Maternal Grandfather      Cancer Paternal Grandfather      Diabetes No family hx of      Cerebrovascular Disease No family hx of      Breast Cancer No family hx of      Cancer - colorectal No family hx of      Prostate Cancer No family hx of        Social History     Tobacco Use     Smoking status: Never Smoker     Smokeless tobacco: Never Used   Substance Use  Topics     Alcohol use: Yes     Comment: Occasional         Objective:    Vitals: /84 (BP Location: Left arm)   Pulse 96   Wt 86.2 kg (190 lb)   BMI 32.11 kg/m     BMI: Body mass index is 32.11 kg/m .  Height: Data Unavailable    Constitutional/ general:  Pt is in no apparent distress, appears well-nourished.  Cooperative with history and physical exam.     Psych:  The patient answered questions appropriately.  Normal affect.  Seems to have reasonable expectations, in terms of treatment.     Eyes:  Visual scanning/ tracking without deficit.     Ears:  Response to auditory stimuli is normal.  No hearing aid devices.  Auricles in proper alignment.     Lymphatic:  Popliteal lymph nodes not enlarged.     Lungs:  Non labored breathing, non labored speech. No cough.  No audible wheezing. Even, quiet breathing.       Vascular:  Pedal pulses are palpable bilaterally for both the DP and PT arteries.  CFT < 3 sec.  No edema.  Pedal hair growth noted.     Neuro:  Alert and oriented x 3. Coordinated gait.  Light touch sensation is intact to the L4, L5, S1 distributions. No obvious deficits.  No evidence of neurological-based weakness, spasticity, or contracture in the lower extremities.     Derm: Normal texture and turgor.  No erythema, ecchymosis, or cyanosis.  No open lesions.     Musculoskeletal:    Lower extremity muscle strength is normal.  Patient is ambulatory without an assistive device or brace.  No gross deformities.      Normal arch with weightbearing.   ROM is within normal limits.  Negative tinel's sign.  No side to side compression pain of the calcaneus.  Pain upon palpation to the bilateral plantarmedial  of the calcaneus R>L.  No erythema, edema, ecchymosis, or subcutaneous masses noted.  No pain on palpation or stressing any tendons.  Negative Tinel's sign        Assessment:  Plantar Fasciitis bilateral R>>L    Plan:   Discussed etiology and treatment options with the patient.  Pain seems to be more  consistent with plantar fasciitis however her inflammatory arthritis could be playing a role in this.  The potential causes and nature of plantar fasciitis were discussed with the patient.  We reviewed the natural history/prognosis of the condition and risks if left untreated.  These include chronic pain, other sites of pain due to gait changes, and potential plantar fascial rupture.      We discussed possible causes of the condition as it relates to the patients specific situation.      Conservative treatment options were reviewed:  appropriate shoes, avoidance of barefoot walking, inserts/orthoses, stretching, ice, massage, immobilization and NSAIDs.     We also reviewed the options of injection therapy and surgery.  However, it was made clear that surgery is only considered when conservative therapy fails.  The risks and benefits of injection therapy, and surgery were discussed.  Dispensed handout.       After thorough discussion and answering all questions, the patient elected to modifying activities, supportive shoes, ice, stretching, and not going barefoot.  Had a long discussion on good shoes at all times which the patient has not done yet.  Good house shoes at all times and I made recommendations.  Patient will get good work shoes and I made recommendations.  We gave patient plastic heel cups today to see if these help.  Discussed over-the-counter arch supports with patient and made recommendations.  Discussed orthotics and she will call me if she would like a pair.  For one week patient to take ibuprofen 600 mg tid after meals and will then stop.  Patient to stop medication if any GI upset or any other side effects.  Discussed other treatment options if she is not improving.  Return to clinic prn.  Thank you for allowing me participate in the care of this patient.        Gamal Olivares DPM, FACFAS      Again, thank you for allowing me to participate in the care of your patient.         Sincerely,        Gamal Olivares, ARIANNA

## 2019-05-08 NOTE — PATIENT INSTRUCTIONS
We wish you continued good healing. If you have any questions or concerns, please do not hesitate to contact us at [phone]    Please remember to call and schedule a follow up appointment if one was recommended at your earliest convenience.   PODIATRY CLINIC HOURS  TELEPHONE NUMBER    Dr. Gaaml Olivares D.P.[facility]    Clinics:  [facility]    [facility]issa Under[facility]   Tuesday 1PM-6PM  [place]  Wednesday 7AM-2PM  [facility]  Thursday 10AM-6PM  [place]  Friday 7AM-3PM  [place]  Specialty schedulers:   [phone] to make an appointment with any Specialty Provider.        Urgent Care locations:    [facility] Monday-Friday 5 pm - 9 pm. Saturday-Sunday 9 am -5pm    Monday-Friday 11 am - 9 pm Saturday 9 am - 5 pm     Monday-Sunday 12 noon-8PM [phone]     If you need a medication refill, please contact us you may need lab work and/or a follow up visit prior to your refill (i.e. Antifungal medications).    Zoomt (secure e-mail communication and access to your chart) to send a message or to make an appointment.    If MRI needed please call [place] Vyas at [phone]        Weight management plan: Patient was referred to their PCP to discuss a diet and exercise plan.

## 2019-05-28 ENCOUNTER — OFFICE VISIT (OUTPATIENT)
Dept: RHEUMATOLOGY | Facility: CLINIC | Age: 59
End: 2019-05-28
Payer: COMMERCIAL

## 2019-05-28 VITALS
RESPIRATION RATE: 16 BRPM | HEART RATE: 90 BPM | DIASTOLIC BLOOD PRESSURE: 78 MMHG | WEIGHT: 190 LBS | TEMPERATURE: 98.4 F | OXYGEN SATURATION: 98 % | SYSTOLIC BLOOD PRESSURE: 151 MMHG | HEIGHT: 65 IN | BODY MASS INDEX: 31.65 KG/M2

## 2019-05-28 DIAGNOSIS — M18.12 PRIMARY OSTEOARTHRITIS OF FIRST CARPOMETACARPAL JOINT OF LEFT HAND: Primary | ICD-10-CM

## 2019-05-28 DIAGNOSIS — Z79.899 HIGH RISK MEDICATIONS (NOT ANTICOAGULANTS) LONG-TERM USE: ICD-10-CM

## 2019-05-28 DIAGNOSIS — M19.90 INFLAMMATORY ARTHRITIS: ICD-10-CM

## 2019-05-28 PROCEDURE — 20600 DRAIN/INJ JOINT/BURSA W/O US: CPT | Mod: LT | Performed by: INTERNAL MEDICINE

## 2019-05-28 PROCEDURE — 99213 OFFICE O/P EST LOW 20 MIN: CPT | Mod: 25 | Performed by: INTERNAL MEDICINE

## 2019-05-28 RX ORDER — METHYLPREDNISOLONE ACETATE 40 MG/ML
20 INJECTION, SUSPENSION INTRA-ARTICULAR; INTRALESIONAL; INTRAMUSCULAR; SOFT TISSUE ONCE
Status: COMPLETED | OUTPATIENT
Start: 2019-05-28 | End: 2019-05-28

## 2019-05-28 RX ADMIN — METHYLPREDNISOLONE ACETATE 20 MG: 40 INJECTION, SUSPENSION INTRA-ARTICULAR; INTRALESIONAL; INTRAMUSCULAR; SOFT TISSUE at 14:57

## 2019-05-28 ASSESSMENT — PAIN SCALES - GENERAL: PAINLEVEL: MODERATE PAIN (4)

## 2019-05-28 ASSESSMENT — MIFFLIN-ST. JEOR: SCORE: 1439.77

## 2019-05-28 NOTE — NURSING NOTE
RAPID3 (0-30) Cumulative Score  8.2          RAPID3 Weighted Score (divide #4 by 3 and that is the weighted score)  2.73

## 2019-05-28 NOTE — PROGRESS NOTES
Rheumatology Clinic Visit      Radha Rodriguez MRN# [id number]   YOB: 1960 Age: 59 year old      Date of visit: 5/28/19   PCP: Dr. Temo Fletcher    Chief Complaint   Patient presents with:  RECHECK: F/u inflammatory athritis.  Left thumb pain.    Assessment and Plan     1. Inflammatory arthritis: Previously followed by Rojas Vasques and Shazia.  Established care with me on 4/24/2018.  Previously on Humira (ineffective), HCQ (ineffective; used with MTX).  Currently on MTX 25mg once weekly with worsening symptoms when reduced in the past.  Question if arthritis has any relation to hx of suspected Crohn's Disease (reportedly fistula requiring surgery, and colonoscopies showing significant scaring in the intestine).  Inflammatory arthritis is well controlled.    - Continue methotrexate 25mg once weekly  - Continue folic 2mg daily  - Labs every 3 months, next in late July: CBC, creatinine, hepatic panel    2.  Primary osteoarthritis of the left first carpometacarpal joint: Reportedly improved with steroid injections in the past.  The diagnosis and treatment options were discussed again today.  Steroid injection today because of worsening symptoms.  Her co-worker is out of the office so she reports doing more constant repetitive activities and she is left-handed.  She has not been able to make it to hand therapy because of time commitments at work.  Steroid injection today as documented in the procedure section.  Encouraged her to go to hand therapy.    3. Plantar fasciitis, bilaterally: seen by podiatry.  Encourage stretching exercises and these were reviewed with her today.    4. Elevated blood pressure:  Radha to follow up with Primary Care provider regarding elevated blood pressure.     I explained that to safely continue medications, clinic follow up as recommended and labs as recommended are needed.  If clinic follow up and labs are not completed as directed then it will be up to the discretion of the  prescribing provider to determine if a refill will be given.  If it is determined that a refill will not be given based on insufficient monitoring, such as having not been seen back in the rheumatology clinic for evaluation, then it is reasonable to discuss with your primary care provider where monitoring and refills may be considered.  I also explained that all patients followed in the rheumatology clinic need to have a primary care provider.     Ms. Rodriguez verbalized agreement with and understanding of the rational for the diagnosis and treatment plan.  All questions were answered to best of my ability and the patient's satisfaction. Ms. Rodriguez was advised to contact the clinic with any questions that may arise after the clinic visit.      Thank you for involving me in the care of the patient    Return to clinic: 3-4 months      HPI   Radha Rodriguez is a 59 year old female with a past medical history significant for possible Crohn's disease requiring fistula surgery in the past, osteoarthritis, inflammatory arthritis who is seen for follow-up of inflammatory arthritis.    Today, Ms. Rodriguez reports that all of her joints are doing okay except she still has pain with where she steps on the bottom of her feet.  She is not doing stretching exercises much but is wearing stiff soled shoes with good arch support.  Left first CMC joint pain that she attributes to increased repetitive activities at work; her coworker is out on maternity leave so she says that she is more busy without any breaks.  She has not been able to go to hand therapy because of time commitments at work.     Denies fevers, chills, nausea, vomiting, constipation, diarrhea. No abdominal pain. No chest pain/pressure, palpitations, or shortness of breath. LE swelling worse at the end of the day but sometimes present at the beginning of the day too; she says that she will discuss with her PCP.  No neck pain. No oral sores.  Chronic recurrent nasal sore for  years, per patient.  No rash. No sicca symptoms. No photosensitivity or photophobia. No eye pain or redness. No history of inflammatory eye disease.    Tobacco: none  EtOH: occasional  Drugs: none    ROS   GEN: No fevers, chills, night sweats, fatigue, or weight change  SKIN: No itching, rashes, sores  HEENT: No epistaxis.   CV: No chest pain, pressure, palpitations, or dyspnea on exertion.  PULM: No SOB, wheeze, cough.  GI: No nausea, vomiting, constipation, diarrhea. No blood in stool. No abdominal pain.  : No blood in urine.  MSK: See HPI.  NEURO: No numbness or tingling  EXT: LE edema unchanged   PSYCH: Negative    Active Problem List     Patient Active Problem List   Diagnosis     FEMALE STRESS INCONTINENCE post-hysterectomy     HYPERHIDROSIS on axilla and soles of feet     Dermatitis due to cosmetics     Contact dermatitis and other eczema, due to unspecified cause     Acquired acanthosis nigricans     LFT abnormalities and Urobilnogen high     CARDIOVASCULAR SCREENING; LDL GOAL LESS THAN 160     Vitamin D deficiency     Inflammatory arthritis     High risk medications (not anticoagulants) long-term use     Osteoarthritis     Menopausal syndrome (hot flashes)     Right lateral epicondylitis     Past Medical History     Past Medical History:   Diagnosis Date     Arthritis      Headache(784.0)      Irritable bowel syndrome      Other and unspecified noninfectious gastroenteritis and colitis(558.9)     chronic     Past Surgical History     Past Surgical History:   Procedure Laterality Date     C REPLACEMENT,URETER,BOWEL SEGMENT  10/01/2008    Part of her ureter was repaired.     CATARACT IOL, RT/LT       COLONOSCOPY  10/14/2011    Procedure:COLONOSCOPY; Colonoscopy; Surgeon:SCOTTY LEDEZMA; Location:[facility]     ENHANCE LASER REFRACTIVE BILATERAL EXISTING PT IN PARAMETERS       HYSTERECTOMY, VAGINAL  1/1/2000    TVH, fibroids (still has ovaries)     SURGICAL HISTORY OF -       Tubal Ligation      SURGICAL HISTORY OF -       Appy     SURGICAL HISTORY OF -       Tonsils     SURGICAL HISTORY OF -   12/94    Anal Fistulotomy     Allergy     Allergies   Allergen Reactions     Sulfa Drugs Rash     Clindamycin Rash     Codeine Hives     Current Medication List     Current Outpatient Medications   Medication Sig     folic acid (FOLVITE) 1 MG tablet Take 2 tablets (2 mg) by mouth daily     methotrexate sodium 2.5 MG TABS Take 10 tablets (25 mg) by mouth once a week . Take all 10 tablets on the same day of each week.     oxyCODONE IR (ROXICODONE) 5 MG tablet Take 1 tablet (5 mg) by mouth every 6 hours as needed for severe pain     predniSONE (DELTASONE) 20 MG tablet Take 3 tablets once a day x 3 days, then 2 tablets once a day x 2 days, then 1 tablet once daily x 3 days and then 1/2 tablet once a day x 2 days.     PREMARIN 0.45 MG tablet TAKE ONE TABLET BY MOUTH EVERY DAY     Current Facility-Administered Medications   Medication     methylPREDNISolone (DEPO-MEDROL) injection 20 mg         Social History   See HPI    Family History     Family History   Problem Relation Age of Onset     Heart Disease Father         Heart attack     C.A.D. Father         age 50     Hypertension Father      Cancer Maternal Grandfather      Alzheimer Disease Maternal Grandfather      Cancer Paternal Grandfather      Diabetes No family hx of      Cerebrovascular Disease No family hx of      Breast Cancer No family hx of      Cancer - colorectal No family hx of      Prostate Cancer No family hx of        Physical Exam     Temp Readings from Last 3 Encounters:   05/28/19 98.4  F (36.9  C) (Oral)   04/09/19 98.4  F (36.9  C) (Oral)   12/18/18 98.1  F (36.7  C) (Oral)     BP Readings from Last 5 Encounters:   05/28/19 151/78   05/08/19 136/84   04/09/19 139/84   12/18/18 142/84   10/05/18 126/81     Pulse Readings from Last 1 Encounters:   05/28/19 90     Resp Readings from Last 1 Encounters:   05/28/19 16     Estimated body mass index is  "32.11 kg/m  as calculated from the following:    Height as of this encounter: 1.638 m (5' 4.5\").    Weight as of this encounter: 86.2 kg (190 lb).    GEN: NAD  HEENT: MMM. No oral lesions.  Anicteric, noninjected sclera  CV: S1, S2. RRR. No m/r/g.  PULM: CTA bilaterally. No w/c.  MSK: Tender to palpation over the left first CMC joint; no effusion or increased warmth; no overlying erythema.  Heberden's nodes present.  MCPs and PIPs without swelling or tenderness to palpation.  Wrists, elbows, shoulders, knees, ankles, and MTPs without swelling or tenderness to palpation.  Hips nontender to palpation.    NEURO: UE and LE strengths 5/5 and equal bilaterally.   SKIN: No rash  EXT: Nonpitting bilateral lower extremity edema distal to the knees  PSYCH: Alert. Appropriate.    Labs / Imaging (select studies)   RF/CCP  Recent Labs   Lab Test 06/01/15  1139 01/31/14  1534 06/06/13  1341   CCPABY <20  Interpretation:  Negative   <20 Interpretation:  Negative  --    RHF <20  --  7     CBC  Recent Labs   Lab Test 03/27/19  1053 12/18/18  1619 08/01/18  0954   WBC 6.5 7.6 6.4   RBC 3.99 3.86 4.01   HGB 12.9 12.5 12.7   HCT 38.0 37.3 38.3   MCV 95 97 96   RDW 13.8 13.4 13.4    235 222   MCH 32.3 32.4 31.7   MCHC 33.9 33.5 33.2   NEUTROPHIL 71.7 69.4 71.9   LYMPH 18.9 19.5 17.0   MONOCYTE 6.4 7.2 6.4   EOSINOPHIL 2.5 3.4 4.2   BASOPHIL 0.5 0.5 0.5   ANEU 4.7 5.3 4.6   ALYM 1.2 1.5 1.1   EDWARD 0.4 0.6 0.4   AEOS 0.2 0.3 0.3   ABAS 0.0 0.0 0.0     CMP  Recent Labs   Lab Test 03/27/19  1053 12/18/18  1619 08/01/18  0954  10/11/17  1207 07/05/17  1639 03/29/17  1148   NA  --   --   --   --  138 139 143   POTASSIUM  --   --   --   --  4.1 3.5 4.5   CHLORIDE  --   --   --   --  105 105 108   CO2  --   --   --   --  28 29 29   ANIONGAP  --   --   --   --  5 5 6   GLC  --   --   --   --  86 119* 85   BUN  --   --   --   --  18 16 13   CR 0.64 0.76 0.72   < > 0.73 0.66 0.68   GFRESTIMATED >90 78 84   < > 82 >90  Non  " GFR Calc   >90  Non  GFR Calc     GFRESTBLACK >90 >90 >90   < > >90 >90   GFR Calc   >90   GFR Calc     ENEIDA  --   --   --   --  9.2 8.9 8.7   BILITOTAL 0.4 0.2 0.4   < > 0.7 0.3 0.5   ALBUMIN 3.9 3.8 3.7   < > 3.9 3.5 3.7   PROTTOTAL 6.4* 6.8 6.6*   < > 6.9 6.4* 6.7*   ALKPHOS 60 63 64   < > 57 69 64   AST 20 28 22   < > 28 27 14   ALT 36 43 39   < > 44 38 31    < > = values in this interval not displayed.     Calcium/VitaminD  Recent Labs   Lab Test 10/11/17  1207 07/05/17  1639 03/29/17  1148  09/18/14  1129 04/29/14  1522 11/25/13  1350   ENEIDA 9.2 8.9 8.7   < >  --   --   --    VITDT  --   --   --   --  38 42 19*    < > = values in this interval not displayed.     ESR/CRP  Recent Labs   Lab Test 03/27/19  1053 12/18/18  1619 10/05/18  1214 08/01/18  0954   SED 6 6  --  6   CRP 3.0 3.1 9.9* 4.0     Lipid Panel  Recent Labs   Lab Test 07/18/11  1145   CHOL 184   TRIG 68   HDL 41*   *   VLDL 14   CHOLHDLRATIO 4.0     Hepatitis B  Recent Labs   Lab Test 04/24/18  1557   HBCAB Nonreactive   HEPBANG Nonreactive     Hepatitis C  Recent Labs   Lab Test 06/01/15  1139   HCVAB Nonreactive   Assay performance characteristics have not been established for newborns,   infants, and children       Tuberculosis Screening  Recent Labs   Lab Test 06/01/15  1139   TBRSLT Negative   TBAGN 0.00     Immunization History     Immunization History   Administered Date(s) Administered     HepB 01/24/1991, 02/28/1991, 09/05/1991     TD (ADULT, 7+) 03/12/1990, 01/01/2000     TDAP Vaccine (Adacel) 09/10/2008     TDAP Vaccine (Boostrix) 09/10/2008     Tdap (Adacel,Boostrix) 03/01/2011       Procedure     Procedure: Steroid injection of the left 1st CMC joint  Indication: Pain, osteoarthritis    The procedure was explained in detail. Risks including infection, pain, structural damage such as cartilage damage and tendon rupture, fat atrophy, skin hyper-/hypo-pigmentation, and medication  reaction was explained. The need for rest of the affected joint for one week after the procedure was explained.  The option of not doing the procedure was also provided. All questions were answered and the patient consented to the procedure.     A time-out was performed and the correct patient, procedure, and laterality were verified.    The left 1st CMC joint was examined and location for injection was identified. The area was cleaned with chlorhexidine, twice.  Ethyl chloride was then used for topical anaesthetic.  Then a mixture of lidocaine 1% 0.1 mL and methylprednisolone 20mg was injected into the intra-articular space.     The patient tolerated the procedure well. No complications.    MEDICATION: Methylprednisolone  LOT #: [id number]  :  [employer]  EXPIRATION DATE:  08/2019  NDC#: 7635-0704-34     1% Lidocaine  :  [employer]  Lot #: [id number]  Expiration date: 08/2020  NDC: 3859-0767-29         Chart documentation done in part with Dragon Voice recognition Software. Although reviewed after completion, some word and grammatical error may remain.    Abdoul Eli MD

## 2019-05-28 NOTE — NURSING NOTE
The following medication was given:     MEDICATION: Methylprednisolone  SITE: Left 1st CMC  DOSE: 1 ml  LOT #: [id number]  :  [employer]  EXPIRATION DATE:  08/2019  NDC#: 1175-7581-25  Time:  2:50PM    1% Lidocaine  :  [employer]  Lot #: [id number]  Expiration date: 08/2020  NDC: 3765-2127-35

## 2019-08-14 ENCOUNTER — OFFICE VISIT (OUTPATIENT)
Dept: URGENT CARE | Facility: URGENT CARE | Age: 59
End: 2019-08-14
Payer: COMMERCIAL

## 2019-08-14 VITALS
BODY MASS INDEX: 31.6 KG/M2 | HEART RATE: 86 BPM | WEIGHT: 187 LBS | OXYGEN SATURATION: 99 % | TEMPERATURE: 98.7 F | DIASTOLIC BLOOD PRESSURE: 84 MMHG | SYSTOLIC BLOOD PRESSURE: 129 MMHG

## 2019-08-14 DIAGNOSIS — N30.01 ACUTE CYSTITIS WITH HEMATURIA: Primary | ICD-10-CM

## 2019-08-14 LAB
ALBUMIN UR-MCNC: 30 MG/DL
AMORPH CRY #/AREA URNS HPF: ABNORMAL /HPF
APPEARANCE UR: CLEAR
BACTERIA #/AREA URNS HPF: ABNORMAL /HPF
BILIRUB UR QL STRIP: NEGATIVE
COLOR UR AUTO: YELLOW
GLUCOSE UR STRIP-MCNC: NEGATIVE MG/DL
HGB UR QL STRIP: NEGATIVE
KETONES UR STRIP-MCNC: NEGATIVE MG/DL
LEUKOCYTE ESTERASE UR QL STRIP: ABNORMAL
NITRATE UR QL: NEGATIVE
NON-SQ EPI CELLS #/AREA URNS LPF: ABNORMAL /LPF
PH UR STRIP: 5.5 PH (ref 5–7)
RBC #/AREA URNS AUTO: ABNORMAL /HPF
SOURCE: ABNORMAL
SP GR UR STRIP: 1.02 (ref 1–1.03)
UROBILINOGEN UR STRIP-ACNC: 0.2 EU/DL (ref 0.2–1)
WBC #/AREA URNS AUTO: ABNORMAL /HPF

## 2019-08-14 PROCEDURE — 81001 URINALYSIS AUTO W/SCOPE: CPT | Performed by: FAMILY MEDICINE

## 2019-08-14 PROCEDURE — 99214 OFFICE O/P EST MOD 30 MIN: CPT | Performed by: FAMILY MEDICINE

## 2019-08-14 PROCEDURE — 87088 URINE BACTERIA CULTURE: CPT | Performed by: FAMILY MEDICINE

## 2019-08-14 PROCEDURE — 87186 SC STD MICRODIL/AGAR DIL: CPT | Performed by: FAMILY MEDICINE

## 2019-08-14 PROCEDURE — 87086 URINE CULTURE/COLONY COUNT: CPT | Performed by: FAMILY MEDICINE

## 2019-08-14 RX ORDER — CIPROFLOXACIN 250 MG/1
250 TABLET, FILM COATED ORAL 2 TIMES DAILY
Qty: 10 TABLET | Refills: 0 | Status: SHIPPED | OUTPATIENT
Start: 2019-08-14 | End: 2019-08-21

## 2019-08-14 RX ORDER — FOLIC ACID 1 MG/1
2 TABLET ORAL
COMMUNITY
End: 2020-11-16

## 2019-08-14 RX ORDER — CIPROFLOXACIN 500 MG/1
500 TABLET, FILM COATED ORAL DAILY
Qty: 5 TABLET | Refills: 0 | Status: SHIPPED | OUTPATIENT
Start: 2019-08-14 | End: 2019-08-14

## 2019-08-14 NOTE — PROGRESS NOTES
SUBJECTIVE:   Chief Complaint   Patient presents with     UTI     Patient complains of urinary frequency, cramping, and blood in urine          Urinary Tract Symptoms  Onset: Few days ago     Description:   Painful urination (Dysuria): No   Frequent urination:  Yes   Need to urinate urgently: Yes   Blood in urine (Hematuria):Yes   Delay in urine (Hesitency): Yes     Intensity: mild    Progression of Symptoms:  same    Accompanying Signs & Symptoms:  Fever/chills: no  Flank pain no  Nausea and vomiting: no  Any vaginal discharge/symptoms: {no  Abdominal/Pelvic Pain: Yes    History:   History of frequent UTI's: Yes   History of kidney stones: no      Review of Systems review of system negative except as mentioned in HPI.       Past Medical History:   Diagnosis Date     Arthritis      Headache(784.0)      Irritable bowel syndrome      Other and unspecified noninfectious gastroenteritis and colitis(558.9)     chronic     Family History   Problem Relation Age of Onset     Heart Disease Father         Heart attack     C.A.D. Father         age 50     Hypertension Father      Cancer Maternal Grandfather      Alzheimer Disease Maternal Grandfather      Cancer Paternal Grandfather      Diabetes No family hx of      Cerebrovascular Disease No family hx of      Breast Cancer No family hx of      Cancer - colorectal No family hx of      Prostate Cancer No family hx of      Current Outpatient Medications   Medication Sig Dispense Refill     estrogen conj (PREMARIN) 0.625 MG tablet Take 0.625 mg by mouth       folic acid (FOLVITE) 1 MG tablet Take 2 mg by mouth       folic acid (FOLVITE) 1 MG tablet Take 2 tablets (2 mg) by mouth daily 180 tablet 3     methotrexate 1.25 MG half-tablet CHEMO 10 mg by Other route       methotrexate sodium 2.5 MG TABS Take 10 tablets (25 mg) by mouth once a week . Take all 10 tablets on the same day of each week. 120 tablet 2     oxyCODONE IR (ROXICODONE) 5 MG tablet Take 1 tablet (5 mg) by mouth  every 6 hours as needed for severe pain 30 tablet 0     predniSONE (DELTASONE) 20 MG tablet Take 3 tablets once a day x 3 days, then 2 tablets once a day x 2 days, then 1 tablet once daily x 3 days and then 1/2 tablet once a day x 2 days. 18 tablet 1     PREMARIN 0.45 MG tablet TAKE ONE TABLET BY MOUTH EVERY DAY 90 tablet 3     Social History     Tobacco Use     Smoking status: Never Smoker     Smokeless tobacco: Never Used   Substance Use Topics     Alcohol use: Yes     Comment: Occasional       OBJECTIVE  /84 (BP Location: Left arm, Patient Position: Chair, Cuff Size: Adult Regular)   Pulse 86   Temp 98.7  F (37.1  C) (Oral)   Wt 84.8 kg (187 lb)   SpO2 99%   BMI 31.60 kg/m      Physical Exam   Constitutional: She appears well-developed and well-nourished. No distress.   HENT:   Head: Normocephalic and atraumatic.   Eyes: Conjunctivae are normal. Right eye exhibits no discharge. Left eye exhibits no discharge.   Skin: Skin is warm. No rash noted. She is not diaphoretic.   On exposed skin   Psychiatric: She has a normal mood and affect. Her behavior is normal.       Labs:  Results for orders placed or performed in visit on 08/14/19 (from the past 24 hour(s))   *UA reflex to Microscopic and Culture ([facility] (except [place] and [place])   Result Value Ref Range    Color Urine Yellow     Appearance Urine Clear     Glucose Urine Negative NEG^Negative mg/dL    Bilirubin Urine Negative NEG^Negative    Ketones Urine Negative NEG^Negative mg/dL    Specific Gravity Urine 1.025 1.003 - 1.035    Blood Urine Negative NEG^Negative    pH Urine 5.5 5.0 - 7.0 pH    Protein Albumin Urine 30 (A) NEG^Negative mg/dL    Urobilinogen Urine 0.2 0.2 - 1.0 EU/dL    Nitrite Urine Negative NEG^Negative    Leukocyte Esterase Urine Trace (A) NEG^Negative    Source Midstream Urine        X-Ray was not done.    ASSESSMENT:    Radha was seen today for uti.    Diagnoses and all orders for this visit:    Acute  cystitis with hematuria  -     Discontinue: ciprofloxacin (CIPRO) 500 MG tablet; Take 1 tablet (500 mg) by mouth daily for 5 days  -     ciprofloxacin (CIPRO) 250 MG tablet; Take 1 tablet (250 mg) by mouth 2 times daily for 5 days    Other orders  -     *UA reflex to Microscopic and Culture ([facility] (except [place] and [place])  -     Urine Microscopic  -     Urine Culture Aerobic Bacterial          Followup:    If not improving or if condition worsens, follow up with your Primary Care Provider    Emily Holman MD

## 2019-08-16 LAB
BACTERIA SPEC CULT: ABNORMAL
SPECIMEN SOURCE: ABNORMAL

## 2019-08-21 ENCOUNTER — OFFICE VISIT (OUTPATIENT)
Dept: FAMILY MEDICINE | Facility: CLINIC | Age: 59
End: 2019-08-21
Payer: COMMERCIAL

## 2019-08-21 VITALS
BODY MASS INDEX: 30.99 KG/M2 | WEIGHT: 186 LBS | TEMPERATURE: 98.3 F | SYSTOLIC BLOOD PRESSURE: 135 MMHG | OXYGEN SATURATION: 98 % | RESPIRATION RATE: 16 BRPM | DIASTOLIC BLOOD PRESSURE: 86 MMHG | HEIGHT: 65 IN | HEART RATE: 75 BPM

## 2019-08-21 DIAGNOSIS — N30.00 ACUTE CYSTITIS WITHOUT HEMATURIA: ICD-10-CM

## 2019-08-21 DIAGNOSIS — M65.4 DE QUERVAIN'S TENOSYNOVITIS, LEFT: Primary | ICD-10-CM

## 2019-08-21 LAB
ALBUMIN UR-MCNC: NEGATIVE MG/DL
APPEARANCE UR: CLEAR
BILIRUB UR QL STRIP: NEGATIVE
COLOR UR AUTO: YELLOW
GLUCOSE UR STRIP-MCNC: NEGATIVE MG/DL
HGB UR QL STRIP: NEGATIVE
KETONES UR STRIP-MCNC: NEGATIVE MG/DL
LEUKOCYTE ESTERASE UR QL STRIP: NEGATIVE
NITRATE UR QL: NEGATIVE
PH UR STRIP: 5 PH (ref 5–7)
SOURCE: NORMAL
SP GR UR STRIP: >1.03 (ref 1–1.03)
UROBILINOGEN UR STRIP-ACNC: 0.2 EU/DL (ref 0.2–1)

## 2019-08-21 PROCEDURE — 99214 OFFICE O/P EST MOD 30 MIN: CPT | Performed by: INTERNAL MEDICINE

## 2019-08-21 PROCEDURE — 81003 URINALYSIS AUTO W/O SCOPE: CPT | Performed by: INTERNAL MEDICINE

## 2019-08-21 RX ORDER — CEFPODOXIME PROXETIL 200 MG/1
200 TABLET, FILM COATED ORAL 2 TIMES DAILY
Qty: 10 TABLET | Refills: 2 | Status: SHIPPED | OUTPATIENT
Start: 2019-08-21 | End: 2019-08-26

## 2019-08-21 ASSESSMENT — PAIN SCALES - GENERAL: PAINLEVEL: MODERATE PAIN (5)

## 2019-08-21 ASSESSMENT — MIFFLIN-ST. JEOR: SCORE: 1421.63

## 2019-08-21 NOTE — PROGRESS NOTES
Subjective     Radha Rodriguez is a 59 year old female who presents to clinic today for the following health issues:    HPI   Joint Pain    Onset: since spring     Description:   Location: left hand   Character: throbbing     Intensity: 5/10    Progression of Symptoms: worse    Accompanying Signs & Symptoms:  Other symptoms: swelling    History:   Previous similar pain: YES      Precipitating factors:   Trauma or overuse: YES- overworking it     Alleviating factors:  Improved by: nothing  Therapies Tried and outcome: injection from rheumatologist, tylenol 600 mg 2-3 times a day       URINARY TRACT SYMPTOMS    Duration: a couple of days ago     Description  frequency, urgency, hematuria and hesitancy    Intensity:  mild    Accompanying signs and symptoms:  Fever/chills: no   Flank pain no   Nausea and vomiting: no   Vaginal symptoms: none  Abdominal/Pelvic Pain: no   Hematuria: YES, following LLQ cramping.    History  History of frequent UTI's: YES  History of kidney stones: no   Sexually Active: YES  Possibility of pregnancy: No    Precipitating or alleviating factors: None    Therapies tried and outcome: Cipro   Outcome: no relief             Patient Active Problem List   Diagnosis     FEMALE STRESS INCONTINENCE post-hysterectomy     HYPERHIDROSIS on axilla and soles of feet     Dermatitis due to cosmetics     Contact dermatitis and other eczema, due to unspecified cause     Acquired acanthosis nigricans     LFT abnormalities and Urobilnogen high     CARDIOVASCULAR SCREENING; LDL GOAL LESS THAN 160     Vitamin D deficiency     Inflammatory arthritis     High risk medications (not anticoagulants) long-term use     Osteoarthritis     Menopausal syndrome (hot flashes)     Right lateral epicondylitis     Past Surgical History:   Procedure Laterality Date     C REPLACEMENT,URETER,BOWEL SEGMENT  10/01/2008    Part of her ureter was repaired.     CATARACT IOL, RT/LT       COLONOSCOPY  10/14/2011    Procedure:COLONOSCOPY;  Colonoscopy; Surgeon:SCOTTY LEDEZMA; Location:[facility]     ENHANCE LASER REFRACTIVE BILATERAL EXISTING PT IN PARAMETERS       HYSTERECTOMY, VAGINAL  1/1/2000    TVH, fibroids (still has ovaries)     SURGICAL HISTORY OF -       Tubal Ligation     SURGICAL HISTORY OF -       Appy     SURGICAL HISTORY OF -       Tonsils     SURGICAL HISTORY OF -   12/94    Anal Fistulotomy       Social History     Tobacco Use     Smoking status: Never Smoker     Smokeless tobacco: Never Used   Substance Use Topics     Alcohol use: Yes     Comment: Occasional     Family History   Problem Relation Age of Onset     Heart Disease Father         Heart attack     C.A.D. Father         age 50     Hypertension Father      Cancer Maternal Grandfather      Alzheimer Disease Maternal Grandfather      Cancer Paternal Grandfather      Diabetes No family hx of      Cerebrovascular Disease No family hx of      Breast Cancer No family hx of      Cancer - colorectal No family hx of      Prostate Cancer No family hx of          Allergies   Allergen Reactions     Sulfa Drugs Rash     Clindamycin Rash     Codeine Hives     Codeine      Recent Labs   Lab Test 03/27/19  1053 12/18/18  1619 08/01/18  0954  04/20/18  1030  10/11/17  1207 07/05/17  1639  07/18/11  1145   LDL  --   --   --   --   --   --   --   --   --  130*   HDL  --   --   --   --   --   --   --   --   --  41*   TRIG  --   --   --   --   --   --   --   --   --  68   ALT 36 43 39   < >  --   --  44 38   < >  --    CR 0.64 0.76 0.72   < >  --   --  0.73 0.66   < >  --    GFRESTIMATED >90 78 84   < >  --   --  82 >90  Non  GFR Calc     < >  --    GFRESTBLACK >90 >90 >90   < >  --   --  >90 >90  African American GFR Calc     < >  --    POTASSIUM  --   --   --   --   --   --  4.1 3.5   < >  --    TSH 0.80  --   --   --  1.23   < >  --   --    < > 0.56    < > = values in this interval not displayed.      BP Readings from Last 3 Encounters:   08/21/19 135/86   08/14/19  "129/84   05/28/19 151/78    Wt Readings from Last 3 Encounters:   08/21/19 84.4 kg (186 lb)   08/14/19 84.8 kg (187 lb)   05/28/19 86.2 kg (190 lb)                  Reviewed and updated as needed this visit by Provider         Review of Systems   ROS COMP: CONSTITUTIONAL: NEGATIVE for fever, chills, change in weight  INTEGUMENTARY/SKIN: NEGATIVE for worrisome rashes, moles or lesions  EYES: NEGATIVE for vision changes or irritation  ENT/MOUTH: NEGATIVE for ear, mouth and throat problems  RESP: NEGATIVE for significant cough or SOB  CV: NEGATIVE for chest pain, palpitations or peripheral edema  GI: NEGATIVE for nausea, abdominal pain, heartburn, or change in bowel habits   female: As above.  MUSCULOSKELETAL:POSITIVE for inflammatory arthritis, currently on Methotrexate.  NEURO: NEGATIVE for weakness, dizziness or paresthesias  ENDOCRINE: NEGATIVE for temperature intolerance, skin/hair changes  HEME: NEGATIVE for bleeding problems  PSYCHIATRIC: NEGATIVE for changes in mood or affect      Objective    /86 (BP Location: Left arm, Patient Position: Chair, Cuff Size: Adult Regular)   Pulse 75   Temp 98.3  F (36.8  C) (Oral)   Resp 16   Ht 1.638 m (5' 4.5\")   Wt 84.4 kg (186 lb)   SpO2 98%   BMI 31.43 kg/m     Physical Exam   GENERAL: healthy, alert and no distress  EYES: Eyes grossly normal to inspection  HENT: normal cephalic/atraumatic and oral mucous membranes moist  NECK: no adenopathy  RESP: lungs clear to auscultation - no rales, rhonchi or wheezes  CV: regular rate and rhythm, normal S1 S2, no S3 or S4, no murmur, click or rub, no peripheral edema and peripheral pulses strong  MS: Tenderness on palpation of left extensor pollicis brevis tendon.  SKIN: no suspicious lesions or rashes  NEURO: Normal strength and tone, mentation intact and speech normal  PSYCH: mentation appears normal, affect normal/bright    Diagnostic Test Results:  Results for orders placed or performed in visit on 08/21/19   *UA " "reflex to Microscopic and Culture ([facility] (except [place] and [place])   Result Value Ref Range    Color Urine Yellow     Appearance Urine Clear     Glucose Urine Negative NEG^Negative mg/dL    Bilirubin Urine Negative NEG^Negative    Ketones Urine Negative NEG^Negative mg/dL    Specific Gravity Urine >1.030 1.003 - 1.035    Blood Urine Negative NEG^Negative    pH Urine 5.0 5.0 - 7.0 pH    Protein Albumin Urine Negative NEG^Negative mg/dL    Urobilinogen Urine 0.2 0.2 - 1.0 EU/dL    Nitrite Urine Negative NEG^Negative    Leukocyte Esterase Urine Negative NEG^Negative    Source Midstream Urine            Assessment & Plan     1. De Quervain's tenosynovitis, left  Most probable cause of left thumb pain, with tenderness on palpation of left extensor pollicis brevis tendon.   - ORTHOPEDICS ADULT REFERRAL; Future  - diclofenac (VOLTAREN) 1 % topical gel; Apply 2 g topically 4 times daily  Dispense: 100 g; Refill: 11    2. Acute cystitis without hematuria  Despite normal UA, recommend empirical treatment since patient is symptomatic and immunosuppressed.  - *UA reflex to Microscopic and Culture ([facility] (except [place] and [place])  - cefpodoxime (VANTIN) 200 MG tablet; Take 1 tablet (200 mg) by mouth 2 times daily for 5 days  Dispense: 10 tablet; Refill: 2     BMI:   Estimated body mass index is 31.43 kg/m  as calculated from the following:    Height as of this encounter: 1.638 m (5' 4.5\").    Weight as of this encounter: 84.4 kg (186 lb).   Weight management plan: diet and exercise.        FUTURE APPOINTMENTS:       - Follow-up visit in 4 weeks or earlier as needed.      Temo Fletcher MD  [facility]          "

## 2019-08-21 NOTE — PATIENT INSTRUCTIONS
At [facility], we strive to deliver an exceptional experience to you, every time we see you.  If you receive a survey in the mail, please send us back your thoughts. We really do value your feedback.    Based on your medical history, these are the current health maintenance/preventive care services that you are due for (some may have been done at this visit.)  Health Maintenance Due   Topic Date Due     ADVANCE CARE PLANNING  1960     HIV SCREENING  03/20/1977     ZOSTER IMMUNIZATION (1 of 2) 03/20/2012     PREVENTIVE CARE VISIT  06/28/2012     LIPID  07/18/2016     PHQ-2  01/01/2019     MAMMO SCREENING  01/13/2019         Suggested websites for health information:  Www.Status Overload.org : Up to date and easily searchable information on multiple topics.  Www.medlineplus.gov : medication info, interactive tutorials, watch real surgeries online  Www.familydoctor.org : good info from the Academy of Family Physicians  Www.cdc.gov : public health info, travel advisories, epidemics (H1N1)  Www.aap.org : children's health info, normal development, vaccinations  Www.health.[facility].[place].us : [place] dept of health, public health issues in [place], N1N1    Your care team:                            Family Medicine Internal Medicine   MD Temo Lott MD Shantel Branch-Fleming, MD Katya Georgiev PA-C Nam Ho, MD Pediatrics   SURESH Morton, MD Daija Gray CNP, MD Deborah Mielke, MD Kim Thein, APRN CNP      Clinic hours: Monday - Thursday 7 am-7 pm; Fridays 7 am-5 pm.   Urgent care: Monday - Friday 11 am-9 pm; Saturday and Sunday 9 am-5 pm.  Pharmacy : Monday -Thursday 8 am-8 pm; Friday 8 am-6 pm; Saturday and Sunday 9 am-5 pm.     Clinic: [phone]   Pharmacy: [phone]

## 2019-08-28 ENCOUNTER — OFFICE VISIT (OUTPATIENT)
Dept: ORTHOPEDICS | Facility: CLINIC | Age: 59
End: 2019-08-28
Attending: INTERNAL MEDICINE
Payer: COMMERCIAL

## 2019-08-28 VITALS — HEIGHT: 65 IN | BODY MASS INDEX: 30.99 KG/M2 | WEIGHT: 186 LBS

## 2019-08-28 DIAGNOSIS — M65.4 DE QUERVAIN'S TENOSYNOVITIS, LEFT: ICD-10-CM

## 2019-08-28 DIAGNOSIS — M18.12 PRIMARY OSTEOARTHRITIS OF FIRST CARPOMETACARPAL JOINT OF LEFT HAND: Primary | ICD-10-CM

## 2019-08-28 PROCEDURE — 99214 OFFICE O/P EST MOD 30 MIN: CPT | Performed by: FAMILY MEDICINE

## 2019-08-28 ASSESSMENT — PAIN SCALES - GENERAL: PAINLEVEL: MODERATE PAIN (5)

## 2019-08-28 ASSESSMENT — MIFFLIN-ST. JEOR: SCORE: 1421.63

## 2019-08-28 NOTE — PROGRESS NOTES
"      [facility]  8/28/2019    Radha Rodriguez's chief complaint for this visit includes:  Chief Complaint   Patient presents with     Consult     Left thumb pain, has previously has cortisone injection,      PCP: Temo Fletcher    Referring Provider:  Temo Fletcher MD  [id number] [place] [address]    Ht 1.638 m (5' 4.5\")   Wt 84.4 kg (186 lb)   BMI 31.43 kg/m    Moderate Pain (5)       Reason for visit:     What part of your body is injured / painful?  left thumb    What caused the injury /pain? No inciting event     How long ago did your injury occur or pain begin? several months ago    What are your most bothersome symptoms? Pain    How would you characterize your symptom?  aching    What makes your symptoms better? Rest    What makes your symptoms worse? Movement    Have you been previously seen for this problem? Yes, PCP    Medical History:    Any recent changes to your medical history? No    Any new medication prescribed since last visit? No    Have you had surgery on this body part before? No    Social History:    Occupation: Dental assistant     Handedness: Left    Review of Systems:    Do you have fever, chills, weight loss? No    Do you have any vision problems? No    Do you have any chest pain or edema? No    Do you have any shortness of breath or wheezing?  No    Do you have stomach problems? No    Do you have any numbness or focal weakness? No    Do you have diabetes? No    Do you have problems with bleeding or clotting? No    Do you have an rashes or other skin lesions? No       CHIEF COMPLAINT:  Consult (Left thumb pain, has previously has cortisone injection, )       HISTORY OF PRESENT ILLNESS  Ms. Rodriguez is a pleasant 59 year old year old female who presents to clinic today with left thumb pain.  She is seen at the request of Dr. Fletcher.    Radha has had pain in her left thumb for years.  She points to the base of her thumb, mostly throughout the " musculature in her palm.  She has had multiple corticosteroid injections in her CMC joint in the past, these were helping initially.  She had her most recent one in May, this did not help her.  Her symptoms are somewhat vague, she reports tenderness, pain with movement of her thumb, pain shoots from her wrist up to the tip of her thumb throughout her radial distribution.  At times she feels tingly, she drops objects on occasion.  She has tried braces in the past and specific exercises.  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      Additional history: as documented    MEDICAL HISTORY  Patient Active Problem List   Diagnosis     FEMALE STRESS INCONTINENCE post-hysterectomy     HYPERHIDROSIS on axilla and soles of feet     Dermatitis due to cosmetics     Contact dermatitis and other eczema, due to unspecified cause     Acquired acanthosis nigricans     LFT abnormalities and Urobilnogen high     CARDIOVASCULAR SCREENING; LDL GOAL LESS THAN 160     Vitamin D deficiency     Inflammatory arthritis     High risk medications (not anticoagulants) long-term use     Osteoarthritis     Menopausal syndrome (hot flashes)     Right lateral epicondylitis       Current Outpatient Medications   Medication Sig Dispense Refill     diclofenac (VOLTAREN) 1 % topical gel Apply 2 g topically 4 times daily 100 g 11     estrogen conj (PREMARIN) 0.625 MG tablet Take 0.625 mg by mouth       folic acid (FOLVITE) 1 MG tablet Take 2 mg by mouth       folic acid (FOLVITE) 1 MG tablet Take 2 tablets (2 mg) by mouth daily 180 tablet 3     methotrexate 1.25 MG half-tablet CHEMO 10 mg by Other route       methotrexate sodium 2.5 MG TABS Take 10 tablets (25 mg) by mouth once a week . Take all 10 tablets on the same day of each week. 120 tablet 2     oxyCODONE IR (ROXICODONE) 5 MG tablet Take 1 tablet (5 mg) by mouth every 6 hours as needed for severe pain (Patient not taking: Reported on 8/28/2019)  "30 tablet 0     PREMARIN 0.45 MG tablet TAKE ONE TABLET BY MOUTH EVERY DAY 90 tablet 3       Allergies   Allergen Reactions     Sulfa Drugs Rash     Clindamycin Rash     Codeine Hives     Codeine        Family History   Problem Relation Age of Onset     Heart Disease Father         Heart attack     C.A.D. Father         age 50     Hypertension Father      Cancer Maternal Grandfather      Alzheimer Disease Maternal Grandfather      Cancer Paternal Grandfather      Diabetes No family hx of      Cerebrovascular Disease No family hx of      Breast Cancer No family hx of      Cancer - colorectal No family hx of      Prostate Cancer No family hx of        Additional medical/Social/Surgical histories reviewed in [facility] and updated as appropriate.          PHYSICAL EXAM  Vitals:    08/28/19 0955   Weight: 84.4 kg (186 lb)   Height: 1.638 m (5' 4.5\")     General  - normal appearance, in no obvious distress  CV  - normal radial pulse  Pulm  - normal respiratory pattern, non-labored  Musculoskeletal - left wrist  - inspection: no atrophy, normal joint alignment, no swelling   - palpation: tenderness over thenar musculature at CMC joint  - ROM:  90 deg flexion   70 deg extension   25 deg abduction   65 deg adduction  - strength: 5/5  strength, 5/5 wrist abduction, 5/5 flexion, extension, pronation, supination, adduction  - special tests:  (-) Tinel's  (-) Finkelstein  Neuro  - no numbness, no motor deficit, grossly normal coordination, normal muscle tone  Skin  - no ecchymosis, erythema, warmth, or induration, no obvious rash  Psych  - interactive, appropriate, normal mood and affect             ASSESSMENT & PLAN  Ms. Rodriguez is a 59 year old year old female who presents to clinic today with pain in her left thumb.    I reviewed an x-ray of her hands from October 2017 which reads:  IMPRESSION:   1. No acute bone abnormality identified in either wrist or hand.  2. Mild osteoarthritis in both hands, most pronounced at the " first  interphalangeal and second distal interphalangeal joints.    Her symptoms may be consistent with CMC osteoarthritis, there may be some component of carpal tunnel syndrome, although her symptoms are not classic for either.    Ultimately I am ordering an MRI of her hand and thumb region.  She also has a postsurgical left ring finger issue, we will obtain an MRI of the entire hand to evaluate this as well.    Thank you for allowing me to participate in Radha's care.    Pascual Hernandez DO, [facility]  Primary Care Sports Medicine       This note was constructed using Dragon dictation software, please excuse any minor errors in spelling, grammar, or syntax.

## 2019-08-28 NOTE — LETTER
"    8/28/2019         RE: Radha Rodriguez  [address]  [place] [address]        Dear Colleague,    Thank you for referring your patient, Radha Rodriguez, to the [facility]. Please see a copy of my visit note below.          [facility]  8/28/2019    Radha Rodriguez's chief complaint for this visit includes:  Chief Complaint   Patient presents with     Consult     Left thumb pain, has previously has cortisone injection,      PCP: Temo Fletcher    Referring Provider:  Temo Fletcher MD  [id number] [place] [address]    Ht 1.638 m (5' 4.5\")   Wt 84.4 kg (186 lb)   BMI 31.43 kg/m     Moderate Pain (5)       Reason for visit:     What part of your body is injured / painful?  left thumb    What caused the injury /pain? No inciting event     How long ago did your injury occur or pain begin? several months ago    What are your most bothersome symptoms? Pain    How would you characterize your symptom?  aching    What makes your symptoms better? Rest    What makes your symptoms worse? Movement    Have you been previously seen for this problem? Yes, PCP    Medical History:    Any recent changes to your medical history? No    Any new medication prescribed since last visit? No    Have you had surgery on this body part before? No    Social History:    Occupation: Dental assistant     Handedness: Left    Review of Systems:    Do you have fever, chills, weight loss? No    Do you have any vision problems? No    Do you have any chest pain or edema? No    Do you have any shortness of breath or wheezing?  No    Do you have stomach problems? No    Do you have any numbness or focal weakness? No    Do you have diabetes? No    Do you have problems with bleeding or clotting? No    Do you have an rashes or other skin lesions? No       CHIEF COMPLAINT:  Consult (Left thumb pain, has previously has cortisone injection, )       HISTORY OF PRESENT ILLNESS  Ms. Rodriguez is a " delma 59 year old year old female who presents to clinic today with left thumb pain.  She is seen at the request of Dr. Fletcher.    Radha has had pain in her left thumb for years.  She points to the base of her thumb, mostly throughout the musculature in her palm.  She has had multiple corticosteroid injections in her CMC joint in the past, these were helping initially.  She had her most recent one in May, this did not help her.  Her symptoms are somewhat vague, she reports tenderness, pain with movement of her thumb, pain shoots from her wrist up to the tip of her thumb throughout her radial distribution.  At times she feels tingly, she drops objects on occasion.  She has tried braces in the past and specific exercises.  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      Additional history: as documented    MEDICAL HISTORY  Patient Active Problem List   Diagnosis     FEMALE STRESS INCONTINENCE post-hysterectomy     HYPERHIDROSIS on axilla and soles of feet     Dermatitis due to cosmetics     Contact dermatitis and other eczema, due to unspecified cause     Acquired acanthosis nigricans     LFT abnormalities and Urobilnogen high     CARDIOVASCULAR SCREENING; LDL GOAL LESS THAN 160     Vitamin D deficiency     Inflammatory arthritis     High risk medications (not anticoagulants) long-term use     Osteoarthritis     Menopausal syndrome (hot flashes)     Right lateral epicondylitis       Current Outpatient Medications   Medication Sig Dispense Refill     diclofenac (VOLTAREN) 1 % topical gel Apply 2 g topically 4 times daily 100 g 11     estrogen conj (PREMARIN) 0.625 MG tablet Take 0.625 mg by mouth       folic acid (FOLVITE) 1 MG tablet Take 2 mg by mouth       folic acid (FOLVITE) 1 MG tablet Take 2 tablets (2 mg) by mouth daily 180 tablet 3     methotrexate 1.25 MG half-tablet CHEMO 10 mg by Other route       methotrexate sodium 2.5 MG TABS Take 10 tablets (25 mg)  "by mouth once a week . Take all 10 tablets on the same day of each week. 120 tablet 2     oxyCODONE IR (ROXICODONE) 5 MG tablet Take 1 tablet (5 mg) by mouth every 6 hours as needed for severe pain (Patient not taking: Reported on 8/28/2019) 30 tablet 0     PREMARIN 0.45 MG tablet TAKE ONE TABLET BY MOUTH EVERY DAY 90 tablet 3       Allergies   Allergen Reactions     Sulfa Drugs Rash     Clindamycin Rash     Codeine Hives     Codeine        Family History   Problem Relation Age of Onset     Heart Disease Father         Heart attack     C.A.D. Father         age 50     Hypertension Father      Cancer Maternal Grandfather      Alzheimer Disease Maternal Grandfather      Cancer Paternal Grandfather      Diabetes No family hx of      Cerebrovascular Disease No family hx of      Breast Cancer No family hx of      Cancer - colorectal No family hx of      Prostate Cancer No family hx of        Additional medical/Social/Surgical histories reviewed in [facility] and updated as appropriate.          PHYSICAL EXAM  Vitals:    08/28/19 0955   Weight: 84.4 kg (186 lb)   Height: 1.638 m (5' 4.5\")     General  - normal appearance, in no obvious distress  CV  - normal radial pulse  Pulm  - normal respiratory pattern, non-labored  Musculoskeletal - left wrist  - inspection: no atrophy, normal joint alignment, no swelling   - palpation: tenderness over thenar musculature at CMC joint  - ROM:  90 deg flexion   70 deg extension   25 deg abduction   65 deg adduction  - strength: 5/5  strength, 5/5 wrist abduction, 5/5 flexion, extension, pronation, supination, adduction  - special tests:  (-) Tinel's  (-) Finkelstein  Neuro  - no numbness, no motor deficit, grossly normal coordination, normal muscle tone  Skin  - no ecchymosis, erythema, warmth, or induration, no obvious rash  Psych  - interactive, appropriate, normal mood and affect             ASSESSMENT & PLAN  Ms. Rodriguez is a 59 year old year old female who presents to clinic today " with pain in her left thumb.    I reviewed an x-ray of her hands from October 2017 which reads:  IMPRESSION:   1. No acute bone abnormality identified in either wrist or hand.  2. Mild osteoarthritis in both hands, most pronounced at the first  interphalangeal and second distal interphalangeal joints.    Her symptoms may be consistent with CMC osteoarthritis, there may be some component of carpal tunnel syndrome, although her symptoms are not classic for either.    Ultimately I am ordering an MRI of her hand and thumb region.  She also has a postsurgical left ring finger issue, we will obtain an MRI of the entire hand to evaluate this as well.    Thank you for allowing me to participate in Radha's care.    Pascual Hernandez DO, [facility]  Primary Care Sports Medicine       This note was constructed using Dragon dictation software, please excuse any minor errors in spelling, grammar, or syntax.      Again, thank you for allowing me to participate in the care of your patient.        Sincerely,        Pascual Hernandez DO

## 2019-08-29 ENCOUNTER — ANCILLARY PROCEDURE (OUTPATIENT)
Dept: MRI IMAGING | Facility: CLINIC | Age: 59
End: 2019-08-29
Attending: FAMILY MEDICINE
Payer: COMMERCIAL

## 2019-08-29 DIAGNOSIS — M18.12 PRIMARY OSTEOARTHRITIS OF FIRST CARPOMETACARPAL JOINT OF LEFT HAND: ICD-10-CM

## 2019-09-10 ENCOUNTER — OFFICE VISIT (OUTPATIENT)
Dept: ORTHOPEDICS | Facility: CLINIC | Age: 59
End: 2019-09-10
Payer: COMMERCIAL

## 2019-09-10 VITALS
BODY MASS INDEX: 30.99 KG/M2 | HEIGHT: 65 IN | SYSTOLIC BLOOD PRESSURE: 123 MMHG | HEART RATE: 71 BPM | DIASTOLIC BLOOD PRESSURE: 78 MMHG | WEIGHT: 186 LBS

## 2019-09-10 DIAGNOSIS — M18.12 PRIMARY OSTEOARTHRITIS OF FIRST CARPOMETACARPAL JOINT OF LEFT HAND: Primary | ICD-10-CM

## 2019-09-10 PROCEDURE — 20604 DRAIN/INJ JOINT/BURSA W/US: CPT | Mod: LT | Performed by: FAMILY MEDICINE

## 2019-09-10 RX ORDER — METHYLPREDNISOLONE ACETATE 40 MG/ML
20 INJECTION, SUSPENSION INTRA-ARTICULAR; INTRALESIONAL; INTRAMUSCULAR; SOFT TISSUE
Status: DISCONTINUED | OUTPATIENT
Start: 2019-09-10 | End: 2020-03-10

## 2019-09-10 RX ADMIN — METHYLPREDNISOLONE ACETATE 20 MG: 40 INJECTION, SUSPENSION INTRA-ARTICULAR; INTRALESIONAL; INTRAMUSCULAR; SOFT TISSUE at 07:36

## 2019-09-10 ASSESSMENT — PAIN SCALES - GENERAL: PAINLEVEL: MODERATE PAIN (5)

## 2019-09-10 ASSESSMENT — MIFFLIN-ST. JEOR: SCORE: 1421.63

## 2019-09-10 NOTE — PROGRESS NOTES
"HISTORY OF PRESENT ILLNESS  Ms. Rodriguez is a pleasant 59 year old year old female following up with left thumb pain.  Radha is here to review her MRI.     PHYSICAL EXAM  Vitals:    09/10/19 0711   BP: 123/78   BP Location: Right arm   Patient Position: Sitting   Pulse: 71   Weight: 84.4 kg (186 lb)   Height: 1.638 m (5' 4.5\")       ASSESSMENT & PLAN  Ms. Rodriguez is a 59 year old year old female following up with left thumb pain.    I reviewed her MR in the room with her:  Impression:  1. Small first carpometacarpal joint effusion with surrounding soft  tissue edema, which is especially notable at the palmar and radial  aspect of the second metacarpal base. Findings possibly represent mild  synovitis though no associated evidence of erosion. If persistent  clinical concern, contrast enhanced MR may be helpful.  2. Possible sequelae of erosion at the fourth and fifth proximal  interphalangeal joints. This may be better assessed with repeat  radiographs and compared to prior from January 2014.    We revisited our discussion centering around treatment options for CMC OA.    I did inject her thumb today (see procedure note).  If not improving I'd consider a CT and a hand surgery referral.    It was a pleasure seeing Radha.    20 minutes was spent in the room, 15 of which was spent on counseling and coordination of care.        Left Thumb Injection, CMC - Ultrasound Guided  The patient was informed of the risks and the benefits of the procedure and a written consent was signed.  The patient s left thumb was prepped with chlorhexidine in sterile fashion.   20 mg of depomedrol suspension was drawn up into a 3 mL syringe with 0.5 mL of 1% lidocaine.  Injection was performed using sterile technique.  Under ultrasound guidance a 1.5-inch 25-gauge needle was used to enter the CMC joint of the left thumb.  Needle placement was visualized and documented with ultrasound.  Needle placed in short axis to the probe.  Ultrasound " "visualization was necessary due to decreased joint space in the setting of osteoarthritis.  Images were permanently stored for the patient's record.  There were no complications. The patient tolerated the procedure well. There was negligible bleeding.   The patient was instructed to ice the thumb upon leaving clinic and refrain from overuse over the next 3 days.   The patient was instructed to call or go to the emergency room with any unusual pain, swelling, redness, or if otherwise concerned.            Pascual Hernandez DO, CAQSM      This note was constructed using Dragon dictation software, please excuse any minor errors in spelling, grammar, or syntax.        [facility] FOLLOW-UP VISIT 9/10/2019    Radha Rodriguez's chief complaint for this visit includes:  Chief Complaint   Patient presents with     Procedure     Left CMC joint cortisone injection.      PCP: Temo Fletcher    Referring Provider:  No referring provider defined for this encounter.    /78 (BP Location: Right arm, Patient Position: Sitting)   Pulse 71   Ht 1.638 m (5' 4.5\")   Wt 84.4 kg (186 lb)   BMI 31.43 kg/m          Interval History:     Follow up reason: left hand MRI and left CMC joint cortisone injection     Medical History:    Any recent changes to your medical history? No    Any new medication prescribed since last visit? No    Review of Systems:    Do you have fever, chills, weight loss? No    Do you have any vision problems? No    Do you have any chest pain or edema? No    Do you have any shortness of breath or wheezing?  No    Do you have stomach problems? No    Do you have any numbness or focal weakness? No    Do you have diabetes? No    Do you have problems with bleeding or clotting? No    Do you have an rashes or other skin lesions? No    Small Joint Injection/Arthrocentesis: L thumb CMC  Date/Time: 9/10/2019 7:36 AM  Performed by: Pascula Hernandez DO  Authorized by: Pascual Hernandez DO   Indications:  " Pain  Needle Size:  25 G  Guidance: ultrasound       Location:  Thumb    Site:  L thumb CMC                    Medications:  20 mg methylPREDNISolone 40 MG/ML        Outcome:  Tolerated well, no immediate complications  Procedure discussed: discussed risks, benefits, and alternatives    Consent Given by:  Patient  Timeout: timeout called immediately prior to procedure    Prep: patient was prepped and draped in usual sterile fashion

## 2019-09-10 NOTE — NURSING NOTE
Medication Waste    Methylprednisolone   Lot: [id number]  Exp 5/20  NDC 2414-2207-09  Med Waste: 0.50 ml

## 2019-09-10 NOTE — LETTER
"    9/10/2019         RE: Radha Rodriguez  [address]  [place] [address]        Dear Colleague,    Thank you for referring your patient, Radha Rodriguez, to the [facility]. Please see a copy of my visit note below.    HISTORY OF PRESENT ILLNESS  Ms. Rodriguez is a pleasant 59 year old year old female following up with left thumb pain.  Radha is here to review her MRI.     PHYSICAL EXAM  Vitals:    09/10/19 0711   BP: 123/78   BP Location: Right arm   Patient Position: Sitting   Pulse: 71   Weight: 84.4 kg (186 lb)   Height: 1.638 m (5' 4.5\")       ASSESSMENT & PLAN  Ms. Rodriguez is a 59 year old year old female following up with left thumb pain.    I reviewed her MR in the room with her:  Impression:  1. Small first carpometacarpal joint effusion with surrounding soft  tissue edema, which is especially notable at the palmar and radial  aspect of the second metacarpal base. Findings possibly represent mild  synovitis though no associated evidence of erosion. If persistent  clinical concern, contrast enhanced MR may be helpful.  2. Possible sequelae of erosion at the fourth and fifth proximal  interphalangeal joints. This may be better assessed with repeat  radiographs and compared to prior from January 2014.    We revisited our discussion centering around treatment options for CMC OA.    I did inject her thumb today (see procedure note).  If not improving I'd consider a CT and a hand surgery referral.    It was a pleasure seeing Radha.    20 minutes was spent in the room, 15 of which was spent on counseling and coordination of care.        Left Thumb Injection, CMC - Ultrasound Guided  The patient was informed of the risks and the benefits of the procedure and a written consent was signed.  The patient s left thumb was prepped with chlorhexidine in sterile fashion.   20 mg of depomedrol suspension was drawn up into a 3 mL syringe with 0.5 mL of 1% lidocaine.  Injection was performed using " "sterile technique.  Under ultrasound guidance a 1.5-inch 25-gauge needle was used to enter the CMC joint of the left thumb.  Needle placement was visualized and documented with ultrasound.  Needle placed in short axis to the probe.  Ultrasound visualization was necessary due to decreased joint space in the setting of osteoarthritis.  Images were permanently stored for the patient's record.  There were no complications. The patient tolerated the procedure well. There was negligible bleeding.   The patient was instructed to ice the thumb upon leaving clinic and refrain from overuse over the next 3 days.   The patient was instructed to call or go to the emergency room with any unusual pain, swelling, redness, or if otherwise concerned.            Pascual Hernandez DO, URSULAM      This note was constructed using Dragon dictation software, please excuse any minor errors in spelling, grammar, or syntax.        [facility] FOLLOW-UP VISIT 9/10/2019    Radha Rodriguez's chief complaint for this visit includes:  Chief Complaint   Patient presents with     Procedure     Left CMC joint cortisone injection.      PCP: Temo Fletcher    Referring Provider:  No referring provider defined for this encounter.    /78 (BP Location: Right arm, Patient Position: Sitting)   Pulse 71   Ht 1.638 m (5' 4.5\")   Wt 84.4 kg (186 lb)   BMI 31.43 kg/m           Interval History:     Follow up reason: left hand MRI and left CMC joint cortisone injection     Medical History:    Any recent changes to your medical history? No    Any new medication prescribed since last visit? No    Review of Systems:    Do you have fever, chills, weight loss? No    Do you have any vision problems? No    Do you have any chest pain or edema? No    Do you have any shortness of breath or wheezing?  No    Do you have stomach problems? No    Do you have any numbness or focal weakness? No    Do you have diabetes? No    Do you have problems with " bleeding or clotting? No    Do you have an rashes or other skin lesions? No    Small Joint Injection/Arthrocentesis: L thumb CMC  Date/Time: 9/10/2019 7:36 AM  Performed by: Pascual Hernandez DO  Authorized by: Pascual Hernandez DO   Indications:  Pain  Needle Size:  25 G  Guidance: ultrasound       Location:  Thumb    Site:  L thumb CMC                    Medications:  20 mg methylPREDNISolone 40 MG/ML        Outcome:  Tolerated well, no immediate complications  Procedure discussed: discussed risks, benefits, and alternatives    Consent Given by:  Patient  Timeout: timeout called immediately prior to procedure    Prep: patient was prepped and draped in usual sterile fashion                Again, thank you for allowing me to participate in the care of your patient.        Sincerely,        Pascual Hernandez DO

## 2019-09-25 DIAGNOSIS — M19.90 INFLAMMATORY ARTHRITIS: ICD-10-CM

## 2019-09-25 DIAGNOSIS — Z79.899 HIGH RISK MEDICATIONS (NOT ANTICOAGULANTS) LONG-TERM USE: ICD-10-CM

## 2019-09-25 LAB
ALBUMIN SERPL-MCNC: 3.9 G/DL (ref 3.4–5)
ALP SERPL-CCNC: 67 U/L (ref 40–150)
ALT SERPL W P-5'-P-CCNC: 30 U/L (ref 0–50)
AST SERPL W P-5'-P-CCNC: 20 U/L (ref 0–45)
BASOPHILS # BLD AUTO: 0 10E9/L (ref 0–0.2)
BASOPHILS NFR BLD AUTO: 0.4 %
BILIRUB DIRECT SERPL-MCNC: <0.1 MG/DL (ref 0–0.2)
BILIRUB SERPL-MCNC: 0.4 MG/DL (ref 0.2–1.3)
CREAT SERPL-MCNC: 0.7 MG/DL (ref 0.52–1.04)
DIFFERENTIAL METHOD BLD: ABNORMAL
EOSINOPHIL # BLD AUTO: 0.2 10E9/L (ref 0–0.7)
EOSINOPHIL NFR BLD AUTO: 2.3 %
ERYTHROCYTE [DISTWIDTH] IN BLOOD BY AUTOMATED COUNT: 13.9 % (ref 10–15)
GFR SERPL CREATININE-BSD FRML MDRD: >90 ML/MIN/{1.73_M2}
HCT VFR BLD AUTO: 38.4 % (ref 35–47)
HGB BLD-MCNC: 13.3 G/DL (ref 11.7–15.7)
LYMPHOCYTES # BLD AUTO: 1.3 10E9/L (ref 0.8–5.3)
LYMPHOCYTES NFR BLD AUTO: 17.8 %
MCH RBC QN AUTO: 33.6 PG (ref 26.5–33)
MCHC RBC AUTO-ENTMCNC: 34.6 G/DL (ref 31.5–36.5)
MCV RBC AUTO: 97 FL (ref 78–100)
MONOCYTES # BLD AUTO: 0.5 10E9/L (ref 0–1.3)
MONOCYTES NFR BLD AUTO: 6.5 %
NEUTROPHILS # BLD AUTO: 5.4 10E9/L (ref 1.6–8.3)
NEUTROPHILS NFR BLD AUTO: 73 %
PLATELET # BLD AUTO: 219 10E9/L (ref 150–450)
PROT SERPL-MCNC: 6.8 G/DL (ref 6.8–8.8)
RBC # BLD AUTO: 3.96 10E12/L (ref 3.8–5.2)
WBC # BLD AUTO: 7.4 10E9/L (ref 4–11)

## 2019-09-25 PROCEDURE — 85025 COMPLETE CBC W/AUTO DIFF WBC: CPT | Performed by: INTERNAL MEDICINE

## 2019-09-25 PROCEDURE — 80076 HEPATIC FUNCTION PANEL: CPT | Performed by: INTERNAL MEDICINE

## 2019-09-25 PROCEDURE — 82565 ASSAY OF CREATININE: CPT | Performed by: INTERNAL MEDICINE

## 2019-09-25 PROCEDURE — 36415 COLL VENOUS BLD VENIPUNCTURE: CPT | Performed by: INTERNAL MEDICINE

## 2019-10-01 ENCOUNTER — OFFICE VISIT (OUTPATIENT)
Dept: RHEUMATOLOGY | Facility: CLINIC | Age: 59
End: 2019-10-01
Payer: COMMERCIAL

## 2019-10-01 VITALS
BODY MASS INDEX: 33.59 KG/M2 | WEIGHT: 189.6 LBS | HEART RATE: 76 BPM | DIASTOLIC BLOOD PRESSURE: 76 MMHG | OXYGEN SATURATION: 96 % | SYSTOLIC BLOOD PRESSURE: 122 MMHG | HEIGHT: 63 IN

## 2019-10-01 DIAGNOSIS — M19.90 INFLAMMATORY ARTHRITIS: Primary | ICD-10-CM

## 2019-10-01 DIAGNOSIS — Z23 NEED FOR PNEUMOCOCCAL VACCINATION: ICD-10-CM

## 2019-10-01 DIAGNOSIS — Z23 NEED FOR PROPHYLACTIC VACCINATION AND INOCULATION AGAINST INFLUENZA: ICD-10-CM

## 2019-10-01 PROCEDURE — 90471 IMMUNIZATION ADMIN: CPT | Performed by: INTERNAL MEDICINE

## 2019-10-01 PROCEDURE — 90682 RIV4 VACC RECOMBINANT DNA IM: CPT | Performed by: INTERNAL MEDICINE

## 2019-10-01 PROCEDURE — 90472 IMMUNIZATION ADMIN EACH ADD: CPT | Performed by: INTERNAL MEDICINE

## 2019-10-01 PROCEDURE — 90670 PCV13 VACCINE IM: CPT | Performed by: INTERNAL MEDICINE

## 2019-10-01 PROCEDURE — 99213 OFFICE O/P EST LOW 20 MIN: CPT | Mod: 25 | Performed by: INTERNAL MEDICINE

## 2019-10-01 RX ORDER — METHOTREXATE 2.5 MG/1
25 TABLET ORAL WEEKLY
Qty: 120 TABLET | Refills: 2 | Status: SHIPPED | OUTPATIENT
Start: 2019-10-01 | End: 2019-12-31

## 2019-10-01 RX ORDER — FOLIC ACID 1 MG/1
2 TABLET ORAL DAILY
Qty: 180 TABLET | Refills: 3 | Status: SHIPPED | OUTPATIENT
Start: 2019-10-01 | End: 2020-05-27

## 2019-10-01 ASSESSMENT — MIFFLIN-ST. JEOR: SCORE: 1414.15

## 2019-10-01 NOTE — PROGRESS NOTES
Rheumatology Clinic Visit      Radha Rodriguez MRN# [id number]   YOB: 1960 Age: 59 year old      Date of visit: 10/01/19   PCP: Dr. Temo Fletcher    Chief Complaint   Patient presents with:  RECHECK: Inflammatory Arthritis  Flu Shot  Imm/Inj: Flu Shot    Assessment and Plan     1. Inflammatory arthritis: Previously followed by Rojas Vasques and Shazia.  Established care with me on 4/24/2018.  Previously on Humira (ineffective), HCQ (ineffective; used with MTX).  Currently on MTX 25mg once weekly with worsening symptoms when reduced in the past.  Question if arthritis has any relation to hx of suspected Crohn's Disease (reportedly fistula requiring surgery, and colonoscopies showing significant scaring in the intestine).  Inflammatory arthritis is well controlled.    - Continue methotrexate 25mg once weekly  - Continue folic 2mg daily  - Labs every 3 months: CBC, creatinine, hepatic panel    2.  Primary osteoarthritis of the left first carpometacarpal joint: Reportedly improved with steroid injections in the past.  Most recently she went to sports medicine and had an injection done by Dr. Hernandez that was effective.  Also recommended that she try wearing a thumb splint while she is working, if she is able to, as that may alleviate some of the pain from the repetitiveness of her job    3. Plantar fasciitis, bilaterally: seen by podiatry.  Improved with stretching exercises.     4.  Vaccinations: Vaccinations reviewed with Ms. Rodriguez.  Risks and benefits of vaccinations were discussed.   CDC stance on shingrix when on moderate to high immunosuppression was reviewed.   - Influenza: will receive today  - [id number]: will receive today  - [id number]: to receive at least 8 weeks after [id number] is administered  - Shingrix: she plans to receive in the future    Ms. Rodriguez verbalized agreement with and understanding of the rational for the diagnosis and treatment plan.  All questions were answered to best of my  ability and the patient's satisfaction. Ms. Rodriguez was advised to contact the clinic with any questions that may arise after the clinic visit.      Thank you for involving me in the care of the patient    Return to clinic: 3-4 months      HPI   Radha Rodriguez is a 59 year old female with a past medical history significant for possible Crohn's disease requiring fistula surgery in the past, osteoarthritis, inflammatory arthritis who is seen for follow-up of inflammatory arthritis.    Today, Ms. Rodriguez reports that she is doing great except for her bilateral first CMC joints that were so bad at one point she had to go have injections done again and the injections have improved but did not resolve her pain at these sites.  Bilateral first CMC joint pain is worse with activity and improves with rest.  She reports that the repetitiveness of her job is likely the cause of her symptoms.     Denies fevers, chills, nausea, vomiting, constipation, diarrhea. No abdominal pain. No chest pain/pressure, palpitations, or shortness of breath. LE swelling worse at the end of the day but sometimes present at the beginning of the day too; she says that she will discuss with her PCP.  No neck pain. No oral sores.  Chronic recurrent nasal sore for years, per patient.  No rash. No sicca symptoms. No photosensitivity or photophobia. No eye pain or redness. No history of inflammatory eye disease.    Tobacco: none  EtOH: occasional  Drugs: none    ROS   GEN: No fevers, chills, night sweats, fatigue, or weight change  SKIN: No itching, rashes, sores  HEENT: No epistaxis.   CV: No chest pain, pressure, palpitations, or dyspnea on exertion.  PULM: No SOB, wheeze, cough.  GI: No nausea, vomiting, constipation, diarrhea. No blood in stool. No abdominal pain.  : No blood in urine.  MSK: See HPI.  NEURO: No numbness or tingling  EXT: LE edema unchanged   PSYCH: Negative    Active Problem List     Patient Active Problem List   Diagnosis     FEMALE  STRESS INCONTINENCE post-hysterectomy     HYPERHIDROSIS on axilla and soles of feet     Dermatitis due to cosmetics     Contact dermatitis and other eczema, due to unspecified cause     Acquired acanthosis nigricans     LFT abnormalities and Urobilnogen high     CARDIOVASCULAR SCREENING; LDL GOAL LESS THAN 160     Vitamin D deficiency     Inflammatory arthritis     High risk medications (not anticoagulants) long-term use     Osteoarthritis     Menopausal syndrome (hot flashes)     Right lateral epicondylitis     Past Medical History     Past Medical History:   Diagnosis Date     Arthritis      Headache(784.0)      Irritable bowel syndrome      Other and unspecified noninfectious gastroenteritis and colitis(558.9)     chronic     Past Surgical History     Past Surgical History:   Procedure Laterality Date     C REPLACEMENT,URETER,BOWEL SEGMENT  10/01/2008    Part of her ureter was repaired.     CATARACT IOL, RT/LT       COLONOSCOPY  10/14/2011    Procedure:COLONOSCOPY; Colonoscopy; Surgeon:SCOTTY LEDEZMA; Location:[facility]     ENHANCE LASER REFRACTIVE BILATERAL EXISTING PT IN PARAMETERS       HYSTERECTOMY, VAGINAL  1/1/2000    TVH, fibroids (still has ovaries)     SURGICAL HISTORY OF -       Tubal Ligation     SURGICAL HISTORY OF -       Appy     SURGICAL HISTORY OF -       Tonsils     SURGICAL HISTORY OF -   12/94    Anal Fistulotomy     Allergy     Allergies   Allergen Reactions     Sulfa Drugs Rash     Clindamycin Rash     Codeine Hives     Codeine      Current Medication List     Current Outpatient Medications   Medication Sig     estrogen conj (PREMARIN) 0.625 MG tablet Take 0.625 mg by mouth     folic acid (FOLVITE) 1 MG tablet Take 2 tablets (2 mg) by mouth daily     folic acid (FOLVITE) 1 MG tablet Take 2 mg by mouth     methotrexate sodium 2.5 MG TABS Take 10 tablets (25 mg) by mouth once a week . Take all 10 tablets on the same day of each week.     PREMARIN 0.45 MG tablet TAKE ONE TABLET BY MOUTH  "EVERY DAY     diclofenac (VOLTAREN) 1 % topical gel Apply 2 g topically 4 times daily (Patient not taking: Reported on 10/1/2019)     oxyCODONE IR (ROXICODONE) 5 MG tablet Take 1 tablet (5 mg) by mouth every 6 hours as needed for severe pain (Patient not taking: Reported on 10/1/2019)     Current Facility-Administered Medications   Medication     methylPREDNISolone (DEPO-MEDROL) injection 20 mg     methylPREDNISolone (DEPO-MEDROL) injection 20 mg         Social History   See HPI    Family History     Family History   Problem Relation Age of Onset     Heart Disease Father         Heart attack     C.A.D. Father         age 50     Hypertension Father      Cancer Maternal Grandfather      Alzheimer Disease Maternal Grandfather      Cancer Paternal Grandfather      Diabetes No family hx of      Cerebrovascular Disease No family hx of      Breast Cancer No family hx of      Cancer - colorectal No family hx of      Prostate Cancer No family hx of        Physical Exam     Temp Readings from Last 3 Encounters:   08/21/19 98.3  F (36.8  C) (Oral)   08/14/19 98.7  F (37.1  C) (Oral)   05/28/19 98.4  F (36.9  C) (Oral)     BP Readings from Last 5 Encounters:   10/01/19 122/76   09/10/19 123/78   08/21/19 135/86   08/14/19 129/84   05/28/19 151/78     Pulse Readings from Last 1 Encounters:   10/01/19 76     Resp Readings from Last 1 Encounters:   08/21/19 16     Estimated body mass index is 33.59 kg/m  as calculated from the following:    Height as of this encounter: 1.6 m (5' 3\").    Weight as of this encounter: 86 kg (189 lb 9.6 oz).    GEN: NAD  HEENT: MMM. No oral lesions.  Anicteric, noninjected sclera  CV: S1, S2. RRR. No m/r/g.  PULM: CTA bilaterally. No w/c.  MSK: Tender to palpation over the left first CMC joint; no effusion or increased warmth; no overlying erythema.  Heberden's nodes present.  MCPs and PIPs without swelling or tenderness to palpation.  Wrists, elbows, shoulders, knees, ankles, and MTPs without " swelling or tenderness to palpation.  Hips nontender to palpation.    NEURO: UE and LE strengths 5/5 and equal bilaterally  SKIN: No rash  EXT: Nonpitting bilateral lower extremity edema distal to the knees  PSYCH: Alert. Appropriate    Labs / Imaging (select studies)   RF/CCP  Recent Labs   Lab Test 06/01/15  1139 01/31/14  1534 06/06/13  1341   CCPABY <20  Interpretation:  Negative   <20 Interpretation:  Negative  --    RHF <20  --  7     CBC  Recent Labs   Lab Test 09/25/19  1117 03/27/19  1053 12/18/18  1619   WBC 7.4 6.5 7.6   RBC 3.96 3.99 3.86   HGB 13.3 12.9 12.5   HCT 38.4 38.0 37.3   MCV 97 95 97   RDW 13.9 13.8 13.4    238 235   MCH 33.6* 32.3 32.4   MCHC 34.6 33.9 33.5   NEUTROPHIL 73.0 71.7 69.4   LYMPH 17.8 18.9 19.5   MONOCYTE 6.5 6.4 7.2   EOSINOPHIL 2.3 2.5 3.4   BASOPHIL 0.4 0.5 0.5   ANEU 5.4 4.7 5.3   ALYM 1.3 1.2 1.5   EDWARD 0.5 0.4 0.6   AEOS 0.2 0.2 0.3   ABAS 0.0 0.0 0.0     CMP  Recent Labs   Lab Test 09/25/19  1117 03/27/19  1053 12/18/18  1619  10/11/17  1207 07/05/17  1639 03/29/17  1148   NA  --   --   --   --  138 139 143   POTASSIUM  --   --   --   --  4.1 3.5 4.5   CHLORIDE  --   --   --   --  105 105 108   CO2  --   --   --   --  28 29 29   ANIONGAP  --   --   --   --  5 5 6   GLC  --   --   --   --  86 119* 85   BUN  --   --   --   --  18 16 13   CR 0.70 0.64 0.76   < > 0.73 0.66 0.68   GFRESTIMATED >90 >90 78   < > 82 >90  Non  GFR Calc   >90  Non  GFR Calc     GFRESTBLACK >90 >90 >90   < > >90 >90   GFR Calc   >90   GFR Calc     ENEIDA  --   --   --   --  9.2 8.9 8.7   BILITOTAL 0.4 0.4 0.2   < > 0.7 0.3 0.5   ALBUMIN 3.9 3.9 3.8   < > 3.9 3.5 3.7   PROTTOTAL 6.8 6.4* 6.8   < > 6.9 6.4* 6.7*   ALKPHOS 67 60 63   < > 57 69 64   AST 20 20 28   < > 28 27 14   ALT 30 36 43   < > 44 38 31    < > = values in this interval not displayed.     Calcium/VitaminD  Recent Labs   Lab Test 10/11/17  1207 07/05/17  4970  03/29/17  1148  09/18/14  1129 04/29/14  1522 11/25/13  1350   ENEIDA 9.2 8.9 8.7   < >  --   --   --    VITDT  --   --   --   --  38 42 19*    < > = values in this interval not displayed.     ESR/CRP  Recent Labs   Lab Test 03/27/19  1053 12/18/18  1619 10/05/18  1214 08/01/18  0954   SED 6 6  --  6   CRP 3.0 3.1 9.9* 4.0     Hepatitis B  Recent Labs   Lab Test 04/24/18  1557   HBCAB Nonreactive   HEPBANG Nonreactive     Hepatitis C  Recent Labs   Lab Test 06/01/15  1139   HCVAB Nonreactive   Assay performance characteristics have not been established for newborns,   infants, and children       Tuberculosis Screening  Recent Labs   Lab Test 06/01/15  1139   TBRSLT Negative   TBAGN 0.00     Immunization History     Immunization History   Administered Date(s) Administered     HepB 01/24/1991, 02/28/1991, 09/05/1991     Influenza Quad, Recombinant, p-free (RIV4) 10/01/2019     Pneumo Conj 13-V (2010&after) 10/01/2019     TD (ADULT, 7+) 03/12/1990, 01/01/2000     TDAP Vaccine (Adacel) 09/10/2008     TDAP Vaccine (Boostrix) 09/10/2008     Tdap (Adacel,Boostrix) 03/01/2011          Chart documentation done in part with Dragon Voice recognition Software. Although reviewed after completion, some word and grammatical error may remain.    Abdoul Eli MD

## 2019-10-01 NOTE — PATIENT INSTRUCTIONS
Rheumatology    Dr. Abdoul Eli         [facility]   (Monday)  [address]  [place] [address]       [facility]   (Tuesday)  [address] [place] [address]    [facility]   (Wed., Thurs., and Friday)  [address] [place] [address]    Phone number: [phone]  Thank you for choosing [facility].  MAYCOL Petty RMA

## 2019-11-28 DIAGNOSIS — N95.1 MENOPAUSAL SYNDROME (HOT FLASHES): ICD-10-CM

## 2019-11-28 NOTE — TELEPHONE ENCOUNTER
"Requested Prescriptions   Pending Prescriptions Disp Refills     PREMARIN 0.45 MG tablet [Pharmacy Med Name: PREMARIN 0.45MG TABS] 90 tablet 0     Sig: TAKE ONE TABLET BY MOUTH EVERY DAY         Last Written Prescription Date:  11/26/18  Last Fill Quantity: 90,  # refills: 3   Last Office Visit with [facility], P or [facility] prescribing provider:  8/21/19   Future Office Visit:    Next 5 appointments (look out 90 days)    Dec 31, 2019  2:40 PM CST  Return Visit with Abdoul Eli MD  [facility] [address] [place] [id number]  [phone]             Hormone Replacement Therapy Passed - 11/28/2019 12:08 PM        Passed - Blood pressure under 140/90 in past 12 months     BP Readings from Last 3 Encounters:   10/01/19 122/76   09/10/19 123/78   08/21/19 135/86                 Passed - Recent (12 mo) or future (30 days) visit within the authorizing provider's specialty     Patient has had an office visit with the authorizing provider or a provider within the authorizing providers department within the previous 12 mos or has a future within next 30 days. See \"Patient Info\" tab in inbasket, or \"Choose Columns\" in Meds & Orders section of the refill encounter.              Passed - Patient has mammogram in past 2 years on file if age 50-75        Passed - Medication is active on med list        Passed - Patient is 18 years of age or older        Passed - No active pregnancy on record        Passed - No positive pregnancy test on record in past 12 months              Alejandro Faarax  [facility]  "

## 2019-12-01 RX ORDER — ESTROGENS, CONJUGATED 0.45 MG/1
TABLET, FILM COATED ORAL
Qty: 90 TABLET | Refills: 0 | Status: SHIPPED | OUTPATIENT
Start: 2019-12-01 | End: 2020-03-16

## 2019-12-01 NOTE — TELEPHONE ENCOUNTER
Medication is being filled for 1 time refill only due to:  Patient needs to be seen because needs annual exam/mammogram.   Please CALL to schedule-not reading my chart.  Rosita Muhammad RN

## 2019-12-10 ENCOUNTER — VIRTUAL VISIT (OUTPATIENT)
Dept: FAMILY MEDICINE | Facility: OTHER | Age: 59
End: 2019-12-10

## 2019-12-10 ENCOUNTER — MYC MEDICAL ADVICE (OUTPATIENT)
Dept: FAMILY MEDICINE | Facility: CLINIC | Age: 59
End: 2019-12-10

## 2019-12-10 NOTE — PROGRESS NOTES
"Date: 12/10/2019 11:47:42  Clinician: Noel Montano  Clinician NPI: [id number]  Patient: Radha Rodriguez  Patient : 1960  Patient Address: [address], [place] [address]  Patient Phone: [phone]  Visit Protocol: UTI  Patient Summary:  Radha is a 59 year old ( : 1960 ) female who initiated a Visit for a presumed bladder infection. When asked the question \"Please sign me up to receive news, health information and promotions from [email or web address].\", Radha responded \"No\".   Her symptoms started 1-3 days ago and consist of dysuria, urgency, feeling as if the bladder is never empty, abdominal pain, and urinary frequency.   Symptom details     Urine color: The color of her urine is yellow.     Abdominal pain: The pain is mild (1-3 on a 10 point pain scale).      Denied symptoms include vaginal discharge, urinary incontinence, vomiting, vaginal itching, foul-smelling urine, nausea, flank pain, and chills. She does not feel feverish.   Radha has not used any over-the-counter medications or home remedies to relieve her current symptoms.  Precipitating events  Radha denies having a sexually transmitted disease.  Pertinent medical history  Radha has had a bladder infection before and has had 1 in the past 12 months. Her most recent bladder infection was not within the last 4 weeks. Her current symptoms are similar to her previous bladder infection symptoms.   She is not sure what antibiotics have been effective in treating her past bladder infections.   Radha has not been prescribed antibiotics to prevent frequent or repeated bladder infections in the past and does not get yeast infections when she takes antibiotics. She has not experienced problems or side effects with any of the common antibiotics used to treat bladder infections.   Radha does not have a history of kidney stones. She has not used a catheter or been a patient in a hospital or nursing home in the past 2 weeks.   Radha does not " smoke or use smokeless tobacco.   Additional information as reported by the patient (free text): Just that I'm on the Methotrexate.     MEDICATIONS: Premarin oral, methotrexate sodium oral, ALLERGIES: Sulfa (Sulfonamide Antibiotics)  Clinician Response:  Dear Radha,  Based on the information you have provided, you likely have an acute urinary tract infection, also called a bladder infection. Bladder infections occur when bacteria from the outside of the body enters the urinary tract. Any part of the urinary system can be infected, but the bladder is the most common.  Medication information  I am prescribing:     Cephalexin (Keflex) 500 mg oral capsule. Take 1 capsule by mouth every 12 hours for 7 days. There are no refills with this prescription.   The medication I prescribed for your bladder infection is an antibiotic. Continue taking the medication until it is gone even if you feel better. If you get an upset stomach while taking antibiotics, taking the medication with food can help.   Yeast infections can be a common side effect of antibiotics. The most common symptom of a yeast infection is itchiness in and around the vagina. Other signs and symptoms include burning, redness, or a thick, white vaginal discharge that looks like cottage cheese and does not have a bad smell.  Self care  Urination helps to flush bacteria from the urinary tract. For this reason, drinking water and urinating often helps relieve some urinary symptoms and can decrease your risk of getting bladder infections in the future.  Other steps you can take to prevent future bladder infections include:     Wipe front to back after using the bathroom    Urinate after sexual intercourse    Avoid using deodorant sprays, douches, or powders in the vaginal area     When to seek care  Please make an appointment to be seen in a clinic or urgent care if any of the following occur:     You develop new symptoms or your symptoms become worse    You have  medication side effects that make it difficult to take them as prescribed    Your symptoms do not improve within 1-2 days of starting treatment    You have symptoms of a bladder infection that return shortly after completing treatment     It is possible to have an allergic reaction to an antibiotic even if you have not had one in the past. If you notice a new rash, significant swelling, or difficulty breathing, stop taking this medication immediately and go to a clinic or urgent care.   Diagnosis: Acute uncomplicated bladder infection  Diagnosis ICD: N39.0  Prescription: cephalexin (Keflex) 500 mg oral capsule 14 capsule, 7 days supply. Take 1 capsule by mouth every 12 hours for 7 days. Refills: 0, Refill as needed: no, Allow substitutions: yes  Pharmacy: [id number] - [phone] - [address], [place] [id number]

## 2019-12-10 NOTE — TELEPHONE ENCOUNTER
E-visit instructions given to Radha to further discuss UTI symptoms.     Alessandro Taylor RN, BSN, PHN

## 2019-12-23 DIAGNOSIS — Z79.899 HIGH RISK MEDICATIONS (NOT ANTICOAGULANTS) LONG-TERM USE: ICD-10-CM

## 2019-12-23 DIAGNOSIS — M19.90 INFLAMMATORY ARTHRITIS: ICD-10-CM

## 2019-12-23 LAB
ALBUMIN SERPL-MCNC: 3.8 G/DL (ref 3.4–5)
ALP SERPL-CCNC: 68 U/L (ref 40–150)
ALT SERPL W P-5'-P-CCNC: 37 U/L (ref 0–50)
AST SERPL W P-5'-P-CCNC: 21 U/L (ref 0–45)
BASOPHILS # BLD AUTO: 0 10E9/L (ref 0–0.2)
BASOPHILS NFR BLD AUTO: 0.4 %
BILIRUB DIRECT SERPL-MCNC: 0.2 MG/DL (ref 0–0.2)
BILIRUB SERPL-MCNC: 0.3 MG/DL (ref 0.2–1.3)
CREAT SERPL-MCNC: 0.73 MG/DL (ref 0.52–1.04)
DIFFERENTIAL METHOD BLD: ABNORMAL
EOSINOPHIL # BLD AUTO: 0.2 10E9/L (ref 0–0.7)
EOSINOPHIL NFR BLD AUTO: 2.6 %
ERYTHROCYTE [DISTWIDTH] IN BLOOD BY AUTOMATED COUNT: 13.1 % (ref 10–15)
GFR SERPL CREATININE-BSD FRML MDRD: >90 ML/MIN/{1.73_M2}
HCT VFR BLD AUTO: 37.8 % (ref 35–47)
HGB BLD-MCNC: 12.9 G/DL (ref 11.7–15.7)
LYMPHOCYTES # BLD AUTO: 1.7 10E9/L (ref 0.8–5.3)
LYMPHOCYTES NFR BLD AUTO: 20.6 %
MCH RBC QN AUTO: 33.4 PG (ref 26.5–33)
MCHC RBC AUTO-ENTMCNC: 34.1 G/DL (ref 31.5–36.5)
MCV RBC AUTO: 98 FL (ref 78–100)
MONOCYTES # BLD AUTO: 0.6 10E9/L (ref 0–1.3)
MONOCYTES NFR BLD AUTO: 6.7 %
NEUTROPHILS # BLD AUTO: 5.8 10E9/L (ref 1.6–8.3)
NEUTROPHILS NFR BLD AUTO: 69.7 %
PLATELET # BLD AUTO: 220 10E9/L (ref 150–450)
PROT SERPL-MCNC: 6.7 G/DL (ref 6.8–8.8)
RBC # BLD AUTO: 3.86 10E12/L (ref 3.8–5.2)
WBC # BLD AUTO: 8.4 10E9/L (ref 4–11)

## 2019-12-23 PROCEDURE — 36415 COLL VENOUS BLD VENIPUNCTURE: CPT | Performed by: INTERNAL MEDICINE

## 2019-12-23 PROCEDURE — 82565 ASSAY OF CREATININE: CPT | Performed by: INTERNAL MEDICINE

## 2019-12-23 PROCEDURE — 80076 HEPATIC FUNCTION PANEL: CPT | Performed by: INTERNAL MEDICINE

## 2019-12-23 PROCEDURE — 85025 COMPLETE CBC W/AUTO DIFF WBC: CPT | Performed by: INTERNAL MEDICINE

## 2019-12-31 ENCOUNTER — OFFICE VISIT (OUTPATIENT)
Dept: RHEUMATOLOGY | Facility: CLINIC | Age: 59
End: 2019-12-31
Payer: COMMERCIAL

## 2019-12-31 VITALS
DIASTOLIC BLOOD PRESSURE: 82 MMHG | BODY MASS INDEX: 30.99 KG/M2 | WEIGHT: 186 LBS | HEART RATE: 76 BPM | HEIGHT: 65 IN | SYSTOLIC BLOOD PRESSURE: 136 MMHG | OXYGEN SATURATION: 96 %

## 2019-12-31 DIAGNOSIS — M18.12 PRIMARY OSTEOARTHRITIS OF FIRST CARPOMETACARPAL JOINT OF LEFT HAND: ICD-10-CM

## 2019-12-31 DIAGNOSIS — M19.90 INFLAMMATORY ARTHRITIS: Primary | ICD-10-CM

## 2019-12-31 DIAGNOSIS — Z79.899 HIGH RISK MEDICATIONS (NOT ANTICOAGULANTS) LONG-TERM USE: ICD-10-CM

## 2019-12-31 PROCEDURE — 20600 DRAIN/INJ JOINT/BURSA W/O US: CPT | Mod: LT | Performed by: INTERNAL MEDICINE

## 2019-12-31 PROCEDURE — 90471 IMMUNIZATION ADMIN: CPT | Performed by: INTERNAL MEDICINE

## 2019-12-31 PROCEDURE — 90732 PPSV23 VACC 2 YRS+ SUBQ/IM: CPT | Performed by: INTERNAL MEDICINE

## 2019-12-31 PROCEDURE — 99214 OFFICE O/P EST MOD 30 MIN: CPT | Mod: 25 | Performed by: INTERNAL MEDICINE

## 2019-12-31 RX ORDER — METHYLPREDNISOLONE ACETATE 40 MG/ML
20 INJECTION, SUSPENSION INTRA-ARTICULAR; INTRALESIONAL; INTRAMUSCULAR; SOFT TISSUE ONCE
Status: COMPLETED | OUTPATIENT
Start: 2019-12-31 | End: 2019-12-31

## 2019-12-31 RX ORDER — SYRINGE-NEEDLE,INSULIN,0.5 ML 27GX1/2"
SYRINGE, EMPTY DISPOSABLE MISCELLANEOUS
Qty: 10 EACH | Refills: 7 | Status: SHIPPED | OUTPATIENT
Start: 2019-12-31 | End: 2020-05-27

## 2019-12-31 RX ORDER — METHOTREXATE 25 MG/ML
25 INJECTION, SOLUTION INTRA-ARTERIAL; INTRAMUSCULAR; INTRAVENOUS
Qty: 4 VIAL | Refills: 3 | Status: SHIPPED | OUTPATIENT
Start: 2019-12-31 | End: 2020-05-27

## 2019-12-31 RX ADMIN — METHYLPREDNISOLONE ACETATE 20 MG: 40 INJECTION, SUSPENSION INTRA-ARTICULAR; INTRALESIONAL; INTRAMUSCULAR; SOFT TISSUE at 15:15

## 2019-12-31 ASSESSMENT — MIFFLIN-ST. JEOR: SCORE: 1421.63

## 2019-12-31 NOTE — NURSING NOTE
RAPID3 (0-30) Cumulative Score  12.5          RAPID3 Weighted Score (divide #4 by 3 and that is the weighted score)  4.16

## 2019-12-31 NOTE — PATIENT INSTRUCTIONS
Rheumatology    Dr. Abdoul Eli       [facility]   (Monday)  [address]  [place] [address]       [facility]   (Tuesday)  [address] [place] [address]    [facility]   (Wed., Thurs., and Friday)  [address] [place] [address]    Phone number: [phone]  Thank you for choosing [facility].  Mi Irwin CMA

## 2019-12-31 NOTE — PROGRESS NOTES
Rheumatology Clinic Visit      Radha Rodriguez MRN# [id number]   YOB: 1960 Age: 59 year old      Date of visit: 12/31/19   PCP: Dr. Temo Fletcher    Chief Complaint   Patient presents with:  RECHECK: RA    Assessment and Plan     1. Inflammatory arthritis: Previously followed by Rojas Vasques and [facility].  Established care with me on 4/24/2018.  Previously on Humira (ineffective), HCQ (ineffective; used with MTX).  Currently on MTX 25mg once weekly with worsening symptoms when reduced in the past.  Question if arthritis has any relation to hx of suspected Crohn's Disease (reportedly fistula requiring surgery, and colonoscopies showing significant scaring in the intestine).  Inflammatory arthritis has been well controlled but more symptomatic at her MCPs for the past 1 month; discussed treatment options and will increase methotrexate by changing from oral to subcutaneous that will increase his bioavailability.  - Change methotrexate from oral to SQ: 25mg once weekly  - Continue folic 2mg daily  - Labs monthly [id number]: CBC, Cr, Hepatic Panel  - Labs every 3 months: CBC, creatinine, hepatic panel    2.  Primary osteoarthritis of the left first carpometacarpal joint: improved with steroid injections in the past.  Repeat steroid injection today.  Advised that she also see a hand surgeon because she is only getting 2 months of benefit from each steroid injection.  - Orthopedic surgery referral    3. Plantar fasciitis, bilaterally: seen by podiatry.  Improved with stretching exercises.     4.  Vaccinations: Vaccinations reviewed with Ms. Rodriguez.  Risks and benefits of vaccinations were discussed.   CDC stance on shingrix when on moderate to high immunosuppression was reviewed.   - Influenza: up to date  - [id number]: up to date  - [id number]: will receive today  - Shingrix: she plans to receive in the future; out of stock in this clinic at this time.  She has going gone to check other clinics and  pharmacies.    Ms. Rodriguez verbalized agreement with and understanding of the rational for the diagnosis and treatment plan.  All questions were answered to best of my ability and the patient's satisfaction. Ms. Rodriguez was advised to contact the clinic with any questions that may arise after the clinic visit.      Thank you for involving me in the care of the patient    Return to clinic: 3-4 months      HPI   Radha Rodriguez is a 59 year old female with a past medical history significant for possible Crohn's disease requiring fistula surgery in the past, osteoarthritis, inflammatory arthritis who is seen for follow-up of inflammatory arthritis.    Today, Ms. Rodriguez reports that the steroid injections to her first CMC joint is effective for about 2 months now.  She is considering surgery now.  More swelling and stiffness and pain at her bilateral second and third MCPs that is worse in the morning and improves with time and activity; worse for the past 1 month.      Denies fevers, chills, nausea, vomiting, constipation, diarrhea. No abdominal pain. No chest pain/pressure, palpitations, or shortness of breath. LE swelling worse at the end of the day but sometimes present at the beginning of the day too; she says that she will discuss with her PCP.  No neck pain. No oral sores.  Chronic recurrent nasal sore for years, per patient.  No rash. No sicca symptoms. No photosensitivity or photophobia. No eye pain or redness. No history of inflammatory eye disease.    Tobacco: none  EtOH: occasional  Drugs: none    ROS   GEN: No fevers, chills, night sweats, fatigue, or weight change  SKIN: No itching, rashes, sores  HEENT: No epistaxis.   CV: No chest pain, pressure, palpitations, or dyspnea on exertion.  PULM: No SOB, wheeze, cough.  GI: No nausea, vomiting, constipation, diarrhea. No blood in stool. No abdominal pain.  : No blood in urine.  MSK: See HPI.  NEURO: No numbness or tingling  EXT: LE edema unchanged   PSYCH:  Negative    Active Problem List     Patient Active Problem List   Diagnosis     FEMALE STRESS INCONTINENCE post-hysterectomy     HYPERHIDROSIS on axilla and soles of feet     Dermatitis due to cosmetics     Contact dermatitis and other eczema, due to unspecified cause     Acquired acanthosis nigricans     LFT abnormalities and Urobilnogen high     CARDIOVASCULAR SCREENING; LDL GOAL LESS THAN 160     Vitamin D deficiency     Inflammatory arthritis     High risk medications (not anticoagulants) long-term use     Osteoarthritis     Menopausal syndrome (hot flashes)     Right lateral epicondylitis     Past Medical History     Past Medical History:   Diagnosis Date     Arthritis      Headache(784.0)      Irritable bowel syndrome      Other and unspecified noninfectious gastroenteritis and colitis(558.9)     chronic     Past Surgical History     Past Surgical History:   Procedure Laterality Date     C REPLACEMENT,URETER,BOWEL SEGMENT  10/01/2008    Part of her ureter was repaired.     CATARACT IOL, RT/LT       COLONOSCOPY  10/14/2011    Procedure:COLONOSCOPY; Colonoscopy; Surgeon:SCOTTY LEDEZMA; Location:[facility]     ENHANCE LASER REFRACTIVE BILATERAL EXISTING PT IN PARAMETERS       HYSTERECTOMY, VAGINAL  1/1/2000    TVH, fibroids (still has ovaries)     SURGICAL HISTORY OF -       Tubal Ligation     SURGICAL HISTORY OF -       Appy     SURGICAL HISTORY OF -       Tonsils     SURGICAL HISTORY OF -   12/94    Anal Fistulotomy     Allergy     Allergies   Allergen Reactions     Sulfa Drugs Rash     Clindamycin Rash     Codeine Hives     Codeine      Current Medication List     Current Outpatient Medications   Medication Sig     estrogen conj (PREMARIN) 0.625 MG tablet Take 0.625 mg by mouth     folic acid (FOLVITE) 1 MG tablet Take 2 tablets (2 mg) by mouth daily     folic acid (FOLVITE) 1 MG tablet Take 2 mg by mouth     methotrexate sodium 2.5 MG TABS Take 10 tablets (25 mg) by mouth once a week . Take all 10  "tablets on the same day of each week.     PREMARIN 0.45 MG tablet TAKE ONE TABLET BY MOUTH EVERY DAY     diclofenac (VOLTAREN) 1 % topical gel Apply 2 g topically 4 times daily (Patient not taking: Reported on 12/31/2019)     Current Facility-Administered Medications   Medication     methylPREDNISolone (DEPO-MEDROL) injection 20 mg     methylPREDNISolone (DEPO-MEDROL) injection 20 mg         Social History   See HPI    Family History     Family History   Problem Relation Age of Onset     Heart Disease Father         Heart attack     C.A.D. Father         age 50     Hypertension Father      Cancer Maternal Grandfather      Alzheimer Disease Maternal Grandfather      Cancer Paternal Grandfather      Diabetes No family hx of      Cerebrovascular Disease No family hx of      Breast Cancer No family hx of      Cancer - colorectal No family hx of      Prostate Cancer No family hx of        Physical Exam     Temp Readings from Last 3 Encounters:   08/21/19 98.3  F (36.8  C) (Oral)   08/14/19 98.7  F (37.1  C) (Oral)   05/28/19 98.4  F (36.9  C) (Oral)     BP Readings from Last 5 Encounters:   12/31/19 136/82   10/01/19 122/76   09/10/19 123/78   08/21/19 135/86   08/14/19 129/84     Pulse Readings from Last 1 Encounters:   12/31/19 76     Resp Readings from Last 1 Encounters:   08/21/19 16     Estimated body mass index is 31.43 kg/m  as calculated from the following:    Height as of this encounter: 1.638 m (5' 4.5\").    Weight as of this encounter: 84.4 kg (186 lb).    GEN: NAD  HEENT: MMM. No oral lesions.  Anicteric, noninjected sclera  CV: S1, S2. RRR. No m/r/g.  PULM: CTA bilaterally. No w/c.  MSK: Tender to palpation over the left first CMC joint; no effusion or increased warmth; no overlying erythema.  Heberden's nodes present.  Subtle synovial swelling and tenderness palpation the bilateral second and third MCPs.  Other MCPs and PIPs without swelling or tenderness to palpation.  DIPs, wrists, elbows, shoulders, " knees, ankles, and MTPs without swelling or tenderness to palpation.  Hips nontender to palpation.    NEURO: UE and LE strengths 5/5 and equal bilaterally  SKIN: No rash  EXT: Nonpitting bilateral lower extremity edema distal to the knees  PSYCH: Alert. Appropriate    Labs / Imaging (select studies)   RF/CCP  Recent Labs   Lab Test 06/01/15  1139 01/31/14  1534 06/06/13  1341   CCPABY <20  Interpretation:  Negative   <20 Interpretation:  Negative  --    RHF <20  --  7     CBC  Recent Labs   Lab Test 12/23/19  1620 09/25/19  1117 03/27/19  1053   WBC 8.4 7.4 6.5   RBC 3.86 3.96 3.99   HGB 12.9 13.3 12.9   HCT 37.8 38.4 38.0   MCV 98 97 95   RDW 13.1 13.9 13.8    219 238   MCH 33.4* 33.6* 32.3   MCHC 34.1 34.6 33.9   NEUTROPHIL 69.7 73.0 71.7   LYMPH 20.6 17.8 18.9   MONOCYTE 6.7 6.5 6.4   EOSINOPHIL 2.6 2.3 2.5   BASOPHIL 0.4 0.4 0.5   ANEU 5.8 5.4 4.7   ALYM 1.7 1.3 1.2   EDWARD 0.6 0.5 0.4   AEOS 0.2 0.2 0.2   ABAS 0.0 0.0 0.0     CMP  Recent Labs   Lab Test 12/23/19  1620 09/25/19  1117 03/27/19  1053  10/11/17  1207 07/05/17  1639 03/29/17  1148   NA  --   --   --   --  138 139 143   POTASSIUM  --   --   --   --  4.1 3.5 4.5   CHLORIDE  --   --   --   --  105 105 108   CO2  --   --   --   --  28 29 29   ANIONGAP  --   --   --   --  5 5 6   GLC  --   --   --   --  86 119* 85   BUN  --   --   --   --  18 16 13   CR 0.73 0.70 0.64   < > 0.73 0.66 0.68   GFRESTIMATED >90 >90 >90   < > 82 >90  Non  GFR Calc   >90  Non  GFR Calc     GFRESTBLACK >90 >90 >90   < > >90 >90   GFR Calc   >90   GFR Calc     ENEIDA  --   --   --   --  9.2 8.9 8.7   BILITOTAL 0.3 0.4 0.4   < > 0.7 0.3 0.5   ALBUMIN 3.8 3.9 3.9   < > 3.9 3.5 3.7   PROTTOTAL 6.7* 6.8 6.4*   < > 6.9 6.4* 6.7*   ALKPHOS 68 67 60   < > 57 69 64   AST 21 20 20   < > 28 27 14   ALT 37 30 36   < > 44 38 31    < > = values in this interval not displayed.     Calcium/VitaminD  Recent Labs   Lab Test  10/11/17  1207 07/05/17  1639 03/29/17  1148  09/18/14  1129 04/29/14  1522 11/25/13  1350   ENEIDA 9.2 8.9 8.7   < >  --   --   --    VITDT  --   --   --   --  38 42 19*    < > = values in this interval not displayed.     ESR/CRP  Recent Labs   Lab Test 03/27/19  1053 12/18/18  1619 10/05/18  1214 08/01/18  0954   SED 6 6  --  6   CRP 3.0 3.1 9.9* 4.0     Hepatitis B  Recent Labs   Lab Test 04/24/18  1557   HBCAB Nonreactive   HEPBANG Nonreactive     Hepatitis C  Recent Labs   Lab Test 06/01/15  1139   HCVAB Nonreactive   Assay performance characteristics have not been established for newborns,   infants, and children       Tuberculosis Screening  Recent Labs   Lab Test 06/01/15  1139   TBRSLT Negative   TBAGN 0.00     Immunization History     Immunization History   Administered Date(s) Administered     HepB 01/24/1991, 02/28/1991, 09/05/1991     Influenza Quad, Recombinant, p-free (RIV4) 10/01/2019     Pneumo Conj 13-V (2010&after) 10/01/2019     TD (ADULT, 7+) 03/12/1990, 01/01/2000     TDAP Vaccine (Adacel) 09/10/2008     TDAP Vaccine (Boostrix) 09/10/2008     Tdap (Adacel,Boostrix) 03/01/2011     Procedure     Procedure: Steroid injection of the left 1st CMC joint  Indication: Pain, osteoarthritis    The procedure was explained in detail. Risks including infection, pain, structural damage such as cartilage damage and tendon rupture, fat atrophy, skin hyper-/hypo-pigmentation, and medication reaction was explained. The need for rest of the affected joint for one week after the procedure was explained.  The option of not doing the procedure was also provided. All questions were answered and the patient consented to the procedure.     A time-out was performed and the correct patient, procedure, and laterality were verified.    The left 1st CMC joint was examined and location for injection was identified. The area was cleaned with chlorhexidine, twice.  Ethyl chloride was then used for topical anaesthetic.  Then a  mixture of lidocaine 1% 0.1 mL and methylprednisolone 20mg was injected into the intra-articular space.     The patient tolerated the procedure well. No complications.    MEDICATION: Methylprednisolone  LOT #: [id number]  :  [employer]  EXPIRATION DATE:  04/20  NDC#: 5382-4546-80     1% Lidocaine  :  [employer]  Lot #: [id number]  Expiration date: 08/2020  NDC: 6084-8832-46       Chart documentation done in part with Dragon Voice recognition Software. Although reviewed after completion, some word and grammatical error may remain.    Abdoul Eli MD

## 2020-02-10 ENCOUNTER — HEALTH MAINTENANCE LETTER (OUTPATIENT)
Age: 60
End: 2020-02-10

## 2020-03-10 ENCOUNTER — OFFICE VISIT (OUTPATIENT)
Dept: FAMILY MEDICINE | Facility: CLINIC | Age: 60
End: 2020-03-10
Payer: COMMERCIAL

## 2020-03-10 VITALS
WEIGHT: 173 LBS | BODY MASS INDEX: 28.82 KG/M2 | SYSTOLIC BLOOD PRESSURE: 121 MMHG | HEART RATE: 79 BPM | DIASTOLIC BLOOD PRESSURE: 88 MMHG | TEMPERATURE: 98.7 F | RESPIRATION RATE: 16 BRPM | HEIGHT: 65 IN | OXYGEN SATURATION: 98 %

## 2020-03-10 DIAGNOSIS — J01.80 OTHER ACUTE SINUSITIS, RECURRENCE NOT SPECIFIED: Primary | ICD-10-CM

## 2020-03-10 DIAGNOSIS — D84.9 IMMUNOSUPPRESSED STATUS (H): ICD-10-CM

## 2020-03-10 PROCEDURE — 99213 OFFICE O/P EST LOW 20 MIN: CPT | Performed by: INTERNAL MEDICINE

## 2020-03-10 RX ORDER — CEFDINIR 300 MG/1
300 CAPSULE ORAL 2 TIMES DAILY
Qty: 20 CAPSULE | Refills: 3 | Status: SHIPPED | OUTPATIENT
Start: 2020-03-10 | End: 2021-11-13

## 2020-03-10 ASSESSMENT — PAIN SCALES - GENERAL: PAINLEVEL: MODERATE PAIN (5)

## 2020-03-10 ASSESSMENT — MIFFLIN-ST. JEOR: SCORE: 1362.66

## 2020-03-10 NOTE — PATIENT INSTRUCTIONS
At [facility], we strive to deliver an exceptional experience to you, every time we see you.  If you receive a survey in the mail, please send us back your thoughts. We really do value your feedback.    Based on your medical history, these are the current health maintenance/preventive care services that you are due for (some may have been done at this visit.)  Health Maintenance Due   Topic Date Due     ADVANCE CARE PLANNING  1960     HIV SCREENING  03/20/1977     ZOSTER IMMUNIZATION (1 of 2) 03/20/2012     PREVENTIVE CARE VISIT  06/28/2012     LIPID  07/18/2016     MAMMO SCREENING  01/13/2019     PHQ-2  01/01/2020         Suggested websites for health information:  Www.[facility].org : Up to date and easily searchable information on multiple topics.  Www.medlineplus.gov : medication info, interactive tutorials, watch real surgeries online  Www.familydoctor.org : good info from the Academy of Family Physicians  Www.cdc.gov : public health info, travel advisories, epidemics (H1N1)  Www.aap.org : children's health info, normal development, vaccinations  Www.health.[facility].[place].us : [place] dept of health, public health issues in [place], N1N1    Your care team:                            Family Medicine Internal Medicine   MD Temo Lott MD Shantel Branch-Fleming, MD Katya Georgiev PA-C Nam Ho, MD Pediatrics   SURESH Morton, MD Daija Gray CNP, MD Deborah Mielke, MD Kim Thein, APRN CNP      Clinic hours: Monday - Thursday 7 am-7 pm; Fridays 7 am-5 pm.   Urgent care: Monday - Friday 11 am-9 pm; Saturday and Sunday 9 am-5 pm.  Pharmacy : Monday -Thursday 8 am-8 pm; Friday 8 am-6 pm; Saturday and Sunday 9 am-5 pm.     Clinic: [phone]   Pharmacy: [phone]

## 2020-03-10 NOTE — PROGRESS NOTES
Subjective     Radha Rodriguez is a 59 year old female who presents to clinic today for the following health issues:    HPI   Acute Illness   Acute illness concerns: sinus infection   Onset: ongoing for the past few days     Fever: no    Chills/Sweats: no    Headache (location?): YES    Sinus Pressure:YES    Conjunctivitis:  no    Ear Pain: YES: right    Rhinorrhea: YES    Congestion: no    Sore Throat: YES- slightly      Cough: no    Wheeze: no    Decreased Appetite: no    Nausea: no    Vomiting: no    Diarrhea:  no    Dysuria/Freq.: no    Fatigue/Achiness: no    Sick/Strep Exposure: no     Therapies Tried and outcome: ibuprofen         Patient Active Problem List   Diagnosis     FEMALE STRESS INCONTINENCE post-hysterectomy     HYPERHIDROSIS on axilla and soles of feet     Dermatitis due to cosmetics     Contact dermatitis and other eczema, due to unspecified cause     Acquired acanthosis nigricans     LFT abnormalities and Urobilnogen high     CARDIOVASCULAR SCREENING; LDL GOAL LESS THAN 160     Vitamin D deficiency     Inflammatory arthritis     High risk medications (not anticoagulants) long-term use     Osteoarthritis     Menopausal syndrome (hot flashes)     Right lateral epicondylitis     Past Surgical History:   Procedure Laterality Date     C REPLACEMENT,URETER,BOWEL SEGMENT  10/01/2008    Part of her ureter was repaired.     CATARACT IOL, RT/LT       COLONOSCOPY  10/14/2011    Procedure:COLONOSCOPY; Colonoscopy; Surgeon:SCOTTY LEDEZMA; Location:[facility]     ENHANCE LASER REFRACTIVE BILATERAL EXISTING PT IN PARAMETERS       HYSTERECTOMY, VAGINAL  1/1/2000    TVH, fibroids (still has ovaries)     SURGICAL HISTORY OF -       Tubal Ligation     SURGICAL HISTORY OF -       Appy     SURGICAL HISTORY OF -       Tonsils     SURGICAL HISTORY OF -   12/94    Anal Fistulotomy       Social History     Tobacco Use     Smoking status: Never Smoker     Smokeless tobacco: Never Used   Substance Use Topics      Alcohol use: Yes     Comment: Occasional     Family History   Problem Relation Age of Onset     Heart Disease Father         Heart attack     C.A.D. Father         age 50     Hypertension Father      Cancer Maternal Grandfather      Alzheimer Disease Maternal Grandfather      Cancer Paternal Grandfather      Diabetes No family hx of      Cerebrovascular Disease No family hx of      Breast Cancer No family hx of      Cancer - colorectal No family hx of      Prostate Cancer No family hx of          Allergies   Allergen Reactions     Sulfa Drugs Rash     Clindamycin Rash     Codeine Hives     Codeine      Recent Labs   Lab Test 12/23/19  1620 09/25/19  1117 03/27/19  1053  04/20/18  1030  10/11/17  1207 07/05/17  1639   ALT 37 30 36   < >  --   --  44 38   CR 0.73 0.70 0.64   < >  --   --  0.73 0.66   GFRESTIMATED >90 >90 >90   < >  --   --  82 >90  Non  GFR Calc     GFRESTBLACK >90 >90 >90   < >  --   --  >90 >90  African American GFR Calc     POTASSIUM  --   --   --   --   --   --  4.1 3.5   TSH  --   --  0.80  --  1.23   < >  --   --     < > = values in this interval not displayed.      BP Readings from Last 3 Encounters:   03/10/20 121/88   12/31/19 136/82   10/01/19 122/76    Wt Readings from Last 3 Encounters:   03/10/20 78.5 kg (173 lb)   12/31/19 84.4 kg (186 lb)   10/01/19 86 kg (189 lb 9.6 oz)                      Reviewed and updated as needed this visit by Provider         Review of Systems   ROS COMP: CONSTITUTIONAL: NEGATIVE for fever, chills, change in weight  INTEGUMENTARY/SKIN: NEGATIVE for worrisome rashes, moles or lesions  EYES: NEGATIVE for vision changes or irritation  ENT/MOUTH: NEGATIVE for ear, mouth and throat problems  RESP: NEGATIVE for significant cough or SOB  CV: NEGATIVE for chest pain, palpitations or peripheral edema  GI: NEGATIVE for nausea, abdominal pain, heartburn, or change in bowel habits  : NEGATIVE for frequency, dysuria, or hematuria  MUSCULOSKELETAL:  "NEGATIVE for significant arthralgias or myalgia  NEURO: NEGATIVE for weakness, dizziness or paresthesias  ENDOCRINE: NEGATIVE for temperature intolerance, skin/hair changes  HEME: NEGATIVE for bleeding problems  PSYCHIATRIC: NEGATIVE for changes in mood or affect      Objective    /88 (BP Location: Left arm, Patient Position: Chair, Cuff Size: Adult Regular)   Pulse 79   Temp 98.7  F (37.1  C) (Oral)   Resp 16   Ht 1.638 m (5' 4.5\")   Wt 78.5 kg (173 lb)   SpO2 98%   BMI 29.24 kg/m    Physical Exam   GENERAL: healthy, alert and no distress  EYES: Eyes grossly normal to inspection, PERRL and conjunctivae and sclerae normal  HENT: ear canals and TM's normal, nose and mouth without ulcers or lesions  NECK: no adenopathy, no asymmetry, masses, or scars and thyroid normal to palpation  RESP: lungs clear to auscultation - no rales, rhonchi or wheezes  CV: regular rate and rhythm, normal S1 S2, no S3 or S4, no murmur, click or rub, no peripheral edema and peripheral pulses strong  ABDOMEN: soft, nontender, no hepatosplenomegaly, no masses and bowel sounds normal  MS: no gross musculoskeletal defects noted, no edema  SKIN: no suspicious lesions or rashes  NEURO: Normal strength and tone, mentation intact and speech normal  PSYCH: mentation appears normal, affect normal/bright    Diagnostic Test Results:  Labs reviewed in Epic        Assessment & Plan     1. Other acute sinusitis, recurrence not specified    - cefdinir (OMNICEF) 300 MG capsule; Take 1 capsule (300 mg) by mouth 2 times daily for 10 days  Dispense: 20 capsule; Refill: 3    2. Immunosuppressed status (H)  Secondary to intake of Methotrexate for undifferentiated inflammatory arthritis.  BMI:   Estimated body mass index is 29.24 kg/m  as calculated from the following:    Height as of this encounter: 1.638 m (5' 4.5\").    Weight as of this encounter: 78.5 kg (173 lb).   Weight management plan: dietary changes.        FUTURE APPOINTMENTS:       - " Follow-up visit in 4 weeks.      Temo Fletcher MD  [facility]

## 2020-05-18 ENCOUNTER — OFFICE VISIT (OUTPATIENT)
Dept: URGENT CARE | Facility: URGENT CARE | Age: 60
End: 2020-05-18
Payer: COMMERCIAL

## 2020-05-18 VITALS
TEMPERATURE: 98.1 F | BODY MASS INDEX: 29.2 KG/M2 | WEIGHT: 172.8 LBS | DIASTOLIC BLOOD PRESSURE: 82 MMHG | HEART RATE: 80 BPM | SYSTOLIC BLOOD PRESSURE: 129 MMHG | RESPIRATION RATE: 12 BRPM | OXYGEN SATURATION: 98 %

## 2020-05-18 DIAGNOSIS — M25.532 LEFT WRIST PAIN: Primary | ICD-10-CM

## 2020-05-18 PROCEDURE — 99213 OFFICE O/P EST LOW 20 MIN: CPT | Performed by: PHYSICIAN ASSISTANT

## 2020-05-18 ASSESSMENT — ENCOUNTER SYMPTOMS
WEAKNESS: 0
HEADACHES: 0
DIARRHEA: 0
BACK PAIN: 0
RHINORRHEA: 0
FEVER: 0
ALLERGIC/IMMUNOLOGIC NEGATIVE: 1
RESPIRATORY NEGATIVE: 1
EYES NEGATIVE: 1
BRUISES/BLEEDS EASILY: 0
NECK PAIN: 0
MYALGIAS: 0
ENDOCRINE NEGATIVE: 1
HEMATOLOGIC/LYMPHATIC NEGATIVE: 1
PALPITATIONS: 0
CARDIOVASCULAR NEGATIVE: 1
LIGHT-HEADEDNESS: 0
VOMITING: 0
NAUSEA: 0
SORE THROAT: 0
WOUND: 0
ARTHRALGIAS: 1
SHORTNESS OF BREATH: 0
NECK STIFFNESS: 0
JOINT SWELLING: 0
CHILLS: 0
COUGH: 0
DIZZINESS: 0

## 2020-05-18 ASSESSMENT — PAIN SCALES - GENERAL: PAINLEVEL: SEVERE PAIN (6)

## 2020-05-18 NOTE — PROGRESS NOTES
Chief Complaint:    Chief Complaint   Patient presents with     Musculoskeletal Problem     Left hand pain, swelling, redness and numbness since Friday. Pt has hx of pain in left thumb, previous cortisone shots       HPI: Radha Rodriguez is an 60 year old female who presents for evaluation and treatment of hand swelling.  Patient has a significant Hx of inflammatory arthritis.  She is currently on Methotrexate.  Patient has had L hand pain, swelling, and numbness for 3 days.  She has a Hx of this and has had cortisone shots in the past.  The numbness and tingling comes and goes in the thumb and pointer finger.  Movement makes the pain worse.  She is L handed and this is affecting her work.  She denies any acute injury.      ROS:      Review of Systems   Constitutional: Negative for chills and fever.   HENT: Negative for congestion, ear pain, rhinorrhea and sore throat.    Eyes: Negative.    Respiratory: Negative.  Negative for cough and shortness of breath.    Cardiovascular: Negative.  Negative for chest pain and palpitations.   Gastrointestinal: Negative for diarrhea, nausea and vomiting.   Endocrine: Negative.    Genitourinary: Negative.    Musculoskeletal: Positive for arthralgias. Negative for back pain, joint swelling, myalgias, neck pain and neck stiffness.   Skin: Negative.  Negative for rash and wound.   Allergic/Immunologic: Negative.  Negative for immunocompromised state.   Neurological: Negative for dizziness, weakness, light-headedness and headaches.   Hematological: Negative.  Does not bruise/bleed easily.        Family History   Family History   Problem Relation Age of Onset     Heart Disease Father         Heart attack     C.A.D. Father         age 50     Hypertension Father      Cancer Maternal Grandfather      Alzheimer Disease Maternal Grandfather      Cancer Paternal Grandfather      Diabetes No family hx of      Cerebrovascular Disease No family hx of      Breast Cancer No family hx of       Cancer - colorectal No family hx of      Prostate Cancer No family hx of        Social History  Social History     Socioeconomic History     Marital status:      Spouse name: Arnulfo Rodriguez     Number of children: 2     Years of education: 14+     Highest education level: Not on file   Occupational History     Occupation: Dental Assistant     Comment: [employer]     Employer: [employer]   Social Needs     Financial resource strain: Not on file     Food insecurity     Worry: Not on file     Inability: Not on file     Transportation needs     Medical: Not on file     Non-medical: Not on file   Tobacco Use     Smoking status: Never Smoker     Smokeless tobacco: Never Used   Substance and Sexual Activity     Alcohol use: Yes     Comment: Occasional     Drug use: No     Sexual activity: Yes     Partners: Male     Birth control/protection: Surgical   Lifestyle     Physical activity     Days per week: Not on file     Minutes per session: Not on file     Stress: Not on file   Relationships     Social connections     Talks on phone: Not on file     Gets together: Not on file     Attends Christian service: Not on file     Active member of club or organization: Not on file     Attends meetings of clubs or organizations: Not on file     Relationship status: Not on file     Intimate partner violence     Fear of current or ex partner: Not on file     Emotionally abused: Not on file     Physically abused: Not on file     Forced sexual activity: Not on file   Other Topics Concern     Parent/sibling w/ CABG, MI or angioplasty before 65F 55M? Yes   Social History Narrative     Not on file        Surgical History:  Past Surgical History:   Procedure Laterality Date     C REPLACEMENT,URETER,BOWEL SEGMENT  10/01/2008    Part of her ureter was repaired.     CATARACT IOL, RT/LT       COLONOSCOPY  10/14/2011    Procedure:COLONOSCOPY; Colonoscopy; Surgeon:SCOTTY LEDEZMA; Location:[facility]     ENHANCE LASER  "REFRACTIVE BILATERAL EXISTING PT IN PARAMETERS       HYSTERECTOMY, VAGINAL  1/1/2000    TVH, fibroids (still has ovaries)     SURGICAL HISTORY OF -       Tubal Ligation     SURGICAL HISTORY OF -       Appy     SURGICAL HISTORY OF -       Tonsils     SURGICAL HISTORY OF -   12/94    Anal Fistulotomy        Problem List:  Patient Active Problem List   Diagnosis     FEMALE STRESS INCONTINENCE post-hysterectomy     HYPERHIDROSIS on axilla and soles of feet     Dermatitis due to cosmetics     Contact dermatitis and other eczema, due to unspecified cause     Acquired acanthosis nigricans     LFT abnormalities and Urobilnogen high     CARDIOVASCULAR SCREENING; LDL GOAL LESS THAN 160     Vitamin D deficiency     Inflammatory arthritis     High risk medications (not anticoagulants) long-term use     Osteoarthritis     Menopausal syndrome (hot flashes)     Right lateral epicondylitis        Allergies:  Allergies   Allergen Reactions     Sulfa Drugs Rash     Clindamycin Rash     Codeine Hives     Codeine         Current Meds:    Current Outpatient Medications:      estrogen conj (PREMARIN) 0.45 MG tablet, Take 1 tablet (0.45 mg) by mouth daily, Disp: 90 tablet, Rfl: 1     folic acid (FOLVITE) 1 MG tablet, Take 2 mg by mouth, Disp: , Rfl:      estrogen conj (PREMARIN) 0.625 MG tablet, Take 0.625 mg by mouth, Disp: , Rfl:      folic acid (FOLVITE) 1 MG tablet, Take 2 tablets (2 mg) by mouth daily (Patient not taking: Reported on 5/18/2020), Disp: 180 tablet, Rfl: 3     Insulin Syringe-Needle U-100 (BD INSULIN SYRINGE) 27G X 1/2\" 1 ML MISC, For once weekly methotrexate administration. (Patient not taking: Reported on 5/18/2020), Disp: 10 each, Rfl: 7     methotrexate 50 MG/2ML injection, Inject 1 mL (25 mg) Subcutaneous every 7 days (Patient not taking: Reported on 5/18/2020), Disp: 4 vial, Rfl: 3     PHYSICAL EXAM:     Vital signs noted and reviewed by Dylan Taveras PA-C  /82   Pulse 80   Temp 98.1  F (36.7  C) " (Tympanic)   Resp 12   Wt 78.4 kg (172 lb 12.8 oz)   SpO2 98%   BMI 29.20 kg/m       PEFR:    Physical Exam  Vitals signs and nursing note reviewed.   Constitutional:       General: She is not in acute distress.     Appearance: She is well-developed. She is not ill-appearing, toxic-appearing or diaphoretic.   HENT:      Head: Normocephalic and atraumatic.      Right Ear: Tympanic membrane and external ear normal. No drainage, swelling or tenderness. Tympanic membrane is not perforated, erythematous, retracted or bulging.      Left Ear: Tympanic membrane and external ear normal. No drainage, swelling or tenderness. Tympanic membrane is not perforated, erythematous, retracted or bulging.      Nose: No mucosal edema, congestion or rhinorrhea.      Right Sinus: No maxillary sinus tenderness or frontal sinus tenderness.      Left Sinus: No maxillary sinus tenderness or frontal sinus tenderness.      Mouth/Throat:      Pharynx: No pharyngeal swelling, oropharyngeal exudate, posterior oropharyngeal erythema or uvula swelling.      Tonsils: No tonsillar abscesses.   Eyes:      Pupils: Pupils are equal, round, and reactive to light.   Neck:      Musculoskeletal: Full passive range of motion without pain, normal range of motion and neck supple.      Trachea: Trachea normal.   Cardiovascular:      Rate and Rhythm: Normal rate and regular rhythm.      Heart sounds: Normal heart sounds, S1 normal and S2 normal. No murmur. No friction rub. No gallop.    Pulmonary:      Effort: Pulmonary effort is normal. No respiratory distress.      Breath sounds: Normal breath sounds. No decreased breath sounds, wheezing, rhonchi or rales.   Abdominal:      General: Bowel sounds are normal. There is no distension.      Palpations: Abdomen is soft. Abdomen is not rigid. There is no mass.      Tenderness: There is no abdominal tenderness. There is no guarding or rebound.   Musculoskeletal:      Left wrist: She exhibits tenderness. She  exhibits normal range of motion, no bony tenderness, no swelling, no crepitus and no deformity.        Arms:    Lymphadenopathy:      Cervical: No cervical adenopathy.   Skin:     General: Skin is warm and dry.   Neurological:      Mental Status: She is alert and oriented to person, place, and time.      Cranial Nerves: No cranial nerve deficit.      Deep Tendon Reflexes: Reflexes are normal and symmetric.   Psychiatric:         Behavior: Behavior normal. Behavior is cooperative.         Thought Content: Thought content normal.         Judgment: Judgment normal.          Labs:     No results found for any visits on 05/18/20.    Medical Decision Making:    Differential Diagnosis:  Carpal tunnel, tendonitis, fracture     ASSESSMENT:     1. Left wrist pain           PLAN:     Patient has a Hx of De Quervains, but finkelstein test was negative.  High suspicion for carpal tunnel.    Patient declined imaging, wrist brace and Rx for Prednisone today.  Advised patient that I cannot perform injection of the wrist.  Patient will follow up with her ortho provider for further evaluation.  Worrisome symptoms discussed with instructions to go to the ED.  Patient verbalized understanding and agreed with this plan.     Dylan Taveras PA-C  5/18/2020, 2:45 PM

## 2020-05-20 ENCOUNTER — TRANSFERRED RECORDS (OUTPATIENT)
Dept: HEALTH INFORMATION MANAGEMENT | Facility: CLINIC | Age: 60
End: 2020-05-20

## 2020-05-21 ENCOUNTER — TELEPHONE (OUTPATIENT)
Dept: RHEUMATOLOGY | Facility: CLINIC | Age: 60
End: 2020-05-21

## 2020-05-21 NOTE — TELEPHONE ENCOUNTER
RN:  With regard to RA and returning to work - I have written a letter (letter section). Please read it to her and mail it to her.           With regard to the injection - she would have to discuss with the person who did the injection.    Abdoul Eli MD  5/21/2020 5:20 PM

## 2020-05-21 NOTE — LETTER
To Whom It May Concern:    Radha Rodriguez is followed at the [facility] and is considered to be at high risk of complications from the COVID-19 virus.  It is recommended to limit contact with other people and if possible to work remotely or provide a leave of absence to reduce the risk for COVID-19.      Sincerely,  Abdoul Eli MD  [facility]  5/21/2020

## 2020-05-21 NOTE — TELEPHONE ENCOUNTER
Patient called, she was having pain in her hand, went to urgent care like instructed to do and they recommended her to go to an orthopedic. Patient was seen at [facility] yesterday and was given an injection. Patient is wondering when she can go back to work?    Mi Irwin [facility]  5/21/2020 9:53 AM

## 2020-05-22 NOTE — TELEPHONE ENCOUNTER
Called patient however there was no answer.  Left message for patient to return call to clinic.    Tuan Burnett RN....5/22/2020 10:08 AM

## 2020-05-27 ENCOUNTER — TELEPHONE (OUTPATIENT)
Dept: RHEUMATOLOGY | Facility: CLINIC | Age: 60
End: 2020-05-27

## 2020-05-27 ENCOUNTER — VIRTUAL VISIT (OUTPATIENT)
Dept: RHEUMATOLOGY | Facility: CLINIC | Age: 60
End: 2020-05-27
Payer: COMMERCIAL

## 2020-05-27 ENCOUNTER — MYC MEDICAL ADVICE (OUTPATIENT)
Dept: RHEUMATOLOGY | Facility: CLINIC | Age: 60
End: 2020-05-27

## 2020-05-27 DIAGNOSIS — M06.4 INFLAMMATORY POLYARTHROPATHY (H): Primary | ICD-10-CM

## 2020-05-27 DIAGNOSIS — Z79.899 HIGH RISK MEDICATION USE: ICD-10-CM

## 2020-05-27 DIAGNOSIS — G56.02 LEFT CARPAL TUNNEL SYNDROME: ICD-10-CM

## 2020-05-27 DIAGNOSIS — M18.12 PRIMARY OSTEOARTHRITIS OF FIRST CARPOMETACARPAL JOINT OF LEFT HAND: ICD-10-CM

## 2020-05-27 PROCEDURE — 99214 OFFICE O/P EST MOD 30 MIN: CPT | Mod: 95 | Performed by: INTERNAL MEDICINE

## 2020-05-27 RX ORDER — SYRING-NEEDL,DISP,INSUL,0.3 ML 28GX1/2"
SYRINGE, EMPTY DISPOSABLE MISCELLANEOUS
Qty: 10 EACH | Refills: 7 | Status: SHIPPED | OUTPATIENT
Start: 2020-05-27 | End: 2021-04-07

## 2020-05-27 RX ORDER — FOLIC ACID 1 MG/1
2 TABLET ORAL DAILY
Qty: 180 TABLET | Refills: 3 | Status: SHIPPED | OUTPATIENT
Start: 2020-05-27 | End: 2021-04-07

## 2020-05-27 RX ORDER — METHOTREXATE 25 MG/ML
25 INJECTION, SOLUTION INTRA-ARTERIAL; INTRAMUSCULAR; INTRAVENOUS
Qty: 4 VIAL | Refills: 3 | Status: SHIPPED | OUTPATIENT
Start: 2020-05-27 | End: 2021-04-07

## 2020-05-27 NOTE — TELEPHONE ENCOUNTER
Patient is calling reporting she had an apt with Dr. Eli today but forgot to ask if there are any work restrictions due to her left hand. 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 Forwarded to Dr. Eli.    ([facility] msg maxine)    Tuan Burnett RN....5/27/2020 3:59 PM

## 2020-05-27 NOTE — TELEPHONE ENCOUNTER
[facility]  Closing this encounter.    Tuan Burentt RN....5/27/2020 4:00 PM

## 2020-05-27 NOTE — PROGRESS NOTES
"Radha Rodriguez is a 60 year old female who is being evaluated via a billable video visit.      The patient has been notified of following:     \"This video visit will be conducted via a call between you and your physician/provider. We have found that certain health care needs can be provided without the need for an in-person physical exam.  This service lets us provide the care you need with a video conversation.  If a prescription is necessary we can send it directly to your pharmacy.  If lab work is needed we can place an order for that and you can then stop by our lab to have the test done at a later time.    Video visits are billed at different rates depending on your insurance coverage.  Please reach out to your insurance provider with any questions.    If during the course of the call the physician/provider feels a video visit is not appropriate, you will not be charged for this service.\"    Patient has given verbal consent for Video visit? Yes    How would you like to obtain your AVS? [facility] [place]    Patient would like the video invitation sent by: Text to cell phone: [phone]    Will anyone else be joining your video visit? No    Rheumatology Video Visit      Radha Rodriguez MRN# [id number]   YOB: 1960 Age: 60 year old      Date of visit: 5/27/20   PCP: Dr. Temo Fletcher    Chief Complaint   Patient presents with:  Inflammatory arthritis    Assessment and Plan     1. Inflammatory polyarthropathy: Previously followed by Ro[facility] Vasques and [facility].  Established care with me on 4/24/2018.  Previously on Humira (ineffective), HCQ (ineffective; used with MTX).  Currently on MTX 25mg once weekly with worsening symptoms when reduced in the past.  Question if arthritis has any relation to hx of suspected Crohn's Disease (reportedly fistula requiring surgery, and colonoscopies showing significant scaring in the intestine).  Inflammatory arthritis has been well controlled but more symptomatic at her [facility]Ps at the " last clinic visit that has improved with changing methotrexate to SQ, increasing bioavailability.  Labs are needed and can be done at her neurology appointment tomorrow; will fax the orders over to that clinic so that she can have labs done there, trying to reduce her exposure to the healthcare industry during the pandemic.  - Continue methotrexate 25mg SQ once weekly  - Continue folic 2mg daily  - Labs tomorrow at [facility] in [place] ([place] to fax orders):  CBC, Creatinine, Hepatic Panel, ESR, CRP  - Labs in 3 months: CBC, Creatinine, Hepatic Panel, ESR, CRP    2.  Primary osteoarthritis of the left first carpometacarpal joint: improved with steroid injections in the past.  Did better when she was not working as much, but since she has returned to work now her thumb pain has worsened.  She recently had an injection by hand surgeon who also recommended that she consider having surgery for her significant left first CMC osteoarthritis.  She is going to follow-up with her surgeon soon, regarding left carpal tunnel syndrome.    3.  Left carpal tunnel syndrome: Suspect carpal tunnel syndrome based on the description of her hand numbness and tingling.  It is also possible that she has a different nerve distribution involved and will be having a nerve conduction study at [facility] tomorrow.  After the NCS she plans to follow-up with her surgeon at [facility].      4. Plantar fasciitis, bilaterally: seen by podiatry.  Improved with stretching exercises.     # Relevant labs and imaging were reviewed with the patient    # High risk medication toxicity monitoring: discussion and labs reviewed; appropriate labs ordered. See above.  Instructed that if confirmed to have COVID-19 or exposure to someone with confirmed COVID-19 to call this clinic for directions on DMARD management.    # Note that this is a virtual visit to reduce the risk of COVID-19 exposure during this current pandemic.      # Considered to be at high  risk of complications from the COVID-19 virus.  It is recommended to limit contact with other people and if possible to work remotely or provide a leave of absence to reduce the risk for COVID-19.        Ms. Rodriguez verbalized agreement with and understanding of the rational for the diagnosis and treatment plan.  All questions were answered to best of my ability and the patient's satisfaction. Ms. Rodriguez was advised to contact the clinic with any questions that may arise after the clinic visit.      Thank you for involving me in the care of the patient    Return to clinic: 3-4 months        HPI   Radha Rodriguez is a 60 year old female with a past medical history significant for possible Crohn's disease requiring fistula surgery in the past, osteoarthritis, inflammatory arthritis who is seen for follow-up of inflammatory arthritis.    Today, Ms. Rodriguez reports that she was doing okay.  She was off work and the left first CMC osteoarthritis pain significantly improved.  She then started going back to work and the pain worsened.  Then more recently she started having numbness and tingling of her left hand and was seen in urgent care who referred her to an orthopedic surgeon who suspected carpal tunnel syndrome and therefore she is going to have a nerve conduction study tomorrow in a neurology clinic.  She is benefiting now from a steroid injection that was given in the orthopedic surgery clinic to her left first CMC joint and she would benefit from surgery for this osteoarthritis.       Denies fevers, chills, nausea, vomiting, constipation, diarrhea. No abdominal pain. No chest pain/pressure, palpitations, or shortness of breath. LE swelling worse at the end of the day but sometimes present at the beginning of the day too; she says that she will discuss with her PCP.  No neck pain. No oral sores.  Chronic recurrent nasal sore for years, per patient.  No rash. No sicca symptoms. No photosensitivity or photophobia. No eye  pain or redness. No history of inflammatory eye disease.    Tobacco: none  EtOH: occasional  Drugs: none    ROS   GEN: No fevers, chills, night sweats, fatigue, or weight change  SKIN: No itching, rashes, sores  HEENT: See HPI  CV: No chest pain, pressure, palpitations, or dyspnea on exertion.  PULM: No SOB, wheeze, cough.  GI: No nausea, vomiting, constipation, diarrhea. No blood in stool. No abdominal pain.  : No blood in urine.  MSK: See HPI.  NEURO: See HPI  EXT: LE edema unchanged   PSYCH: Negative    Active Problem List     Patient Active Problem List   Diagnosis     FEMALE STRESS INCONTINENCE post-hysterectomy     HYPERHIDROSIS on axilla and soles of feet     Dermatitis due to cosmetics     Contact dermatitis and other eczema, due to unspecified cause     Acquired acanthosis nigricans     LFT abnormalities and Urobilnogen high     CARDIOVASCULAR SCREENING; LDL GOAL LESS THAN 160     Vitamin D deficiency     Inflammatory arthritis     High risk medications (not anticoagulants) long-term use     Osteoarthritis     Menopausal syndrome (hot flashes)     Right lateral epicondylitis     Past Medical History     Past Medical History:   Diagnosis Date     Arthritis      Headache(784.0)      Irritable bowel syndrome      Other and unspecified noninfectious gastroenteritis and colitis(558.9)     chronic     Past Surgical History     Past Surgical History:   Procedure Laterality Date     C REPLACEMENT,URETER,BOWEL SEGMENT  10/01/2008    Part of her ureter was repaired.     CATARACT IOL, RT/LT       COLONOSCOPY  10/14/2011    Procedure:COLONOSCOPY; Colonoscopy; Surgeon:SCOTTY LEDEZMA; Location:[facility]     ENHANCE LASER REFRACTIVE BILATERAL EXISTING PT IN PARAMETERS       HYSTERECTOMY, VAGINAL  1/1/2000    TVH, fibroids (still has ovaries)     SURGICAL HISTORY OF -       Tubal Ligation     SURGICAL HISTORY OF -       Appy     SURGICAL HISTORY OF -       Tonsils     SURGICAL HISTORY OF -   12/94    Anal  "Fistulotomy     Allergy     Allergies   Allergen Reactions     Sulfa Drugs Rash     Clindamycin Rash     Codeine Hives     Codeine      Current Medication List     Current Outpatient Medications   Medication Sig     estrogen conj (PREMARIN) 0.45 MG tablet Take 1 tablet (0.45 mg) by mouth daily     estrogen conj (PREMARIN) 0.625 MG tablet Take 0.625 mg by mouth     folic acid (FOLVITE) 1 MG tablet Take 2 tablets (2 mg) by mouth daily (Patient not taking: Reported on 5/18/2020)     folic acid (FOLVITE) 1 MG tablet Take 2 mg by mouth     Insulin Syringe-Needle U-100 (BD INSULIN SYRINGE) 27G X 1/2\" 1 ML MISC For once weekly methotrexate administration. (Patient not taking: Reported on 5/18/2020)     methotrexate 50 MG/2ML injection Inject 1 mL (25 mg) Subcutaneous every 7 days (Patient not taking: Reported on 5/18/2020)     No current facility-administered medications for this visit.          Social History   See HPI    Family History     Family History   Problem Relation Age of Onset     Heart Disease Father         Heart attack     C.A.D. Father         age 50     Hypertension Father      Cancer Maternal Grandfather      Alzheimer Disease Maternal Grandfather      Cancer Paternal Grandfather      Diabetes No family hx of      Cerebrovascular Disease No family hx of      Breast Cancer No family hx of      Cancer - colorectal No family hx of      Prostate Cancer No family hx of        Physical Exam     Temp Readings from Last 3 Encounters:   05/18/20 98.1  F (36.7  C) (Tympanic)   03/10/20 98.7  F (37.1  C) (Oral)   08/21/19 98.3  F (36.8  C) (Oral)     BP Readings from Last 5 Encounters:   05/18/20 129/82   03/10/20 121/88   12/31/19 136/82   10/01/19 122/76   09/10/19 123/78     Pulse Readings from Last 1 Encounters:   05/18/20 80     Resp Readings from Last 1 Encounters:   05/18/20 12     Estimated body mass index is 29.2 kg/m  as calculated from the following:    Height as of 3/10/20: 1.638 m (5' 4.5\").    Weight as " of 5/18/20: 78.4 kg (172 lb 12.8 oz).      GEN: NAD  HEENT: MMM.  Anicteric, noninjected sclera  PULM: No increased work of breathing  MSK:  Hands and wrists without swelling.  Squaring of the left first CMC joint  PSYCH: Alert. Appropriate.        Labs / Imaging (select studies)   RF/CCP  Recent Labs   Lab Test 06/01/15  1139 01/31/14  1534 06/06/13  1341   CCPABY <20  Interpretation:  Negative   <20 Interpretation:  Negative  --    RHF <20  --  7     CBC  Recent Labs   Lab Test 12/23/19  1620 09/25/19  1117 03/27/19  1053   WBC 8.4 7.4 6.5   RBC 3.86 3.96 3.99   HGB 12.9 13.3 12.9   HCT 37.8 38.4 38.0   MCV 98 97 95   RDW 13.1 13.9 13.8    219 238   MCH 33.4* 33.6* 32.3   MCHC 34.1 34.6 33.9   NEUTROPHIL 69.7 73.0 71.7   LYMPH 20.6 17.8 18.9   MONOCYTE 6.7 6.5 6.4   EOSINOPHIL 2.6 2.3 2.5   BASOPHIL 0.4 0.4 0.5   ANEU 5.8 5.4 4.7   ALYM 1.7 1.3 1.2   EDWARD 0.6 0.5 0.4   AEOS 0.2 0.2 0.2   ABAS 0.0 0.0 0.0     CMP  Recent Labs   Lab Test 12/23/19  1620 09/25/19  1117 03/27/19  1053  10/11/17  1207 07/05/17  1639 03/29/17  1148   NA  --   --   --   --  138 139 143   POTASSIUM  --   --   --   --  4.1 3.5 4.5   CHLORIDE  --   --   --   --  105 105 108   CO2  --   --   --   --  28 29 29   ANIONGAP  --   --   --   --  5 5 6   GLC  --   --   --   --  86 119* 85   BUN  --   --   --   --  18 16 13   CR 0.73 0.70 0.64   < > 0.73 0.66 0.68   GFRESTIMATED >90 >90 >90   < > 82 >90  Non  GFR Calc   >90  Non  GFR Calc     GFRESTBLACK >90 >90 >90   < > >90 >90   GFR Calc   >90   GFR Calc     ENEIDA  --   --   --   --  9.2 8.9 8.7   BILITOTAL 0.3 0.4 0.4   < > 0.7 0.3 0.5   ALBUMIN 3.8 3.9 3.9   < > 3.9 3.5 3.7   PROTTOTAL 6.7* 6.8 6.4*   < > 6.9 6.4* 6.7*   ALKPHOS 68 67 60   < > 57 69 64   AST 21 20 20   < > 28 27 14   ALT 37 30 36   < > 44 38 31    < > = values in this interval not displayed.     Calcium/VitaminD  Recent Labs   Lab Test 10/11/17  5605  07/05/17  1639 03/29/17  1148  09/18/14  1129 04/29/14  1522 11/25/13  1350   ENEIDA 9.2 8.9 8.7   < >  --   --   --    VITDT  --   --   --   --  38 42 19*    < > = values in this interval not displayed.     ESR/CRP  Recent Labs   Lab Test 03/27/19  1053 12/18/18  1619 10/05/18  1214 08/01/18  0954   SED 6 6  --  6   CRP 3.0 3.1 9.9* 4.0     Hepatitis B  Recent Labs   Lab Test 04/24/18  1557   HBCAB Nonreactive   HEPBANG Nonreactive     Hepatitis C  Recent Labs   Lab Test 06/01/15  1139   HCVAB Nonreactive   Assay performance characteristics have not been established for newborns,   infants, and children       Tuberculosis Screening  Recent Labs   Lab Test 06/01/15  1139   TBRSLT Negative   TBAGN 0.00       Immunization History     Immunization History   Administered Date(s) Administered     HepB 01/24/1991, 02/28/1991, 09/05/1991     Influenza Quad, Recombinant, p-free (RIV4) 10/01/2019     Pneumo Conj 13-V (2010&after) 10/01/2019     Pneumococcal 23 valent 12/31/2019     TD (ADULT, 7+) 03/12/1990, 01/01/2000     TDAP Vaccine (Adacel) 09/10/2008     TDAP Vaccine (Boostrix) 09/10/2008     Tdap (Adacel,Boostrix) 03/01/2011            Chart documentation done in part with Dragon Voice recognition Software. Although reviewed after completion, some word and grammatical error may remain.        Video-Visit Details    Type of service:  Video Visit    Video Start Time: 3:00 PM  Video End Time: 3:17 PM    Originating Location (pt. Location):  Home    Distant Location (provider location):  Home    Platform used for Video Visit: Vanessa Eli MD

## 2020-05-27 NOTE — TELEPHONE ENCOUNTER
Reason for call:  Other   Patient called regarding (reason for call): call back  Additional comments: Patient is calling to speak to Dr. Eli's nurse, please call back to discuss.    Phone number to reach patient:  Home number on file [phone] (home)    Best Time:  any    Can we leave a detailed message on this number?  YES    Travel screening: Negative

## 2020-05-27 NOTE — TELEPHONE ENCOUNTER
Sent patient mychart message relaying Dr. Eli's message below.    Tuan Burnett RN....5/27/2020 8:55 AM

## 2020-05-29 NOTE — TELEPHONE ENCOUNTER
Called patient and relayed Dr. Eli's message below.  Patient verbalized understanding and has no questions.    Tuan Burnett RN....5/29/2020 4:08 PM

## 2020-05-29 NOTE — TELEPHONE ENCOUNTER
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     Abdoul Eli MD  5/29/2020 2:19 PM

## 2020-06-03 ENCOUNTER — TRANSFERRED RECORDS (OUTPATIENT)
Dept: HEALTH INFORMATION MANAGEMENT | Facility: CLINIC | Age: 60
End: 2020-06-03

## 2020-09-30 ENCOUNTER — TRANSFERRED RECORDS (OUTPATIENT)
Dept: HEALTH INFORMATION MANAGEMENT | Facility: CLINIC | Age: 60
End: 2020-09-30

## 2020-11-06 ENCOUNTER — MYC MEDICAL ADVICE (OUTPATIENT)
Dept: FAMILY MEDICINE | Facility: CLINIC | Age: 60
End: 2020-11-06

## 2020-11-06 NOTE — TELEPHONE ENCOUNTER
E-visit/Virtual visit/Telephone visit instructions given to Radha to further discuss symptoms.     Alessandro Taylor RN, BSN, PHN

## 2020-11-08 ENCOUNTER — VIRTUAL VISIT (OUTPATIENT)
Dept: FAMILY MEDICINE | Facility: OTHER | Age: 60
End: 2020-11-08

## 2020-11-08 NOTE — PROGRESS NOTES
"Date: 2020 10:09:18  Clinician: Raudel Jovel  Clinician NPI: [id number]  Patient: Radha Rodriguez  Patient : 1960  Patient Address: [address] [place] [address]  Patient Phone: [phone]  Visit Protocol: URI  Patient Summary:  Radha is a 60 year old ( : 1960 ) female who initiated a [facility] Visit for COVID-19 (Coronavirus) evaluation and screening. When asked the question \"Please sign me up to receive news, health information and promotions from [facility].\", Radha responded \"No\".    Radha states her symptoms started 1-2 days ago.   Her symptoms consist of myalgia, chills, malaise, a sore throat, a headache, a cough, and nasal congestion. Radha also feels feverish.   Symptom details     Nasal secretions: The color of her mucus is clear.    Cough: Radha coughs a few times an hour and her cough is not more bothersome at night. Phlegm comes into her throat when she coughs. She does not believe her cough is caused by post-nasal drip. The color of the phlegm is clear.     Sore throat: Radha reports having moderate throat pain (4-6 on a 10 point pain scale), does not have exudate on her tonsils, and can swallow liquids. The lymph nodes in her neck are not enlarged. A rash has not appeared on the skin since the sore throat started.     Temperature: Her current temperature is 99.4 degrees Fahrenheit.     Headache: She states the headache is moderate (4-6 on a 10 point pain scale).      Radha denies having vomiting, rhinitis, facial pain or pressure, teeth pain, ageusia, diarrhea, ear pain, wheezing, enlarged lymph nodes, nausea, and anosmia. She also denies taking antibiotic medication in the past month and having recent facial or sinus surgery in the past 60 days. She is not experiencing dyspnea.   Precipitating events  Within the past week, Radha has not been exposed to someone with strep throat. She has not recently been exposed to someone with influenza. Radha has been in close " contact with the following high risk individuals: people with asthma, heart disease or diabetes.   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 In the past 14 days, Radha has worked or volunteered at a healthcare facility or a group living setting. 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   Radha has had a close contact with a laboratory-confirmed COVID-19 patient within 14 days of symptom onset. She was not exposed at her work. Date Radha was exposed to the laboratory-confirmed COVID-19 patient: 11/04/2020   Additional information about contact with COVID-19 (Coronavirus) patient as reported by the patient (free text):     Since December 2019, Radha has not been tested for COVID-19 and has had upper respiratory infection (URI) or influenza-like illness.      Date(s) of previous URI or influenza-like illness (free-text): 11/6/2020     Symptoms Radha experienced during previous URI or influenza-like illness as reported by the patient (free-text): Chest tightness, sore throat, slight cough, low grade temp        Pertinent medical history  Radha had 2 sinus infections within the past year.   Radha does not get yeast infections when she takes antibiotics.   Radha does not need a return to work/school note.   Weight: 170 lbs   Radha does not smoke or use smokeless tobacco.   Weight: 170 lbs    MEDICATIONS: methotrexate sodium oral, Premarin oral, ALLERGIES: Sulfa (Sulfonamide Antibiotics)  Clinician Response:  Dear Radha,   Your symptoms show that you may have coronavirus (COVID-19). This illness can cause fever, cough and trouble breathing. Many people get a mild case and get better on their own. Some people can get very sick.  What should I do?  We would like to test you for this virus.   " 1. Please call [phone] to schedule your visit. Explain that you were referred by [facility] to have a COVID-19 test. Be ready to share your [facility] visit ID number.  * If you need to schedule in [facility] please call [phone] or for [employer] [place] employees please call [phone].  * If you need to schedule in the [place] area please call [phone]. [place] employees call [phone].  The following will serve as your written order for this COVID Test, ordered by me, for the indication of suspected COVID [Z20.828]: The test will be ordered in [employer], our electronic health record, after you are scheduled. It will show as ordered and authorized by Laz Ozuna MD.  Order: COVID-19 (Coronavirus) PCR for SYMPTOMATIC testing from [facility].   2. When it's time for your COVID test:  Stay at least 6 feet away from others. (If someone will drive you to your test, stay in the backseat, as far away from the  as you can.)   Cover your mouth and nose with a mask, tissue or washcloth.  Go straight to the testing site. Don't make any stops on the way there or back.      3.Starting now: Stay home and away from others (self-isolate) until:   You've had no fever---and no medicine that reduces fever---for one full day (24 hours). And...   Your other symptoms have gotten better. For example, your cough or breathing has improved. And...   At least 10 days have passed since your symptoms started.       During this time, don't leave the house except for testing or medical care.   Stay in your own room, even for meals. Use your own bathroom if you can.   Stay away from others in your home. No hugging, kissing or shaking hands. No visitors.  Don't go to work, school or anywhere else.    Clean \"high touch\" surfaces often (doorknobs, counters, handles, etc.). Use a household cleaning spray or wipes. You'll find a full list of  on the EPA website: www.epa.gov/pesticide-registration/list-n-disinfectants-use-against-sars-cov-2.  "  Cover your mouth and nose with a mask, tissue or washcloth to avoid spreading germs.  Wash your hands and face often. Use soap and water.  Caregivers in these groups are at risk for severe illness due to COVID-19:  o People 65 years and older  o People who live in a nursing home or long-term care facility  o People with chronic disease (lung, heart, cancer, diabetes, kidney, liver, immunologic)  o People who have a weakened immune system, including those who:   Are in cancer treatment  Take medicine that weakens the immune system, such as corticosteroids  Had a bone marrow or organ transplant  Have an immune deficiency  Have poorly controlled HIV or AIDS  Are obese (body mass index of 40 or higher)  Smoke regularly   o Caregivers should wear gloves while washing dishes, handling laundry and cleaning bedrooms and bathrooms.  o Use caution when washing and drying laundry: Don't shake dirty laundry, and use the warmest water setting that you can.  o For more tips, go to www.cdc.gov/coronavirus/2019-ncov/downloads/10Things.pdf.    4.Sign up for [employer]. We know it's scary to hear that you might have COVID-19. We want to track your symptoms to make sure you're okay over the next 2 weeks. Please look for an email from [employer]---this is a free, online program that we'll use to keep in touch. To sign up, follow the link in the email. Learn more at [email or web address]  How can I take care of myself?   Get lots of rest. Drink extra fluids (unless a doctor has told you not to).   Take Tylenol (acetaminophen) for fever or pain. If you have liver or kidney problems, ask your family doctor if it's okay to take Tylenol.   Adults can take either:    650 mg (two 325 mg pills) every 4 to 6 hours, or...   1,000 mg (two 500 mg pills) every 8 hours as needed.    Note: Don't take more than 3,000 mg in one day. Acetaminophen is found in many medicines (both prescribed and over-the-counter medicines). Read all  labels to be sure you don't take too much.   For children, check the Tylenol bottle for the right dose. The dose is based on the child's age or weight.    If you have other health problems (like cancer, heart failure, an organ transplant or severe kidney disease): Call your specialty clinic if you don't feel better in the next 2 days.       Know when to call 911. Emergency warning signs include:    Trouble breathing or shortness of breath Pain or pressure in the chest that doesn't go away Feeling confused like you haven't felt before, or not being able to wake up Bluish-colored lips or face.  Where can I get more information?   [facility] -- About COVID-19: [email or web address]   CDC -- What to Do If You're Sick: www.cdc.gov/coronavirus/2019-ncov/about/steps-when-sick.html   [facility] -- Ending Home Isolation: www.cdc.gov/coronavirus/2019-ncov/hcp/disposition-in-home-patients.html   [facility] -- Caring for Someone: www.cdc.gov/coronavirus/2019-ncov/if-you-are-sick/care-for-someone.html   [facility] -- Interim Guidance for Hospital Discharge to Home: www.[facility].[place]/diseases/coronavirus/hcp/hospdischarge.pdf   [facility] clinical trials (COVID-19 research studies): clinicalaffairs.[facility]-clinical-trials    Below are the COVID-19 hotlines at the [facility] ([facility]). Interpreters are available.    For health questions: Call 611-194-3011 or 1-133.199.8421 (7 a.m. to 7 p.m.) For questions about schools and childcare: Call 802-950-4900 or 1-629.745.2667 (7 a.m. to 7 p.m.)    Diagnosis: Contact with and (suspected) exposure to other viral communicable diseases  Diagnosis ICD: Z20.828

## 2020-11-16 ENCOUNTER — VIRTUAL VISIT (OUTPATIENT)
Dept: FAMILY MEDICINE | Facility: CLINIC | Age: 60
End: 2020-11-16
Payer: COMMERCIAL

## 2020-11-16 ENCOUNTER — ANCILLARY PROCEDURE (OUTPATIENT)
Dept: GENERAL RADIOLOGY | Facility: CLINIC | Age: 60
End: 2020-11-16
Attending: INTERNAL MEDICINE
Payer: COMMERCIAL

## 2020-11-16 ENCOUNTER — HEALTH MAINTENANCE LETTER (OUTPATIENT)
Age: 60
End: 2020-11-16

## 2020-11-16 DIAGNOSIS — R05.9 COUGH: ICD-10-CM

## 2020-11-16 DIAGNOSIS — J40 BRONCHITIS: ICD-10-CM

## 2020-11-16 DIAGNOSIS — R50.9 FEVER AND CHILLS: Primary | ICD-10-CM

## 2020-11-16 DIAGNOSIS — R50.9 FEVER AND CHILLS: ICD-10-CM

## 2020-11-16 DIAGNOSIS — Z20.822 EXPOSURE TO COVID-19 VIRUS: ICD-10-CM

## 2020-11-16 PROCEDURE — 71046 X-RAY EXAM CHEST 2 VIEWS: CPT | Performed by: RADIOLOGY

## 2020-11-16 PROCEDURE — 99213 OFFICE O/P EST LOW 20 MIN: CPT | Mod: 95 | Performed by: INTERNAL MEDICINE

## 2020-11-16 RX ORDER — AZITHROMYCIN 250 MG/1
TABLET, FILM COATED ORAL
Qty: 6 TABLET | Refills: 0 | Status: SHIPPED | OUTPATIENT
Start: 2020-11-16 | End: 2020-11-21

## 2020-11-16 NOTE — PATIENT INSTRUCTIONS
Patient Education     Pneumonia (Adult)  Pneumonia is an infection deep in the lungs. It is in the small air sacs (alveoli). It may be caused by a virus, fungus, or bacteria. Pneumonia caused by bacteria is often treated with an antibiotic. Severe cases may need to be treated in the hospital. Milder cases can be treated at home. Pneumonia symptoms are a lot like flu symptoms. They include fever, cough (dry or with phlegm), headache, muscle weakness, and pain. These symptoms often get worse in the first 2 days. But they often start to get better in the first week of treatment.     Home care  Follow these guidelines when caring for yourself at home:     Get plenty of rest. Don t let yourself get overly tired when you go back to your activities. Participate in activities as directed by your healthcare provider.    Stop smoking. This is the most important step you can take to help treat pneumonia. If you need help stopping smoking, talk with your healthcare provider.    Stay away from smoke and other irritants. Stay away from secondhand smoke. Don t let anyone smoke in your home.    Prevent lung infections. Ask your healthcare provider about the flu and pneumonia vaccines. Take steps to prevent colds and other lung infections.    Practice correct handwashing. Wash your hands often with soap and water. Use hand  when you can t wash your hands. Stay away from crowds during cold and flu season.    Use pain medicine as directed. You may use acetaminophen or ibuprofen to control fever or pain, unless another medicine was prescribed. If you have chronic liver or kidney disease, talk with your healthcare provider before using these medicines. Also talk with your provider if you ve had a stomach ulcer or GI (gastrointestinal) bleeding. Don t give aspirin to a child younger than age 19 unless directed by the provider. Taking aspirin can put a child at risk for Reye syndrome. This is a rare but very serious disorder.  It most often affects the brain and the liver.    Eat a light diet as needed. You may not feel like eating, so a light diet is fine. Follow the treatment plan as advised by your healthcare provider.    Drink plenty of water and fluids. This can make mucus thinner and easier to cough up. Ask your healthcare provider how much water you should drink. For many people, 6 to 8 glasses (8 ounces each) a day is a good goal. Other fluids include sport drinks, sodas without caffeine, juices, tea, or soup. If you also have heart or kidney disease, check with your provider before you drink extra fluids.    Finish all prescription medicine. Take antibiotic or antiviral medicine as prescribed by your healthcare provider, even if you are feeling better after a few days. Take the medicine until it is all gone.    Try to stay away from air pollution. If you live in an area with air pollution, track the Air Quality Index (AQI) reports and plan your outdoor activities with the AQI recommendations in mind  Follow-up care  Follow up with your healthcare provider in the next 2 to 3 days, or as advised. This is to be sure the medicine is helping you get better.   If you are 65 or older, you should get a pneumococcal vaccine and a yearly flu (influenza) shot. You should also get these vaccines if you have chronic lung disease such as asthma, emphysema, or COPD. A second type of pneumonia vaccine is also available for people over age 65 and those younger than 65 with certain health conditions. Talk with your healthcare provider about which pneumococcal vaccine is best for you.   Call 911  Call 911if any of these occur:     Unable to speak or swallow    Lips or skin looks blue, purple, or gray    Feeling dizzy or faint    Unable to wake up or loss of consciousness    Feeling of doom    Trouble breathing or wheezing    Shortness of breath gets worse or doesn't get better with treatment    Rapid breathing (more than 25 breaths per  minute)    Coughing up blood    Chest pain gets worse with breathing or doesn't get better with treatment  When to get medical advice  Call your healthcare provider right away if any of these occur:    You don t get better in the first 2 days of treatment    Fever of 100.4 F (38 C) or higher, or as directed by your healthcare provider    Shaking chills    Cough with phlegm that doesn't get better, or get worse    Shortness of breath with activities    Weakness, dizziness, or fainting that gets worse    Thirst or dry mouth that gets worse    Sinus pain, headache, or a stiff neck    Chest pain with breathing or coughing    Symptoms that get worse or not improving  [employer] last reviewed this educational content on 11/1/2019 2000-2020 [employer]. [address] [place] [address], [place] [address]. All rights reserved. This information is not intended as a substitute for professional medical care. Always follow your healthcare professional's instructions.

## 2020-11-16 NOTE — PROGRESS NOTES
"Radha Rodriguez is a 60 year old female who is being evaluated via a billable telephone visit.      The patient has been notified of following:     \"This telephone visit will be conducted via a call between you and your physician/provider. We have found that certain health care needs can be provided without the need for a physical exam.  This service lets us provide the care you need with a short phone conversation.  If a prescription is necessary we can send it directly to your pharmacy.  If lab work is needed we can place an order for that and you can then stop by our lab to have the test done at a later time.    Telephone visits are billed at different rates depending on your insurance coverage. During this emergency period, for some insurers they may be billed the same as an in-person visit.  Please reach out to your insurance provider with any questions.    If during the course of the call the physician/provider feels a telephone visit is not appropriate, you will not be charged for this service.\"    Patient has given verbal consent for Telephone visit?  Yes    What phone number would you like to be contacted at?     How would you like to obtain your AVS? [place]    Subjective     Radha Rodriguez is a 60 year old female who presents via phone visit today for the following health issues:    HPI   She is a healthcare worker  She was exposed to her  who had Covid  She started having all the classic symptoms of Covid  She contacted [place] [facility] and also [place]  This was 14 days ago  At that point of time because of the backlog with testing, she never got tested and was treated symptomatically  Now her fever is going up  She was only having low-grade fever before but today it went up to 101.5  Having cough  Pain in the left breast  No shortness of breath  She is concerned if she has pneumonia  No lethargy or weakness       Past Medical History:   Diagnosis Date     Arthritis      " "Headache(784.0)      Irritable bowel syndrome      Other and unspecified noninfectious gastroenteritis and colitis(558.9)     chronic     Current Outpatient Medications   Medication     azithromycin (ZITHROMAX) 250 MG tablet     estrogen conj (PREMARIN) 0.45 MG tablet     folic acid (FOLVITE) 1 MG tablet     Insulin Syringe-Needle U-100 (BD INSULIN SYRINGE) 27G X 1/2\" 1 ML MISC     methotrexate 50 MG/2ML injection     No current facility-administered medications for this visit.          Review of Systems   Constitutional, HEENT, cardiovascular, pulmonary, gi and gu systems are negative, except as otherwise noted.       Objective          Vitals:  No vitals were obtained today due to virtual visit.    healthy, alert and no distress  PSYCH: Alert and oriented times 3; coherent speech, normal   rate and volume, able to articulate logical thoughts, able   to abstract reason, no tangential thoughts, no hallucinations   or delusions  Her affect is normal  RESP: Having some cough, no audible wheezing, able to talk in full sentences  Remainder of exam unable to be completed due to telephone visits            Assessment/Plan:    Assessment & Plan     Fever and chills  Concern is if she has developed secondary bacterial infection after the Covid infection  Will get chest x-ray and start Z-Johnson  She is immunocompromised and takes methotrexate so we have to be very vigilant, she has not been taking it this for the last 2 weeks due to her Covid symptoms  Advised her to give me a call back if she starts developing shortness of breath  Obviously if the x-ray shows big pneumonia then I will have to send her to the hospital for IV antibiotics and blood cultures  .The differential diagnoses she could have had a flulike infection  - azithromycin (ZITHROMAX) 250 MG tablet; Take 2 tablets (500 mg) by mouth daily for 1 day, THEN 1 tablet (250 mg) daily for 4 days.  - XR Chest 2 Views; Future    Cough  As above  - azithromycin (ZITHROMAX) " "250 MG tablet; Take 2 tablets (500 mg) by mouth daily for 1 day, THEN 1 tablet (250 mg) daily for 4 days.  - XR Chest 2 Views; Future    Bronchitis  As above getting chest x-ray and empirically started on Z-Johnson    Exposure to COVID-19 virus  She is on day 14 of her quarantine.  - azithromycin (ZITHROMAX) 250 MG tablet; Take 2 tablets (500 mg) by mouth daily for 1 day, THEN 1 tablet (250 mg) daily for 4 days.  - XR Chest 2 Views; Future     BMI:   Estimated body mass index is 29.2 kg/m  as calculated from the following:    Height as of 3/10/20: 1.638 m (5' 4.5\").    Weight as of 5/18/20: 78.4 kg (172 lb 12.8 oz).                Return in about 2 days (around 11/18/2020) for using a [employer].    Giles Ruiz MD  [facility]    Phone call duration:  10 minutes                "

## 2020-11-25 ENCOUNTER — OFFICE VISIT (OUTPATIENT)
Dept: URGENT CARE | Facility: URGENT CARE | Age: 60
End: 2020-11-25
Payer: COMMERCIAL

## 2020-11-25 ENCOUNTER — MYC MEDICAL ADVICE (OUTPATIENT)
Dept: FAMILY MEDICINE | Facility: CLINIC | Age: 60
End: 2020-11-25

## 2020-11-25 VITALS
SYSTOLIC BLOOD PRESSURE: 136 MMHG | RESPIRATION RATE: 12 BRPM | HEART RATE: 77 BPM | OXYGEN SATURATION: 97 % | TEMPERATURE: 98.3 F | DIASTOLIC BLOOD PRESSURE: 83 MMHG

## 2020-11-25 DIAGNOSIS — Z20.822 SUSPECTED 2019 NOVEL CORONAVIRUS INFECTION: Primary | ICD-10-CM

## 2020-11-25 PROCEDURE — 99214 OFFICE O/P EST MOD 30 MIN: CPT | Performed by: FAMILY MEDICINE

## 2020-11-25 PROCEDURE — U0003 INFECTIOUS AGENT DETECTION BY NUCLEIC ACID (DNA OR RNA); SEVERE ACUTE RESPIRATORY SYNDROME CORONAVIRUS 2 (SARS-COV-2) (CORONAVIRUS DISEASE [COVID-19]), AMPLIFIED PROBE TECHNIQUE, MAKING USE OF HIGH THROUGHPUT TECHNOLOGIES AS DESCRIBED BY CMS-2020-01-R: HCPCS | Performed by: FAMILY MEDICINE

## 2020-11-25 RX ORDER — IBUPROFEN 200 MG
600 TABLET ORAL EVERY 6 HOURS PRN
COMMUNITY
End: 2022-02-07

## 2020-11-25 ASSESSMENT — ENCOUNTER SYMPTOMS
WOUND: 0
SORE THROAT: 0
FATIGUE: 1
RHINORRHEA: 0
MYALGIAS: 1
DIARRHEA: 0
VOMITING: 0
FEVER: 0
SHORTNESS OF BREATH: 0
COUGH: 1
HEADACHES: 0
NAUSEA: 0
CHILLS: 0

## 2020-11-25 NOTE — PROGRESS NOTES
SUBJECTIVE:   Radha Rodriguez is a 60 year old female presenting with a chief complaint of   Chief Complaint   Patient presents with     Breathing Problem     COVID sx started 11/3- pt was never tested but was assumed positive by [facility]- her  and child both tested positive. Pt is having chest tightness/soreness, difficulty breathing, fatigue, cough and back hurts        Sharri is a 60-year-old female presenting today with a viral symptoms over the past 3 weeks.  Although she is never been tested for Covid her  and child both tested positive recently has been having some chest tightness and soreness in central aspect of her chest as well as some increased work of breathing some fatigue cough with some muscle aches.      Review of Systems   Constitutional: Positive for fatigue. Negative for chills and fever.   HENT: Negative for congestion, ear pain, rhinorrhea and sore throat.    Respiratory: Positive for cough. Negative for shortness of breath.    Gastrointestinal: Negative for diarrhea, nausea and vomiting.   Musculoskeletal: Positive for myalgias.   Skin: Negative for rash and wound.   Neurological: Negative for headaches.       Past Medical History:   Diagnosis Date     Arthritis      Headache(784.0)      Irritable bowel syndrome      Other and unspecified noninfectious gastroenteritis and colitis(558.9)     chronic     Family History   Problem Relation Age of Onset     Heart Disease Father         Heart attack     C.A.D. Father         age 50     Hypertension Father      Cancer Maternal Grandfather      Alzheimer Disease Maternal Grandfather      Cancer Paternal Grandfather      Diabetes No family hx of      Cerebrovascular Disease No family hx of      Breast Cancer No family hx of      Cancer - colorectal No family hx of      Prostate Cancer No family hx of      Current Outpatient Medications   Medication Sig Dispense Refill     estrogen conj (PREMARIN) 0.45 MG tablet Take 1 tablet (0.45 mg) by mouth  "daily 90 tablet 1     folic acid (FOLVITE) 1 MG tablet Take 2 tablets (2 mg) by mouth daily 180 tablet 3     ibuprofen (ADVIL/MOTRIN) 200 MG tablet Take 200 mg by mouth every 4 hours as needed for mild pain       Insulin Syringe-Needle U-100 (BD INSULIN SYRINGE) 27G X 1/2\" 1 ML MISC For once weekly methotrexate administration. (Patient not taking: Reported on 11/25/2020) 10 each 7     methotrexate 50 MG/2ML injection Inject 1 mL (25 mg) Subcutaneous every 7 days (Patient not taking: Reported on 11/25/2020) 4 vial 3     Social History     Tobacco Use     Smoking status: Never Smoker     Smokeless tobacco: Never Used   Substance Use Topics     Alcohol use: Yes     Comment: Occasional       OBJECTIVE  /83   Pulse 77   Temp 98.3  F (36.8  C) (Oral)   Resp 12   SpO2 97%     Physical Exam  Neck:      Musculoskeletal: Normal range of motion.   Cardiovascular:      Rate and Rhythm: Normal rate.      Pulses: Normal pulses.   Pulmonary:      Effort: Pulmonary effort is normal.   Skin:     General: Skin is warm.      Capillary Refill: Capillary refill takes less than 2 seconds.   Neurological:      General: No focal deficit present.      Mental Status: She is alert.           ASSESSMENT:    ICD-10-CM    1. Suspected 2019 novel coronavirus infection  Z20.828 Symptomatic COVID-19 Virus (Coronavirus) by PCR          PLAN:  Gown gloves mask and eye protection worn throughout the evaluation  I did agree to test her today she seems that she is concerned about 3 weeks of symptoms that seem to intermittently improve and then worsen again. possible that she is in the \"long-hauler \"category with Covid although has never had a previous test.  Certainly high risk with family members both testing positive  Emphasize importance of rest healthy nutrition One-A-Day multivitamin vitamin D help keep her immune system strong  Of a low threshold in the next 10 to 12 hours even to go directly to the ER if she feels she continues to " worsen although her exam is reassuring without any obvious distress or work of breathing.  Oxygen saturation is also reassuring.  The patient indicates understanding of these issues and agrees with the plan.   Patient educational/instructional material provided including reasons for follow-up   Jatin Amador MD

## 2020-11-28 LAB
SARS-COV-2 RNA SPEC QL NAA+PROBE: NOT DETECTED
SPECIMEN SOURCE: NORMAL

## 2021-01-04 ENCOUNTER — OFFICE VISIT (OUTPATIENT)
Dept: FAMILY MEDICINE | Facility: CLINIC | Age: 61
End: 2021-01-04
Payer: COMMERCIAL

## 2021-01-04 ENCOUNTER — ANCILLARY PROCEDURE (OUTPATIENT)
Dept: GENERAL RADIOLOGY | Facility: CLINIC | Age: 61
End: 2021-01-04
Attending: NURSE PRACTITIONER
Payer: COMMERCIAL

## 2021-01-04 DIAGNOSIS — R94.31 ABNORMAL ELECTROCARDIOGRAM: ICD-10-CM

## 2021-01-04 DIAGNOSIS — D84.9 IMMUNOSUPPRESSED STATUS (H): ICD-10-CM

## 2021-01-04 DIAGNOSIS — R07.9 CHEST PAIN, UNSPECIFIED TYPE: Primary | ICD-10-CM

## 2021-01-04 DIAGNOSIS — M06.4 INFLAMMATORY POLYARTHROPATHY (H): ICD-10-CM

## 2021-01-04 LAB
ALBUMIN SERPL-MCNC: 3.9 G/DL (ref 3.4–5)
ALP SERPL-CCNC: 58 U/L (ref 40–150)
ALT SERPL W P-5'-P-CCNC: 32 U/L (ref 0–50)
ANION GAP SERPL CALCULATED.3IONS-SCNC: 8 MMOL/L (ref 3–14)
AST SERPL W P-5'-P-CCNC: 18 U/L (ref 0–45)
BILIRUB SERPL-MCNC: 0.4 MG/DL (ref 0.2–1.3)
BUN SERPL-MCNC: 14 MG/DL (ref 7–30)
CALCIUM SERPL-MCNC: 9.1 MG/DL (ref 8.5–10.1)
CHLORIDE SERPL-SCNC: 105 MMOL/L (ref 94–109)
CO2 SERPL-SCNC: 27 MMOL/L (ref 20–32)
CREAT SERPL-MCNC: 0.75 MG/DL (ref 0.52–1.04)
ERYTHROCYTE [DISTWIDTH] IN BLOOD BY AUTOMATED COUNT: 13 % (ref 10–15)
GFR SERPL CREATININE-BSD FRML MDRD: 87 ML/MIN/{1.73_M2}
GLUCOSE SERPL-MCNC: 86 MG/DL (ref 70–99)
HCT VFR BLD AUTO: 39.9 % (ref 35–47)
HGB BLD-MCNC: 13.3 G/DL (ref 11.7–15.7)
MCH RBC QN AUTO: 30.9 PG (ref 26.5–33)
MCHC RBC AUTO-ENTMCNC: 33.3 G/DL (ref 31.5–36.5)
MCV RBC AUTO: 93 FL (ref 78–100)
PLATELET # BLD AUTO: 237 10E9/L (ref 150–450)
POTASSIUM SERPL-SCNC: 3.8 MMOL/L (ref 3.4–5.3)
PROT SERPL-MCNC: 7.1 G/DL (ref 6.8–8.8)
RBC # BLD AUTO: 4.3 10E12/L (ref 3.8–5.2)
SODIUM SERPL-SCNC: 140 MMOL/L (ref 133–144)
WBC # BLD AUTO: 8.6 10E9/L (ref 4–11)

## 2021-01-04 PROCEDURE — 85027 COMPLETE CBC AUTOMATED: CPT | Performed by: NURSE PRACTITIONER

## 2021-01-04 PROCEDURE — 93000 ELECTROCARDIOGRAM COMPLETE: CPT | Performed by: NURSE PRACTITIONER

## 2021-01-04 PROCEDURE — 71046 X-RAY EXAM CHEST 2 VIEWS: CPT | Performed by: RADIOLOGY

## 2021-01-04 PROCEDURE — 36415 COLL VENOUS BLD VENIPUNCTURE: CPT | Performed by: NURSE PRACTITIONER

## 2021-01-04 PROCEDURE — 80053 COMPREHEN METABOLIC PANEL: CPT | Performed by: NURSE PRACTITIONER

## 2021-01-04 PROCEDURE — 99214 OFFICE O/P EST MOD 30 MIN: CPT | Performed by: NURSE PRACTITIONER

## 2021-01-04 ASSESSMENT — MIFFLIN-ST. JEOR: SCORE: 1378.53

## 2021-01-04 ASSESSMENT — PAIN SCALES - GENERAL: PAINLEVEL: MILD PAIN (2)

## 2021-01-04 NOTE — PROGRESS NOTES
"  Assessment & Plan     Chest pain, unspecified type  EKG shows possible enlarged heart but not much change from EKG in 2015, get ECHO done, follow up with PCP or consider referral based off results. Xray shows small pericardial fat pad, ECHO will likely evaluate this further during assessment. Labs WNL. Chest pain likely from COVID-19 in November 2020  - EKG 12-lead complete w/read - Clinics  - XR Chest 2 Views  - CBC with platelets  - Comprehensive metabolic panel (BMP + Alb, Alk Phos, ALT, AST, Total. Bili, TP)    Abnormal electrocardiogram  As above  - Echocardiogram Complete; Future    Immunosuppressed status (H)Inflammatory polyarthropathy (H)  Is not on folic acid or methotrexate at this time      Review of the result(s) of each unique test - as above         BMI:   Estimated body mass index is 30.01 kg/m  as calculated from the following:    Height as of this encounter: 1.638 m (5' 4.5\").    Weight as of this encounter: 80.6 kg (177 lb 9.6 oz).         Return in about 2 weeks (around 1/18/2021), or if symptoms worsen or fail to improve.    PHONG Tony, NP-C  [facility]     Radha Rodriguez is a 60 year old female who presents to clinic today for the following health issues  accompanied by her self:    HPI         Chest pain on LT breast/chest that will radiate to the back. Same pain when she was put on zpack and when she went to urgent care. Ibuprofen helps sometime. Doesn't feel like heart burn. It does wake her up. Deep breath does worsen the pain and feels it more intense.    Has been to chiropracter for back ache which helps some.  -has been present since positive for COVID in November, got sick nov 5th, was out of work almost a month. Her  and son also got sick at same time, they has positive tests, she was in contact with MD, ultimately she didn't get a positive test at that time but was presumed positive due to significant symptoms.  -was on a zpack but " "that did not help much.   She was unhappy with the urgent care provider.   Getting her smell back, its not 100% back though  Taste is coming back, not quite 100%.   She thought she was going to see Dr. Fletcher today, isn't sure what happened.   134/83 recheck        Review of Systems   Constitutional, HEENT, cardiovascular-as above, pulmonary-as abve, gi and gu systems are negative, except as otherwise noted.      Objective    /83   Pulse 74   Temp 98.7  F (37.1  C) (Oral)   Resp 18   Ht 1.638 m (5' 4.5\")   Wt 80.6 kg (177 lb 9.6 oz)   SpO2 97%   BMI 30.01 kg/m    Body mass index is 30.01 kg/m .  Physical Exam   GENERAL: healthy, alert and no distress  EYES: Eyes grossly normal to inspection, PERRL and conjunctivae and sclerae normal  HENT: ear canals and TM's normal, nose and mouth without ulcers or lesions  NECK: no adenopathy, no asymmetry, masses, or scars and thyroid normal to palpation  RESP: lungs clear to auscultation - no rales, rhonchi or wheezes  CV: regular rate and rhythm, normal S1 S2, no S3 or S4, no murmur, click or rub. Slight chest discomfort slightly left from sternum slightly higher than midline, no rash noted  ABDOMEN: soft, nontender, no hepatosplenomegaly, no masses and bowel sounds normal  MS: no gross musculoskeletal defects noted      EKG - Reviewed and interpreted by me see below  Results for orders placed or performed in visit on 01/04/21   XR Chest 2 Views     Status: None    Narrative    CHEST TWO VIEWS  1/4/2021 4:28 PM     HISTORY: 60-year-old woman with chest pain.       Impression    IMPRESSION: Since November 16, 2020, heart size is normal. No pleural  effusion, pneumothorax, or abnormal area of consolidation.  Well-defined opacity adjacent to the left heart border thought to be a  pericardial fat pad.    TYREE VASQUES MD   CBC with platelets     Status: None   Result Value Ref Range    WBC 8.6 4.0 - 11.0 10e9/L    RBC Count 4.30 3.8 - 5.2 10e12/L    Hemoglobin 13.3 " 11.7 - 15.7 g/dL    Hematocrit 39.9 35.0 - 47.0 %    MCV 93 78 - 100 fl    MCH 30.9 26.5 - 33.0 pg    MCHC 33.3 31.5 - 36.5 g/dL    RDW 13.0 10.0 - 15.0 %    Platelet Count 237 150 - 450 10e9/L   Comprehensive metabolic panel (BMP + Alb, Alk Phos, ALT, AST, Total. Bili, TP)     Status: None   Result Value Ref Range    Sodium 140 133 - 144 mmol/L    Potassium 3.8 3.4 - 5.3 mmol/L    Chloride 105 94 - 109 mmol/L    Carbon Dioxide 27 20 - 32 mmol/L    Anion Gap 8 3 - 14 mmol/L    Glucose 86 70 - 99 mg/dL    Urea Nitrogen 14 7 - 30 mg/dL    Creatinine 0.75 0.52 - 1.04 mg/dL    GFR Estimate 87 >60 mL/min/[1.73_m2]    GFR Estimate If Black >90 >60 mL/min/[1.73_m2]    Calcium 9.1 8.5 - 10.1 mg/dL    Bilirubin Total 0.4 0.2 - 1.3 mg/dL    Albumin 3.9 3.4 - 5.0 g/dL    Protein Total 7.1 6.8 - 8.8 g/dL    Alkaline Phosphatase 58 40 - 150 U/L    ALT 32 0 - 50 U/L    AST 18 0 - 45 U/L   EKG 12-lead complete w/read - Clinics     Status: None    Impression    SR, possible left atrial enlargement. From 2015, slight change in waveform in V1.  PHONG Tony, NP-C  [facility]

## 2021-01-05 PROBLEM — D84.9 IMMUNOSUPPRESSED STATUS (H): Status: ACTIVE | Noted: 2021-01-05

## 2021-01-05 NOTE — RESULT ENCOUNTER NOTE
Garry Garcia,   Your chest x-ray was normal, no fluid noted in the lungs, no signs of infection. I do not feel you need another round of antibiotics at this time. There was a noted shaded area near the heart that is likely a fatty deposit pad. The ECHO will also likely note what this is with more clear imaging. I will make sure Dr. Fletcher gets all this information when I send the note/your chart to him. Please let me know if you have questions.   Thank you,  PHONG Tony, NP-C  [facility]

## 2021-01-05 NOTE — RESULT ENCOUNTER NOTE
Garry Garcia,   Your labs are normal. Keep me posted if you have questions.   Thank you,  PHONG Tony, NP-C  [facility]

## 2021-01-06 ENCOUNTER — ANCILLARY PROCEDURE (OUTPATIENT)
Dept: CARDIOLOGY | Facility: CLINIC | Age: 61
End: 2021-01-06
Attending: NURSE PRACTITIONER
Payer: COMMERCIAL

## 2021-01-06 VITALS
RESPIRATION RATE: 18 BRPM | BODY MASS INDEX: 29.59 KG/M2 | HEART RATE: 74 BPM | TEMPERATURE: 98.7 F | SYSTOLIC BLOOD PRESSURE: 134 MMHG | WEIGHT: 177.6 LBS | DIASTOLIC BLOOD PRESSURE: 83 MMHG | HEIGHT: 65 IN | OXYGEN SATURATION: 97 %

## 2021-01-06 DIAGNOSIS — R94.31 ABNORMAL ELECTROCARDIOGRAM: ICD-10-CM

## 2021-01-06 PROCEDURE — 93306 TTE W/DOPPLER COMPLETE: CPT | Performed by: INTERNAL MEDICINE

## 2021-01-07 NOTE — RESULT ENCOUNTER NOTE
Garry Garcia,   Good news your ECHO was normal. EF was 60-65%, this is how well your heart pumps blood. Anything over 50% is normal. Please let me know if you have questions.   Thank you,  PHONG Tony, NP-C  [facility]

## 2021-03-29 ENCOUNTER — OFFICE VISIT (OUTPATIENT)
Dept: FAMILY MEDICINE | Facility: CLINIC | Age: 61
End: 2021-03-29
Payer: COMMERCIAL

## 2021-03-29 VITALS
BODY MASS INDEX: 29.99 KG/M2 | WEIGHT: 180 LBS | SYSTOLIC BLOOD PRESSURE: 148 MMHG | HEART RATE: 67 BPM | HEIGHT: 65 IN | DIASTOLIC BLOOD PRESSURE: 80 MMHG | TEMPERATURE: 97.8 F | OXYGEN SATURATION: 100 %

## 2021-03-29 DIAGNOSIS — M06.4 INFLAMMATORY POLYARTHROPATHY (H): ICD-10-CM

## 2021-03-29 DIAGNOSIS — M79.661 PAIN IN BOTH LOWER LEGS: Primary | ICD-10-CM

## 2021-03-29 DIAGNOSIS — M79.662 PAIN IN BOTH LOWER LEGS: Primary | ICD-10-CM

## 2021-03-29 LAB
ALBUMIN SERPL-MCNC: 3.9 G/DL (ref 3.4–5)
ALP SERPL-CCNC: 62 U/L (ref 40–150)
ALT SERPL W P-5'-P-CCNC: 28 U/L (ref 0–50)
ANION GAP SERPL CALCULATED.3IONS-SCNC: 2 MMOL/L (ref 3–14)
AST SERPL W P-5'-P-CCNC: 17 U/L (ref 0–45)
BASOPHILS # BLD AUTO: 0 10E9/L (ref 0–0.2)
BASOPHILS NFR BLD AUTO: 0.3 %
BILIRUB DIRECT SERPL-MCNC: 0.2 MG/DL (ref 0–0.2)
BILIRUB SERPL-MCNC: 0.5 MG/DL (ref 0.2–1.3)
BUN SERPL-MCNC: 11 MG/DL (ref 7–30)
CALCIUM SERPL-MCNC: 9 MG/DL (ref 8.5–10.1)
CHLORIDE SERPL-SCNC: 109 MMOL/L (ref 94–109)
CO2 SERPL-SCNC: 29 MMOL/L (ref 20–32)
CREAT SERPL-MCNC: 0.77 MG/DL (ref 0.52–1.04)
CRP SERPL-MCNC: <2.9 MG/L (ref 0–8)
DIFFERENTIAL METHOD BLD: NORMAL
EOSINOPHIL # BLD AUTO: 0.2 10E9/L (ref 0–0.7)
EOSINOPHIL NFR BLD AUTO: 2.8 %
ERYTHROCYTE [DISTWIDTH] IN BLOOD BY AUTOMATED COUNT: 12.5 % (ref 10–15)
ERYTHROCYTE [SEDIMENTATION RATE] IN BLOOD BY WESTERGREN METHOD: 5 MM/H (ref 0–30)
GFR SERPL CREATININE-BSD FRML MDRD: 85 ML/MIN/{1.73_M2}
GLUCOSE SERPL-MCNC: 85 MG/DL (ref 70–99)
HCT VFR BLD AUTO: 41.3 % (ref 35–47)
HGB BLD-MCNC: 13.6 G/DL (ref 11.7–15.7)
LYMPHOCYTES # BLD AUTO: 1.8 10E9/L (ref 0.8–5.3)
LYMPHOCYTES NFR BLD AUTO: 26.5 %
MCH RBC QN AUTO: 30.4 PG (ref 26.5–33)
MCHC RBC AUTO-ENTMCNC: 32.9 G/DL (ref 31.5–36.5)
MCV RBC AUTO: 92 FL (ref 78–100)
MONOCYTES # BLD AUTO: 0.4 10E9/L (ref 0–1.3)
MONOCYTES NFR BLD AUTO: 6.4 %
NEUTROPHILS # BLD AUTO: 4.4 10E9/L (ref 1.6–8.3)
NEUTROPHILS NFR BLD AUTO: 64 %
PLATELET # BLD AUTO: 226 10E9/L (ref 150–450)
POTASSIUM SERPL-SCNC: 4 MMOL/L (ref 3.4–5.3)
PROT SERPL-MCNC: 6.6 G/DL (ref 6.8–8.8)
RBC # BLD AUTO: 4.47 10E12/L (ref 3.8–5.2)
SODIUM SERPL-SCNC: 140 MMOL/L (ref 133–144)
WBC # BLD AUTO: 6.9 10E9/L (ref 4–11)

## 2021-03-29 PROCEDURE — 86618 LYME DISEASE ANTIBODY: CPT | Performed by: NURSE PRACTITIONER

## 2021-03-29 PROCEDURE — 85025 COMPLETE CBC W/AUTO DIFF WBC: CPT | Performed by: NURSE PRACTITIONER

## 2021-03-29 PROCEDURE — 82550 ASSAY OF CK (CPK): CPT | Performed by: NURSE PRACTITIONER

## 2021-03-29 PROCEDURE — 80076 HEPATIC FUNCTION PANEL: CPT | Performed by: NURSE PRACTITIONER

## 2021-03-29 PROCEDURE — 80048 BASIC METABOLIC PNL TOTAL CA: CPT | Performed by: NURSE PRACTITIONER

## 2021-03-29 PROCEDURE — 85652 RBC SED RATE AUTOMATED: CPT | Performed by: NURSE PRACTITIONER

## 2021-03-29 PROCEDURE — 86140 C-REACTIVE PROTEIN: CPT | Performed by: NURSE PRACTITIONER

## 2021-03-29 PROCEDURE — 36415 COLL VENOUS BLD VENIPUNCTURE: CPT | Performed by: NURSE PRACTITIONER

## 2021-03-29 PROCEDURE — 99214 OFFICE O/P EST MOD 30 MIN: CPT | Performed by: NURSE PRACTITIONER

## 2021-03-29 RX ORDER — CYCLOBENZAPRINE HCL 5 MG
5 TABLET ORAL 3 TIMES DAILY PRN
Qty: 30 TABLET | Refills: 1 | Status: SHIPPED | OUTPATIENT
Start: 2021-03-29 | End: 2021-06-11

## 2021-03-29 RX ORDER — TRAMADOL HYDROCHLORIDE 50 MG/1
50 TABLET ORAL EVERY 6 HOURS PRN
Qty: 10 TABLET | Refills: 0 | Status: SHIPPED | OUTPATIENT
Start: 2021-03-29 | End: 2021-04-01

## 2021-03-29 ASSESSMENT — MIFFLIN-ST. JEOR: SCORE: 1384.41

## 2021-03-29 ASSESSMENT — PAIN SCALES - GENERAL: PAINLEVEL: EXTREME PAIN (8)

## 2021-03-29 NOTE — Clinical Note
Hello, just saw this patient.  Wondering if there is anything else you would do for her today?  Thank you for your time,  PHONG Ramos CNP D

## 2021-03-29 NOTE — PATIENT INSTRUCTIONS
Flexeril three times a day as needed for pain- it is a muscle relaxer.  Causes drowsiness.  Do not drive while taking.    Continue ibuprofen 800 mg every 6 hours   Can add tylenol 1000  Mg every 6 hours.  Try voltaren gel, lidocaine cream or patches, and/or icy hot all available over the counter.      Will follow up with labs and will send chart to Rojas Fletcher and Sreedhar

## 2021-03-29 NOTE — PROGRESS NOTES
Assessment & Plan     Pain in both lower legs  Plan for labs below.  If inflammatory markers elevated, will recommend she start prednisone.  Pain control as below and per patient instructions.  Will send note to PCP and rheumatology.   - ESR: Erythrocyte sedimentation rate  - CRP, inflammation  - CBC with platelets and differential  - Lyme Disease Shanon with reflex to WB Serum  - Hepatic panel (Albumin, ALT, AST, Bili, Alk Phos, TP)  - Basic metabolic panel  (Ca, Cl, CO2, Creat, Gluc, K, Na, BUN)  - cyclobenzaprine (FLEXERIL) 5 MG tablet; Take 1 tablet (5 mg) by mouth 3 times daily as needed for muscle spasms  - traMADol (ULTRAM) 50 MG tablet; Take 1 tablet (50 mg) by mouth every 6 hours as needed for severe pain    Inflammatory polyarthropathy (H)  As above.   - ESR: Erythrocyte sedimentation rate  - CRP, inflammation  - cyclobenzaprine (FLEXERIL) 5 MG tablet; Take 1 tablet (5 mg) by mouth 3 times daily as needed for muscle spasms  - traMADol (ULTRAM) 50 MG tablet; Take 1 tablet (50 mg) by mouth every 6 hours as needed for severe pain      25 minutes spent on the date of the encounter doing chart review, history and exam, documentation and further activities as noted above       Patient Instructions   Flexeril three times a day as needed for pain- it is a muscle relaxer.  Causes drowsiness.  Do not drive while taking.    Continue ibuprofen 800 mg every 6 hours   Can add tylenol 1000  Mg every 6 hours.  Try voltaren gel, lidocaine cream or patches, and/or icy hot all available over the counter.      Will follow up with labs and will send chart to Rojas Fletcher and Sreedhar      Return in about 2 days (around 3/31/2021), or if symptoms worsen or fail to improve.    PHONG Ramos CNP  [facility] is a 61 year old who presents for the following health issues     HPI     Musculoskeletal problem/pain  Onset/Duration: x1 wk. Started with right knee pain, now both  "legs hurt.   Description  Location: leg - bilateral  Joint Swelling: YES  Redness: YES  Pain: YES  Warmth: no  Intensity:  8/10  Progression of Symptoms:  worsening  Accompanying signs and symptoms:   Fevers: no  Numbness/tingling/weakness: YES  History  Trauma to the area: no  Recent illness:  no  Previous similar problem: YES  Previous evaluation:  YES  Precipitating or alleviating factors:  Aggravating factors include: standing  Therapies tried and outcome: heat, stretching and Ibuprofen  Right knee sore with positioning at work last week Thursday. Then progressed to right hamstring area.  A few days later, progressed to left leg as well. Then for the last few days has been bilateral lower extremity leg (thigh) and hip pain.  Feels like a deep ache.  Tried going for a walk with a friend which didn't help. Feels possibly made it worse.  Pain wakes her from sleep several times a night.  Left work today due to pain.  Works as dental hygienist.   Ibuprofen does help. A bit.    Was on vacation to [place] 1. 5 weeks ago.  Stayed in hotel and went to a beach.  Denies any rash, fever, chills, weight loss, fatigue, sweats.        A similar issue happened three years ago. Got to a point she could not walk at all.  She was seen in emergency room.  Workup was negative at the time.      Off methotrexate since COVID.  Has not had many issues with arthritis since then.  States she wants to avoid being on prednisone as rarely works and causes side effects.     Most recent thumb injection 3 months ago with [facility].    Review of Systems   Constitutional, HEENT, cardiovascular, pulmonary, GI, , musculoskeletal, neuro, skin, endocrine and psych systems are negative, except as otherwise noted.      Objective    BP (!) 148/80 (BP Location: Right arm, Patient Position: Sitting, Cuff Size: Adult Regular)   Pulse 67   Temp 97.8  F (36.6  C) (Oral)   Ht 1.638 m (5' 4.5\")   Wt 81.6 kg (180 lb)   SpO2 100%   BMI 30.42 kg/m    Body mass index " is 30.42 kg/m .  Physical Exam   GENERAL: alert and appears to be in a lot of pain, continually rubbing thighs and wincing with any movements.   NECK: no adenopathy, no asymmetry, masses, or scars and thyroid normal to palpation  RESP: lungs clear to auscultation - no rales, rhonchi or wheezes  CV: regular rate and rhythm, normal S1 S2, no S3 or S4, no murmur, click or rub, no peripheral edema and peripheral pulses strong  ABDOMEN: soft, nontender, no hepatosplenomegaly, no masses and bowel sounds normal  MS: no gross musculoskeletal defects noted, no edema  SKIN: no suspicious lesions or rashes

## 2021-03-30 LAB
B BURGDOR IGG+IGM SER QL: 0.04 (ref 0–0.89)
CK SERPL-CCNC: 105 U/L (ref 30–225)

## 2021-03-31 NOTE — PROGRESS NOTES
Spoke with patient, appointment made per Dr. Eli for April 7th at 11:50.  Mi Irwin CMA Rheumatology  3/31/2021 10:17 AM

## 2021-04-03 ENCOUNTER — HEALTH MAINTENANCE LETTER (OUTPATIENT)
Age: 61
End: 2021-04-03

## 2021-04-07 ENCOUNTER — OFFICE VISIT (OUTPATIENT)
Dept: RHEUMATOLOGY | Facility: CLINIC | Age: 61
End: 2021-04-07
Payer: COMMERCIAL

## 2021-04-07 VITALS
SYSTOLIC BLOOD PRESSURE: 135 MMHG | DIASTOLIC BLOOD PRESSURE: 84 MMHG | HEART RATE: 87 BPM | BODY MASS INDEX: 29.59 KG/M2 | HEIGHT: 65 IN | WEIGHT: 177.6 LBS

## 2021-04-07 DIAGNOSIS — M06.4 INFLAMMATORY POLYARTHROPATHY (H): ICD-10-CM

## 2021-04-07 DIAGNOSIS — M18.12 PRIMARY OSTEOARTHRITIS OF FIRST CARPOMETACARPAL JOINT OF LEFT HAND: ICD-10-CM

## 2021-04-07 DIAGNOSIS — G89.29 CHRONIC PAIN OF BOTH KNEES: Primary | ICD-10-CM

## 2021-04-07 DIAGNOSIS — M17.11 PRIMARY OSTEOARTHRITIS OF RIGHT KNEE: ICD-10-CM

## 2021-04-07 DIAGNOSIS — M25.562 CHRONIC PAIN OF BOTH KNEES: Primary | ICD-10-CM

## 2021-04-07 DIAGNOSIS — M25.561 CHRONIC PAIN OF BOTH KNEES: Primary | ICD-10-CM

## 2021-04-07 PROCEDURE — 99214 OFFICE O/P EST MOD 30 MIN: CPT | Mod: 25 | Performed by: INTERNAL MEDICINE

## 2021-04-07 PROCEDURE — 20600 DRAIN/INJ JOINT/BURSA W/O US: CPT | Mod: LT | Performed by: INTERNAL MEDICINE

## 2021-04-07 PROCEDURE — 20610 DRAIN/INJ JOINT/BURSA W/O US: CPT | Mod: RT | Performed by: INTERNAL MEDICINE

## 2021-04-07 RX ORDER — TRIAMCINOLONE ACETONIDE 40 MG/ML
40 INJECTION, SUSPENSION INTRA-ARTICULAR; INTRAMUSCULAR ONCE
Status: COMPLETED | OUTPATIENT
Start: 2021-04-07 | End: 2021-04-07

## 2021-04-07 RX ORDER — METHYLPREDNISOLONE ACETATE 40 MG/ML
10 INJECTION, SUSPENSION INTRA-ARTICULAR; INTRALESIONAL; INTRAMUSCULAR; SOFT TISSUE ONCE
Status: COMPLETED | OUTPATIENT
Start: 2021-04-07 | End: 2021-04-07

## 2021-04-07 RX ADMIN — METHYLPREDNISOLONE ACETATE 10 MG: 40 INJECTION, SUSPENSION INTRA-ARTICULAR; INTRALESIONAL; INTRAMUSCULAR; SOFT TISSUE at 12:20

## 2021-04-07 RX ADMIN — TRIAMCINOLONE ACETONIDE 40 MG: 40 INJECTION, SUSPENSION INTRA-ARTICULAR; INTRAMUSCULAR at 12:20

## 2021-04-07 ASSESSMENT — MIFFLIN-ST. JEOR: SCORE: 1373.53

## 2021-04-07 NOTE — PROGRESS NOTES
Rheumatology Clinic Visit      Radha Rodriguez MRN# [id number]   YOB: 1960 Age: 61 year old      Date of visit: 4/07/21   PCP: Dr. Temo Fletcher    Chief Complaint   Patient presents with:  RECHECK    Assessment and Plan     1. Inflammatory polyarthropathy: Previously followed by Rojas Vasques and Shazia.  Established care with me on 4/24/2018.  Previously on Humira (ineffective), HCQ (ineffective; used with MTX), MTX (25mg wkly was effective and dose reductions resulted in worsening joint symptoms, but then she stopped on her own without any worsening inflammatory arthritis symptoms).  Has been doing well with regard inflammatory arthritis since stopping methotrexate several months ago.  Note that historically there was a question if the arthritis had any relation to the history of suspected Crohn's Disease (reportedly fistula requiring surgery, and colonoscopies showing significant scaring in the intestine).  Also see #3.  Chronic illness, stable.      2.  Primary osteoarthritis of the left first carpometacarpal joint: improved with steroid injections in the past.  Steroid injection needed again today and as documented in the procedure section.     3.  R>>>L knee / hip pain: Left-sided symptoms are minimal and not bothersome today she says.  Right-sided symptoms are most consistent with iliotibial band syndrome and mild knee osteoarthritis.  Advised physical therapy exercises for the iliotibial band syndrome and we discussed injection of the right knee and this was performed as documented in the procedure section.  Note that she had a history of this several years ago with very similar symptoms and an MRI suggestive of iliotibial band syndrome and she says that there was talk at that time of replacing her right knee.    - PT referral    # Status-post 2 doses of the Pfizer COVID-19 vaccine    Total minutes spent in evaluation with patient, documentation, , and review of pertinent studies and  chart notes: 32     Ms. Rodriguez verbalized agreement with and understanding of the rational for the diagnosis and treatment plan.  All questions were answered to best of my ability and the patient's satisfaction. Ms. Rodriguez was advised to contact the clinic with any questions that may arise after the clinic visit.      Thank you for involving me in the care of the patient    Return to clinic: 3-4 months    HPI   Radha Rodriguez is a 61 year old female with a past medical history significant for possible Crohn's disease requiring fistula surgery in the past, osteoarthritis, inflammatory arthritis who is seen for follow-up of arthritis.    Today, Ms. Rodriguez reports that she stopped methotrexate several months ago and has not had worsening inflammatory arthritis symptoms.  And more recently she had right knee and hip pain, and mild symptoms on the left as well.  Pain is mostly on the posterior aspect of her thigh and on the lateral aspect.  Knee pain is worse with going up or down stairs; positive crepitation.  Had been given a Medrol Dosepak from a primary care clinic with improvement, but not resolution, of her symptoms.  Would like her left first CMC joint injected again as these have been helpful and the last injection was in 2020.     Denies fevers, chills, nausea, vomiting, constipation, diarrhea. No abdominal pain. No chest pain/pressure, palpitations, or shortness of breath. LE swelling worse at the end of the day but sometimes present at the beginning of the day too; she says that she will discuss with her PCP.  No neck pain. No oral sores.  Chronic recurrent nasal sore for years, per patient.  No rash. No sicca symptoms. No photosensitivity or photophobia. No eye pain or redness. No history of inflammatory eye disease.    Tobacco: none  EtOH: occasional  Drugs: none    ROS   12 point review of system was completed and negative except as noted in the HPI     Active Problem List     Patient Active Problem List    Diagnosis     FEMALE STRESS INCONTINENCE post-hysterectomy     HYPERHIDROSIS on axilla and soles of feet     Dermatitis due to cosmetics     Contact dermatitis and other eczema, due to unspecified cause     Acquired acanthosis nigricans     LFT abnormalities and Urobilnogen high     CARDIOVASCULAR SCREENING; LDL GOAL LESS THAN 160     Vitamin D deficiency     Inflammatory polyarthropathy (H)     High risk medications (not anticoagulants) long-term use     Osteoarthritis     Menopausal syndrome (hot flashes)     Right lateral epicondylitis     Immunosuppressed status (H)     Past Medical History     Past Medical History:   Diagnosis Date     Arthritis      Headache(784.0)      Irritable bowel syndrome      Other and unspecified noninfectious gastroenteritis and colitis(558.9)     chronic     Past Surgical History     Past Surgical History:   Procedure Laterality Date     C REPLACEMENT,URETER,BOWEL SEGMENT  10/01/2008    Part of her ureter was repaired.     CATARACT IOL, RT/LT       COLONOSCOPY  10/14/2011    Procedure:COLONOSCOPY; Colonoscopy; Surgeon:SCOTTY LEDEZMA; Location:[facility]     ENHANCE LASER REFRACTIVE BILATERAL EXISTING PT IN PARAMETERS       HYSTERECTOMY, VAGINAL  1/1/2000    TVH, fibroids (still has ovaries)     SURGICAL HISTORY OF -       Tubal Ligation     SURGICAL HISTORY OF -       Appy     SURGICAL HISTORY OF -       Tonsils     SURGICAL HISTORY OF -   12/94    Anal Fistulotomy     Allergy     Allergies   Allergen Reactions     Sulfa Drugs Rash     Codeine Hives     Clindamycin Rash     Current Medication List     Current Outpatient Medications   Medication Sig     estrogen conj (PREMARIN) 0.45 MG tablet Take 1 tablet (0.45 mg) by mouth daily     ibuprofen (ADVIL/MOTRIN) 200 MG tablet Take 200 mg by mouth every 4 hours as needed for mild pain     cyclobenzaprine (FLEXERIL) 5 MG tablet Take 1 tablet (5 mg) by mouth 3 times daily as needed for muscle spasms (Patient not taking: Reported on  "4/7/2021)     folic acid (FOLVITE) 1 MG tablet Take 2 tablets (2 mg) by mouth daily (Patient not taking: Reported on 4/7/2021)     Insulin Syringe-Needle U-100 (BD INSULIN SYRINGE) 27G X 1/2\" 1 ML MISC For once weekly methotrexate administration. (Patient not taking: Reported on 4/7/2021)     methotrexate 50 MG/2ML injection Inject 1 mL (25 mg) Subcutaneous every 7 days (Patient not taking: Reported on 4/7/2021)     methylPREDNISolone (MEDROL DOSEPAK) 4 MG tablet therapy pack Follow Package Directions (Patient not taking: Reported on 4/7/2021)     No current facility-administered medications for this visit.          Social History   See HPI    Family History     Family History   Problem Relation Age of Onset     Heart Disease Father         Heart attack     C.A.D. Father         age 50     Hypertension Father      Cancer Maternal Grandfather      Alzheimer Disease Maternal Grandfather      Cancer Paternal Grandfather      Diabetes No family hx of      Cerebrovascular Disease No family hx of      Breast Cancer No family hx of      Cancer - colorectal No family hx of      Prostate Cancer No family hx of        Physical Exam     Temp Readings from Last 3 Encounters:   03/29/21 97.8  F (36.6  C) (Oral)   01/04/21 98.7  F (37.1  C) (Oral)   11/25/20 98.3  F (36.8  C) (Oral)     BP Readings from Last 5 Encounters:   04/07/21 135/84   03/29/21 (!) 148/80   01/04/21 134/83   11/25/20 136/83   05/18/20 129/82     Pulse Readings from Last 1 Encounters:   04/07/21 87     Resp Readings from Last 1 Encounters:   01/04/21 18     Estimated body mass index is 30.01 kg/m  as calculated from the following:    Height as of this encounter: 1.638 m (5' 4.5\").    Weight as of this encounter: 80.6 kg (177 lb 9.6 oz).        GEN: NAD. Healthy appearing adult.   HEENT:  Anicteric, noninjected sclera. No obvious external lesions of the ear and nose. Hearing intact.  PULM: No increased work of breathing  MSK: MCPs, PIPs, DIPs without swelling " or tenderness to palpation.  Left first CMC joint tender to palpation and with squaring; no effusion, increased warmth, or overlying erythema.  Tense right iliotibial band; mildly tender palpation over the right trochanteric bursa and on the lateral foot of the knee.  Also with mild medial joint line tenderness of the right knee and crepitation.  Right knee without effusion, increased warmth, or overlying erythema.  Left hip and knee without swelling or tenderness to palpation.  Ankles without swelling or tenderness to palpation.  Negative MTP squeeze bilaterally.    SKIN: No rash or jaundice seen  PSYCH: Alert. Appropriate.        Labs / Imaging (select studies)     RF/CCP  Recent Labs   Lab Test 06/01/15  1139 01/31/14  1534 06/06/13  1341   CCPABY <20  Interpretation:  Negative   <20 Interpretation:  Negative  --    RHF <20  --  7     CBC  Recent Labs   Lab Test 03/29/21 1431 01/04/21 1619 12/23/19  1620 09/25/19  1117   WBC 6.9 8.6 8.4 7.4   RBC 4.47 4.30 3.86 3.96   HGB 13.6 13.3 12.9 13.3   HCT 41.3 39.9 37.8 38.4   MCV 92 93 98 97   RDW 12.5 13.0 13.1 13.9    237 220 219   MCH 30.4 30.9 33.4* 33.6*   MCHC 32.9 33.3 34.1 34.6   NEUTROPHIL 64.0  --  69.7 73.0   LYMPH 26.5  --  20.6 17.8   MONOCYTE 6.4  --  6.7 6.5   EOSINOPHIL 2.8  --  2.6 2.3   BASOPHIL 0.3  --  0.4 0.4   ANEU 4.4  --  5.8 5.4   ALYM 1.8  --  1.7 1.3   EDWARD 0.4  --  0.6 0.5   AEOS 0.2  --  0.2 0.2   ABAS 0.0  --  0.0 0.0     CMP  Recent Labs   Lab Test 03/29/21  1431 01/04/21  1619 12/23/19  1620 10/11/17  1207 10/11/17  1207    140  --   --  138   POTASSIUM 4.0 3.8  --   --  4.1   CHLORIDE 109 105  --   --  105   CO2 29 27  --   --  28   ANIONGAP 2* 8  --   --  5   GLC 85 86  --   --  86   BUN 11 14  --   --  18   CR 0.77 0.75 0.73   < > 0.73   GFRESTIMATED 85 87 >90   < > 82   GFRESTBLACK >90 >90 >90   < > >90   ENEIDA 9.0 9.1  --   --  9.2   BILITOTAL 0.5 0.4 0.3   < > 0.7   ALBUMIN 3.9 3.9 3.8   < > 3.9   PROTTOTAL 6.6* 7.1  6.7*   < > 6.9   ALKPHOS 62 58 68   < > 57   AST 17 18 21   < > 28   ALT 28 32 37   < > 44    < > = values in this interval not displayed.     Calcium/VitaminD  Recent Labs   Lab Test 03/29/21  1431 01/04/21  1619 10/11/17  1207 09/18/14  1129 09/18/14  1129 04/29/14  1522 11/25/13  1350   ENEIDA 9.0 9.1 9.2   < >  --   --   --    VITDT  --   --   --   --  38 42 19*    < > = values in this interval not displayed.     ESR/CRP  Recent Labs   Lab Test 03/29/21  1431 03/27/19  1053 12/18/18  1619   SED 5 6 6   CRP <2.9 3.0 3.1     Hepatitis B  Recent Labs   Lab Test 04/24/18  1557   HBCAB Nonreactive   HEPBANG Nonreactive     Hepatitis C  Recent Labs   Lab Test 06/01/15  1139   HCVAB Nonreactive   Assay performance characteristics have not been established for newborns,   infants, and children       Lyme ab screening  Recent Labs   Lab Test 03/29/21  1431   LYMEGM 0.04     Tuberculosis Screening  Recent Labs   Lab Test 06/01/15  1139   TBRSLT Negative   TBAGN 0.00       Immunization History     Immunization History   Administered Date(s) Administered     COVID-19,PF,Pfizer 01/25/2021, 02/15/2021     HepB 01/24/1991, 02/28/1991, 09/05/1991     Influenza Quad, Recombinant, p-free (RIV4) 10/01/2019     Pneumo Conj 13-V (2010&after) 10/01/2019     Pneumococcal 23 valent 12/31/2019     TD (ADULT, 7+) 03/12/1990, 01/01/2000     TDAP Vaccine (Adacel) 09/10/2008     TDAP Vaccine (Boostrix) 09/10/2008     Tdap (Adacel,Boostrix) 03/01/2011     Procedure     Procedure: Steroid injection of the left 1st CMC joint  Indication: Pain, osteoarthritis     The procedure was explained in detail. Risks including infection, pain, structural damage such as cartilage damage and tendon rupture, fat atrophy, skin hyper-/hypo-pigmentation, and medication reaction was explained. The need for rest of the affected joint for one week after the procedure was explained.  The option of not doing the procedure was also provided. All questions were answered  and the patient consented to the procedure.     A time-out was performed and the correct patient, procedure, and laterality were verified.    The left 1st carpometacarpal joint was examined and location for injection was identified. The area was cleaned with chlorhexidine, twice.  Ethyl chloride was then used for topical anaesthetic.  Then a mixture of lidocaine 1% 0.1 mL and methylprednisolone 10mg was injected into the intra-articular space.     The patient tolerated the procedure well. No complications.    1% Lidocaine  : Hospira  Lot #: [id number]  EXPIRATION DATE: 01 OCT 2022  NDC: 8573-5592-84    MEDICATION: Methylprednisolone  LOT #: [id number]  :  [employer]  EXPIRATION DATE:  07/21  NDC#: 0396-3543-55      Procedure     Procedure: Steroid injection of the right knee  Indication: Pain, osteoarthritis, patellofemoral syndrome    The procedure was explained in detail. Risks including infection, pain, structural damage such as cartilage damage and tendon rupture, fat atrophy, skin hyper-/hypo-pigmentation, and medication reaction was explained. The need for rest of the affected joint for one week after the procedure was explained.  The option of not doing the procedure was also provided. All questions were answered and the patient consented to the procedure.     A time-out was performed and the correct patient, procedure, and laterality were verified.    The right knee was examined and location for injection was identified - anterior medial. The area was cleaned with chlorhexidine, twice.  Ethyl chloride was then used for topical anaesthetic.  Then a mixture of lidocaine 1% 2 mL and Kenalog 40mg was injected into the intra-articular space.     The patient tolerated the procedure well. No complications.    1% Lidocaine  : Hospira  Lot #: [id number]  EXPIRATION DATE: 01 OCT 2022  NDC: 2421-9631-93    MEDICATION: Triamcinolone 40 mg  LOT #: [id number]  :  [employer]  EXPIRATION DATE:  06/2022  NDC#: [address]-6       Chart documentation done in part with Dragon Voice recognition Software. Although reviewed after completion, some word and grammatical error may remain.    Adboul Eli MD

## 2021-04-19 ENCOUNTER — E-VISIT (OUTPATIENT)
Dept: FAMILY MEDICINE | Facility: CLINIC | Age: 61
End: 2021-04-19
Payer: COMMERCIAL

## 2021-04-19 DIAGNOSIS — J01.80 OTHER ACUTE SINUSITIS, RECURRENCE NOT SPECIFIED: Primary | ICD-10-CM

## 2021-04-19 PROCEDURE — 99421 OL DIG E/M SVC 5-10 MIN: CPT | Performed by: INTERNAL MEDICINE

## 2021-04-19 RX ORDER — AZITHROMYCIN 250 MG/1
TABLET, FILM COATED ORAL
Qty: 6 TABLET | Refills: 5 | Status: SHIPPED | OUTPATIENT
Start: 2021-04-19 | End: 2021-06-11

## 2021-04-19 NOTE — PATIENT INSTRUCTIONS
Dear Radha Rodriguez    After reviewing your responses, I've been able to diagnose you with?a sinus infection caused by bacteria.?     Based on your responses and diagnosis, I have prescribed Azithromycin to treat your symptoms. I have sent this to your pharmacy.?     It is also important to stay well hydrated, get lots of rest and take over-the-counter decongestants,?tylenol?or ibuprofen if you?are able to?take those medications per your primary care provider to help relieve discomfort.?     It is important that you take?all of?your prescribed medication even if your symptoms are improving after a few doses.? Taking?all of?your medicine helps prevent the symptoms from returning.?     If your symptoms worsen, you develop severe headache, vomiting, high fever (>102), or are not improving in 7 days, please contact your primary care provider for an appointment or visit any of our convenient Walk-in Care or Urgent Care Centers to be seen which can be found on our website?here.?     Thanks again for choosing?us?as your health care partner,?   ?  Temo Fletcher MD?

## 2021-06-08 ENCOUNTER — NURSE TRIAGE (OUTPATIENT)
Dept: FAMILY MEDICINE | Facility: CLINIC | Age: 61
End: 2021-06-08

## 2021-06-08 NOTE — TELEPHONE ENCOUNTER
"Pt called for triage of midback pain and spasms that have been treated recently by chiropractor. Pt states she cannot be seen by chiropractor and wants to know what to do. Pt states she has already taken 800 mg ibuprofen 4 times today. Pain #7/10 midback. RN reviewed maximum daily dose of ibuprofen with the pt and advised it has been exceeded already today.    Pt then states she is having lt-sided CP.    Pt states \"I will not drive myself.\" Pt inquires which ED to go to. RN advised any local emergency room is appropriate to evaluate and treat CP. RN advised pt be seen at any ED now.    Reason for Disposition    Pain in the upper back over the ribs (rib cage) that radiates (travels) into the chest    Chest pain lasting longer than 5 minutes and ANY of the following:* Over 45 years old* Over 30 years old and at least one cardiac risk factor (e.g., diabetes, high blood pressure, high cholesterol, smoker, or strong family history of heart disease)* History of heart disease (i.e., angina, heart attack, heart failure, bypass surgery, takes nitroglycerin)* Pain is crushing, pressure-like, or heavy    Answer Assessment - Initial Assessment Questions  1. ONSET: \"When did the pain begin?\"       2 weeks ago worse today  2. LOCATION: \"Where does it hurt?\" (upper, mid or lower back)      Mid back and spasms since 4am today, onset x 2 weeks  3. SEVERITY: \"How bad is the pain?\"  (e.g., Scale 1-10; mild, moderate, or severe)    - MILD (1-3): doesn't interfere with normal activities     - MODERATE (4-7): interferes with normal activities or awakens from sleep     - SEVERE (8-10): excruciating pain, unable to do any normal activities       severe  4. PATTERN: \"Is the pain constant?\" (e.g., yes, no; constant, intermittent)       constant  5. RADIATION: \"Does the pain shoot into your legs or elsewhere?\"      Pain into ribs with certain movements  6. CAUSE:  \"What do you think is causing the back pain?\"       Unknown   7. BACK OVERUSE:  " "\"Any recent lifting of heavy objects, strenuous work or exercise?\"      no  8. MEDICATIONS: \"What have you taken so far for the pain?\" (e.g., nothing, acetaminophen, NSAIDS)      ibu  9. NEUROLOGIC SYMPTOMS: \"Do you have any weakness, numbness, or problems with bowel/bladder control?\"      denies  10. OTHER SYMPTOMS: \"Do you have any other symptoms?\" (e.g., fever, abdominal pain, burning with urination, blood in urine)        no    Answer Assessment - Initial Assessment Questions  After triaging pt's \"midback pain.\"    1. LOCATION: \"Where does it hurt?\"        Lt-sided CP  2. RADIATION: \"Does the pain go anywhere else?\" (e.g., into neck, jaw, arms, back)      Widespread \"midback pain\"  3. ONSET: \"When did the chest pain begin?\" (Minutes, hours or days)       Reports hx of lt-sided CP, states it is worse today    Protocols used: BACK PAIN-A-OH, CHEST PAIN-A-OH      "

## 2021-06-11 ENCOUNTER — OFFICE VISIT (OUTPATIENT)
Dept: FAMILY MEDICINE | Facility: CLINIC | Age: 61
End: 2021-06-11
Payer: COMMERCIAL

## 2021-06-11 VITALS
TEMPERATURE: 97.6 F | RESPIRATION RATE: 18 BRPM | SYSTOLIC BLOOD PRESSURE: 136 MMHG | BODY MASS INDEX: 29.49 KG/M2 | OXYGEN SATURATION: 98 % | HEART RATE: 66 BPM | WEIGHT: 177 LBS | DIASTOLIC BLOOD PRESSURE: 85 MMHG | HEIGHT: 65 IN

## 2021-06-11 DIAGNOSIS — Z86.39 HISTORY OF VITAMIN D DEFICIENCY: ICD-10-CM

## 2021-06-11 DIAGNOSIS — M06.4 INFLAMMATORY POLYARTHROPATHY (H): ICD-10-CM

## 2021-06-11 DIAGNOSIS — M47.814 OSTEOARTHRITIS OF THORACIC SPINE, UNSPECIFIED SPINAL OSTEOARTHRITIS COMPLICATION STATUS: ICD-10-CM

## 2021-06-11 DIAGNOSIS — M54.6 PAIN IN THORACIC SPINE: Primary | ICD-10-CM

## 2021-06-11 PROCEDURE — 36415 COLL VENOUS BLD VENIPUNCTURE: CPT | Performed by: FAMILY MEDICINE

## 2021-06-11 PROCEDURE — 99214 OFFICE O/P EST MOD 30 MIN: CPT | Performed by: FAMILY MEDICINE

## 2021-06-11 PROCEDURE — 82306 VITAMIN D 25 HYDROXY: CPT | Performed by: FAMILY MEDICINE

## 2021-06-11 ASSESSMENT — PAIN SCALES - GENERAL: PAINLEVEL: MODERATE PAIN (4)

## 2021-06-11 ASSESSMENT — MIFFLIN-ST. JEOR: SCORE: 1370.81

## 2021-06-11 NOTE — PROGRESS NOTES
"    {PROVIDER CHARTING PREFERENCE:928441}    Subjective   Radha is a 61 year old who presents for the following health issues     HPI       Hospital Follow-up Visit:    Hospital/Nursing Home/IP Rehab Facility: {INPT AND SNF DISCHARGE:422419}  Date of Admission: ***  Date of Discharge: ***  Reason(s) for Admission: ***      Was your hospitalization related to COVID-19? {Yes add questions_No:688691}  Problems taking medications regularly:  {NONE DEFAULTED:724281::\"None\"}  Medication changes since discharge: {NONE DEFAULTED:158291::\"None\"}  Problems adhering to non-medication therapy:  {NONE DEFAULTED:792454::\"None\"}    Summary of hospitalization:  {HOSPITAL DISCHARGE SUMMARY INFO:851632::\"[facility] hospital discharge summary reviewed\"}  Diagnostic Tests/Treatments reviewed.  Follow up needed: {NONE DEFAULTED:393759::\"none\"}  Other Healthcare Providers Involved in Patient s Care:         {those currently involved after discharge:755540::\"None\"}  Update since discharge: {IMPROVED DEFAULT:612981::\"improved.\"} {TIP  Include information from family/caregivers, SNF, Care Coordination :083725}      Post Discharge Medication Reconciliation: {ACO Med Rec (Provider):863451}.  Plan of care communicated with {Communicate Plan to:131774::\"patient\"}     {Reference  Coding guidelines- Moderate Complexity F2F/Video within 7 - 14 days of discharge 38038, High Complexity F2F/Video within 7 days 43494 or [id number] days 42831 :059471}         {additonal problems for provider to add (Optional):199733}    Review of Systems   {ROS COMP (Optional):996492}      Objective    There were no vitals taken for this visit.  There is no height or weight on file to calculate BMI.  Physical Exam   {Exam List (Optional):171635}    {Diagnostic Test Results (Optional):923788}    {AMBULATORY ATTESTATION (Optional):136325}        "

## 2021-06-11 NOTE — LETTER
[address] [address]  Phone: [phone]    June 11, 2021        Radha STEVENSON Rodriguez  [address] [place]  [facility] [address]          To whom it may concern:    RE: Radha M Michael    Patient may return to work June 18 with the following:  Light duty-unable to lift more than 10 pounds, bend, stoop or twist for the next 2 weeks.     Please contact me for questions or concerns.      Sincerely,        Jelly Copeland MD

## 2021-06-11 NOTE — PROGRESS NOTES
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 Very poor body mechanics described for the work she needs to do leaning over during procedures. Recommend physical therapy assessment and treatment  - tiZANidine (ZANAFLEX) 4 MG tablet; Take 1 tablet (4 mg) by mouth 3 times daily as needed for muscle spasms  - [facility] REFERRAL; Future    History of vitamin D deficiency  recheck  - Vitamin D Deficiency    Inflammatory polyarthropathy (H)  Follow up with rheumatology. I do not think that this is contributing to her current pain in upper back.     DJD thoracic spine  Findings on plain x ray of upper spine. At risk for further injury if she is not able to practice good ergonomics with her back at work.                CONSULTATION/REFERRAL to physical therapy and rheumatology.   FUTURE LABS:       - Schedule fasting labs in 6 months  FUTURE APPOINTMENTS:       - Follow-up visit in 6 months or sooner if any questions or concerns.   Regular exercise  See Patient Instructions    No follow-ups on file.    Jelly Copeland MD  [facility]    Jesus Alberto Garcia is a 61 year old who presents for the following health issues   HPI   ED Vitals:  Patient Vitals for the past 24 hrs:  BP Temp Pulse Resp SpO2   06/08/21 1729 (!) 142/95 99.6  F (37.6  C) 73 20 100 %     MDM:   Radha Rodriguez is a 61 y.o. female presents with severe thoracic back pain. It seems to be muscular in nature. It started a couple weeks ago and felt better after seeing a chiropractor. She describes it as a spasm starting early this morning when she went to the bathroom. 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 Her pain is reproducible with  palpation. She does have midline tenderness and so I did get an x-ray to confirm that there was no compression fracture and her x-ray showed degenerative changes, but no fracture. Heat was applied to her back and that seemed to give some relief. She was given oral Valium and when I went to reassess her she was feeling better. She would still have moments of spasm, but it was improved. I do not feel she needs any further imaging or work-up at this time. She does not have any fever or infectious symptoms to suggest epidural abscess. She denies any shortness of breath. I recommended that she follow-up in the next 2 to 3 days with her primary provider if not improving. I discussed with her dosing of ibuprofen and acetaminophen as well for pain. We discussed stretching exercises as well.    DIAGNOSIS:   ICD-10-CM   1. Acute bilateral thoracic back pain M54.6     EXAM: X-RAY THORACIC SPINE    DATE: 6/8/2021 6:34 PM    COMPARISON: None    CLINICAL DATA: Pain.    ADDITIONAL CLINICAL DATA:    TECHNIQUE: Frontal and lateral views of the thoracic spine were obtained.    FINDINGS:     Anterior bridging osteophytes identified at greater than 4 consecutive levels compatible with diffuse idiopathic skeletal hyperostosis. There is multilevel disc degenerative change. Posterior elements appear intact and normally aligned.    Thoracic vertebral body height is within normal limits.    IMPRESSION  IMPRESSION:    Degenerative changes. No fracture or listhesis.      REPORT SIGNED BY DR. 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   Pain interferes with job: YES  History of back problems: no prior back problems, but has had inflammatory arthritis and osteoarthritis. Followed by rheumatology. Stopped methotrexate last year   Any previous MRI or X-rays: None  Sees a specialist for back pain: No  Alleviating factors:   Improved by: heat    Precipitating factors:  Worsened by: Lifting, Bending and Standing  Therapies tried and outcome: heat and massage helping. Valium worked but caused too much sedation. Has been off work for this week.     Accompanying Signs & Symptoms:  Risk of Fracture: None  Risk of Cauda Equina: None  Risk of Infection: None  Risk of Cancer: None  Risk of Ankylosing Spondylitis: Onset at age <35, male, AND morning back stiffness  no         Review of Systems   Constitutional, HEENT, cardiovascular, pulmonary, gi and gu systems are negative, except as otherwise noted.      Objective    /85   Pulse 66   Temp 97.6  F (36.4  C) (Tympanic)   Resp 18   Ht 1.638 m (5' 4.5\")   Wt 80.3 kg (177 lb)   SpO2 98%   BMI 29.91 kg/m    Body mass index is 29.91 kg/m .  Physical Exam   GENERAL APPEARANCE: healthy, alert and no distress, except winces when moves in certain ways.   EYES: Eyes grossly normal to inspection, PERRL and conjunctivae and sclerae normal  HENT: ear canals and TM's normal, nose and mouth without ulcers or lesions, oropharynx clear and oral mucous membranes moist  NECK: no adenopathy, no asymmetry, masses, or scars and thyroid normal to palpation  RESP: lungs clear to auscultation - no rales, rhonchi or wheezes  CV: regular rates and rhythm, normal S1 S2, no S3 or S4, no murmur, click or rub, no peripheral edema and peripheral pulses strong  ABDOMEN: soft, nontender, no hepatosplenomegaly, no masses and bowel sounds normal  MS: no musculoskeletal defects are noted and gait is age appropriate without ataxia  Comprehensive back pain exam:  No tenderness of " bilateral paralumbar region without muscle spasm. Pain limits the following motions: extension, flexion and twisting of upper back with tenderness in T-4 area of upper para spinal region at level of scapula. Upper extremity strength normal and equal on both sides.   SKIN: no suspicious lesions or rashes  NEURO: Normal strength and tone, sensory exam grossly normal, mentation intact and speech normal  PSYCH: mentation appears normal and affect normal/bright     Office Visit on 03/29/2021   Component Date Value Ref Range Status     Sed Rate 03/29/2021 5  0 - 30 mm/h Final     CRP Inflammation 03/29/2021 <2.9  0.0 - 8.0 mg/L Final     WBC 03/29/2021 6.9  4.0 - 11.0 10e9/L Final     RBC Count 03/29/2021 4.47  3.8 - 5.2 10e12/L Final     Hemoglobin 03/29/2021 13.6  11.7 - 15.7 g/dL Final     Hematocrit 03/29/2021 41.3  35.0 - 47.0 % Final     MCV 03/29/2021 92  78 - 100 fl Final     MCH 03/29/2021 30.4  26.5 - 33.0 pg Final     MCHC 03/29/2021 32.9  31.5 - 36.5 g/dL Final     RDW 03/29/2021 12.5  10.0 - 15.0 % Final     Platelet Count 03/29/2021 226  150 - 450 10e9/L Final     % Neutrophils 03/29/2021 64.0  % Final     % Lymphocytes 03/29/2021 26.5  % Final     % Monocytes 03/29/2021 6.4  % Final     % Eosinophils 03/29/2021 2.8  % Final     % Basophils 03/29/2021 0.3  % Final     Absolute Neutrophil 03/29/2021 4.4  1.6 - 8.3 10e9/L Final     Absolute Lymphocytes 03/29/2021 1.8  0.8 - 5.3 10e9/L Final     Absolute Monocytes 03/29/2021 0.4  0.0 - 1.3 10e9/L Final     Absolute Eosinophils 03/29/2021 0.2  0.0 - 0.7 10e9/L Final     Absolute Basophils 03/29/2021 0.0  0.0 - 0.2 10e9/L Final     Diff Method 03/29/2021 Automated Method   Final     Lyme Disease Antibodies Serum 03/29/2021 0.04  0.00 - 0.89 Final    Comment: Negative, Absence of detectable Borrelia burdorferi antibodies. A negative   result does not exclude the possibility of Borrelia burgdorferi infection. If   early Lyme disease is suspected, a second sample  should be collected and   tested 2 to 4 weeks later.       Bilirubin Direct 03/29/2021 0.2  0.0 - 0.2 mg/dL Final     Bilirubin Total 03/29/2021 0.5  0.2 - 1.3 mg/dL Final     Albumin 03/29/2021 3.9  3.4 - 5.0 g/dL Final     Protein Total 03/29/2021 6.6* 6.8 - 8.8 g/dL Final     Alkaline Phosphatase 03/29/2021 62  40 - 150 U/L Final     ALT 03/29/2021 28  0 - 50 U/L Final     AST 03/29/2021 17  0 - 45 U/L Final     Sodium 03/29/2021 140  133 - 144 mmol/L Final     Potassium 03/29/2021 4.0  3.4 - 5.3 mmol/L Final     Chloride 03/29/2021 109  94 - 109 mmol/L Final     Carbon Dioxide 03/29/2021 29  20 - 32 mmol/L Final     Anion Gap 03/29/2021 2* 3 - 14 mmol/L Final     Glucose 03/29/2021 85  70 - 99 mg/dL Final     Urea Nitrogen 03/29/2021 11  7 - 30 mg/dL Final     Creatinine 03/29/2021 0.77  0.52 - 1.04 mg/dL Final     GFR Estimate 03/29/2021 85  >60 mL/min/[1.73_m2] Final    Comment: Non  GFR Calc  Starting 12/18/2018, serum creatinine based estimated GFR (eGFR) will be   calculated using the Chronic Kidney Disease Epidemiology Collaboration   (CKD-EPI) equation.       GFR Estimate If Black 03/29/2021 >90  >60 mL/min/[1.73_m2] Final    Comment:  GFR Calc  Starting 12/18/2018, serum creatinine based estimated GFR (eGFR) will be   calculated using the Chronic Kidney Disease Epidemiology Collaboration   (CKD-EPI) equation.       Calcium 03/29/2021 9.0  8.5 - 10.1 mg/dL Final     CK Total 03/29/2021 105  30 - 225 U/L Final

## 2021-06-11 NOTE — PATIENT INSTRUCTIONS
At [facility], we strive to deliver an exceptional experience to you, every time we see you. If you receive a survey, please complete it as we do value your feedback.  If you have MyChart, you can expect to receive results automatically within 24 hours of their completion.  Your provider will send a note interpreting your results as well.   If you do not have MyChart, you should receive your results in about a week by mail.    Your care team:                            Family Medicine Internal Medicine   MD Temo Lott MD Shantel Branch-Fleming, MD Srinivasa Vaka, MD Katya Belousova, PAAL Wheeler, APRN CNP    Ivan Lewis, MD Pediatrics   Jeremy Rodriguez, PAAL Borges, CNP MD Flory Ivory APRN CNP   MD Daija Ortiz MD Deborah Mielke, MD Tatiana Tejeda, APRN [facility]      Clinic hours: Monday - Thursday 7 am-6 pm; Fridays 7 am-5 pm.   Urgent care: Monday - Friday 10 am- 8 pm; Saturday and Sunday 9 am-5 pm.    Clinic: [phone]       [facility]: Monday - Thursday 8 am - 7 pm; Friday 8 am - 6 pm  [facility]: [phone]     Use [email or web address] for 24/7 diagnosis and treatment of dozens of conditions.  Patient Education     Back Basics: A Healthy Spine  A healthy spine supports the body while letting it move freely. It does this with the help of three natural curves. Strong, flexible muscles help, too. They support the spine by keeping its curves properly aligned. The disks that cushion the bones of your spine also play a role in back fitness.    Three natural curves  The spine is made of bones (vertebrae) and pads of soft tissue (disks). These parts are arranged in three curves: cervical, thoracic, and lumbar. When properly aligned, these curves keep your body balanced. They also support your body when you move. By distributing your weight throughout your  spine, the curves make back injuries less likely.  Strong, flexible muscles  Strong, flexible back muscles help support the three curves of the spine. They do so by holding the vertebrae and disks in proper alignment. Strong, flexible abdominal, hip, and leg muscles also reduce strain on the back.  The lumbar curve  The lumbar curve is the hardest-working part of the spine. It carries more weight and moves the most. Aligning this curve helps prevent damage to vertebrae, disks, and other parts of the spine.  Cushioning disks  Disks are the soft pads of tissue between the vertebrae. The disks absorb shock caused by movement. Each disk has a spongy center (nucleus) and a tougher outer ring (annulus). Movement within the nucleus allows the vertebrae to rock back and forth on the disks. This provides the flexibility needed to bend and move.    [employer] last reviewed this educational content on 5/1/2018 2000-2021 [employer]. All rights reserved. This information is not intended as a substitute for professional medical care. Always follow your healthcare professional's instructions.           Patient Education     Reducing Risk of Work-Related Musculoskeletal Disorders (WRMSDs): Your Role  It takes more than planning to prevent or control work-related musculoskeletal disorders (MSDs). Be willing to accept new ideas and make changes in your workday. When you get training, put your new knowledge to use. Once you know the proper way to do a job or use a tool, do it right every time. If you're aware, you can often spot and control a risk factor before it leads to an injury.  Identify and assess risks  Many people don't know they're at risk of an MSD until they start having discomfort. This needn't be true for you. Read about risk factors for MSDs. Look for any risk factors you're exposed to. Then talk with your supervisor. He or she can help find out the level of risk. If you're already noticing MSD symptoms,  such as ongoing muscle fatigue or numbness, a health evaluation can also help gauge your risk.  Reduce risks  Reducing your risk of an MSD doesn't have to be costly or complicated. In many cases, improving body posture and rearranging your workspace can make a big difference. Be sure to use these ergonomic principles at home, too. Whether you're carrying groceries or working on a hobby, keeping a safe body position has a lot to do with reducing your risk of injury.  Monitor and communicate  Ongoing awareness and communication also play a big role in reducing the risk of MSDs. Don't forget about a problem just because you've made an effort to control it. As time passes, try to notice if your risk truly is being reduced. Also, make sure the control measures aren't causing any new problems that could become risks. And be sure to let your supervisor know how well the controls are working. In some cases, a little fine-tuning may be needed.  [email or web address] last reviewed this educational content on 6/1/2018 2000-2021 [employer]. All rights reserved. This information is not intended as a substitute for professional medical care. Always follow your healthcare professional's instructions.

## 2021-06-12 LAB — DEPRECATED CALCIDIOL+CALCIFEROL SERPL-MC: 26 UG/L (ref 20–75)

## 2021-06-13 NOTE — RESULT ENCOUNTER NOTE
Dear Radha    Your test results are attached. I am happy to let you know that they are stable.    The vitamin D test is normal.     Please contact me by MyChart if you have any questions about your labs or management. You may also call my office number [phone] for any questions.     Jelly Copeland MD

## 2021-06-16 ENCOUNTER — TELEPHONE (OUTPATIENT)
Dept: FAMILY MEDICINE | Facility: CLINIC | Age: 61
End: 2021-06-16

## 2021-06-16 ENCOUNTER — MYC MEDICAL ADVICE (OUTPATIENT)
Dept: FAMILY MEDICINE | Facility: CLINIC | Age: 61
End: 2021-06-16

## 2021-06-16 NOTE — NURSING NOTE
RAPID3 (0-30) Cumulative Score  11          RAPID3 Weighted Score (divide #4 by 3 and that is the weighted score)  3.6                2021         RE: Arturo Raman  [address]  [place] [address]        Dear Colleague,    Thank you for referring your patient, Arturo Raman, to the [facility]. Please see a copy of my visit note below.    HPI:  Arturo Raman is a 32 year old male patient here today for alopecia areata of scalp and beard .  Patient states this has been present for months.  Patient reports the following symptoms: improvement with desonide probably once a day to beard and and clobetasol  to scalp. LOV IL TAC with improvement.  Patient reports the following previous treatments: topicals and IL TAC.  Patient reports the following modifying factors: none.  Associated symptoms: none.  Patient has no other skin complaints today.  Remainder of the HPI, Meds, PMH, Allergies, FH, and SH was reviewed in chart.      No past medical history on file.    No past surgical history on file.     Family History   Problem Relation Age of Onset     Hypertension Father        Social History     Socioeconomic History     Marital status: Single     Spouse name: Not on file     Number of children: Not on file     Years of education: Not on file     Highest education level: Not on file   Occupational History     Not on file   Social Needs     Financial resource strain: Not on file     Food insecurity     Worry: Not on file     Inability: Not on file     Transportation needs     Medical: Not on file     Non-medical: Not on file   Tobacco Use     Smoking status: Former Smoker     Quit date:      Years since quittin.4     Smokeless tobacco: Never Used   Substance and Sexual Activity     Alcohol use: No     Drug use: No     Sexual activity: Not on file   Lifestyle     Physical activity     Days per week: Not on file     Minutes per session: Not on file     Stress: Not on file   Relationships     Social connections     Talks on phone: Not on file     Gets together: Not on file     Attends  Uatsdin service: Not on file     Active member of club or organization: Not on file     Attends meetings of clubs or organizations: Not on file     Relationship status: Not on file     Intimate partner violence     Fear of current or ex partner: Not on file     Emotionally abused: Not on file     Physically abused: Not on file     Forced sexual activity: Not on file   Other Topics Concern     Parent/sibling w/ CABG, MI or angioplasty before 65F 55M? Not Asked   Social History Narrative     Not on file       Outpatient Encounter Medications as of 6/16/2021   Medication Sig Dispense Refill     amphetamine-dextroamphetamine (ADDERALL) 20 MG tablet TAKE HALF TABLET (10 MG) TWICE DAILY.       clobetasol (TEMOVATE) 0.05 % external solution Apply topically 2 times daily To aa on scalp x 1 month. Do not use on face or body folds. 50 mL 0     desonide (DESOWEN) 0.05 % external cream Apply topically as needed       escitalopram (LEXAPRO) 10 MG tablet Take 1 tablet (10 mg) by mouth daily 30 tablet 0     LORazepam (ATIVAN) 0.5 MG tablet Take 0.5 mg by mouth       propranolol (INDERAL) 10 MG tablet TK 1 T PO BID       No facility-administered encounter medications on file as of 6/16/2021.        Review Of Systems:  Skin: loss of hair  Eyes: negative  Ears/Nose/Throat: negative  Respiratory: No shortness of breath, dyspnea on exertion, cough, or hemoptysis  Cardiovascular: negative  Gastrointestinal: negative  Genitourinary: negative  Musculoskeletal: negative  Neurologic: negative  Psychiatric: negative  Hematologic/Lymphatic/Immunologic: negative  Endocrine: negative      Objective:     There were no vitals taken for this visit.  Eyes: Conjunctivae/lids: Normal   ENT: Lips:  Normal  MSK: Normal  Cardiovascular: Peripheral edema none  Pulm: Breathing Normal  Neuro/Psych: Orientation: A/O x 3 Normal; Mood/Affect: Normal, NAD, WDWN  Pt accompanied by: self  Following areas examined: face, neck, scalp  Cabello skin type:ii    Findings:  Annular patch of non scarring hair loss with depigmented terminal hairs on left parietal scalp, submental, jaw line. Thinning of hair at right frontotemporal region  Assessment and Plan:  1) alopecia areata and androgenetic alopecia  Disc etiology and course  Treatment options include topical steroid and IL Tac  Apply a thin layer of clobetasol to affected area 2x a day for 4 weeks to scalp. Tapering with improvement. Do not use on face or body folds.  Apply a thin layer of desonide to affected area on beard 2x a day for 4 weeks to beard area.   Discussed side effects of topical steroids including but not limited to atrophy (skin thinning), striae (stretch marks) telangiectasias, steroid acne, and others. Do not apply to normal skin. Do not apply to discolored skin that does not have rash present. Educated patient on post inflammatory hyperpigmentation  IL TAC: PGACAC discussed.  Risks including but not limited to injection site reaction, bruising, no resolution.  All questions answered and entertained to patient s satisfaction.  Informed consent obtained.  IL TAC in concentration of 5mg/ml was injected ID to one patch on left parietal scalp and 2.5mg/ml into 5 patches on beard area.  Total injected was  0.5 ml.  Patient tolerated without complications and given wound care instructions, including not to move product around.  Return in 4 weeks for follow-up and possible additional IL TAC. May need additional treatment. May not be effective.    IL TACLot abu [id number] Aug 2022  Sodium chloride lot [id number]    Male pattern alopecia or androgenetic alopecia is mediated by dihydrotestosterone causing miniaturization of the hair follicles. Hair loss if first seen on the anterior scalp and bitemporal regions. The only clinically proven methods to prevent hair loss are Finasteride and Minoxidil. Neither Finasteride nor Minoxidil can cause hair to regrow. Once treatment is discontinued hair loss will  progress.    Side effects of oral Finasteride include and are not limited to impotence, decreased libido, abnormal ejaculation, sexual dysfunction and gynecomastia.    Side effects of Minoxidil (Rogaine) include contact dermatitis, pruritis (itch), and skin irritation.        Follow up in for il tac. Topical sent        Again, thank you for allowing me to participate in the care of your patient.        Sincerely,        Krystle Vazquez PA-C     Breath sounds clear and equal bilaterally.

## 2021-06-16 NOTE — TELEPHONE ENCOUNTER
Patient called inquiring if you had reviewed her [employer] message she sent on Monday.     She asked to let you know that she starts her PT tomorrow. She states her back Is not any better and if you think she should have a MRI. Would you like to see her in clinic or do a virtual visit to discuss this further? She knows and realizes you had wanted to wait two weeks but she wants to secure an appt. If necessary. Please call to advise.  Tylor Whitehead

## 2021-06-17 ENCOUNTER — TRANSFERRED RECORDS (OUTPATIENT)
Dept: HEALTH INFORMATION MANAGEMENT | Facility: CLINIC | Age: 61
End: 2021-06-17

## 2021-06-17 NOTE — TELEPHONE ENCOUNTER
.Reason for Call:  Form, our goal is to have forms completed with 72 hours, however, some forms may require a visit or additional information.    Type of letter, form or note:  other dont know     Who is the form from?: Patient    Where did the form come from: Patient or family brought in       What clinic location was the form placed at?: [facility]    Where the form was placed:  Box/Folder    What number is listed as a contact on the form?: 1         Additional comments:     Call taken on 6/17/2021 at 12:53 PM by MANOHAR TUCKER

## 2021-06-17 NOTE — TELEPHONE ENCOUNTER
Received [place] [employer] form and placed in Dr Copeland's box for review.  Tania Ramirez MA  [facility]  2nd Floor  Primary Care

## 2021-06-21 NOTE — TELEPHONE ENCOUNTER
Called pt and lvm informing pt that forms are being mailed to home. Copy placed in abstract and copy placed in the tc bin.

## 2021-06-24 ENCOUNTER — VIRTUAL VISIT (OUTPATIENT)
Dept: FAMILY MEDICINE | Facility: CLINIC | Age: 61
End: 2021-06-24
Payer: COMMERCIAL

## 2021-06-24 DIAGNOSIS — M54.6 PAIN IN THORACIC SPINE: ICD-10-CM

## 2021-06-24 DIAGNOSIS — M47.814 OSTEOARTHRITIS OF THORACIC SPINE, UNSPECIFIED SPINAL OSTEOARTHRITIS COMPLICATION STATUS: Primary | ICD-10-CM

## 2021-06-24 PROCEDURE — 99213 OFFICE O/P EST LOW 20 MIN: CPT | Mod: 95 | Performed by: FAMILY MEDICINE

## 2021-06-24 NOTE — PROGRESS NOTES
"Radha is a 61 year old who is being evaluated via a billable video visit.      How would you like to obtain your AVS? MyChart  If the video visit is dropped, the invitation should be resent by: Text to cell phone: done  Will anyone else be joining your video visit? No    Video Start Time: 1:06 PM    Assessment & Plan     DJD thoracic spine  Spine pain is out of range for usual degenerative joint disease upper back. Not responding to medical treatment and physical therapy as expected.   - CT Thoracic Spine w/o Contrast; Future  - Spine Referral; Future    Pain in thoracic spine  Severe with muscle spasms. Recommend check for compression fracture injury and other pathology.  - Spine Referral; Future             BMI:   Estimated body mass index is 29.91 kg/m  as calculated from the following:    Height as of 6/11/21: 1.638 m (5' 4.5\").    Weight as of 6/11/21: 80.3 kg (177 lb).   Weight management plan: Patient was referred to their PCP to discuss a diet and exercise plan.    FURTHER TESTING:       - CT upper back  CONSULTATION/REFERRAL to spine specialist if not improving.  FUTURE APPOINTMENTS:       - Follow-up visit in 2-3 weeks or sooner if any questions or concerns.   See Patient Instructions    No follow-ups on file.    Jelly Copeland MD  [facility]    Jesus Alberto Garcia is a 61 year old who presents for the following health issues     HPI     Back Pain  Onset/Duration: 6-8-2021  Description:   Location of pain: middle of back bilateral  Character of pain: sharp and stabbing  Pain radiation: none  New numbness or weakness in legs, not attributed to pain: no   Intensity: Currently 7/10, At its worst 9/10  Progression of Symptoms: was getting better with physical therapy but became much worse again with going back to work. Now happens with minimal activity.  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  Pain " interferes with job: YES  History of back problems: no prior back problems  Any previous MRI or X-rays: None  Sees a specialist for back pain: No  Alleviating factors:   Improved by: muscle relaxants, Physical Therapy and rest    Precipitating factors:  Worsened by: Bending, Coughing and work  Therapies tried and outcome: cold, muscle relaxants, Physical Therapy and rest    Accompanying Signs & Symptoms:  Risk of Fracture: None  Risk of Cauda Equina: None  Risk of Infection: None  Risk of Cancer: progression of symptoms that are beyond expected in mid back region  Risk of Ankylosing Spondylitis: Onset at age <35, male, AND morning back stiffness no            Review of Systems   Constitutional, HEENT, cardiovascular, pulmonary, gi and gu systems are negative, except as otherwise noted.      Objective           Vitals:  No vitals were obtained today due to virtual visit.    Physical Exam   GENERAL: Healthy, alert and no distress  EYES: Eyes grossly normal to inspection.  No discharge or erythema, or obvious scleral/conjunctival abnormalities.  RESP: No audible wheeze, cough, or visible cyanosis.  No visible retractions or increased work of breathing.    SKIN: Visible skin clear. No significant rash, abnormal pigmentation or lesions.  NEURO: Cranial nerves grossly intact.  Mentation and speech appropriate for age.  PSYCH: Mentation appears normal, affect normal/bright, judgement and insight intact, normal speech and appearance well-groomed.    Degenerative joint disease on previous x ray of thoracic spine.         Video-Visit Details    Type of service:  Video Visit    Video End Time:1:19 PM    Originating Location (pt. Location): Home    Distant Location (provider location):  [facility]     Platform used for Video Visit: Vanessa

## 2021-06-24 NOTE — PATIENT INSTRUCTIONS
Please call to schedule your MRI scan or CT scan at [facility] by calling [phone].   Patient Education     Degenerative Disk Disease    Spinal disks are gel-filled cushions between the bones, or vertebrae, of the spine. The disks act like shock absorbers. Over time, the disks may break down. This is called degenerative disk disease.  This condition can affect the neck or back. It is one of the most common causes of low back pain. The pain often remains localized to the lower back or neck. Muscle spasm is often present and adds to the pain.  Disk degeneration is a natural part of aging. But it is not painful for most people. It may also occur as a result of repeated minor injuries due to daily activities, sports, or accidents. It may lead to osteoarthritis of the spine. Back pain related to disk disease may come and go. Or it may become chronic and last for months or years. The disk may bulge or rupture. This is called a slipped disk or herniated disk. That can put pressure on a nearby spinal nerve and cause neck or back pain that spreads down one arm or leg.  X-rays, CT scan, or an MRI scan may help to diagnose this condition. For acute pain, treatment includes anti-inflammatory medicines, muscle relaxants, rest, ice, or heat. Strong prescription pain medicines, called opioids, may be needed for short-term treatment if pain suddenly gets worse. Opioid medicines can be addictive. So they are not advised for long-term pain management. Other types of medicines are preferred. Surgery is generally not used to treat this condition unless there is a complication.    Home care    For neck pain: Use a comfortable pillow that supports the head and keeps the spine in a neutral position. Your head should not be tilted forward or backward.    For back pain: Avoid sitting for long periods of time. This puts more stress on the lower back than standing or walking. Starting a regular exercise program to  strengthen the supporting muscles of the spine will make it easier to live with degenerative disk disease.    Apply an ice pack over the injured area for no more than 15 to 20 minutes. Do this every 3 to 6 hours for the first 24 to 48 hours. To make an ice pack, put ice cubes in a plastic bag that seals at the top. Wrap the bag in a clean, thin towel or cloth. Never put ice or an ice pack directly on the skin. Keep using ice packs to ease pain and swelling as needed. After 48 hours, apply heat (warm shower or warm bath) for 20 minutes several times a day, or switch between ice and heat.     You may use over-the-counter pain medicine to control pain, unless another pain medicine was prescribed. If you have chronic liver or kidney disease or ever had a stomach ulcer or GI bleeding, talk with your provider before using these medicines.    Follow-up care  Follow up with your healthcare provider, or as directed.  If X-rays, a CT scan or an MRI scan were done, you will be notified of any new findings that may affect your care.  When to seek medical advice  Contact your healthcare provider right away if any of these occur:    Increasing back pain    Your foot drags when you walk, a condition called foot drop    You have new weakness, numbness, or pain in one or both arms or legs    Loss of bowel or bladder control    Numbness or tingling in the buttock or groin area  Contour last reviewed this educational content on 3/1/2018    3916-8739 [employer]. All rights reserved. This information is not intended as a substitute for professional medical care. Always follow your healthcare professional's instructions.

## 2021-06-25 ENCOUNTER — TELEPHONE (OUTPATIENT)
Dept: FAMILY MEDICINE | Facility: CLINIC | Age: 61
End: 2021-06-25
Payer: COMMERCIAL

## 2021-06-25 DIAGNOSIS — M54.6 ACUTE MIDLINE THORACIC BACK PAIN: Primary | ICD-10-CM

## 2021-06-25 DIAGNOSIS — M62.830 BACK MUSCLE SPASM: ICD-10-CM

## 2021-06-25 NOTE — TELEPHONE ENCOUNTER
.What type of form? Dental   What day did you drop off your forms? 6/25/21  Is there a due date? ASAP (7-10 business day to compete forms)   How would you like to receive these forms? Patient will  at the clinic when completed  Which clinic was the [facility] off at? [facility]    What is the best number to contact you? Cell [phone]  What time works best to contact you with in 4 hrs? Anytime  Is it okay to leave a message? Yes    Elenita Nicolas

## 2021-06-27 PROBLEM — M54.6 ACUTE MIDLINE THORACIC BACK PAIN: Status: ACTIVE | Noted: 2021-06-27

## 2021-06-27 PROBLEM — M62.830 BACK MUSCLE SPASM: Status: ACTIVE | Noted: 2021-06-08

## 2021-06-28 ENCOUNTER — ANCILLARY PROCEDURE (OUTPATIENT)
Dept: CT IMAGING | Facility: CLINIC | Age: 61
End: 2021-06-28
Attending: FAMILY MEDICINE
Payer: COMMERCIAL

## 2021-06-28 DIAGNOSIS — M47.814 OSTEOARTHRITIS OF THORACIC SPINE, UNSPECIFIED SPINAL OSTEOARTHRITIS COMPLICATION STATUS: ICD-10-CM

## 2021-06-28 PROCEDURE — 72128 CT CHEST SPINE W/O DYE: CPT | Mod: TC | Performed by: RADIOLOGY

## 2021-06-30 NOTE — RESULT ENCOUNTER NOTE
Dear Radha    Your test results are attached. I am happy to let you know that they are stable.    Very good news. The CT of your upper back is normal except for the mild arthritic changes. It looks like the pain is from a back strain that is mostly muscular and ligament. This should heal with time and treatment.     Please contact me by [employer] if you have any questions about your labs or management. You may also call my office number [phone] for any questions.     Jelly Copeland MD

## 2021-07-01 ENCOUNTER — TELEPHONE (OUTPATIENT)
Dept: FAMILY MEDICINE | Facility: CLINIC | Age: 61
End: 2021-07-01

## 2021-07-01 ENCOUNTER — OFFICE VISIT (OUTPATIENT)
Dept: NEUROSURGERY | Facility: CLINIC | Age: 61
End: 2021-07-01
Attending: FAMILY MEDICINE
Payer: COMMERCIAL

## 2021-07-01 VITALS
SYSTOLIC BLOOD PRESSURE: 138 MMHG | HEIGHT: 65 IN | TEMPERATURE: 97.6 F | DIASTOLIC BLOOD PRESSURE: 80 MMHG | WEIGHT: 176.8 LBS | BODY MASS INDEX: 29.46 KG/M2

## 2021-07-01 DIAGNOSIS — M54.6 PAIN IN THORACIC SPINE: ICD-10-CM

## 2021-07-01 PROCEDURE — 99244 OFF/OP CNSLTJ NEW/EST MOD 40: CPT | Performed by: NURSE PRACTITIONER

## 2021-07-01 ASSESSMENT — PAIN SCALES - GENERAL: PAINLEVEL: MODERATE PAIN (4)

## 2021-07-01 ASSESSMENT — MIFFLIN-ST. JEOR: SCORE: 1369.9

## 2021-07-01 NOTE — LETTER
"    7/1/2021         RE: Radha Rodriguez  [address]  [place] [address]        Dear Colleague,    Thank you for referring your patient, Rahda Rodriguez, to the [facility]. Please see a copy of my visit note below.    Radha Rodriguez is a 61 year old female who presents for:  Chief Complaint   Patient presents with     Neurologic Problem     Osteoarthritis thoracic spine         Initial Vitals:  /80   Temp 97.6  F (36.4  C) (Temporal)   Ht 5' 4.5\" (1.638 m)   Wt 176 lb 12.8 oz (80.2 kg)   BMI 29.88 kg/m   Estimated body mass index is 29.88 kg/m  as calculated from the following:    Height as of this encounter: 5' 4.5\" (1.638 m).    Weight as of this encounter: 176 lb 12.8 oz (80.2 kg).. Body surface area is 1.91 meters squared. BP completed using cuff size: regular  Moderate Pain (4)    Nursing Comments:     Kiya Mora CMA      Dr. José Luis Che   [facility] Visit      CC: mid back pain    Primary care Provider: Temo Fletcher    Reason For Visit:   I was asked by Dr. Copeland to consult on the patient for:   M47.814 (ICD-10-CM) - Osteoarthritis of thoracic spine, unspecified spinal osteoarthritis complication status   M54.6 (ICD-10-CM) - Pain in thoracic spine     HPI: Radha Rodriguez is a 61 year old female who presents for evaluation of mid back pain that started about 1 month ago. Denies any precipitating trauma or injuries. Pain is located in the mid thoracic spine and radiates to right>left side. Denies any arm or leg pain. Denies any paresthesias or weakness. Describes the pain as throbbing and burning. Pain is worsened with activity and has made it difficult to work. She has been doing PT, NSAIDS, ice, heat, and muscle relaxants for pain control. Denies any falls, foot drop, coordination/gait changes, or bladder/bowel incontinence.     Current pain: 7/10     Past Medical History:   Diagnosis Date     Arthritis      " Headache(784.0)      Irritable bowel syndrome      Other and unspecified noninfectious gastroenteritis and colitis(558.9)     chronic       Past Medical History reviewed with patient during visit.    Past Surgical History:   Procedure Laterality Date     C REPLACEMENT,URETER,BOWEL SEGMENT  10/01/2008    Part of her ureter was repaired.     CATARACT IOL, RT/LT       COLONOSCOPY  10/14/2011    Procedure:COLONOSCOPY; Colonoscopy; Surgeon:SCOTTY LEDEZMA; Location:[facility]     ENHANCE LASER REFRACTIVE BILATERAL EXISTING PT IN PARAMETERS       HYSTERECTOMY, VAGINAL  1/1/2000    TVH, fibroids (still has ovaries)     SURGICAL HISTORY OF -       Tubal Ligation     SURGICAL HISTORY OF -       Appy     SURGICAL HISTORY OF -       Tonsils     SURGICAL HISTORY OF -   12/94    Anal Fistulotomy     Past Surgical History reviewed with patient during visit.    Current Outpatient Medications   Medication     ibuprofen (ADVIL/MOTRIN) 200 MG tablet     PREMARIN 0.45 MG tablet     tiZANidine (ZANAFLEX) 4 MG tablet     No current facility-administered medications for this visit.        Allergies   Allergen Reactions     Sulfa Drugs Rash     Codeine Hives     Clindamycin Rash       Social History     Socioeconomic History     Marital status:      Spouse name: Arnulfo Rodriguez     Number of children: 2     Years of education: 14+     Highest education level: None   Occupational History     Occupation: Dental Assistant     Comment: [employer]     Employer: [employer]   Social Needs     Financial resource strain: None     Food insecurity     Worry: None     Inability: None     Transportation needs     Medical: None     Non-medical: None   Tobacco Use     Smoking status: Never Smoker     Smokeless tobacco: Never Used   Substance and Sexual Activity     Alcohol use: Yes     Comment: Occasional     Drug use: No     Sexual activity: Yes     Partners: Male     Birth control/protection: Surgical   Lifestyle     Physical  "activity     Days per week: None     Minutes per session: None     Stress: None   Relationships     Social connections     Talks on phone: None     Gets together: None     Attends [employer] service: None     Active member of club or organization: None     Attends meetings of clubs or organizations: None     Relationship status: None     Intimate partner violence     Fear of current or ex partner: None     Emotionally abused: None     Physically abused: None     Forced sexual activity: None   Other Topics Concern     Parent/sibling w/ CABG, MI or angioplasty before 65F 55M? Yes   Social History Narrative     None       Family History   Problem Relation Age of Onset     Heart Disease Father         Heart attack     C.A.D. Father         age 50     Hypertension Father      Cancer Maternal Grandfather      Alzheimer Disease Maternal Grandfather      Cancer Paternal Grandfather      Diabetes No family hx of      Cerebrovascular Disease No family hx of      Breast Cancer No family hx of      Cancer - colorectal No family hx of      Prostate Cancer No family hx of        ROS: 10 point ROS neg other than the symptoms noted above in the HPI.    Vital Signs: /80   Temp 97.6  F (36.4  C) (Temporal)   Ht 5' 4.5\" (1.638 m)   Wt 176 lb 12.8 oz (80.2 kg)   BMI 29.88 kg/m      Examination:  Constitutional:  Alert, well nourished, NAD.  HEENT: Normocephalic, atraumatic.   Pulmonary:  Without shortness of breath, normal effort.   Lymph: No lymphadenopathy to low back or LE.   Integumentary: Skin is free of rashes or lesions.   Cardiovascular:  No pitting edema of BLE.      Neurological:  Awake  Alert  Oriented x 3  Speech clear  Cranial nerves II - XII grossly intact  PERRL  EOMI  Face symmetric  Tongue midline  Motor exam   Shoulder Abduction:  Right:  5/5   Left:  5/5  Biceps:                      Right:  5/5   Left:  5/5  Triceps:                     Right:  5/5   Left:  5/5  Wrist Extensors:        Right:  5/5   Left:  " 5/5  Wrist Flexors:            Right:  5/5   Left:  5/5  Intrinsics:                   Right:  5/5   Left:  5/5  Hip Flexor:                 Right: 5/5  Left:  5/5  Quadriceps:               Right:  5/5  Left:  5/5  Hamstrings:               Right:  5/5  Left:  5/5  Gastroc Soleus:         Right:  5/5  Left:  5/5  Tib/Ant:                      Right:  5/5  Left:  5/5  EHL:                          Right:  5/5  Left:  5/5         Sensation normal to bilateral upper and lower extremities.    Reflexes are 2+ in the patellar and Achilles. There is no clonus. Downgoing Babinski.    Reflexes are 2+ in the brachial radialis and triceps. Negative Sunny sign bilaterally.    Musculoskeletal:  Gait: Able to stand from a seated position. Normal non-antalgic, non-myelopathic gait. Able to heel/toe walk without loss of balance.    Tenderness to palpation of the mid thoracic spine to right shoulder blade.   Straight leg raise is negative bilaterally.      Imaging:   CT OF THE THORACIC SPINE WITHOUT CONTRAST   6/28/2021 2:08 PM   IMPRESSION: No acute process in the thoracic spine.    Assessment/Plan:   61 year old female who presents for evaluation of mid back pain that started about 1 month ago. Denies any precipitating trauma or injuries. Pain is located in the mid thoracic spine and radiates to right>left side. Denies any arm or leg pain. Denies any paresthesias or weakness. Pain is worsened with activity and has made it difficult to work. She has been doing PT, NSAIDS, ice, heat, and muscle relaxants for pain control. CT thoracic spine reviewed and we discussed options. Patient would like to proceed with a thoracic spine MRI for further evaluation of symptoms. Placed order. Okay to continue with PT for now. Will call with MRI results and next steps.      Advised patient to call our clinic with any questions or concerns. Discussed red flag symptoms and advised to seek medical attention if these develop. Patient voiced  understanding and agreement.        Elly Pagan, DAMIÁN  [facility]surgery  [address]  [place] [address]  Tel [phone]  Pager [phone]        Again, thank you for allowing me to participate in the care of your patient.        Sincerely,        Elly Pagan, NP

## 2021-07-01 NOTE — PATIENT INSTRUCTIONS
Order for thoracic spine MRI.  I will call with the results and next steps.     Please call our clinic if symptoms persist, change, or worsen at any time.    Elly Pagan CNP  [facility]surgery  [address] [address]  Tel [phone]  Pager [phone]

## 2021-07-01 NOTE — PROGRESS NOTES
Dr. José Luis Che   [facility] Clinic Visit      CC: mid back pain    Primary care Provider: Temo Fletcher    Reason For Visit:   I was asked by Dr. Copeland to consult on the patient for:   M47.814 (ICD-10-CM) - Osteoarthritis of thoracic spine, unspecified spinal osteoarthritis complication status   M54.6 (ICD-10-CM) - Pain in thoracic spine     HPI: Radha Rodriguez is a 61 year old female who presents for evaluation of mid back pain that started about 1 month ago. Denies any precipitating trauma or injuries. Pain is located in the mid thoracic spine and radiates to right>left side. Denies any arm or leg pain. Denies any paresthesias or weakness. Describes the pain as throbbing and burning. Pain is worsened with activity and has made it difficult to work. She has been doing PT, NSAIDS, ice, heat, and muscle relaxants for pain control. Denies any falls, foot drop, coordination/gait changes, or bladder/bowel incontinence.     Current pain: 7/10     Past Medical History:   Diagnosis Date     Arthritis      Headache(784.0)      Irritable bowel syndrome      Other and unspecified noninfectious gastroenteritis and colitis(558.9)     chronic       Past Medical History reviewed with patient during visit.    Past Surgical History:   Procedure Laterality Date     C REPLACEMENT,URETER,BOWEL SEGMENT  10/01/2008    Part of her ureter was repaired.     CATARACT IOL, RT/LT       COLONOSCOPY  10/14/2011    Procedure:COLONOSCOPY; Colonoscopy; Surgeon:SCOTTY LEDEZMA; Location:[facility]     ENHANCE LASER REFRACTIVE BILATERAL EXISTING PT IN PARAMETERS       HYSTERECTOMY, VAGINAL  1/1/2000    TVH, fibroids (still has ovaries)     SURGICAL HISTORY OF -       Tubal Ligation     SURGICAL HISTORY OF -       Appy     SURGICAL HISTORY OF -       Tonsils     SURGICAL HISTORY OF -   12/94    Anal Fistulotomy     Past Surgical History reviewed with patient during visit.    Current Outpatient Medications    Medication     ibuprofen (ADVIL/MOTRIN) 200 MG tablet     PREMARIN 0.45 MG tablet     tiZANidine (ZANAFLEX) 4 MG tablet     No current facility-administered medications for this visit.        Allergies   Allergen Reactions     Sulfa Drugs Rash     Codeine Hives     Clindamycin Rash       Social History     Socioeconomic History     Marital status:      Spouse name: Arnulfo Rodriguez     Number of children: 2     Years of education: 14+     Highest education level: None   Occupational History     Occupation: Dental Assistant     Comment: [employer]     Employer: [employer]   Social Needs     Financial resource strain: None     Food insecurity     Worry: None     Inability: None     Transportation needs     Medical: None     Non-medical: None   Tobacco Use     Smoking status: Never Smoker     Smokeless tobacco: Never Used   Substance and Sexual Activity     Alcohol use: Yes     Comment: Occasional     Drug use: No     Sexual activity: Yes     Partners: Male     Birth control/protection: Surgical   Lifestyle     Physical activity     Days per week: None     Minutes per session: None     Stress: None   Relationships     Social connections     Talks on phone: None     Gets together: None     Attends Confucianist service: None     Active member of club or organization: None     Attends meetings of clubs or organizations: None     Relationship status: None     Intimate partner violence     Fear of current or ex partner: None     Emotionally abused: None     Physically abused: None     Forced sexual activity: None   Other Topics Concern     Parent/sibling w/ CABG, MI or angioplasty before 65F 55M? Yes   Social History Narrative     None       Family History   Problem Relation Age of Onset     Heart Disease Father         Heart attack     C.A.D. Father         age 50     Hypertension Father      Cancer Maternal Grandfather      Alzheimer Disease Maternal Grandfather      Cancer Paternal Grandfather       "Diabetes No family hx of      Cerebrovascular Disease No family hx of      Breast Cancer No family hx of      Cancer - colorectal No family hx of      Prostate Cancer No family hx of        ROS: 10 point ROS neg other than the symptoms noted above in the HPI.    Vital Signs: /80   Temp 97.6  F (36.4  C) (Temporal)   Ht 5' 4.5\" (1.638 m)   Wt 176 lb 12.8 oz (80.2 kg)   BMI 29.88 kg/m      Examination:  Constitutional:  Alert, well nourished, NAD.  HEENT: Normocephalic, atraumatic.   Pulmonary:  Without shortness of breath, normal effort.   Lymph: No lymphadenopathy to low back or LE.   Integumentary: Skin is free of rashes or lesions.   Cardiovascular:  No pitting edema of BLE.      Neurological:  Awake  Alert  Oriented x 3  Speech clear  Cranial nerves II - XII grossly intact  PERRL  EOMI  Face symmetric  Tongue midline  Motor exam   Shoulder Abduction:  Right:  5/5   Left:  5/5  Biceps:                      Right:  5/5   Left:  5/5  Triceps:                     Right:  5/5   Left:  5/5  Wrist Extensors:        Right:  5/5   Left:  5/5  Wrist Flexors:            Right:  5/5   Left:  5/5  Intrinsics:                   Right:  5/5   Left:  5/5  Hip Flexor:                 Right: 5/5  Left:  5/5  Quadriceps:               Right:  5/5  Left:  5/5  Hamstrings:               Right:  5/5  Left:  5/5  Gastroc Soleus:         Right:  5/5  Left:  5/5  Tib/Ant:                      Right:  5/5  Left:  5/5  EHL:                          Right:  5/5  Left:  5/5         Sensation normal to bilateral upper and lower extremities.    Reflexes are 2+ in the patellar and Achilles. There is no clonus. Downgoing Babinski.    Reflexes are 2+ in the brachial radialis and triceps. Negative Sunny sign bilaterally.    Musculoskeletal:  Gait: Able to stand from a seated position. Normal non-antalgic, non-myelopathic gait. Able to heel/toe walk without loss of balance.    Tenderness to palpation of the mid thoracic spine to right " shoulder blade.   Straight leg raise is negative bilaterally.      Imaging:   CT OF THE THORACIC SPINE WITHOUT CONTRAST   6/28/2021 2:08 PM   IMPRESSION: No acute process in the thoracic spine.    Assessment/Plan:   61 year old female who presents for evaluation of mid back pain that started about 1 month ago. Denies any precipitating trauma or injuries. Pain is located in the mid thoracic spine and radiates to right>left side. Denies any arm or leg pain. Denies any paresthesias or weakness. Pain is worsened with activity and has made it difficult to work. She has been doing PT, NSAIDS, ice, heat, and muscle relaxants for pain control. CT thoracic spine reviewed and we discussed options. Patient would like to proceed with a thoracic spine MRI for further evaluation of symptoms. Placed order. Okay to continue with PT for now. Will call with MRI results and next steps.      Advised patient to call our clinic with any questions or concerns. Discussed red flag symptoms and advised to seek medical attention if these develop. Patient voiced understanding and agreement.        Elly Pagan CNP  [facility] Neurosurgery  [address] [address]  Tel [phone]  Pager [phone]

## 2021-07-01 NOTE — PROGRESS NOTES
"Radha Rodriguez is a 61 year old female who presents for:  Chief Complaint   Patient presents with     Neurologic Problem     Osteoarthritis thoracic spine         Initial Vitals:  /80   Temp 97.6  F (36.4  C) (Temporal)   Ht 5' 4.5\" (1.638 m)   Wt 176 lb 12.8 oz (80.2 kg)   BMI 29.88 kg/m   Estimated body mass index is 29.88 kg/m  as calculated from the following:    Height as of this encounter: 5' 4.5\" (1.638 m).    Weight as of this encounter: 176 lb 12.8 oz (80.2 kg).. Body surface area is 1.91 meters squared. BP completed using cuff size: regular  Moderate Pain (4)    Nursing Comments:     Kiya Mora CMA    "

## 2021-07-01 NOTE — TELEPHONE ENCOUNTER
Received [employer] Short [employer]ability form(s)  via fax and placed in Dr Copeland's Red folder for signature.  Tania Ramirez MA  [facility]  2nd Floor  Primary Care

## 2021-07-02 ENCOUNTER — MYC MEDICAL ADVICE (OUTPATIENT)
Dept: FAMILY MEDICINE | Facility: CLINIC | Age: 61
End: 2021-07-02

## 2021-07-08 ENCOUNTER — OFFICE VISIT (OUTPATIENT)
Dept: RHEUMATOLOGY | Facility: CLINIC | Age: 61
End: 2021-07-08
Payer: COMMERCIAL

## 2021-07-08 ENCOUNTER — ANCILLARY PROCEDURE (OUTPATIENT)
Dept: MRI IMAGING | Facility: CLINIC | Age: 61
End: 2021-07-08
Attending: NURSE PRACTITIONER
Payer: COMMERCIAL

## 2021-07-08 VITALS
HEIGHT: 65 IN | SYSTOLIC BLOOD PRESSURE: 147 MMHG | OXYGEN SATURATION: 98 % | BODY MASS INDEX: 30.16 KG/M2 | DIASTOLIC BLOOD PRESSURE: 89 MMHG | WEIGHT: 181 LBS | HEART RATE: 67 BPM

## 2021-07-08 DIAGNOSIS — M06.4 INFLAMMATORY POLYARTHROPATHY (H): Primary | ICD-10-CM

## 2021-07-08 DIAGNOSIS — M18.12 PRIMARY OSTEOARTHRITIS OF FIRST CARPOMETACARPAL JOINT OF LEFT HAND: ICD-10-CM

## 2021-07-08 DIAGNOSIS — Z79.899 HIGH RISK MEDICATION USE: ICD-10-CM

## 2021-07-08 DIAGNOSIS — M17.12 PRIMARY OSTEOARTHRITIS OF LEFT KNEE: ICD-10-CM

## 2021-07-08 DIAGNOSIS — M54.6 PAIN IN THORACIC SPINE: ICD-10-CM

## 2021-07-08 PROCEDURE — 72146 MRI CHEST SPINE W/O DYE: CPT | Mod: TC | Performed by: RADIOLOGY

## 2021-07-08 PROCEDURE — 99214 OFFICE O/P EST MOD 30 MIN: CPT | Mod: 25 | Performed by: INTERNAL MEDICINE

## 2021-07-08 PROCEDURE — 20610 DRAIN/INJ JOINT/BURSA W/O US: CPT | Mod: LT | Performed by: INTERNAL MEDICINE

## 2021-07-08 PROCEDURE — 20600 DRAIN/INJ JOINT/BURSA W/O US: CPT | Mod: LT | Performed by: INTERNAL MEDICINE

## 2021-07-08 RX ORDER — HYDROXYCHLOROQUINE SULFATE 200 MG/1
400 TABLET, FILM COATED ORAL DAILY
Qty: 60 TABLET | Refills: 3 | Status: SHIPPED | OUTPATIENT
Start: 2021-07-08 | End: 2021-10-14

## 2021-07-08 RX ORDER — TRIAMCINOLONE ACETONIDE 40 MG/ML
40 INJECTION, SUSPENSION INTRA-ARTICULAR; INTRAMUSCULAR ONCE
Status: COMPLETED | OUTPATIENT
Start: 2021-07-08 | End: 2021-07-08

## 2021-07-08 RX ORDER — METHYLPREDNISOLONE ACETATE 40 MG/ML
10 INJECTION, SUSPENSION INTRA-ARTICULAR; INTRALESIONAL; INTRAMUSCULAR; SOFT TISSUE ONCE
Status: COMPLETED | OUTPATIENT
Start: 2021-07-08 | End: 2021-07-08

## 2021-07-08 RX ADMIN — TRIAMCINOLONE ACETONIDE 40 MG: 40 INJECTION, SUSPENSION INTRA-ARTICULAR; INTRAMUSCULAR at 07:45

## 2021-07-08 RX ADMIN — METHYLPREDNISOLONE ACETATE 10 MG: 40 INJECTION, SUSPENSION INTRA-ARTICULAR; INTRALESIONAL; INTRAMUSCULAR; SOFT TISSUE at 07:44

## 2021-07-08 ASSESSMENT — MIFFLIN-ST. JEOR: SCORE: 1388.95

## 2021-07-08 NOTE — NURSING NOTE
RAPID3 (0-30) Cumulative Score  9.5          RAPID3 Weighted Score (divide #4 by 3 and that is the weighted score)  3.1

## 2021-07-08 NOTE — PATIENT INSTRUCTIONS
RHEUMATOLOGY    Dr. Abdoul Eli    [facility]  [address] [place] [address]    Our new phone number for Rheumatology is [phone], this number will be able to help you schedule appointments for Dr. Eli or if you have any message you would like sent to us.    Thank you for choosing [facility]!    [facility] Rheumatology

## 2021-07-08 NOTE — PROGRESS NOTES
Rheumatology Clinic Visit      Radha Rodriguez MRN# [id number]   YOB: 1960 Age: 61 year old      Date of visit: 7/08/21   PCP: Dr. Temo Fletcher    Chief Complaint   Patient presents with:  Inflammatory polyarthropathy    Assessment and Plan     1. Inflammatory polyarthropathy: Previously followed by Rojas Vasques and Shazia.  Established care with me on 4/24/2018.  Previously on Humira (ineffective), HCQ (ineffective; used with MTX), MTX (25mg wkly was effective and dose reductions resulted in worsening joint symptoms, but then she stopped on her own during the COVID19 pandemic without any worsening inflammatory arthritis symptoms).  Had been doing well for a while without a DMARD but now with full body aches that and stiffness that is worse in the morning and improves with time and activity; fullness of the bilateral second and third MCPs.  Discussed using either methotrexate that had been effective in the past or retrial of hydroxychloroquine; she would like to retry hydroxychloroquine.  If not effective then will change to methotrexate.  Chronic illness, progressive.    - start hydroxychloroquine 400 mg daily  - Labs in 3 months: CBC, Creatinine, Hepatic Panel, ESR, CRP    # Hydroxychloroquine (Plaquenil) Risks and Benefits:  The risks and benefits of hydroxychloroquine were discussed in detail and the patient verbalized understanding; the patient also verbalized agreement to get the required ophthalmologic toxicity monitoring.  The risks discussed include, but are not limited to, the risk for hypersensitivity, anaphylaxis, anaphylactoid reactions, irreversible retinal damage, rare hematologic reactions, and rare cardiomyopathy.  Patients with G6PD deficiency or hepatic impairment may be at an increased risk for adverse effects.  I encouraged reviewing the package insert and asking any questions about the medication.      2.  Primary osteoarthritis of the left first carpometacarpal joint: improved  with steroid injections in the past.  Steroid injection needed again today and was completed as documented in the procedure section.  Chronic illness, progressive.      3.  Bilateral knee osteoarthritis, R>L:  previously injected her right knee with improvement.  Now her left knee is with degenerative symptoms and she would like a steroid injection of the left knee.  This is reasonable.  Advised physical therapy exercises for both knees.  Steroid injection of the left knee as documented in the procedure section.  Chronic illness, progressive.      - COVID-19: has received the Pfizer COVID-19 vaccine on 1/25/2021 and 2/15/2021    Total minutes spent in evaluation with patient, documentation, , and review of pertinent studies and chart notes: 23      Ms. Rodriguez verbalized agreement with and understanding of the rational for the diagnosis and treatment plan.  All questions were answered to best of my ability and the patient's satisfaction. Ms. Rodriguez was advised to contact the clinic with any questions that may arise after the clinic visit.      Thank you for involving me in the care of the patient    Return to clinic: 3-4 months    HPI   Radha Rodriguez is a 61 year old female with a past medical history significant for possible Crohn's disease requiring fistula surgery in the past, osteoarthritis, inflammatory arthritis who is seen for follow-up of arthritis.    Today, Ms. Rodriguez reports that the steroid injection of her right knee was very effective.  Now her left knee is hurting more, with pain that is worse with activity and improves with rest.  Whole body ache and stiffness that is worse in the morning and improves with time and activity; stiffness lasts for about 1 hour or more.  Swelling across the MCPs bilaterally, worse on the left.  Left first CMC joint injection was effective previously and she would like to have this repeated today.  Also with thoracic back pain that is being evaluated by the  neurosurgery team, and she is going to have an MRI of the thoracic spine soon.     Denies fevers, chills, nausea, vomiting, constipation, diarrhea. No abdominal pain. No chest pain/pressure, palpitations, or shortness of breath. LE swelling worse at the end of the day.  No neck pain. No oral sores.  Chronic recurrent nasal sore for years, per patient.  No rash. No sicca symptoms. No photosensitivity or photophobia. No eye pain or redness. No history of inflammatory eye disease.    Tobacco: none  EtOH: occasional  Drugs: none    ROS   12 point review of system was completed and negative except as noted in the HPI     Active Problem List     Patient Active Problem List   Diagnosis     CARDIOVASCULAR SCREENING; LDL GOAL LESS THAN 160     Vitamin D deficiency     Inflammatory polyarthropathy (H)     High risk medications (not anticoagulants) long-term use     Osteoarthritis     Menopausal syndrome (hot flashes)     Right lateral epicondylitis     Immunosuppressed status (H)     DJD thoracic spine     Back muscle spasm     Acute midline thoracic back pain     Past Medical History     Past Medical History:   Diagnosis Date     Arthritis      Headache(784.0)      Irritable bowel syndrome      Other and unspecified noninfectious gastroenteritis and colitis(558.9)     chronic     Past Surgical History     Past Surgical History:   Procedure Laterality Date     C REPLACEMENT,URETER,BOWEL SEGMENT  10/01/2008    Part of her ureter was repaired.     CATARACT IOL, RT/LT       COLONOSCOPY  10/14/2011    Procedure:COLONOSCOPY; Colonoscopy; Surgeon:SCOTTY LEDEZMA; Location:[facility]     ENHANCE LASER REFRACTIVE BILATERAL EXISTING PT IN PARAMETERS       HYSTERECTOMY, VAGINAL  1/1/2000    TVH, fibroids (still has ovaries)     SURGICAL HISTORY OF -       Tubal Ligation     SURGICAL HISTORY OF -       Appy     SURGICAL HISTORY OF -       Tonsils     SURGICAL HISTORY OF -   12/94    Anal Fistulotomy     Allergy     Allergies  "  Allergen Reactions     Sulfa Drugs Rash     Codeine Hives     Clindamycin Rash     Current Medication List     Current Outpatient Medications   Medication Sig     hydroxychloroquine (PLAQUENIL) 200 MG tablet Take 2 tablets (400 mg) by mouth daily     ibuprofen (ADVIL/MOTRIN) 200 MG tablet Take 200 mg by mouth every 4 hours as needed for mild pain     PREMARIN 0.45 MG tablet TAKE 1 TABLET(0.45 MG) BY MOUTH DAILY     tiZANidine (ZANAFLEX) 4 MG tablet Take 1 tablet (4 mg) by mouth 3 times daily as needed for muscle spasms (Patient not taking: Reported on 7/8/2021)     No current facility-administered medications for this visit.          Social History   See HPI    Family History     Family History   Problem Relation Age of Onset     Heart Disease Father         Heart attack     C.A.D. Father         age 50     Hypertension Father      Cancer Maternal Grandfather      Alzheimer Disease Maternal Grandfather      Cancer Paternal Grandfather      Diabetes No family hx of      Cerebrovascular Disease No family hx of      Breast Cancer No family hx of      Cancer - colorectal No family hx of      Prostate Cancer No family hx of        Physical Exam     Temp Readings from Last 3 Encounters:   07/01/21 97.6  F (36.4  C) (Temporal)   06/11/21 97.6  F (36.4  C) (Tympanic)   03/29/21 97.8  F (36.6  C) (Oral)     BP Readings from Last 5 Encounters:   07/08/21 (!) 147/89   07/01/21 138/80   06/11/21 136/85   04/07/21 135/84   03/29/21 (!) 148/80     Pulse Readings from Last 1 Encounters:   07/08/21 67     Resp Readings from Last 1 Encounters:   06/11/21 18     Estimated body mass index is 30.59 kg/m  as calculated from the following:    Height as of this encounter: 1.638 m (5' 4.5\").    Weight as of this encounter: 82.1 kg (181 lb).    GEN: NAD. Healthy appearing adult.   HEENT:  Anicteric, noninjected sclera. No obvious external lesions of the ear and nose. Hearing intact.  CV: S1, S2. RRR. No m/r/g  PULM: No increased work of " breathing. CTA bilaterally   MSK: Synovial hypertrophy of the bilateral second and third MCPs, worse on the left; no tenderness to palpation, increased warmth, or overlying erythema.  Other MCPs, PIPs, DIPs without swelling or tenderness to palpation.  Left first CMC joint with squaring and tenderness to palpation.  Wrists without swelling or tenderness to palpation.  Elbows and shoulders without swelling or tenderness to palpation.  Shoulders with normal range of motion.  Right knee without swelling or tenderness to palpation.  Left knee with medial joint line tenderness but no effusion, increased warmth, or overlying erythema.  Ankles and MTPs without swelling or tenderness to palpation.    SKIN: No rash or jaundice seen  PSYCH: Alert. Appropriate.      Labs / Imaging (select studies)     RF/CCP  Recent Labs   Lab Test 06/01/15  1139 01/31/14  1534 06/06/13  1341   CCPABY <20  Interpretation:  Negative   <20 Interpretation:  Negative  --    RHF <20  --  7     CBC  Recent Labs   Lab Test 03/29/21  1431 01/04/21  1619 12/23/19  1620 09/25/19  1117   WBC 6.9 8.6 8.4 7.4   RBC 4.47 4.30 3.86 3.96   HGB 13.6 13.3 12.9 13.3   HCT 41.3 39.9 37.8 38.4   MCV 92 93 98 97   RDW 12.5 13.0 13.1 13.9    237 220 219   MCH 30.4 30.9 33.4* 33.6*   MCHC 32.9 33.3 34.1 34.6   NEUTROPHIL 64.0  --  69.7 73.0   LYMPH 26.5  --  20.6 17.8   MONOCYTE 6.4  --  6.7 6.5   EOSINOPHIL 2.8  --  2.6 2.3   BASOPHIL 0.3  --  0.4 0.4   ANEU 4.4  --  5.8 5.4   ALYM 1.8  --  1.7 1.3   EDWARD 0.4  --  0.6 0.5   AEOS 0.2  --  0.2 0.2   ABAS 0.0  --  0.0 0.0     CMP  Recent Labs   Lab Test 03/29/21  1431 01/04/21  1619 12/23/19  1620 10/11/17  1207 10/11/17  1207    140  --   --  138   POTASSIUM 4.0 3.8  --   --  4.1   CHLORIDE 109 105  --   --  105   CO2 29 27  --   --  28   ANIONGAP 2* 8  --   --  5   GLC 85 86  --   --  86   BUN 11 14  --   --  18   CR 0.77 0.75 0.73   < > 0.73   GFRESTIMATED 85 87 >90   < > 82   GFRESTBLACK >90 >90 >90    < > >90   ENEIDA 9.0 9.1  --   --  9.2   BILITOTAL 0.5 0.4 0.3   < > 0.7   ALBUMIN 3.9 3.9 3.8   < > 3.9   PROTTOTAL 6.6* 7.1 6.7*   < > 6.9   ALKPHOS 62 58 68   < > 57   AST 17 18 21   < > 28   ALT 28 32 37   < > 44    < > = values in this interval not displayed.     Calcium/VitaminD  Recent Labs   Lab Test 03/29/21  1431 01/04/21  1619 10/11/17  1207 09/18/14  1129 09/18/14  1129 04/29/14  1522 11/25/13  1350   ENEIDA 9.0 9.1 9.2   < >  --   --   --    VITDT  --   --   --   --  38 42 19*    < > = values in this interval not displayed.     ESR/CRP  Recent Labs   Lab Test 03/29/21  1431 03/27/19  1053 12/18/18  1619   SED 5 6 6   CRP <2.9 3.0 3.1     Hepatitis B  Recent Labs   Lab Test 04/24/18  1557   HBCAB Nonreactive   HEPBANG Nonreactive     Hepatitis C  Recent Labs   Lab Test 06/01/15  1139   HCVAB Nonreactive   Assay performance characteristics have not been established for newborns,   infants, and children       Lyme ab screening  Recent Labs   Lab Test 03/29/21  1431   LYMEGM 0.04     Tuberculosis Screening  Recent Labs   Lab Test 06/01/15  1139   TBRSLT Negative   TBAGN 0.00       Immunization History     Immunization History   Administered Date(s) Administered     COVID-19,PF,Pfizer 01/25/2021, 02/15/2021     HepB 01/24/1991, 02/28/1991, 09/05/1991     Influenza Quad, Recombinant, p-free (RIV4) 10/01/2019     Pneumo Conj 13-V (2010&after) 10/01/2019     Pneumococcal 23 valent 12/31/2019     TD (ADULT, 7+) 03/12/1990, 01/01/2000     TDAP Vaccine (Adacel) 09/10/2008     TDAP Vaccine (Boostrix) 09/10/2008     Tdap (Adacel,Boostrix) 09/10/2008, 03/01/2011     Procedure     Procedure: Steroid injection of the left 1st CMC joint  Indication: Pain, osteoarthritis     The procedure was explained in detail. Risks including infection, pain, structural damage such as cartilage damage and tendon rupture, fat atrophy, skin hyper-/hypo-pigmentation, and medication reaction was explained. The need for rest of the affected  joint for one week after the procedure was explained.  The option of not doing the procedure was also provided. All questions were answered and the patient consented to the procedure.     A time-out was performed and the correct patient, procedure, and laterality were verified.    The left 1st carpometacarpal joint was examined and location for injection was identified. The area was cleaned with chlorhexidine, twice.  Ethyl chloride was then used for topical anaesthetic.  Then a mixture of lidocaine 1% 0.1 mL and methylprednisolone 10mg was injected into the intra-articular space.     The patient tolerated the procedure well. No complications.    1% Lidocaine  : Owned it  Lot #: [id number]  EXPIRATION DATE: 06/2022  NDC: 0580-6485-41    MEDICATION: Methylprednisolone  LOT #: [id number]  :  [employer]  EXPIRATION DATE:  01/22  NDC#: 1368-1606-78          Procedure     Procedure: Steroid injection of the left knee  Indication: Pain, left knee osteoarthritis    The procedure was explained in detail. Risks including infection, pain, structural damage such as cartilage damage and tendon rupture, fat atrophy, skin hyper-/hypo-pigmentation, and medication reaction was explained. The need for rest of the affected joint for one week after the procedure was explained.  The option of not doing the procedure was also provided. All questions were answered and the patient consented to the procedure.     A time-out was performed and the correct patient, procedure, and laterality were verified.    The left knee was examined and location for injection was identified - anterior medial. The area was cleaned with chlorhexidine, twice.  Ethyl chloride was then used for topical anaesthetic.  Then a mixture of lidocaine 1% 2 mL and Kenalog 40mg was injected into the intra-articular space.     The patient tolerated the procedure well. No complications.    1% Lidocaine  : [employer]  Lot #: [id number]  EXPIRATION DATE: 06/2022  NDC: 8930-0308-32    MEDICATION: Triamcinolone 40 mg  LOT #: [id number]  :  [employer]  EXPIRATION DATE:  11/2022  NDC#: [address]-8         Chart documentation done in part with Dragon Voice recognition Software. Although reviewed after completion, some word and grammatical error may remain.    Abdoul Eli MD

## 2021-07-09 ENCOUNTER — TELEPHONE (OUTPATIENT)
Dept: FAMILY MEDICINE | Facility: CLINIC | Age: 61
End: 2021-07-09

## 2021-07-09 NOTE — TELEPHONE ENCOUNTER
..What type of form? unknown  What day did you drop off your forms? 7/9/21  Is there a due date? ASAP (7-10 business day to compete forms)   How would you like to receive these forms? Patient will  at the clinic when completed  Which clinic was the form dropped off at? [facility]  Placed on [facility]    What is the best number to contact you? Cell [phone]  What time works best to contact you with in 4 hrs? whenever  Is it okay to leave a message? Yes    Elenita Nicolas

## 2021-07-09 NOTE — TELEPHONE ENCOUNTER
Received [employer] Non-FMLA Eligible Medical form. Placed in Dr Copeland's box for review.  Tania Ramirez [facility]  2nd Floor  Primary Care

## 2021-07-12 ENCOUNTER — TELEPHONE (OUTPATIENT)
Dept: NEUROSURGERY | Facility: CLINIC | Age: 61
End: 2021-07-12

## 2021-07-12 DIAGNOSIS — M54.6 PAIN IN THORACIC SPINE: Primary | ICD-10-CM

## 2021-07-12 NOTE — TELEPHONE ENCOUNTER
Patient called and gave verbal consent that her son, Stanislav or his wife, Albert  the forms.     Received signed form. Bringing to the  by 3:00 pm today, 7/12/2021. Copy to TC and abstracting.  Called and spoke to the patient and explained form . Patient understands.  Tania Ramirez [facility]  2nd Floor  Primary Care

## 2021-07-12 NOTE — TELEPHONE ENCOUNTER
Form completed. Patient needs to schedule a follow up appointment with me, either virtual or office visit. Jelly Copeland MD

## 2021-07-12 NOTE — TELEPHONE ENCOUNTER
Per Elly Pagan, DAMIÁN:   MRI shows degenerative changes, including T8-9 endplate osteophytes; no  significant disc herniation or stenosis. She has been working with PT. Recommend referral to pain clinic for comprehensive services.     Called and spoke to the patient. She is in agreement with plan. Referral placed.

## 2021-07-13 ENCOUNTER — TELEPHONE (OUTPATIENT)
Dept: PALLIATIVE MEDICINE | Facility: CLINIC | Age: 61
End: 2021-07-13

## 2021-07-13 NOTE — TELEPHONE ENCOUNTER
Pain Management Center Referral      1. Confirmed address with patient? Yes  2. Confirmed phone number with patient? Yes  3. Confirmed referring provider? Yes  4. Is the PCP the same as the referring provider? No  5. Has the patient been to any previous pain clinics? No  (If yes, send SHAZIA with welcome letter)  6. Which insurance are we to bill for this appointment?  [employer]    7. Informed pt of cancellation (48 hour) policy? Yes    REGARDING OPIOID MEDICATIONS: We will always address appropriateness of opioid pain medications, but we generally will not automatically take on a prescribing role. When we do take on prescribing of opioids for chronic pain, it is in collaboration with the referring physician for an intermediate period of time (months), with an expectation that the primary physician or provider will assume the prescribing role if medications are effective at stable doses with demonstrated compliance. Therefore, please do not assume that your prescribing responsibilities end on the day of pain clinic consultation.  8. Informed pt of prescribing policy? Yes    9.Please be aware that once you are established with a pain provider and location, you will need to continue have all future visits with that provider and location. It is best to determine what location is the most convenient for you and schedule with that one.    ** PATIENT INFORMED OF THIS POLICY Yes      9. Referring Provider: Elly Rousseau NP    10. Criteria for Triage Eval:   -Missed/Failed 1st appointment? N/A     -Medication Focused? N/A     -Mental Health Concerns? (e.g. Recent psych hospitalization/snap shot)? N/A     -Active substance abuse? N/A     -Patient behaviors (e.g. Offensive language/raised voice)? N/A    Anitha BOWEN    [facility]

## 2021-07-13 NOTE — LETTER
July 13, 2021    Radha Rodriguez  [address]  [place] [address]    Dear Radha,                                                                 Welcome to the [facility] at the [facility]. We are located on the 6th floor, [address], of the [facility] located at [address], [place] [address]. For general parking, the [address] is the closest to our building.     Your appointment at the [facility] has been scheduled on Tuesday July 27, 2021 at 2:15 PM with Marisol Colindres MD.    At your first visit, you will meet your team of caregivers who will help you to develop pain management strategies that will last a lifetime. You will meet with our support staff to validate parking at a reduced rate, review your insurance information, and collect your co-payment if required by your insurance company. You will also meet with a medical pain specialist and care coordinator who will assess your pain and develop a plan of care for your successful pain rehabilitation. You should expect to spend approximately 1 hour at your first visit with us. Usually, patients work with us for a period of 6-12 months, and eventually return to their primary doctor once their pain management has stabilized.      To help us make your visit go as smoothly as possible, please bring the following items with you on your visit:     Completed Pain Questionnaire enclosed in this packet.  If you do not bring the completed questionnaire, we may have to reschedule your appointment.    List of any medicines that you are currently taking or have been prescribed    Important NON-[facility] medical information such as medical records or tests results (X-rays, or laboratory tests)    Your health insurance card    Financial resources to cover your co-payment or balance due at the time of service (cash, personal check,  Visa, and MasterCard are acceptable methods of payment)     Due to the demand for new patient evaluations, you must notify the scheduling department 48 hours (2 days) in advance if you are not able to keep this appointment.  Failure to do so could affect your ability to reschedule with our clinic. Please do not assume that you will  receive any prescription medications at your first visit.    Please call [phone] with any questions regarding your appointment.  We look forward to meeting you and working to address your health care needs.       Sincerely,    [facility]

## 2021-07-19 ENCOUNTER — TELEPHONE (OUTPATIENT)
Dept: FAMILY MEDICINE | Facility: CLINIC | Age: 61
End: 2021-07-19

## 2021-07-27 ENCOUNTER — OFFICE VISIT (OUTPATIENT)
Dept: PALLIATIVE MEDICINE | Facility: CLINIC | Age: 61
End: 2021-07-27
Payer: COMMERCIAL

## 2021-07-27 VITALS — HEART RATE: 92 BPM | SYSTOLIC BLOOD PRESSURE: 130 MMHG | DIASTOLIC BLOOD PRESSURE: 87 MMHG | OXYGEN SATURATION: 98 %

## 2021-07-27 DIAGNOSIS — M54.6 PAIN IN THORACIC SPINE: ICD-10-CM

## 2021-07-27 DIAGNOSIS — M79.18 MYOFASCIAL PAIN: Primary | ICD-10-CM

## 2021-07-27 DIAGNOSIS — M47.819 FACET ARTHROPATHY: ICD-10-CM

## 2021-07-27 PROCEDURE — 99204 OFFICE O/P NEW MOD 45 MIN: CPT | Performed by: PSYCHIATRY & NEUROLOGY

## 2021-07-27 RX ORDER — METHOCARBAMOL 500 MG/1
500-1000 TABLET, FILM COATED ORAL 3 TIMES DAILY PRN
Qty: 20 TABLET | Refills: 1 | Status: SHIPPED | OUTPATIENT
Start: 2021-07-27 | End: 2021-12-13

## 2021-07-27 RX ORDER — BUPIVACAINE HYDROCHLORIDE 5 MG/ML
4 INJECTION, SOLUTION EPIDURAL; INTRACAUDAL ONCE
Status: COMPLETED | OUTPATIENT
Start: 2021-07-27 | End: 2021-07-27

## 2021-07-27 RX ADMIN — BUPIVACAINE HYDROCHLORIDE 20 MG: 5 INJECTION, SOLUTION EPIDURAL; INTRACAUDAL at 15:22

## 2021-07-27 NOTE — PATIENT INSTRUCTIONS
1. Ways for patients to obtain their records:       [facility]: You can see most of your records in [employer], but you can also send a request for records from within [facility] directly to HIM (medical records). When they process these, they publish that set of records directly to [employer].        You can send a written request including their full name, address, and date of birth by fax [phone], email [email or web address]) or mail to [address] [place] [address]. Medical Records sends records through secure email.         You can schedule an appointment to  their records at [facility]: [phone].       If you need your records sent elsewhere for an appointment, you  can request them over the phone, and we process them same day.     2. Update me in a couple days.    3. [facility]   Post Procedure Instructions    Today you had:  trigger point injections     Medications used:  lidocaine   bupivicaine           Go to the emergency room if you develop any shortness of breath    Monitor the injection sites for signs and symptoms of infection-fever, chills, redness, swelling, warmth, or drainage to areas.    You may have soreness at injection sites for up to 24 hours.    You may apply ice to the painful areas to help minimize the discomfort of the needle pokes.    Do not apply heat to sites for at least 12 hours.    You may use anti-inflammatory medications or Tylenol for pain control if necessary    Pain Clinic phone number during work hours Monday-Friday:  [phone]    After hours provider line: [phone]         ----------------------------------------------------------------  Clinic Number:  [phone]     Call with any questions about your care and for scheduling assistance.     Calls are returned Monday through Friday between 8 AM and 4:30 PM. We usually get back to you within 2 business days depending on the issue/request.    If we are  prescribing your medications:    For opioid medication refills, call the clinic or send a [employer] message 7 days in advance.  Please include:    Name of requested medication    Name of the pharmacy.    For non-opioid medications, call your pharmacy directly to request a refill. Please allow 3-4 days to be processed.     Per [place] State Law:    All controlled substance prescriptions must be filled within 30 days of being written.      For those controlled substances allowing refills, pickup must occur within 30 days of last fill.      We believe regular attendance is key to your success in our program!      Any time you are unable to keep your appointment we ask that you call us at least 24 hours in advance to cancel.This will allow us to offer the appointment time to another patient.     Multiple missed appointments may lead to dismissal from the clinic.

## 2021-07-27 NOTE — PROGRESS NOTES
[facility] Consultation    Date of visit: 7/27/2021    Reason for consultation:    Radha Rodriguez is a 61 year old female who is seen in consultation today at the request of her provider, Elly Rousseau NP.    Primary Care Provider is Temo Fletcher.  Pain medications are being prescribed by n/a.    Please see the [facility] health questionnaire which the patient completed and reviewed with me in detail.    Chief Complaint:    Chief Complaint   Patient presents with     Pain       Pain history:  Radha Rodriguez is a 61 year old female who first started having problems with pain in her midback, which started around June of this year.  She had a very busy day at work and was doing a lot of stretching and twising related to that. She was sore but not anything out of the ordinary.  She woke up with severe spasm in her back during the night.  By that next afternoon, she went to the ER due to severe pain, and she was given valium to take home.  She followed up with her PCP and she stayed off of work and was started on PT.  She has been doing PT twice weekly and she has also been working with the chiropractor.  She does HEP at home.    She is no longer getting the severe spasm.  She tried going back to work light duty, and on the first day it was quite painful. She tried half shifts and due to pain she has been taken off of work.     She had CT and MRI of her thoracic spine. She was advised to go to the pain clinic.    Currently has discomfort throughout the day.  She will have pain that wakes her up during the night and this is walking her up. She describes an aching pain.  With any twisting of the torso causes pain to come out. Not much radiation, sometime on the right side.    She was seen by neurosurgery on 7/1/21  They recommended to see pain.    Pain rating: intensity ranges from 1/10 to 9/10, and Averages 4/10 on a 0-10 scale.  Aggravating  factors include: turning, twisting  Relieving factors include: standing in water.  Any bowel or bladder incontinence: no  No n/t.    Current pain medications include:  Tizanidine 4mg- takes 1/2 tab, only takes at night. Doesn't like side effects- groggy  Ibuprofen 600mg TID prn    Previous medication treatments included:  Valium- groggy  Acetaminophen (tylenol) - not helpful    Other treatments have included:  Radha Rodriguez has not been seen at a pain clinic in the past.    PT: currently doing  Acupuncture: no- did years ago at work to try it out  TENs Unit: not sure if helpful  Injections: cortisone in knees, thumb- 7/8    Past Medical History:  Past Medical History:   Diagnosis Date     Arthritis      Headache(784.0)      Irritable bowel syndrome      Other and unspecified noninfectious gastroenteritis and colitis(558.9)     chronic     Patient Active Problem List    Diagnosis Date Noted     Acute midline thoracic back pain 06/27/2021     Priority: Medium     DJD thoracic spine 06/11/2021     Priority: Medium     Back muscle spasm 06/08/2021     Priority: Medium     Immunosuppressed status (H) 01/05/2021     Priority: Medium     Right lateral epicondylitis 08/07/2018     Priority: Medium     Menopausal syndrome (hot flashes) 01/02/2017     Priority: Medium     Osteoarthritis 03/03/2015     Priority: Medium     High risk medications (not anticoagulants) long-term use 09/19/2014     Priority: Medium     Inflammatory polyarthropathy (H) 01/31/2014     Priority: Medium     Vitamin D deficiency 11/25/2013     Priority: Medium     Problem list name updated by automated process. Provider to review       CARDIOVASCULAR SCREENING; LDL GOAL LESS THAN 160 10/31/2010     Priority: Medium       Past Surgical History:  Past Surgical History:   Procedure Laterality Date     C REPLACEMENT,URETER,BOWEL SEGMENT  10/01/2008    Part of her ureter was repaired.     CATARACT IOL, RT/LT       COLONOSCOPY  10/14/2011     Procedure:COLONOSCOPY; Colonoscopy; Surgeon:SCOTTY LEDEZMA; Location:[facility]     ENHANCE LASER REFRACTIVE BILATERAL EXISTING PT IN PARAMETERS       HYSTERECTOMY, VAGINAL  1/1/2000    TVH, fibroids (still has ovaries)     SURGICAL HISTORY OF -       Tubal Ligation     SURGICAL HISTORY OF -       Appy     SURGICAL HISTORY OF -       Tonsils     SURGICAL HISTORY OF -   12/94    Anal Fistulotomy     Medications:  Current Outpatient Medications   Medication Sig Dispense Refill     ibuprofen (ADVIL/MOTRIN) 200 MG tablet Take 200 mg by mouth every 4 hours as needed for mild pain       methocarbamol (ROBAXIN) 500 MG tablet Take 1-2 tablets (500-1,000 mg) by mouth 3 times daily as needed for muscle spasms . Can be sedating.  Do not drive until you know how the medication affects you. 20 tablet 1     PREMARIN 0.45 MG tablet TAKE 1 TABLET(0.45 MG) BY MOUTH DAILY 90 tablet 1     hydroxychloroquine (PLAQUENIL) 200 MG tablet Take 2 tablets (400 mg) by mouth daily (Patient not taking: Reported on 7/27/2021) 60 tablet 3     tiZANidine (ZANAFLEX) 4 MG tablet Take 1 tablet (4 mg) by mouth 3 times daily as needed for muscle spasms (Patient not taking: Reported on 7/27/2021) 30 tablet 3     Allergies:     Allergies   Allergen Reactions     Sulfa Drugs Rash     Codeine Hives     Clindamycin Rash     Social History:  Home situation: lives with   Occupation/Schooling: dental assistant, working full time, currently not working  Tobacco use: no  Alcohol use: during camping, 2-3/weekend, otherwise more rare  Drug use: no  History of chemical dependency treatment: no    Family history:  Family History   Problem Relation Age of Onset     Heart Disease Father         Heart attack     C.A.D. Father         age 50     Hypertension Father      Cancer Maternal Grandfather      Alzheimer Disease Maternal Grandfather      Cancer Paternal Grandfather      Diabetes No family hx of      Cerebrovascular Disease No family hx of       "Breast Cancer No family hx of      Cancer - colorectal No family hx of      Prostate Cancer No family hx of        Physical Exam:  Vitals:    07/27/21 1416   BP: 130/87   Pulse: 92   SpO2: 98%     Exam:  Constitutional: healthy, alert and no distress  Head: normocephalic. Atraumatic.   Eyes: no redness or jaundice noted   Skin: no suspicious lesions or rashes  Psychiatric: mentation appears normal and affect normal/bright    Musculoskeletal exam:  Gait/Station/Posture: normal gait  Cervical spine: no myofascial tenderness. Normal ROM    Thoracic spine:  Normal curvature.  Pain with extension, lateral bend and rotation.  Myofascial tenderness along right paraspinal and lower scapula        Neurologic exam:  CN:  Cranial nerves 2-12 are normal  Motor:  5/5 UE and LE strength  Reflexes:     Biceps:     R:  2/4 L: 2/4   Brachioradialis   R:  2/4 L: 2/4   Triceps:  R:  2/4 L: 2/4   Patella:  R:  2/4 L: 2/4      Sensory:  (upper and lower extremities):   Light touch: normal    Allodynia: absent    Dysethesia: absent    Hyperalgesia: absent     Diagnostic tests:  MRI of thoracic spine was completed on 7/8/21 showing:  \"FINDINGS:  Sagittal alignment is normal. Normal vertebral body heights. Small nonspecific T2 hyperintense, intrinsically T1 hyperintense lesion with associated hyperintense signal on sagittal STIR sequence involving the T4 inferior endplate, probably representing an atypical intraosseous  hemangioma. No definite aggressive appearing osseous lesion.     There is Modic type I degenerative endplate signal change along the right anterolateral aspect of the T8-T9 disc space. Multilevel prominent predominantly right anterolateral marginal/appositional endplate osteophytes are present, as seen on prior CT. The intervertebral discs appear largely maintained in height, although there may be some multilevel disc desiccation/signal loss. There is  mild multilevel degenerative facet arthropathy primarily in the upper to " "mid thoracic spine. No significant disc herniation is seen. There is no spinal canal stenosis. There is no high-grade neural foraminal  stenosis. The morphology and signal intensity of the spinal cord is normal. The visualized paraspinous soft tissues are grossly  unremarkable.                                                                      IMPRESSION:  Multilevel degenerative changes, as described, including prominent multilevel right anterolateral marginal/appositional endplate osteophytes, Modic type I degenerative endplate signal change at T8-T9, and mild upper to mid thoracic facet arthropathy. No significant disc herniation seen. No spinal canal or neural foraminal stenosis.\"    Personally reviewed imaging, reviewed with patient    Other testing (labs, diagnostics) reviewed:  Labs  Last Comprehensive Metabolic Panel:  Sodium   Date Value Ref Range Status   03/29/2021 140 133 - 144 mmol/L Final     Potassium   Date Value Ref Range Status   03/29/2021 4.0 3.4 - 5.3 mmol/L Final     Chloride   Date Value Ref Range Status   03/29/2021 109 94 - 109 mmol/L Final     Carbon Dioxide   Date Value Ref Range Status   03/29/2021 29 20 - 32 mmol/L Final     Anion Gap   Date Value Ref Range Status   03/29/2021 2 (L) 3 - 14 mmol/L Final     Glucose   Date Value Ref Range Status   03/29/2021 85 70 - 99 mg/dL Final     Urea Nitrogen   Date Value Ref Range Status   03/29/2021 11 7 - 30 mg/dL Final     Creatinine   Date Value Ref Range Status   03/29/2021 0.77 0.52 - 1.04 mg/dL Final     GFR Estimate   Date Value Ref Range Status   03/29/2021 85 >60 mL/min/[1.73_m2] Final     Comment:     Non  GFR Calc  Starting 12/18/2018, serum creatinine based estimated GFR (eGFR) will be   calculated using the Chronic Kidney Disease Epidemiology Collaboration   (CKD-EPI) equation.       Calcium   Date Value Ref Range Status   03/29/2021 9.0 8.5 - 10.1 mg/dL Final      [place] Prescription Monitoring Program reviewed- valium " recent      Assessment:  1. Thoracic spine pain, features of myofascial pain and likely facet arthropathy   2. Inflammatory athropathy   3. Osteoarthritis- knee, hand    No signs of disk changes on thoracic imaging.  Has component of myofascial pain.  Has known facet arthropathy and not sure if myofascial pain alone a big component, or if the myofascial pain is related to the underlying facet arthropathy.  Will assess with TPI and see, consider medial branch block/RFA     Plan:  Diagnosis reviewed, treatment option addressed, and risk/benefits discussed.  Self-care instructions given.  I am recommending a multidisciplinary treatment plan to help this patient better manage her pain.      1. Physical Therapy: continue  2. Pain Psychologist to address issues of relaxation, behavioral change, coping style, and other factors important to improvement: not at this delmy  3. Diagnostic Studies: none  4. Urine toxicology screen: n/a   5. Medication Management:   1. Stop the tizanidine  2. Instead, will try Robaxin (methocarbamol) to see if better tolerated, possible ability to use during the day  6. Further procedures recommended:   1. TPI today if helpful repeat  2. If not overly helpful, will try right thoracic medial branch block, likely T4-8  7. Other treatments: none  8. Recommendations/follow-up for PCP:  none  9. Release of information: none  10. Follow up: update later this week to determine if repeat of TPI or medial branch block     54 minutes spent on the date of encounter doing chart review, history, and exam documentation and further activities as noted above. This is separate from time spent doing the procedure.      Kimberly Colindres MD    Diagnosis: myofascial pain  Procedure: trigger point injections.    Trigger points were identified by patient, and marked when appropriate.  The area was prepped with Chloroprep.    Using clean technique, injections were completed using a 25G, 1.5 inch needle.  After negative  aspiration, injection was completed.  A total of 4 locations were injected.  When possible, tissue was retracted from the chest wall to avoid lung injury.    Muscle groups injected:  Right thoracic paraspinals    Injection solution contained:  4ml of 1% lidocaine and 4ml of 0.5% bupivacaine.

## 2021-07-31 ENCOUNTER — MYC MEDICAL ADVICE (OUTPATIENT)
Dept: PALLIATIVE MEDICINE | Facility: CLINIC | Age: 61
End: 2021-07-31

## 2021-07-31 DIAGNOSIS — M47.894 OTHER SPONDYLOSIS, THORACIC REGION: Primary | ICD-10-CM

## 2021-08-01 NOTE — TELEPHONE ENCOUNTER
From: Radha Rodriguez      Created: 7/31/2021 12:02 PM        *-*-*This message has not been handled.*-*-*    Swathi Colindres,  I saw you on Tuesday and had trigger point injections.  That evening my back was really sore with burning issues. Wed and Thursday my back felt like it's been feeling when it's not flared up, tender and sore, but no spasm or burning.  Wed and Thursday I did some pool therapy. Friday I had physical therapy, that brought on a flare I had burning in my back.  This morning I saw the chiropractor, he did some minor stretching, acupuncture, and muscle stem.  At this time my back is a little tender no burning.  The chiropractor (Dr Amilcar Vieyra) wanted your information he was very interested in referring some of his patients for the RFA procedure.  Thank You for your help this week.  Please let me know what my next step should be.  I am scheduled to see my family doctor (Dr. Copeland) on August 6th.  Thanks Again,  Radha Rodriguez        Will forward to Dr. Colindres as FYI.    Zay Gresham, RN  Patient Care Supervisor   [facility]

## 2021-08-02 NOTE — TELEPHONE ENCOUNTER
Order is in for the procedure. They call you, or you can call [phone] to schedule tomorrow after 8am.     Kimberly Colindres MD  [facility]

## 2021-08-02 NOTE — TELEPHONE ENCOUNTER
Radha,  From your note, it seems like the trigger point wasn't overly helpful.  Let me know if I am incorrect there.  So we had discussed if that didn't seem to be overly helpful, then the next step would be doing the medial branch block for the thoracic spine.  Do you want me to put an order in?  This is the test procedure that I gave you the packet for.     In regards to your chiropractor  You can let him know that we accept outside procedure orders, and he can call [phone] (which is our main scheduling line) to ask about what needs to be sent with a referral.  We do not accept clinic consultations /med management from providers outside of the [facility] system.     Kimberly Colindres MD  [facility]

## 2021-08-04 ENCOUNTER — TELEPHONE (OUTPATIENT)
Dept: PALLIATIVE MEDICINE | Facility: CLINIC | Age: 61
End: 2021-08-04

## 2021-08-04 NOTE — TELEPHONE ENCOUNTER
Screening questions for MBB Injections:    Injection to be done at which interventional clinic site? [facility] [place]       Instruct patient to arrive as directed prior to the scheduled appointment time:    [place] and [place]: 30 minutes before      Procedure ordered by Dr. Colindres    Procedure ordered? Thoracic Medial Branch Block    What insurance would patient like us to bill for this procedure? BCBS      MEDICA: REQUIRES A PA FOR THE FIRST MBB     Worker's comp- Any injection DO NOT SCHEDULE and route to Sydnee Davila.      [employer] insurance - If scheduling an SI joint injection DO NOT SCHEDULE and route to Sydnee Davila.       MBBs must be scheduled with elapsed time interval of at least 2 weeks and not more than 6 months between the First MBB and the Second MBB for insurance purposes       [employer] - Any injection besides hip/shoulder/knee joint DO NOT SCHEDULE and route to Sydnee Davila. She will obtain PA and call pt back to schedule procedure or notify pt of denial.       [employer]- PA required for all MBB's      **BCBS- ALL need to be routed to Sydnee for review if a PA is needed**    IF SCHEDULING IN [place] AND NEEDS A PA, IT IS OKAY TO SCHEDULE. [place] HANDLES THEIR OWN PA'S AFTER THE PATIENT IS SCHEDULED. PLEASE SCHEDULE AT LEAST 1 WEEK OUT SO A PA CAN BE OBTAINED.    Any chance of pregnancy? NO   If YES, do NOT schedule and route to RN pool    Is an  needed? No     Patient has a drive home? (mandatory) Yes     Is patient taking any blood thinners (plavix, coumadin, jantoven, warfarin, heparin, pradaxa or dabigatran )? No    If hold needed, do NOT schedule, route to RN pool     Is patient taking any aspirin products? No     If more than 325mg/day, OK to schedule; Instruct pt to decrease to less than 325 mg for 7 days AND route to RN pool    For CERVICAL procedures, hold all aspirin products for 6 days.     Tell pt that if aspirin product is not held for 6 days, the  procedure WILL BE cancelled.      Does the patient have a bleeding or clotting disorder? No    If YES, okay to schedule AND route to RN nurse pool    **For any patients with platelet count <100, must be forwarded to provider**    Is patient diabetic? NO If YES, have them bring their glucometer.    Does patient have an active infection or treated for one within the past week? No    Is patient currently taking any antibiotics?  No    For patients on chronic, preventative, or prophylactic antibiotics, procedures may be scheduled.     For patients on antibiotics for active or recent infection:antibiotic course must have been completed for 4 days    Is patient currently taking any steroid medications? (i.e. Prednisone, Medrol)  No     For patients on steroid medications, course must have been completed for 4 days    Is patient actively being treated for cancer or immunocompromised? No   If YES, do NOT schedule and route to RN    Are you able to get on and off an exam table with minimal or no assistance? Yes   If NO, do NOT schedule and route to RN    Are you able to roll over and lay on your stomach with minimal or no assistance? Yes   If NO, do NOT schedule and route to RN    Any allergies to contrast dye, iodine, shellfish, or numbing and steroid medications? No  (If so, inform nursing and note in scheduling comments.)    Allergies: Sulfa drugs, Codeine, and Clindamycin     Does patient have an MRI/CT?  YES: 2021  Check Procedure Scheduling Grid to see if required.      Was the MRI done within the last 3 years?  Yes    If yes, where was the MRI done i.e.[facility], [facility], [facility], [facility] etc? [facility]      If no, do not schedule and route to nursing    If MRI was not done at [facility], [facility] or [facility] do NOT schedule and route to nursing.      If pt has an imaging disc, the injection MAY be scheduled but pt has to bring disc to appt.     If they show up without the disc the injection cannot be  done      Medial Branch Block Pre-Procedure Instructions    It is okay to take long acting pain medications (if you are on them) the day of the procedure but try not to take any short acting medications unless absolutely necessary.    YES: INFORMED   Long acting meds would include: Gabapentin (Neurontin), MS Contin, Oxycontin        Short acting meds would include:  Percocet, Oxycodone, Vicodin, Ibuprofen     The day of the procedure, you should try to do things that provoke your pain, since the injection is being done to see if it will relieve your pain . YES: INFORMED     If your pain level is a 4 out of 10 or less on the day of the procedu re, please call [phone] to reschedule.  YES: INFORMED     Reminders:      If you are started on any steroids or antibiotics between now and your appointment, you must contact us because it may affect our ability to perform your procedure INFORMED      Instructed pt to arrive 30 minutes early for IV start if required. (Check Procedure Scheduling Grid) INot Applicable       If this is for a cervical MBB aspirin needs to be held for 6 days.  Not Applicable       Do not schedule procedures requiring IV placement in the first appointment of the day or first appointment after lunch. Do NOT schedule at 0745, 0815 or 1245.        For patients 85 or older we recommend having an adult stay w/ them for the remainder of the day.      Does the patient have any questions? MIAH BOWEN    [facility]

## 2021-08-06 ENCOUNTER — OFFICE VISIT (OUTPATIENT)
Dept: FAMILY MEDICINE | Facility: CLINIC | Age: 61
End: 2021-08-06
Payer: COMMERCIAL

## 2021-08-06 VITALS
HEIGHT: 65 IN | SYSTOLIC BLOOD PRESSURE: 136 MMHG | OXYGEN SATURATION: 98 % | HEART RATE: 75 BPM | WEIGHT: 177 LBS | BODY MASS INDEX: 29.49 KG/M2 | DIASTOLIC BLOOD PRESSURE: 73 MMHG | TEMPERATURE: 97.8 F

## 2021-08-06 DIAGNOSIS — M06.4 INFLAMMATORY POLYARTHROPATHY (H): ICD-10-CM

## 2021-08-06 DIAGNOSIS — S29.012D UPPER BACK STRAIN, SUBSEQUENT ENCOUNTER: Primary | ICD-10-CM

## 2021-08-06 DIAGNOSIS — M47.814 OSTEOARTHRITIS OF THORACIC SPINE, UNSPECIFIED SPINAL OSTEOARTHRITIS COMPLICATION STATUS: ICD-10-CM

## 2021-08-06 DIAGNOSIS — M62.830 BACK MUSCLE SPASM: ICD-10-CM

## 2021-08-06 PROCEDURE — 99214 OFFICE O/P EST MOD 30 MIN: CPT | Performed by: FAMILY MEDICINE

## 2021-08-06 ASSESSMENT — MIFFLIN-ST. JEOR: SCORE: 1370.81

## 2021-08-06 ASSESSMENT — PAIN SCALES - GENERAL: PAINLEVEL: MODERATE PAIN (4)

## 2021-08-06 NOTE — PATIENT INSTRUCTIONS
At [facility], we strive to deliver an exceptional experience to you, every time we see you. If you receive a survey, please complete it as we do value your feedback.  If you have MyChart, you can expect to receive results automatically within 24 hours of their completion.  Your provider will send a note interpreting your results as well.   If you do not have MyChart, you should receive your results in about a week by mail.    Your care team:                            Family Medicine Internal Medicine   MD Temo Lott MD Shantel Branch-Fleming, MD Srinivasa Vaka, MD Katya Belousova, PAAL Wheeler, APRN CNP    Ivan Lewis, MD Pediatrics   Jeremy Rodriguez, PAAL Borges, CNP MD Flory Ivory APRN CNP   MD Daija Ortiz MD Deborah Mielke, MD Tatiana Tejeda, APRN [facility]      Clinic hours: Monday - Thursday 7 am-6 pm; Fridays 7 am-5 pm.   Urgent care: Monday - Friday 10 am- 8 pm; Saturday and Sunday 9 am-5 pm.    Clinic: [phone]       [facility]: Monday - Thursday 8 am - 7 pm; Friday 8 am - 6 pm  [facility]: [phone]     Use [email or web address] for 24/7 diagnosis and treatment of dozens of conditions.

## 2021-08-06 NOTE — PROGRESS NOTES
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 This requires the assistant to bend over repeatedly during the procedure. 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  Will complete disability forms for [employer] based on latest evaluation and treatment plans.     DJD thoracic spine  Age and occupation related arthritic changes    Back muscle spasm  Continued back spasms. Changed to a muscle relaxer that should be less sedating.     Inflammatory polyarthropathy (H)  Management by rheumatology. Taking Plavix.                CONSULTATION/REFERRAL to Pain Clinic, chiropractor.   FUTURE LABS:       - Schedule fasting labs in 6 months  FUTURE APPOINTMENTS:       - Follow-up for annual visit or as needed  See Patient Instructions    No follow-ups on file.    Jelly Copeland MD  [facility] is a 61 year old who presents for the following health issues     HPI     Upper Back Pain from strain Follow Up      Where is your back pain located? (Select all that apply) upper back bilateral    How would you describe your back pain?  burning, cramping and sharp    Where does your back pain spread? nowhere    Since your last clinic visit for back pain, how has your pain changed? always present, but gets better and worse    Does your back pain interfere with your job? YES- has been unable to return to work because this requires leaning forward and repetitive motion that caused the back strain initially. The ergonomics at work assisting dentist with root canals is not amenable to job changes that allow good back posture to protect from repeat injury. Still having significant  "symptoms, pain and dysfunction related to the initial event.     Since your last visit, have you tried any new treatment? Yes -  chiropractor, muscle relaxants and steroid injection  Has seen neurosurgery, CT scan and MRI scan, pain clinic with steroid injection, chiropractic treatment. Now has a nerve ablation procedure and will be having preliminary injection to test the sites where pain and nerve damage seems to be the worse.         Review of Systems   Constitutional, HEENT, cardiovascular, pulmonary, gi and gu systems are negative, except as otherwise noted.      Objective    /73 (BP Location: Left arm, Patient Position: Chair, Cuff Size: Adult Large)   Pulse 75   Temp 97.8  F (36.6  C) (Tympanic)   Ht 1.638 m (5' 4.5\")   Wt 80.3 kg (177 lb)   SpO2 98%   BMI 29.91 kg/m    Body mass index is 29.91 kg/m .  Physical Exam   GENERAL: healthy, alert and no distress  EYES: Eyes grossly normal to inspection, conjunctivae and sclerae normal  MS: no gross musculoskeletal defects noted, no edema  SKIN: no suspicious lesions or rashes  NEURO: Normal strength and tone, gait and speech normal  PSYCH: mentation appears normal, affect normal/bright   Unable to do ROM upper back and tender in paraspinal muscles with muscle spasms.     No results found for any visits on 08/06/21.    MRI upper back 7-8-2021:  IMPRESSION:  Multilevel degenerative changes, as described, including prominent  multilevel right anterolateral marginal/appositional endplate  osteophytes, Modic type I degenerative endplate signal change at  T8-T9, and mild upper to mid thoracic facet arthropathy. No  significant disc herniation seen. No spinal canal or neural foraminal  stenosis.           "

## 2021-08-08 PROBLEM — S29.012A UPPER BACK STRAIN: Status: ACTIVE | Noted: 2021-06-27

## 2021-08-09 ENCOUNTER — RADIOLOGY INJECTION OFFICE VISIT (OUTPATIENT)
Dept: PALLIATIVE MEDICINE | Facility: CLINIC | Age: 61
End: 2021-08-09
Payer: COMMERCIAL

## 2021-08-09 VITALS
OXYGEN SATURATION: 98 % | RESPIRATION RATE: 16 BRPM | HEART RATE: 78 BPM | DIASTOLIC BLOOD PRESSURE: 86 MMHG | SYSTOLIC BLOOD PRESSURE: 144 MMHG

## 2021-08-09 DIAGNOSIS — M47.894 OTHER SPONDYLOSIS, THORACIC REGION: Primary | ICD-10-CM

## 2021-08-09 DIAGNOSIS — M47.814 SPONDYLOSIS OF THORACIC REGION WITHOUT MYELOPATHY OR RADICULOPATHY: ICD-10-CM

## 2021-08-09 PROCEDURE — 64495 INJ PARAVERT F JNT L/S 3 LEV: CPT | Mod: RT | Performed by: PSYCHIATRY & NEUROLOGY

## 2021-08-09 PROCEDURE — 64494 INJ PARAVERT F JNT L/S 2 LEV: CPT | Mod: RT | Performed by: PSYCHIATRY & NEUROLOGY

## 2021-08-09 PROCEDURE — 64493 INJ PARAVERT F JNT L/S 1 LEV: CPT | Mod: RT | Performed by: PSYCHIATRY & NEUROLOGY

## 2021-08-09 ASSESSMENT — PAIN SCALES - GENERAL: PAINLEVEL: MODERATE PAIN (5)

## 2021-08-09 NOTE — NURSING NOTE
Discharge Information    IV Discontiued Time:  NA    Amount of Fluid Infused:  NA    Discharge Criteria = When patient returns to baseline or as per MD order    Consciousness:  Pt is fully awake    Circulation:  BP +/- 20% of pre-procedure level    Respiration:  Patient is able to breathe deeply    O2 Sat:  Patient is able to maintain O2 Sat >92% on room air    Activity:  Moves 4 extremities on command    Ambulation:  Patient is able to stand and walk or stand and pivot into wheelchair    Dressing:  Clean/dry or No Dressing    Notes:   Discharge instructions and AVS given to patient    Patient meets criteria for discharge?  YES    Admitted to PCU?  No    Responsible adult present to accompany patient home?  Yes    Signature/Title:    Zay Gresham RN  RN Care Coordinator  [facility] [place]

## 2021-08-09 NOTE — PROGRESS NOTES
Pre procedure Diagnosis: facet arthropathy   Post procedure Diagnosis: Same  Procedure performed:  Right T8-11 medial branch blocks #1  Anesthesia: none  Complications: none  Operators: Kimberly Colindres MD     Indications:   Radha Rodriguez is a 61 year old female seen by me in clinic.They have a history of right sided thoracic spine pain, worse with lifting, twisting.  Exam shows pain with extension/rotation and rotation along and they have tried conservative treatment including medication.    Physical therapy last done:  current    Options/alternatives, benefits and risks were discussed with the patient including bleeding, infection, tissue trauma, exposure to radiation, reaction to medications, spinal cord injury, weakness, numbness and paralysis.  Questions were answered to her satisfaction and she agrees to proceed. Voluntary informed consent was obtained and signed.     Vitals were reviewed: Yes  Allergies were reviewed:  Yes   Medications were reviewed:  Yes   Pre-procedure pain score: 6/10    Procedure:  After getting informed consent, patient was brought into the procedure suite and was placed in a prone position on the procedure table.   A Pause for the Cause was performed.  Patient was prepped and draped in sterile fashion.     Under AP fluoroscopic guidance the T8-11 vertebral bodies and transverse processes were identified.   Lidocaine 1% was used to anesthetize the skin at each level.  Under intermittent fluoroscopy, 25G 2inch Quinke spinal needle were advised to the superiolateral portion of the transverse process, taking care to avoid the rib and lung.      Omnipaque-300 was injected, and appropriate spread of contrast was noted without vascular uptake.  A total of 1ml of Omnipaque was used.  9ml was wasted. Bupivacaine 0.5% 0.5 ml was injected at each location.  The needles were removed.      Hemostasis was achieved, the area was cleaned, and bandaids were placed when appropriate.  The patient  tolerated the procedure well, and was taken to the recovery room.    Images were saved to PACS.    Lung sounds were normal per RN    The patient will continue to monitor progress, and they were given a pain diary to complete at home.  They will either fax or mail this back to us or bring it to their next appointment. We will determine the treatment plan after we review the diary.      Post-procedure pain score: 5/10  Follow-up includes: -will await diary for further planning.    Kimberly Colindres MD  [facility]

## 2021-08-09 NOTE — PATIENT INSTRUCTIONS
[facility]   Medial Branch Block Discharge Instructions      Your procedure was performed by: Dr. Kimberly Colindres     Medications used include:  Lidocaine  Bupivicaine      You will need to complete the Pain Scale Log form and return it to us as soon as possible.  Once we have received the form, we will review it and call you to determine the next steps.     The form can be faxed to [phone] or mailed to:   [facility]   [address]   [place] [address]      You may resume your regular medications    If you were holding your blood thinning medication, please restart taking it: N/A    You may resume your regular activities    Be cautious with walking as numbness and/or weakness in the lower extremities may occur for up to 6-8 hours due to the effects of the anesthetic.    Avoid driving for 6 hours. The local anesthetic could slow your reflexes.     You may shower, however no swimming or tub baths or hot tubs for 24 hours following your procedure.    Your pain will return after the numbing medications have worn off.  You may use your current pain medications as needed.    Unless you have been directed to avoid the use of anti-inflammatory medications (NSAIDS), you may use medications such as ibuprofen, Aleve or Tylenol for pain control if needed.  Some people find it helpful to alternate ibuprofen and Tylenol every 3 hours for a couple of days.    You may use ice packs 10-15 minutes three to four times a day at the injection site for comfort.     Do not use heat to painful areas for 6 to 8 hours. This will give the local anesthetic time to wear off and prevent you from accidentally burning your skin.     If you experience any of the following, call the Pain Clinic during work hours (Monday through Friday 8 am-4:30 pm) at [phone] or the Provider Line after hours at [phone]:  -Fever over 100 degree F  -Swelling, bleeding, redness, drainage,  warmth at the injection site  -Progressive weakness or numbness   -Unusual new onset of pain that is not improving

## 2021-08-10 ENCOUNTER — MYC MEDICAL ADVICE (OUTPATIENT)
Dept: PALLIATIVE MEDICINE | Facility: CLINIC | Age: 61
End: 2021-08-10

## 2021-08-10 DIAGNOSIS — M47.894 OTHER SPONDYLOSIS, THORACIC REGION: Primary | ICD-10-CM

## 2021-08-10 NOTE — TELEPHONE ENCOUNTER
08/09/21 Procedure performed:  Right T8-11 medial branch blocks #1    From: Radha Rodriguez      Created: 8/10/2021 9:45 AM        Dear Dr. Colindres,  I'm concerned over my procedure yesterday.  My midback felt better, but ached.  Above the area we worked on was very sore.  The upper area is where my sensitivity and spasm started.  The mid area usually gets ache and burning.  So I'm unsure if we worked on the area that will be most beneficial.  Please give me your thoughts on what I should do.   Also I need to send in the diary of yesterday. My back improved on the area we worked on, my upper area was very ache.  I'm very concerned I need to return to my job, with the constant twisting and bending I'm not sure how I can do it. I'm an endodontic assistant. Dr. Copeland discussed maybe the need for me to find a new line of work. That is a scary thought at my age and doing this for 30 plus years.  Thank You so much for your help with this matter!            From: Kait Melo, RN      Created: 8/10/2021 10:02 AM        Amie Mcdowell to hear about the aching back. It is not uncommon to have some aching after this procedure due to the needles. This will be temporary and and will respond best to ice, ibuprofen if you are able to use it, some very gentle stretching and rest. It should not last more than a few days and should start to improve soon.      Are you saying that you feel like the procedure was done too low on your spine and that your actual pain is above that, higher up on your spine?     ESMER Reese, RN  Care Coordinator  [facility]            From: Radha Rodriguez      Created: 8/11/2021 6:55 AM        That was my concern. Dr. Colindres is the expert in this and I just wanted her opinion before additional treatment is done.  Today my back is very sore, I am icing it and taking ibuprofen.  Thanks for the advice and help.

## 2021-08-11 NOTE — TELEPHONE ENCOUNTER
Garry Garcia,  I tried to call but didn't reach you.     The area we did spanned nearly all of the tender area, with hte very top-most one left off as we could only do 4.  If you think that the area you need done is higher than the tender location we palpated, we could certainly move the procedure up, but we would need to repeat x 2.  Again, it is hard to span a large area in one single procedure.     I understand and recognize the concern you have with your job.  The medial branch block to radiofrequency ablation process does take a while, but tends to give a better, longer lasting relief, and it sounds like maybe the tender area we identified isn't covering high enough for you     Options are:  -continue with block #2 of current location as it managed a portion of your pain. We would need to see your diary. Once done we could address higher up.  - do a new set of 2 blocks higher up.  -we could try thoracic facet joint steroid injections.  My partner does these.  They are quicker in how they work, but don't last as long typically.  Given your concerns with work, that may be a way to get faster relief, and then we can decide on the radiofrequency ablation process once you get back to work.  We don't often do the steroid injections here, but I checked your insurance and we appear to be covered.  I would recommend that you really work on isolating the section that you feel would be MOST beneficial, as we can't injection steroid all up and down that area.       Kimberly Colindres MD  [facility]

## 2021-08-11 NOTE — TELEPHONE ENCOUNTER
From: Radha Rodriguez      Created: 8/11/2021 2:33 PM        Dear Dr. Colindres,  I'm so sorry I missed your call. Thank You for the quick response.  My back has been very tender today on the left side near just above and below my bra line.  I would love to get back to work but my type of work really aggrevates the situation, so I don't want to go back and have a set back and spasms start again.    I also want the longer better procedure to be done.  If you think it's the RFA that's what I want.  I faxed my diary over this morning.  I did feel more sensitivity higher up. My question for you is do you recommend I have the RFA on my mid back or should I have a medial branch block done higher on my back. And is that covered by my insurance?  Thank You for your time and expert opinion!             From: Kait Melo, CHANELL      Created: 8/11/2021 3:49 PM        Garry Garcia! She did give you the option of doing the blocks higher up if that is what you want. Your insurance will approve or deny the RFA based on the information you provide in your pain diary. You will likely need to do some PT or have done PT in the last few months for approval as well. When we look at your pain diary that might help decide as well.     ESMER Reese, RN  Care Coordinator  [facility]

## 2021-08-12 NOTE — TELEPHONE ENCOUNTER
There is no PA required for medial branch blocks but there is for the RFA, below is the RFA policy for [employer]    [employer] RFA REQUIREMENTS-     [employer]  A radiofrequency joint denervation/ablation is considered medically necessary for facet mediated pain resulting from disease, injury, or surgery when ALL of the following are met:     Clinical findings and imaging studies suggest no other obvious cause of the pain (e.g., central spinal stenosis with neurogenic claudication/myelopathy, foraminal stenosis or disc herniation with concordant radicular pain/radiculopathy, infection, tumor, fracture, pseudoarthrosis, pain related to spinal instrumentation).     Failure of at least three (3) months of conservative therapy (e.g., exercise, physical methods including physical therapy, chiropractic care, nonsteroidal antiinflammatory drugs [NSAID's] and/or analgesics) unless contraindicated and the reason(s) for the contraindication(s) is/are documented in the medical record     Two positive diagnostic facet joint injections/medial branch blocks as evidenced by at least 80% relief of facet mediated pain for at least the expected minimum duration of the effect of the local anesthetic used.     For an individual with a prior spinal fusion, radiofrequency joint denervation/ablation is considered medically necessary when the above criteria are met and the procedure is performed at an unfused spinal segment located either above or below the fused spinal segment.     Routing to nursing to review    Sydnee RODGERS    [facility]

## 2021-08-12 NOTE — TELEPHONE ENCOUNTER
Pt had a rightThoracic  medial branch block # 1 on 8/9/21.  The post medial branch block form was received.  According to pain diary pt would like to proceed with medial branch block #2.    Max relief from block is:    At rest:                 60%  Left fact load:       na%  Right facet load:  did not fill out%  Normal activity:    70%    Physical therapy:      Last done in?:  current.     How many sessions?:  14 per pt report.      Where was it  done?  [facility].      If outside location does a release of information need to be signed? TBD     Routed to Sydnee to review. Does pt had enough relief insurance-wise to proceed to lumbar medial branch block #2? Route information back to nursing to determine plan-do not schedule yet, even if approved.    Katie Horan, RN-BSN  [facility]

## 2021-08-12 NOTE — TELEPHONE ENCOUNTER
Thanks for that update.  Looking at your medial branch block form, you don't have enough relief for [employer] to cover the radiofrequency ablation.  So we could either move up (remember we can only do 4 levels) or try the steroid.     I guess I am going to put this back on you. Either option is fine.  I think if we go down a new medial branch block higher up, that would mean it takes longer to get to the point of pain relief than the steroid injections.  Downside to steroid injection is it lasts shorter.     I am leary of telling you which one, as it seems like your work really depends on this.   So let me know, and we will put in the appropriate order.     Kimberly Colindres MD  [facility]

## 2021-08-12 NOTE — TELEPHONE ENCOUNTER
Pt did not have enough relief to proceed per insurance guidelines of 80%.      Routed to Dr. Colindres to review before contacting pt.    Katie RN-BSN  [facility]

## 2021-08-13 NOTE — TELEPHONE ENCOUNTER
Pt is questioning if the new order is for the #2 MBB or RFA? Pt has PIYUSH GAMINO    [facility] [place]

## 2021-08-13 NOTE — TELEPHONE ENCOUNTER
From pt:    Dr. Colindres,  Thank You for the options.  I would like to try the procedure higher. Would I need to see if my insurance covers this and then get this scheduled.  Thanks Again!  Radha    ------------------------------    Order placed.  Message sent to patient.    Radha,  The order is in- please call to schedule.  [phone]     Kimberly Colindres MD  [facility]

## 2021-08-16 ENCOUNTER — MYC MEDICAL ADVICE (OUTPATIENT)
Dept: FAMILY MEDICINE | Facility: CLINIC | Age: 61
End: 2021-08-16

## 2021-08-17 ENCOUNTER — TELEPHONE (OUTPATIENT)
Dept: PALLIATIVE MEDICINE | Facility: CLINIC | Age: 61
End: 2021-08-17

## 2021-08-17 NOTE — TELEPHONE ENCOUNTER
This will be for a thoracic MBB #1. We are changing the levels so starting over from number #1 . Will route to [place] prior to scheduling because it is [employer].     If no PA required please route to  to schedule Thoracic MBB#1 (new levels) with Dr Colindres.     Kait Melo, URBANON, RN  Care Coordinator  [facility] [place]

## 2021-08-17 NOTE — TELEPHONE ENCOUNTER
Screening questions for MBB Injections:    Injection to be done at which interventional clinic site? [facility] [place]       Instruct patient to arrive as directed prior to the scheduled appointment time:    [place]: 30 minutes before      Procedure ordered by Dr. Colindres    Procedure ordered? Thoracic Medial Branch Block    What insurance would patient like us to bill for this procedure? BCBS - No PA      MEDICA: REQUIRES A PA FOR THE FIRST MBB     Worker's comp- Any injection DO NOT SCHEDULE and route to Sydnee Giovanni.      [employer] insurance - If scheduling an SI joint injection DO NOT SCHEDULE and route to [facility].       MBBs must be scheduled with elapsed time interval of at least 2 weeks and not more than 6 months between the First MBB and the Second MBB for insurance purposes       [employer] - Any injection besides hip/shoulder/knee joint DO NOT SCHEDULE and route to Sydnee Giovanni. She will obtain PA and call pt back to schedule procedure or notify pt of denial.       [employer]- PA required for all MBB's      **BCBS- ALL need to be routed to [place] for review if a PA is needed**    IF SCHEDULING IN [place] AND NEEDS A PA, IT IS OKAY TO SCHEDULE. [place] HANDLES THEIR OWN PA'S AFTER THE PATIENT IS SCHEDULED. PLEASE SCHEDULE AT LEAST 1 WEEK OUT SO A PA CAN BE OBTAINED.    Any chance of pregnancy? NO   If YES, do NOT schedule and route to RN pool    Is an  needed? No     Patient has a drive home? (mandatory) Yes     Is patient taking any blood thinners (plavix, coumadin, jantoven, warfarin, heparin, pradaxa or dabigatran )? No    If hold needed, do NOT schedule, route to RN pool     Is patient taking any aspirin products? No     If more than 325mg/day, OK to schedule; Instruct pt to decrease to less than 325 mg for 7 days AND route to RN pool    For CERVICAL procedures, hold all aspirin products for 6 days.     Tell pt that if aspirin product is not held for 6  days, the procedure WILL BE cancelled.      Does the patient have a bleeding or clotting disorder? No    If YES, okay to schedule AND route to RN nurse pool    **For any patients with platelet count <100, must be forwarded to provider**    Is patient diabetic? NO If YES, have them bring their glucometer.    Does patient have an active infection or treated for one within the past week? No    Is patient currently taking any antibiotics?  No    For patients on chronic, preventative, or prophylactic antibiotics, procedures may be scheduled.     For patients on antibiotics for active or recent infection:antibiotic course must have been completed for 4 days    Is patient currently taking any steroid medications? (i.e. Prednisone, Medrol)  No     For patients on steroid medications, course must have been completed for 4 days    Is patient actively being treated for cancer or immunocompromised? No   If YES, do NOT schedule and route to RN    Are you able to get on and off an exam table with minimal or no assistance? Yes   If NO, do NOT schedule and route to RN    Are you able to roll over and lay on your stomach with minimal or no assistance? Yes   If NO, do NOT schedule and route to RN    Any allergies to contrast dye, iodine, shellfish, or numbing and steroid medications? No  (If so, inform nursing and note in scheduling comments.)    Allergies: Sulfa drugs, Codeine, and Clindamycin     Does patient have an MRI/CT?  YES: 2021  Check Procedure Scheduling Grid to see if required.      Was the MRI done within the last 3 years?  Yes    If yes, where was the MRI done i.e.[facility], [facility], [facility], [facility] etc? [facility]      If no, do not schedule and route to nursing    If MRI was not done at [facility], [facility] or [facility] do NOT schedule and route to nursing.      If pt has an imaging disc, the injection MAY be scheduled but pt has to bring disc to appt.     If they show up without the disc the injection cannot  be done      Medial Branch Block Pre-Procedure Instructions    It is okay to take long acting pain medications (if you are on them) the day of the procedure but try not to take any short acting medications unless absolutely necessary.    YES: INFORMED   Long acting meds would include: Gabapentin (Neurontin), MS Contin, Oxycontin        Short acting meds would include:  Percocet, Oxycodone, Vicodin, Ibuprofen     The day of the procedure, you should try to do things that provoke your pain, since the injection is being done to see if it will relieve your pain . YES: INFORMED     If your pain level is a 4 out of 10 or less on the day of the procedu re, please call [phone] to reschedule.  YES: INFORMED     Reminders:      If you are started on any steroids or antibiotics between now and your appointment, you must contact us because it may affect our ability to perform your procedure INFORMED      Instructed pt to arrive 30 minutes early for IV start if required. (Check Procedure Scheduling Grid) INot Applicable       If this is for a cervical MBB aspirin needs to be held for 6 days.  Not Applicable       Do not schedule procedures requiring IV placement in the first appointment of the day or first appointment after lunch. Do NOT schedule at 0745, 0815 or 1245.        For patients 85 or older we recommend having an adult stay w/ them for the remainder of the day.      Does the patient have any questions? MIAH BOWEN    [facility]

## 2021-08-17 NOTE — TELEPHONE ENCOUNTER
This will be for a thoracic MBB #1. We are changing the levels so starting over from number #1 .     URBANO ReeseN, RN  Care Coordinator  [facility] [place]

## 2021-08-26 ENCOUNTER — TELEPHONE (OUTPATIENT)
Dept: FAMILY MEDICINE | Facility: CLINIC | Age: 61
End: 2021-08-26

## 2021-08-26 ENCOUNTER — VIRTUAL VISIT (OUTPATIENT)
Dept: FAMILY MEDICINE | Facility: CLINIC | Age: 61
End: 2021-08-26
Payer: COMMERCIAL

## 2021-08-26 DIAGNOSIS — S29.012D UPPER BACK STRAIN, SUBSEQUENT ENCOUNTER: Primary | ICD-10-CM

## 2021-08-26 DIAGNOSIS — M47.814 OSTEOARTHRITIS OF THORACIC SPINE, UNSPECIFIED SPINAL OSTEOARTHRITIS COMPLICATION STATUS: ICD-10-CM

## 2021-08-26 PROCEDURE — 99213 OFFICE O/P EST LOW 20 MIN: CPT | Mod: 95 | Performed by: FAMILY MEDICINE

## 2021-08-26 ASSESSMENT — PAIN SCALES - GENERAL: PAINLEVEL: MODERATE PAIN (5)

## 2021-08-26 NOTE — TELEPHONE ENCOUNTER
Forms/Letter Request    Name of form/letter: Non-FMLA Medical form for work    Have you been seen for this request: Yes; today, 8/26 at 1pm    Do we have the form/letter: Yes: forms left in [facility]    When is form/letter needed by: No date specified when asked    How would you like the form/letter returned:     Patient Notified form requests are processed in 3-5 business days:Yes    Okay to leave a detailed message? Yes Home number on file [phone] (home)    Forms left in [facility].    Thank you,  Debra Rodriguez, Patient Representative

## 2021-08-26 NOTE — PATIENT INSTRUCTIONS
Patient Education     Common Spine and Disk Problems  The most common serious back problems happen when disks tear, bulge, or rupture. In such cases, an injured disk can no longer cushion the vertebrae and absorb shock. As a result, the rest of your spine may also weaken. This can lead to pain, stiffness, and other symptoms.                Torn annulus. A sudden movement may cause a tiny tear in an annulus. Nearby ligaments may stretch.    Contained herniated disk. As a disk wears out, the nucleus may bulge into the annulus and press on nerves.    Extruded herniated disk. When a disk ruptures, its nucleus can squeeze out and irritate a nerve.    Arthritis. As disks wear out over time, bone spurs form. These growths can irritate nerves and inflame facets.    Instability. As a disk stretches, the vertebrae slip back and forth. This can put pressure on the annulus.    Spondylolisthesis. This is a condition in which one vertebra has moved forward or backward, in relation to the one above or below it. This causes a crack (stress fracture) in the areas that link the vertebrae together. This may put pressure on the annulus, stretch the disk, and irritate nerves.  Cristina last reviewed this educational content on 6/1/2018 2000-2021 [employer]. All rights reserved. This information is not intended as a substitute for professional medical care. Always follow your healthcare professional's instructions.

## 2021-08-26 NOTE — PROGRESS NOTES
Radha is a 61 year old who is being evaluated via a billable video visit.      How would you like to obtain your AVS? MyChart  If the video visit is dropped, the invitation should be resent by: Text to cell phone: [phone]  Will anyone else be joining your video visit? No    Video Start Time: 1:18 PM    Assessment & Plan     Upper back strain, subsequent encounter  Continued pain in upper back. Improvement with physical therapy and cold water therapy. Injection block in back did not work and repeat higher up being planned. Chiropractic treatment and noted lesion at T4 level consistent with hemangioma on MRI that is site of patient's pain and tenderness. Needs to be followed up.     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 Discussed ergonomics at work. Will continue current treatment but advise not returning to work until pain free and fully functional with upper back. Anticipate that this may take another 3 months or longer. 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                CONSULTATION/REFERRAL to pain clinic as scheduled.   FUTURE APPOINTMENTS:       - Follow-up visit in 3 months or sooner if any questions or concerns.   See Patient Instructions    No follow-ups on file.    Jelly Copeland MD  [facility]   Radha is a 61 year old who presents for the following health issues     HPI     Back Pain  Onset/Duration: June 8th   Description:   Location of pain: thoracic   Character of pain: dull ache, burning and constant  Pain radiation: towards arms   New numbness or weakness in legs, not attributed to pain: no   Intensity: Currently 5/10, most often 2-4.   Progression of Symptoms: same  History:   Specific cause: none  Pain interferes with job: currently off   History of back problems: no prior back problems  Any previous MRI or X-rays: Yes- at [place].   Sees a specialist  for back pain: fascial pain specialist, physical therapy, chiropractor   Alleviating factors:   Improved by: water, cold and NSAIDs    Precipitating factors:  Worsened by: turning, lifting, driving   Therapies tried and outcome: chiropractor, cold, NSAIDs and Physical Therapy  Water therapy in cool water helps.     Accompanying Signs & Symptoms:  Risk of Fracture: None  Risk of Cauda Equina: None  Risk of Infection: None  Risk of Cancer: None  Risk of Ankylosing Spondylitis: Onset at age <35, male, AND morning back stiffness  no             Review of Systems   Constitutional, HEENT, cardiovascular, pulmonary, gi and gu systems are negative, except as otherwise noted.      Objective           Vitals:  No vitals were obtained today due to virtual visit.    Physical Exam   GENERAL: Healthy, alert and no distress  EYES: Eyes grossly normal to inspection.  No discharge or erythema, or obvious scleral/conjunctival abnormalities.  RESP: No audible wheeze, cough, or visible cyanosis.  No visible retractions or increased work of breathing.    SKIN: Visible skin clear. No significant rash, abnormal pigmentation or lesions.  NEURO: Cranial nerves grossly intact.  Mentation and speech appropriate for age.  PSYCH: Mentation appears normal, affect normal/bright, judgement and insight intact, normal speech and appearance well-groomed.    Office Visit on 06/11/2021   Component Date Value Ref Range Status     Vitamin D Deficiency screening 06/11/2021 26  20 - 75 ug/L Final    Comment: Season, race, dietary intake, and treatment affect the concentration of   25-hydroxy-Vitamin D. Values may decrease during winter months and increase   during summer months. Values 20-29 ug/L may indicate Vitamin D insufficiency   and values <20 ug/L may indicate Vitamin D deficiency.  Vitamin D determination is routinely performed by an immunoassay specific for   25 hydroxyvitamin D3.  If an individual is on vitamin D2 (ergocalciferol)    supplementation, please specify 25 OH vitamin D2 and D3 level determination by   LCMSMS test [id number].                   Video-Visit Details    Type of service:  Video Visit    Video End Time: Unable to complete. 15 minute phone call. Patient driving at the time of visit.     Originating Location (pt. Location): Other car    Distant Location (provider location):  [facility]     Platform used for Video Visit: Unable to complete video visit

## 2021-08-27 NOTE — TELEPHONE ENCOUNTER
Form copy placed in abstract and tc bin. Original placed at the  for pt . Tried calling pt 4 times and the phone is busy. If pt calls back let her know forms are complete and waiting at .

## 2021-09-08 ENCOUNTER — TRANSFERRED RECORDS (OUTPATIENT)
Dept: HEALTH INFORMATION MANAGEMENT | Facility: CLINIC | Age: 61
End: 2021-09-08

## 2021-09-12 ENCOUNTER — HEALTH MAINTENANCE LETTER (OUTPATIENT)
Age: 61
End: 2021-09-12

## 2021-09-13 ENCOUNTER — RADIOLOGY INJECTION OFFICE VISIT (OUTPATIENT)
Dept: PALLIATIVE MEDICINE | Facility: CLINIC | Age: 61
End: 2021-09-13
Payer: COMMERCIAL

## 2021-09-13 VITALS
RESPIRATION RATE: 16 BRPM | DIASTOLIC BLOOD PRESSURE: 91 MMHG | OXYGEN SATURATION: 100 % | SYSTOLIC BLOOD PRESSURE: 153 MMHG | HEART RATE: 68 BPM

## 2021-09-13 DIAGNOSIS — M47.894 OTHER SPONDYLOSIS, THORACIC REGION: ICD-10-CM

## 2021-09-13 DIAGNOSIS — M47.814 SPONDYLOSIS OF THORACIC REGION WITHOUT MYELOPATHY OR RADICULOPATHY: ICD-10-CM

## 2021-09-13 PROCEDURE — 64491 INJ PARAVERT F JNT C/T 2 LEV: CPT | Mod: RT | Performed by: PSYCHIATRY & NEUROLOGY

## 2021-09-13 PROCEDURE — 64492 INJ PARAVERT F JNT C/T 3 LEV: CPT | Mod: RT | Performed by: PSYCHIATRY & NEUROLOGY

## 2021-09-13 PROCEDURE — 64490 INJ PARAVERT F JNT C/T 1 LEV: CPT | Mod: RT | Performed by: PSYCHIATRY & NEUROLOGY

## 2021-09-13 ASSESSMENT — PAIN SCALES - GENERAL: PAINLEVEL: MODERATE PAIN (5)

## 2021-09-13 NOTE — NURSING NOTE
Pre-procedure Intake    Have you been fasting? NA    If yes, for how long?     Are you taking a prescribed blood thinner such as coumadin, Plavix, Xarelto?    No    If yes, when did you take your last dose?     Do you take aspirin?  No    If cervical procedure, have you held aspirin for 6 days?   No     Do you have any allergies to contrast dye, iodine, steroid and/or numbing medications?  NO    Are you currently taking antibiotics or have an active infection?  NO    Have you had a fever/elevated temperature within the past week? NO    Are you currently taking oral steroids? NO    Do you have a ? Yes       Are you pregnant or breastfeeding?  NO    Have you received the COVID-19 vaccine? Yes       If yes, was it your 1st, 2nd or only dose needed? 2nd dose     Date of most recent vaccine: 2/15/2021    Notify provider and RNs if systolic BP >170, diastolic BP >100, P >100 or O2 sats < 90%

## 2021-09-13 NOTE — PATIENT INSTRUCTIONS
[facility]   Medial Branch Block Discharge Instructions      Your procedure was performed by: Dr. Kimberly Colindres      Medications used include:  Lidocaine  Bupivicaine     You will need to complete the Pain Scale Log form and return it to us as soon as possible.  Once we have received the form, we will review it and call you to determine the next steps.     The form can be faxed to [phone] or mailed to:   [facility]   [id number]   [place] [address]      You may resume your regular medications    If you were holding your blood thinning medication, please restart taking it: N/A    You may resume your regular activities    Be cautious with walking as numbness and/or weakness in the lower extremities may occur for up to 6-8 hours due to the effects of the anesthetic.    Avoid driving for 6 hours. The local anesthetic could slow your reflexes.     You may shower, however no swimming or tub baths or hot tubs for 24 hours following your procedure.    Your pain will return after the numbing medications have worn off.  You may use your current pain medications as needed.    Unless you have been directed to avoid the use of anti-inflammatory medications (NSAIDS), you may use medications such as ibuprofen, Aleve or Tylenol for pain control if needed.  Some people find it helpful to alternate ibuprofen and Tylenol every 3 hours for a couple of days.    You may use ice packs 10-15 minutes three to four times a day at the injection site for comfort.     Do not use heat to painful areas for 6 to 8 hours. This will give the local anesthetic time to wear off and prevent you from accidentally burning your skin.     If you experience any of the following, call the Pain Clinic during work hours (Monday through Friday 8 am-4:30 pm) at [phone] or the Provider Line after hours at [phone]:  -Fever over 100 degree F  -Swelling, bleeding, redness, drainage,  warmth at the injection site  -Progressive weakness or numbness   -Unusual new onset of pain that is not improving

## 2021-09-13 NOTE — PROGRESS NOTES
Pre procedure Diagnosis: facet arthropathy   Post procedure Diagnosis: Same  Procedure performed:  Right T4-7 medial branch blocks #1  Anesthesia: none  Complications: none  Operators: Kimberly Colindres MD and Ramirez Dalton MD    Indications:   Radha Rodriguez is a 61 year old female seen by me in clinic.They have a history of right sided thoracic spine pain, worse with lifting, twisting.  Exam shows pain with extension/rotation and rotation along and they have tried conservative treatment including medication.    Physical therapy last done: last Wednesday was final session    Options/alternatives, benefits and risks were discussed with the patient including bleeding, infection, tissue trauma, exposure to radiation, reaction to medications, spinal cord injury, weakness, numbness and paralysis.  Questions were answered to her satisfaction and she agrees to proceed. Voluntary informed consent was obtained and signed.     Vitals were reviewed: Yes  Allergies were reviewed:  Yes   Medications were reviewed:  Yes   Pre-procedure pain score: 5/10    Procedure:  After getting informed consent, patient was brought into the procedure suite and was placed in a prone position on the procedure table.   A Pause for the Cause was performed.  Patient was prepped and draped in sterile fashion.     Under AP fluoroscopic guidance the T5-8 vertebral bodies and transverse processes were identified.   Lidocaine 1% was used to anesthetize the skin at each level.  Under intermittent fluoroscopy, 25G 2inch Quinke spinal needle were advised to the superiolateral portion of the transverse process, taking care to avoid the rib and lung.      Omnipaque-300 was injected, and appropriate spread of contrast was noted without vascular uptake.  A total of 1ml of Omnipaque was used.  9ml was wasted. Bupivacaine 0.5% 0.4 ml was injected at each location.  The needles were removed.      The block were done at T5-8, blocking the T4-7 medial  branches.    Hemostasis was achieved, the area was cleaned, and bandaids were placed when appropriate.  The patient tolerated the procedure well, and was taken to the recovery room.    Images were saved to PACS.    Lung sounds were normal    The patient will continue to monitor progress, and they were given a pain diary to complete at home.  They will either fax or mail this back to us or bring it to their next appointment. We will determine the treatment plan after we review the diary.      Post-procedure pain score: 4/10  Follow-up includes: -will await diary for further planning.    Ramirez Dalton MD  Pain Medicine Fellow  [facility]    Kimberly Colindres MD  [facility]

## 2021-09-13 NOTE — NURSING NOTE
Discharge Information    Lung sounds clean all    IV Discontiued Time:  NA    Amount of Fluid Infused:  NA    Discharge Criteria = When patient returns to baseline or as per MD order    Consciousness:  Pt is fully awake    Circulation:  BP +/- 20% of pre-procedure level    Respiration:  Patient is able to breathe deeply    O2 Sat:  Patient is able to maintain O2 Sat >92% on room air    Activity:  Moves 4 extremities on command    Ambulation:  Patient is able to stand and walk or stand and pivot into wheelchair    Dressing:  Clean/dry or No Dressing    Notes:   Discharge instructions and AVS given to patient    Patient meets criteria for discharge?  YES    Admitted to PCU?  No    Responsible adult present to accompany patient home?  Yes    Signature/Title:    Zay Gresham RN  RN Care Coordinator  [facility] [place]

## 2021-09-16 ENCOUNTER — MYC MEDICAL ADVICE (OUTPATIENT)
Dept: PALLIATIVE MEDICINE | Facility: CLINIC | Age: 61
End: 2021-09-16

## 2021-09-16 NOTE — TELEPHONE ENCOUNTER
Per patient [employer] message:  From: Radha Rodriguez      Created: 9/16/2021 3:58 PM      It didn't take my pain totally away put it decreased it. If Dr. Colindres feels that was what she expected then yes please set me up for the next procedure.  Thank You!        _________________________________    Mychart sent to pt:  From: Katie Horan RN      Created: 9/16/2021 4:26 PM      Per your pain diary you received anywhere from 71% -81% relief on the day of the procedure.  That is pretty significant and patients usually would want to proceed unless you really feel that the relief wasn't that significant.  I am going to sent this on to Sydnee to see what your insurance stipulations are.           Routed to Sydnee.    CHANELL Wilson-BSN  [facility]

## 2021-09-16 NOTE — TELEPHONE ENCOUNTER
Pt had a rightThoracic  medial branch block # 1 on 9/13/21.  The post medial branch block form was received.    According to pain diary pt would like to proceed with medial branch block #2.    Max relief from block is:    At rest:                 80%  Left fact load:       na%  Right facet load:  71%  Normal activity:    did not fill out%      Physical therapy:      Last done in?:  9/8/21.     How many sessions?:  12.      Where was it  done?  [facility].      If outside location does a release of information need to be signed? TBD  _________________________________    Mychart sent to pt:  From: Katie Horan RN      Created: 9/16/2021 3:36 PM      Garry Garcia,  We received your pain diary.  Do you want to proceed on to medial branch block #2?  If so I will sent it on to [facility] just to make sure your insurance will cover it.     Let us know.     CHANELL Wilson-BSN  [facility]

## 2021-09-22 NOTE — TELEPHONE ENCOUNTER
No PA required, okay to schedule.      BCBS RFA REQUIREMENTS-   [employer] [place]   I.  Non-Pulsed Radiofrequency Facet Joint Denervation    Initial Procedure: Non-pulsed radiofrequency denervation of cervical facet joints (C2-C3 thru C7-T1 vertebrae) and lumbar facet joints (T12-L1 thru L5-S1 vertebrae) may be considered MEDICALLY NECESSARY AND APPROPRIATE when ALL of the following criteria are met:  1. No prior spinal fusion surgery in the vertebral level being treated; AND  2. Non-radicular low back (lumbosacral) or neck (cervical) pain, suggestive of facet joint origin as documented in the medical record, including ALL of the following:  - History, consistenting of mainly axial or non-radicular pain; AND  - Physical examination, with positive provocative signs of facet disease; AND  - Radiographic imaging, obtained within the previous 12 months, excludes other causes of cervical or lumbar pain (e.g., nerve root compression).  AND  3. Pain has failed to respond to three months of conservative management within the last 6 months as documented in the medical record, including ALL of the following:  - Oral pain medications (e.g., non-steroidal anti-inflammatory medications, analgesics, muscle relaxants, or pharmacological therapy); AND  - At least ONE of the following therapies:  - Course of physical therapy (e.g., at least 4 visits over a period of 4-6 weeks); OR  - Course of manipulative therapy (e.g., at least 4 visits over a period of 4-6 weeks);AND  4. No therapeutic intra-articular injections (i.e., steroids, saline, or other substances) for a period of at least 4 weeks prior to use of a diagnostic medial branch block; AND  5. Diagnostic block with local anesthetic (no steroids or other drugs) of the facet nerve (medial branch block) or injection under fluoroscopic guidance into the facet joint has resulted in at least 80% reduction in pain for the duration of the specific local anesthetic used (e.g.,  generally 3-4 hours for bupivacaine and 30 minutes to 1 hour for lidocaine).       Okay to schedule MBB #2        Sydnee RODGERS    [place] [facility]

## 2021-09-22 NOTE — TELEPHONE ENCOUNTER
Screening questions for MBB Injections:    Injection to be done at which interventional clinic site? [facility] [place]      Instruct patient to arrive as directed prior to the scheduled appointment time:    [place] and [place]: 30 minutes before      Procedure ordered by Dr. Colindres    Procedure ordered? Thoracic Medial Branch Block    What insurance would patient like us to bill for this procedure? BCBS      [employer]: REQUIRES A PA FOR THE FIRST MBB     Worker's comp- Any injection DO NOT SCHEDULE and route to Sydnee Davila.      [employer] insurance - If scheduling an SI joint injection DO NOT SCHEDULE and route to Sydnee Davila.       MBBs must be scheduled with elapsed time interval of at least 2 weeks and not more than 6 months between the First MBB and the Second MBB for insurance purposes       [employer] - Any injection besides hip/shoulder/knee joint DO NOT SCHEDULE and route to Sydnee Davila. She will obtain PA and call pt back to schedule procedure or notify pt of denial.       [employer]- PA required for all MBB's      **BCBS- ALL need to be routed to Sydnee for review if a PA is needed**    IF SCHEDULING IN [place] AND NEEDS A PA, IT IS OKAY TO SCHEDULE. [place] HANDLES THEIR OWN PA'S AFTER THE PATIENT IS SCHEDULED. PLEASE SCHEDULE AT LEAST 1 WEEK OUT SO A PA CAN BE OBTAINED.    Any chance of pregnancy? NO   If YES, do NOT schedule and route to RN pool    Is an  needed? No     Patient has a drive home? (mandatory) Yes     Is patient taking any blood thinners (plavix, coumadin, jantoven, warfarin, heparin, pradaxa or dabigatran )? No    If hold needed, do NOT schedule, route to RN pool     Is patient taking any aspirin products? No     If more than 325mg/day, OK to schedule; Instruct pt to decrease to less than 325 mg for 7 days AND route to RN pool    For CERVICAL procedures, hold all aspirin products for 6 days.     Tell pt that if aspirin product is not held for 6 days, the  procedure WILL BE cancelled.      Does the patient have a bleeding or clotting disorder? No    If YES, okay to schedule AND route to RN nurse pool    **For any patients with platelet count <100, must be forwarded to provider**    Is patient diabetic? NO If YES, have them bring their glucometer.    Does patient have an active infection or treated for one within the past week? No    Is patient currently taking any antibiotics?  No    For patients on chronic, preventative, or prophylactic antibiotics, procedures may be scheduled.     For patients on antibiotics for active or recent infection:antibiotic course must have been completed for 4 days    Is patient currently taking any steroid medications? (i.e. Prednisone, Medrol)  No     For patients on steroid medications, course must have been completed for 4 days    Is patient actively being treated for cancer or immunocompromised? No   If YES, do NOT schedule and route to RN    Are you able to get on and off an exam table with minimal or no assistance? Yes   If NO, do NOT schedule and route to RN    Are you able to roll over and lay on your stomach with minimal or no assistance? Yes   If NO, do NOT schedule and route to RN    Any allergies to contrast dye, iodine, shellfish, or numbing and steroid medications? No  (If so, inform nursing and note in scheduling comments.)    Allergies: Sulfa drugs, Codeine, and Clindamycin     Does patient have an MRI/CT?  YES: 2021  Check Procedure Scheduling Grid to see if required.      Was the MRI done within the last 3 years?  Yes    If yes, where was the MRI done i.e.[facility], [facility], [facility], [facility] etc? [facility]      If no, do not schedule and route to nursing    If MRI was not done at [place], [facility] or [facility] do NOT schedule and route to nursing.      If pt has an imaging disc, the injection MAY be scheduled but pt has to bring disc to appt.     If they show up without the disc the injection cannot be  done      Medial Branch Block Pre-Procedure Instructions    It is okay to take long acting pain medications (if you are on them) the day of the procedure but try not to take any short acting medications unless absolutely necessary.    YES: INFORMED   Long acting meds would include: Gabapentin (Neurontin), MS Contin, Oxycontin        Short acting meds would include:  Percocet, Oxycodone, Vicodin, Ibuprofen     The day of the procedure, you should try to do things that provoke your pain, since the injection is being done to see if it will relieve your pain . YES: INFORMED     If your pain level is a 4 out of 10 or less on the day of the procedu re, please call [phone] to reschedule.  YES: INFORMED     Reminders:      If you are started on any steroids or antibiotics between now and your appointment, you must contact us because it may affect our ability to perform your procedure INFORMED      Instructed pt to arrive 30 minutes early for IV start if required. (Check Procedure Scheduling Grid) INot Applicable       If this is for a cervical MBB aspirin needs to be held for 6 days.  Not Applicable       Do not schedule procedures requiring IV placement in the first appointment of the day or first appointment after lunch. Do NOT schedule at 0745, 0815 or 1245.        For patients 85 or older we recommend having an adult stay w/ them for the remainder of the day.      Does the patient have any questions? MIAH BOWEN    [facility]

## 2021-10-06 ENCOUNTER — RADIOLOGY INJECTION OFFICE VISIT (OUTPATIENT)
Dept: PALLIATIVE MEDICINE | Facility: CLINIC | Age: 61
End: 2021-10-06
Payer: COMMERCIAL

## 2021-10-06 VITALS — DIASTOLIC BLOOD PRESSURE: 86 MMHG | SYSTOLIC BLOOD PRESSURE: 144 MMHG | OXYGEN SATURATION: 99 % | HEART RATE: 68 BPM

## 2021-10-06 DIAGNOSIS — M47.814 SPONDYLOSIS OF THORACIC REGION WITHOUT MYELOPATHY OR RADICULOPATHY: Primary | ICD-10-CM

## 2021-10-06 PROCEDURE — 64492 INJ PARAVERT F JNT C/T 3 LEV: CPT | Mod: RT | Performed by: PSYCHIATRY & NEUROLOGY

## 2021-10-06 PROCEDURE — 64490 INJ PARAVERT F JNT C/T 1 LEV: CPT | Mod: RT | Performed by: PSYCHIATRY & NEUROLOGY

## 2021-10-06 PROCEDURE — 64491 INJ PARAVERT F JNT C/T 2 LEV: CPT | Mod: RT | Performed by: PSYCHIATRY & NEUROLOGY

## 2021-10-06 ASSESSMENT — PAIN SCALES - GENERAL: PAINLEVEL: SEVERE PAIN (6)

## 2021-10-06 NOTE — NURSING NOTE
Discharge Information    IV Discontiued Time:  NA    Amount of Fluid Infused:  NA    Discharge Criteria = When patient returns to baseline or as per MD order    Consciousness:  Pt is fully awake    Circulation:  BP +/- 20% of pre-procedure level    Respiration:  Patient is able to breathe deeply    O2 Sat:  Patient is able to maintain O2 Sat >92% on room air    Activity:  Moves 4 extremities on command    Ambulation:  Patient is able to stand and walk or stand and pivot into wheelchair    Dressing:  Clean/dry or No Dressing    Notes:   Discharge instructions and AVS given to patient    Patient meets criteria for discharge?  YES    Admitted to PCU?  No    Responsible adult present to accompany patient home?  Yes    Signature/Title:    lubna snyder RN  RN Care Coordinator  [facility] [place]

## 2021-10-06 NOTE — NURSING NOTE
Pre-procedure Intake    Have you been fasting? NA    If yes, for how long?     Are you taking a prescribed blood thinner such as coumadin, Plavix, Xarelto?    No    If yes, when did you take your last dose?     Do you take aspirin?  No    If cervical procedure, have you held aspirin for 6 days?     Do you have any allergies to contrast dye, iodine, steroid and/or numbing medications?  NO    Are you currently taking antibiotics or have an active infection?  NO    Have you had a fever/elevated temperature within the past week? NO    Are you currently taking oral steroids? NO    Do you have a ? Yes       Are you pregnant or breastfeeding?  NO    Have you received the COVID-19 vaccine? Yes       If yes, was it your 1st, 2nd or only dose needed? 2nd dose     Date of most recent vaccine: 2/15/2021    Notify provider and RNs if systolic BP >170, diastolic BP >100, P >100 or O2 sats < 90%

## 2021-10-06 NOTE — PROGRESS NOTES
Pre procedure Diagnosis: facet arthropathy   Post procedure Diagnosis: Same  Procedure performed:  Right T4-7 medial branch blocks #2  Anesthesia: none  Complications: none  Operators: Kimberly Colindres MD    Indications:   Radha Rodriguez is a 61 year old female seen by me in clinic.They have a history of right sided thoracic spine pain, worse with lifting, twisting.  Exam shows pain with extension/rotation and rotation along and they have tried conservative treatment including medication.    Physical therapy last done: last Wednesday was final session    Options/alternatives, benefits and risks were discussed with the patient including bleeding, infection, tissue trauma, exposure to radiation, reaction to medications, spinal cord injury, weakness, numbness and paralysis.  Questions were answered to her satisfaction and she agrees to proceed. Voluntary informed consent was obtained and signed.     Vitals were reviewed: Yes  Allergies were reviewed:  Yes   Medications were reviewed:  Yes   Pre-procedure pain score: 7/10    Procedure:  After getting informed consent, patient was brought into the procedure suite and was placed in a prone position on the procedure table.   A Pause for the Cause was performed.  Patient was prepped and draped in sterile fashion.     Under AP fluoroscopic guidance the T5-8 vertebral bodies and transverse processes were identified.   Lidocaine 1% was used to anesthetize the skin at each level.  Under intermittent fluoroscopy, 25G 2inch Quinke spinal needle were advised to the superiolateral portion of the transverse process, taking care to avoid the rib and lung.      Omnipaque-300 was injected, and appropriate spread of contrast was noted without vascular uptake.  A total of 1ml of Omnipaque was used.  9ml was wasted. Bupivacaine 0.5% 0.4 ml was injected at each location.  The needles were removed.      The block were done at T5-8, blocking the T4-7 medial branches.    Hemostasis was  achieved, the area was cleaned, and bandaids were placed when appropriate.  The patient tolerated the procedure well, and was taken to the recovery room.    Images were saved to PACS.    Lung sounds were normal    The patient will continue to monitor progress, and they were given a pain diary to complete at home.  They will either fax or mail this back to us or bring it to their next appointment. We will determine the treatment plan after we review the diary.      Post-procedure pain score: 4/10  Follow-up includes: -will await diary for further planning.      Kimberly Colindres MD  [facility]

## 2021-10-06 NOTE — PATIENT INSTRUCTIONS
[facility]   Medial Branch Block Discharge Instructions      Your procedure was performed by: Dr. Kimberly Colindres     Medications used include:  Lidocaine  Bupivicaine  Omnipaque       You will need to complete the Pain Scale Log form and return it to us as soon as possible.  Once we have received the form, we will review it and call you to determine the next steps.     The form can be faxed to [phone] or mailed to:   [facility]   [id number]   [place] [address]      You may resume your regular medications    If you were holding your blood thinning medication, please restart taking it: N/A    You may resume your regular activities    Be cautious with walking as numbness and/or weakness in the lower extremities may occur for up to 6-8 hours due to the effects of the anesthetic.    Avoid driving for 6 hours. The local anesthetic could slow your reflexes.     You may shower, however no swimming or tub baths or hot tubs for 24 hours following your procedure.    Your pain will return after the numbing medications have worn off.  You may use your current pain medications as needed.    Unless you have been directed to avoid the use of anti-inflammatory medications (NSAIDS), you may use medications such as ibuprofen, Aleve or Tylenol for pain control if needed.  Some people find it helpful to alternate ibuprofen and Tylenol every 3 hours for a couple of days.    You may use ice packs 10-15 minutes three to four times a day at the injection site for comfort.     Do not use heat to painful areas for 6 to 8 hours. This will give the local anesthetic time to wear off and prevent you from accidentally burning your skin.     If you experience any of the following, call the Pain Clinic during work hours (Monday through Friday 8 am-4:30 pm) at [phone] or the Provider Line after hours at [phone]:  -Fever over 100 degree F  -Swelling, bleeding, redness,  drainage, warmth at the injection site  -Progressive weakness or numbness in your legs or arms  -If lumbar, call if you have a loss of bowel or bladder function  -If cervical, call if you have any unusual headache that is not relieved by Tylenol  -Unusual new onset of pain that is not improving

## 2021-10-11 ENCOUNTER — LAB (OUTPATIENT)
Dept: LAB | Facility: CLINIC | Age: 61
End: 2021-10-11
Payer: COMMERCIAL

## 2021-10-11 DIAGNOSIS — M06.4 INFLAMMATORY POLYARTHROPATHY (H): ICD-10-CM

## 2021-10-11 DIAGNOSIS — Z79.899 HIGH RISK MEDICATION USE: ICD-10-CM

## 2021-10-11 PROBLEM — M54.9 UPPER BACK PAIN, CHRONIC: Status: ACTIVE | Noted: 2021-10-11

## 2021-10-11 PROBLEM — G89.29 UPPER BACK PAIN, CHRONIC: Status: ACTIVE | Noted: 2021-10-11

## 2021-10-11 LAB
ALBUMIN SERPL-MCNC: 3.6 G/DL (ref 3.4–5)
ALP SERPL-CCNC: 56 U/L (ref 40–150)
ALT SERPL W P-5'-P-CCNC: 28 U/L (ref 0–50)
AST SERPL W P-5'-P-CCNC: 22 U/L (ref 0–45)
BASOPHILS # BLD AUTO: 0 10E3/UL (ref 0–0.2)
BASOPHILS NFR BLD AUTO: 1 %
BILIRUB DIRECT SERPL-MCNC: 0.1 MG/DL (ref 0–0.2)
BILIRUB SERPL-MCNC: 0.4 MG/DL (ref 0.2–1.3)
CREAT SERPL-MCNC: 0.77 MG/DL (ref 0.52–1.04)
CRP SERPL-MCNC: 4 MG/L (ref 0–8)
EOSINOPHIL # BLD AUTO: 0.2 10E3/UL (ref 0–0.7)
EOSINOPHIL NFR BLD AUTO: 2 %
ERYTHROCYTE [DISTWIDTH] IN BLOOD BY AUTOMATED COUNT: 12.5 % (ref 10–15)
ERYTHROCYTE [SEDIMENTATION RATE] IN BLOOD BY WESTERGREN METHOD: 7 MM/HR (ref 0–30)
GFR SERPL CREATININE-BSD FRML MDRD: 85 ML/MIN/1.73M2
HCT VFR BLD AUTO: 37.2 % (ref 35–47)
HGB BLD-MCNC: 12.3 G/DL (ref 11.7–15.7)
IMM GRANULOCYTES # BLD: 0 10E3/UL
IMM GRANULOCYTES NFR BLD: 0 %
LYMPHOCYTES # BLD AUTO: 1.5 10E3/UL (ref 0.8–5.3)
LYMPHOCYTES NFR BLD AUTO: 21 %
MCH RBC QN AUTO: 30.9 PG (ref 26.5–33)
MCHC RBC AUTO-ENTMCNC: 33.1 G/DL (ref 31.5–36.5)
MCV RBC AUTO: 94 FL (ref 78–100)
MONOCYTES # BLD AUTO: 0.5 10E3/UL (ref 0–1.3)
MONOCYTES NFR BLD AUTO: 7 %
NEUTROPHILS # BLD AUTO: 5.1 10E3/UL (ref 1.6–8.3)
NEUTROPHILS NFR BLD AUTO: 70 %
PLATELET # BLD AUTO: 215 10E3/UL (ref 150–450)
PROT SERPL-MCNC: 6.4 G/DL (ref 6.8–8.8)
RBC # BLD AUTO: 3.98 10E6/UL (ref 3.8–5.2)
WBC # BLD AUTO: 7.3 10E3/UL (ref 4–11)

## 2021-10-11 PROCEDURE — 85025 COMPLETE CBC W/AUTO DIFF WBC: CPT

## 2021-10-11 PROCEDURE — 36415 COLL VENOUS BLD VENIPUNCTURE: CPT

## 2021-10-11 PROCEDURE — 82565 ASSAY OF CREATININE: CPT

## 2021-10-11 PROCEDURE — 85652 RBC SED RATE AUTOMATED: CPT

## 2021-10-11 PROCEDURE — 86140 C-REACTIVE PROTEIN: CPT

## 2021-10-11 PROCEDURE — 80076 HEPATIC FUNCTION PANEL: CPT

## 2021-10-12 ENCOUNTER — ANCILLARY PROCEDURE (OUTPATIENT)
Dept: MAMMOGRAPHY | Facility: CLINIC | Age: 61
End: 2021-10-12
Attending: INTERNAL MEDICINE
Payer: COMMERCIAL

## 2021-10-12 DIAGNOSIS — Z12.31 VISIT FOR SCREENING MAMMOGRAM: ICD-10-CM

## 2021-10-12 PROCEDURE — 77063 BREAST TOMOSYNTHESIS BI: CPT | Performed by: RADIOLOGY

## 2021-10-12 PROCEDURE — 77067 SCR MAMMO BI INCL CAD: CPT | Performed by: RADIOLOGY

## 2021-10-14 ENCOUNTER — OFFICE VISIT (OUTPATIENT)
Dept: RHEUMATOLOGY | Facility: CLINIC | Age: 61
End: 2021-10-14
Payer: COMMERCIAL

## 2021-10-14 VITALS
DIASTOLIC BLOOD PRESSURE: 81 MMHG | SYSTOLIC BLOOD PRESSURE: 131 MMHG | WEIGHT: 177.6 LBS | BODY MASS INDEX: 29.59 KG/M2 | OXYGEN SATURATION: 97 % | HEART RATE: 73 BPM | HEIGHT: 65 IN

## 2021-10-14 DIAGNOSIS — Z23 NEED FOR VACCINATION: ICD-10-CM

## 2021-10-14 DIAGNOSIS — Z23 NEED FOR PROPHYLACTIC VACCINATION AND INOCULATION AGAINST INFLUENZA: ICD-10-CM

## 2021-10-14 DIAGNOSIS — M06.4 INFLAMMATORY POLYARTHROPATHY (H): Primary | ICD-10-CM

## 2021-10-14 DIAGNOSIS — Z79.899 HIGH RISK MEDICATION USE: ICD-10-CM

## 2021-10-14 DIAGNOSIS — M18.12 PRIMARY OSTEOARTHRITIS OF FIRST CARPOMETACARPAL JOINT OF LEFT HAND: ICD-10-CM

## 2021-10-14 PROCEDURE — 90472 IMMUNIZATION ADMIN EACH ADD: CPT | Performed by: INTERNAL MEDICINE

## 2021-10-14 PROCEDURE — 20600 DRAIN/INJ JOINT/BURSA W/O US: CPT | Mod: LT | Performed by: INTERNAL MEDICINE

## 2021-10-14 PROCEDURE — 90682 RIV4 VACC RECOMBINANT DNA IM: CPT | Performed by: INTERNAL MEDICINE

## 2021-10-14 PROCEDURE — 90715 TDAP VACCINE 7 YRS/> IM: CPT | Performed by: INTERNAL MEDICINE

## 2021-10-14 PROCEDURE — 99214 OFFICE O/P EST MOD 30 MIN: CPT | Mod: 25 | Performed by: INTERNAL MEDICINE

## 2021-10-14 PROCEDURE — 90471 IMMUNIZATION ADMIN: CPT | Performed by: INTERNAL MEDICINE

## 2021-10-14 RX ORDER — HYDROXYCHLOROQUINE SULFATE 200 MG/1
400 TABLET, FILM COATED ORAL DAILY
Qty: 180 TABLET | Refills: 1 | Status: SHIPPED | OUTPATIENT
Start: 2021-10-14 | End: 2022-02-07

## 2021-10-14 RX ORDER — METHYLPREDNISOLONE ACETATE 40 MG/ML
10 INJECTION, SUSPENSION INTRA-ARTICULAR; INTRALESIONAL; INTRAMUSCULAR; SOFT TISSUE ONCE
Status: COMPLETED | OUTPATIENT
Start: 2021-10-14 | End: 2021-10-14

## 2021-10-14 RX ADMIN — METHYLPREDNISOLONE ACETATE 10 MG: 40 INJECTION, SUSPENSION INTRA-ARTICULAR; INTRALESIONAL; INTRAMUSCULAR; SOFT TISSUE at 10:12

## 2021-10-14 ASSESSMENT — MIFFLIN-ST. JEOR: SCORE: 1373.53

## 2021-10-14 NOTE — NURSING NOTE
RAPID3 (0-30) Cumulative Score  11.3          RAPID3 Weighted Score (divide #4 by 3 and that is the weighted score)  3.7

## 2021-10-14 NOTE — PROGRESS NOTES
Rheumatology Clinic Visit      Radha Rodriguez MRN# [id number]   YOB: 1960 Age: 61 year old      Date of visit: 10/14/21   PCP: Dr. Temo Fletcher    Chief Complaint   Patient presents with:  Inflammatory arthritis: Left 1st digit hurts and swells. Back pain  Flu Shot  Imm/Inj: Flu Shot    Assessment and Plan     1. Inflammatory polyarthropathy: Previously followed by Rojas Vasques and Shazia.  Established care with me on 4/24/2018.  Previously on Humira (ineffective), HCQ (ineffective; used with MTX), MTX (25mg wkly was effective and dose reductions resulted in worsening joint symptoms, but then she stopped on her own during the COVID19 pandemic without any worsening inflammatory arthritis symptoms).  Had been doing well for a while without a DMARD but then with full body aches that and stiffness that was worse in the morning and improved with time and activity; fullness of the bilateral second and third MCPs with prolonged morning stiffness; hydroxychloroquine was started with near resolution of the inflammatory MCP symptoms. Still with degenerative arthritis of the hands and back.  Chronic illness, stable.    - Continue hydroxychloroquine 400 mg daily  - Ophthalmology referral for hydroxychloroquine toxicity monitoring     2.  Primary osteoarthritis of the left first carpometacarpal joint: improved with steroid injections in the past.  Steroid injection needed again today and was completed as documented in the procedure section.  Chronic illness, progressive.      3.  Bilateral knee osteoarthritis:  Previous injections were helpful. Continue PT exercises.       4.  Vaccinations: Vaccinations reviewed with Ms. Rodriguez.    - Influenza: will receive today  - [id number]: up to date  - [id number]: up to date  - Shingrix: advised receiving 4 weeks after the COVID19 booster  - TDAP: will receive today  - COVID-19: has received the Pfizer COVID-19 vaccine on 1/25/2021 and 2/15/2021 ; works in healthcare so  advised booster; she plans to receive in 2-4 weeks      Total minutes spent in evaluation with patient, documentation, , and review of pertinent studies and chart notes: 25      Ms. Rodriguez verbalized agreement with and understanding of the rational for the diagnosis and treatment plan.  All questions were answered to best of my ability and the patient's satisfaction. Ms. Rodriguez was advised to contact the clinic with any questions that may arise after the clinic visit.      Thank you for involving me in the care of the patient    Return to clinic: 3-4 months    HPI   Radha Rodriguez is a 61 year old female with a past medical history significant for possible Crohn's disease requiring fistula surgery in the past, osteoarthritis, inflammatory arthritis who is seen for follow-up of arthritis.    Today, 10/14/2021: steroid injection of the knee was very effective; knees are doing okay right now. Would like repeat inj of the left 1st CMC joint that is worse with activity and improved with rest. Recently had nerve block of her back from the pain clinic with [id number] benefit. Hydroxychloroquine tolerated. Morning stiffness for about 30 min.     Denies fevers, chills, nausea, vomiting, constipation, diarrhea. No abdominal pain. No chest pain/pressure, palpitations, or shortness of breath. LE swelling worse at the end of the day.  No neck pain. No oral sores.  Chronic recurrent nasal sore for years, per patient.  No rash. No sicca symptoms.    Tobacco: none  EtOH: occasional  Drugs: none    ROS   12 point review of system was completed and negative except as noted in the HPI     Active Problem List     Patient Active Problem List   Diagnosis     CARDIOVASCULAR SCREENING; LDL GOAL LESS THAN 160     Vitamin D deficiency     Inflammatory polyarthropathy (H)     High risk medications (not anticoagulants) long-term use     Osteoarthritis     Menopausal syndrome (hot flashes)     Right lateral epicondylitis      Immunosuppressed status (H)     DJD thoracic spine     Back muscle spasm     Upper back strain     Upper back pain, chronic     Past Medical History     Past Medical History:   Diagnosis Date     Arthritis      Headache(784.0)      Irritable bowel syndrome      Other and unspecified noninfectious gastroenteritis and colitis(558.9)     chronic     Past Surgical History     Past Surgical History:   Procedure Laterality Date     C REPLACEMENT,URETER,BOWEL SEGMENT  10/01/2008    Part of her ureter was repaired.     CATARACT IOL, RT/LT       COLONOSCOPY  10/14/2011    Procedure:COLONOSCOPY; Colonoscopy; Surgeon:SCOTTY LEDEZMA; Location:[facility]     ENHANCE LASER REFRACTIVE BILATERAL EXISTING PT IN PARAMETERS       HYSTERECTOMY, VAGINAL  1/1/2000    TVH, fibroids (still has ovaries)     SURGICAL HISTORY OF -       Tubal Ligation     SURGICAL HISTORY OF -       Appy     SURGICAL HISTORY OF -       Tonsils     SURGICAL HISTORY OF -   12/94    Anal Fistulotomy     Allergy     Allergies   Allergen Reactions     Sulfa Drugs Rash     Codeine Hives     Clindamycin Rash     Current Medication List     Current Outpatient Medications   Medication Sig     hydroxychloroquine (PLAQUENIL) 200 MG tablet Take 2 tablets (400 mg) by mouth daily     ibuprofen (ADVIL/MOTRIN) 200 MG tablet Take 600 mg by mouth every 6 hours as needed for mild pain      PREMARIN 0.45 MG tablet TAKE 1 TABLET(0.45 MG) BY MOUTH DAILY     methocarbamol (ROBAXIN) 500 MG tablet Take 1-2 tablets (500-1,000 mg) by mouth 3 times daily as needed for muscle spasms . Can be sedating.  Do not drive until you know how the medication affects you. (Patient not taking: Reported on 10/14/2021)     No current facility-administered medications for this visit.         Social History   See HPI    Family History     Family History   Problem Relation Age of Onset     Heart Disease Father         Heart attack     C.A.D. Father         age 50     Hypertension Father       "Cancer Maternal Grandfather      Alzheimer Disease Maternal Grandfather      Cancer Paternal Grandfather      Diabetes No family hx of      Cerebrovascular Disease No family hx of      Breast Cancer No family hx of      Cancer - colorectal No family hx of      Prostate Cancer No family hx of        Physical Exam     Temp Readings from Last 3 Encounters:   08/06/21 97.8  F (36.6  C) (Tympanic)   07/01/21 97.6  F (36.4  C) (Temporal)   06/11/21 97.6  F (36.4  C) (Tympanic)     BP Readings from Last 5 Encounters:   10/14/21 131/81   10/06/21 (!) 144/86   09/13/21 (!) 153/91   08/09/21 (!) 144/86   08/06/21 136/73     Pulse Readings from Last 1 Encounters:   10/14/21 73     Resp Readings from Last 1 Encounters:   09/13/21 16     Estimated body mass index is 30.01 kg/m  as calculated from the following:    Height as of this encounter: 1.638 m (5' 4.5\").    Weight as of this encounter: 80.6 kg (177 lb 9.6 oz).      GEN: NAD.   HEENT:  Anicteric, noninjected sclera. No obvious external lesions of the ear and nose. Hearing intact.  CV: S1, S2. RRR. No m/r/g  PULM: No increased work of breathing. CTA bilaterally   MSK: MCPs, PIPs, DIPs without swelling or tenderness to palpation. Heberden's and Maye's nodes.  Squaring of the bilateral 1st cmc joints, worse on the left; no effusion or increased warmth.  Wrists without swelling or tenderness to palpation.  Elbows and shoulders without swelling or tenderness to palpation.  Knees, ankles, and MTPs without swelling or tenderness to palpation.    SKIN: No rash or jaundice seen  PSYCH: Alert. Appropriate.         Labs / Imaging (select studies)   RF/CCP  Recent Labs   Lab Test 06/01/15  1139 01/31/14  1534   CCPABY <20  Interpretation:  Negative   <20 Interpretation:  Negative   RHF <20  --      CBC  Recent Labs   Lab Test 10/11/21  1704 03/29/21  1431 01/04/21  1619 12/23/19  1620 12/23/19  1620 09/25/19  1117 09/25/19  1117   WBC 7.3 6.9 8.6   < > 8.4   < > 7.4   RBC 3.98 " 4.47 4.30   < > 3.86   < > 3.96   HGB 12.3 13.6 13.3   < > 12.9   < > 13.3   HCT 37.2 41.3 39.9   < > 37.8   < > 38.4   MCV 94 92 93   < > 98   < > 97   RDW 12.5 12.5 13.0   < > 13.1   < > 13.9    226 237   < > 220   < > 219   MCH 30.9 30.4 30.9   < > 33.4*   < > 33.6*   MCHC 33.1 32.9 33.3   < > 34.1   < > 34.6   NEUTROPHIL 70 64.0  --   --  69.7   < > 73.0   LYMPH 21 26.5  --   --  20.6   < > 17.8   MONOCYTE 7 6.4  --   --  6.7   < > 6.5   EOSINOPHIL 2 2.8  --   --  2.6   < > 2.3   BASOPHIL 1 0.3  --   --  0.4   < > 0.4   ANEU  --  4.4  --   --  5.8  --  5.4   ALYM  --  1.8  --   --  1.7  --  1.3   EDWARD  --  0.4  --   --  0.6  --  0.5   AEOS  --  0.2  --   --  0.2  --  0.2   ABAS  --  0.0  --   --  0.0  --  0.0   ANEUTAUTO 5.1  --   --   --   --   --   --    ALYMPAUTO 1.5  --   --   --   --   --   --    AMONOAUTO 0.5  --   --   --   --   --   --    AEOSAUTO 0.2  --   --   --   --   --   --    ABSBASO 0.0  --   --   --   --   --   --     < > = values in this interval not displayed.     CMP  Recent Labs   Lab Test 10/11/21  1704 03/29/21  1431 01/04/21  1619 12/23/19  1620 12/23/19  1620 04/24/18  1557 10/11/17  1207   NA  --  140 140  --   --   --  138   POTASSIUM  --  4.0 3.8  --   --   --  4.1   CHLORIDE  --  109 105  --   --   --  105   CO2  --  29 27  --   --   --  28   ANIONGAP  --  2* 8  --   --   --  5   GLC  --  85 86  --   --   --  86   BUN  --  11 14  --   --   --  18   CR 0.77 0.77 0.75   < > 0.73   < > 0.73   GFRESTIMATED 85 85 87   < > >90   < > 82   GFRESTBLACK  --  >90 >90  --  >90   < > >90   ENEIDA  --  9.0 9.1  --   --   --  9.2   BILITOTAL 0.4 0.5 0.4   < > 0.3   < > 0.7   ALBUMIN 3.6 3.9 3.9   < > 3.8   < > 3.9   PROTTOTAL 6.4* 6.6* 7.1   < > 6.7*   < > 6.9   ALKPHOS 56 62 58   < > 68   < > 57   AST 22 17 18   < > 21   < > 28   ALT 28 28 32   < > 37   < > 44    < > = values in this interval not displayed.     Calcium/VitaminD  Recent Labs   Lab Test 06/11/21  1014 03/29/21  1431  01/04/21  1619 10/11/17  1207 06/01/15  1139 09/18/14  1129 04/29/14  1522   ENEIDA  --  9.0 9.1 9.2   < >  --   --    VITDT 26  --   --   --   --  38 42    < > = values in this interval not displayed.     ESR/CRP  Recent Labs   Lab Test 10/11/21  1704 03/29/21  1431 03/27/19  1053   SED 7 5 6   CRP 4.0 <2.9 3.0     Hepatitis B  Recent Labs   Lab Test 04/24/18  1557   HBCAB Nonreactive   HEPBANG Nonreactive     Hepatitis C  Recent Labs   Lab Test 06/01/15  1139   HCVAB Nonreactive   Assay performance characteristics have not been established for newborns,   infants, and children       Lyme ab screening  Recent Labs   Lab Test 03/29/21  1431   LYMEGM 0.04     Tuberculosis Screening  Recent Labs   Lab Test 06/01/15  1139   TBRSLT Negative   TBAGN 0.00     Immunization History     Immunization History   Administered Date(s) Administered     COVID-19,PF,Pfizer 01/25/2021, 02/15/2021     HepB 01/24/1991, 02/28/1991, 09/05/1991     Influenza Quad, Recombinant, pf(RIV4) (Flublok) 10/01/2019, 10/14/2021     Pneumo Conj 13-V (2010&after) 10/01/2019     Pneumococcal 23 valent 12/31/2019     TD (ADULT, 7+) 03/12/1990, 01/01/2000     TDAP Vaccine (Adacel) 09/10/2008     TDAP Vaccine (Boostrix) 09/10/2008     Tdap (Adacel,Boostrix) 09/10/2008, 03/01/2011, 10/14/2021     Procedure     Procedure: Steroid injection of the left 1st CMC joint  Indication: Pain, osteoarthritis     The procedure was explained in detail. Risks including infection, pain, structural damage such as cartilage damage and tendon rupture, fat atrophy, skin hyper-/hypo-pigmentation, and medication reaction was explained. The need for rest of the affected joint for one week after the procedure was explained.  The option of not doing the procedure was also provided. All questions were answered and the patient consented to the procedure.     A time-out was performed and the correct patient, procedure, and laterality were verified.    The left 1st carpometacarpal  joint was examined and location for injection was identified. The area was cleaned with chlorhexidine, twice.  Ethyl chloride was then used for topical anaesthetic.  Then a mixture of lidocaine 1% 0.1 mL and methylprednisolone 10mg was injected into the intra-articular space.     The patient tolerated the procedure well. No complications.    1% Lidocaine  : Hospira  Lot #: [id number]  EXPIRATION DATE: 01 DEC 2022  NDC: 1485-7265-84    MEDICATION: Methylprednisolone  LOT #: [id number]  EXPIRATION DATE:  09/2022  NDC#: 2256-9710-24         Chart documentation done in part with Dragon Voice recognition Software. Although reviewed after completion, some word and grammatical error may remain.    Abdoul Eli MD

## 2021-10-14 NOTE — PATIENT INSTRUCTIONS
RHEUMATOLOGY    Dr. Abdoul Eli    [address] [place] [address]  Phone number: [phone]  Fax number: [phone]    Thank you for choosing [facility]!    Mi Irwin CMA Rheumatology

## 2021-10-27 NOTE — TELEPHONE ENCOUNTER
Pt calling to follow up on TMBB#2 results. Pt states she mailed her pain diary in.    Anitha BOWEN    [facility]

## 2021-10-28 ENCOUNTER — TRANSFERRED RECORDS (OUTPATIENT)
Dept: HEALTH INFORMATION MANAGEMENT | Facility: CLINIC | Age: 61
End: 2021-10-28

## 2021-10-29 ENCOUNTER — TELEPHONE (OUTPATIENT)
Dept: PALLIATIVE MEDICINE | Facility: CLINIC | Age: 61
End: 2021-10-29

## 2021-10-29 NOTE — TELEPHONE ENCOUNTER
Erroneous encounter    Zay Gresham, RN  Patient Care Supervisor   [facility]

## 2021-12-07 ENCOUNTER — TELEPHONE (OUTPATIENT)
Dept: FAMILY MEDICINE | Facility: CLINIC | Age: 61
End: 2021-12-07
Payer: COMMERCIAL

## 2021-12-07 NOTE — TELEPHONE ENCOUNTER
Form received via fax from [employer]. Forms placed in provider's bin to address. Pt has video visit on 12/13/21

## 2021-12-13 ENCOUNTER — VIRTUAL VISIT (OUTPATIENT)
Dept: FAMILY MEDICINE | Facility: CLINIC | Age: 61
End: 2021-12-13
Payer: COMMERCIAL

## 2021-12-13 DIAGNOSIS — S29.012S UPPER BACK STRAIN, SEQUELA: ICD-10-CM

## 2021-12-13 DIAGNOSIS — M47.814 OSTEOARTHRITIS OF THORACIC SPINE, UNSPECIFIED SPINAL OSTEOARTHRITIS COMPLICATION STATUS: ICD-10-CM

## 2021-12-13 DIAGNOSIS — M54.9 UPPER BACK PAIN, CHRONIC: Primary | ICD-10-CM

## 2021-12-13 DIAGNOSIS — G89.29 UPPER BACK PAIN, CHRONIC: Primary | ICD-10-CM

## 2021-12-13 PROCEDURE — 99214 OFFICE O/P EST MOD 30 MIN: CPT | Mod: 95 | Performed by: FAMILY MEDICINE

## 2021-12-13 RX ORDER — GABAPENTIN 100 MG/1
100-200 CAPSULE ORAL AT BEDTIME
COMMUNITY
Start: 2021-12-07 | End: 2022-05-25

## 2021-12-13 RX ORDER — CYCLOBENZAPRINE HCL 5 MG
TABLET ORAL
COMMUNITY
Start: 2021-12-07 | End: 2022-02-08

## 2021-12-13 NOTE — PATIENT INSTRUCTIONS
Patient Education     Back Care Tips     Caring for your back  These are things you can do to prevent a recurrence of acute back pain and to reduce symptoms from chronic back pain:    Stay at a healthy weight. If you are overweight, losing weight will help most types of back pain.    Exercise is an important part of recovery from most types of back pain. The muscles behind and in front of the spine support the back. This means strengthening both the back muscles and the abdominal muscles will provide better support for your spine.     Swimming and brisk walking are good overall exercises to improve your fitness level.    Practice safe lifting methods (see below).    Practice good posture when sitting, standing, and walking. Don't sit for a long time. This puts more stress on the lower back than standing or walking.    Wear quality shoes with good arch support. Foot and ankle alignment can affect back symptoms. Don't wear high heels.    Therapeutic massage can help relax the back muscles without stretching them.    During the first 24 to 72 hours after an acute injury or flare-up of chronic back pain, put an ice pack on the painful area for 20 minutes and then remove it for 20 minutes. Do thisover a period of 60 to 90 minutes, or several times a day. As a safety precaution, don't use a heating pad at bedtime. Sleeping on a heating pad can lead to skin burns or tissue damage.    You can alternate using ice and heat.  Medicines  Talk with your healthcare provider before using medicines, especially if you have other health problems or are taking other medicines.    You may use over-the-counter medicines, such as acetaminophen, ibuprofen, or naprosyn to control pain, unless your healthcare provider prescribed other pain medicine. Talk with your healthcare provider before taking any medicines if you have a chronic condition such as diabetes, liver or kidney disease, stomach ulcers, or digestive bleeding, or are taking  blood thinners.    Be careful if you are given prescription pain medicines, opioids, or medicine for muscle spasm. They can cause drowsiness, and affect your coordination, reflexes, and judgment. Don't drive or operate heavy machinery while taking these types of medicines. Take prescription pain medicine only as prescribed by your healthcare provider.  Lumbar stretch  This simple stretch will help relax muscle spasm and keep your back more limber. If exercise makes your back pain worse, don t do it.    Lie on your back with your knees bent and both feet on the ground.    Slowly raise your left knee to your chest as you flatten your lower back against the floor. Hold for 5 seconds.    Relax and repeat the exercise with your right knee.    Do 10 of these exercises for each leg.  Safe lifting method    Don t bend over at the waist to lift an object off the floor.  Instead, bend your knees and hips in a squat.     Keep your back and head upright    Hold the object close to your body, directly in front of you.    Straighten your legs to lift the object.     Lower the object to the floor in the reverse fashion.    If you must slide something across the floor, push it.    Posture tips  Sitting  Sit in chairs with straight backs or low-back support. Keep your knees lower than your hips, with your feet flat on the floor.  When driving, sit up straight. Adjust the seat forward so you are not leaning toward the steering wheel.  A small pillow or rolled towel behind your lower back may help if you are driving long distances.   Standing  When standing for long periods, shift most of your weight to one leg at a time. Switch legs every few minutes.   Sleeping  The best way to sleep is on your side with your knees bent. Put a low pillow under your head to support your neck in a neutral spine position. Don't use thick pillows that bend your neck to one side. Put a pillow between your legs to further relax your lower back. If you  sleep on your back, put pillows under your knees to support your legs in a slightly flexed position. Use a firm mattress. If your mattress sags, replace it, or use a 1/2-inch plywood board under the mattress to add support.  Follow-up care  Follow up with your healthcare provider, or as advised.  If X-rays, a CT scan or an MRI scan were taken, they may be reviewed by a radiologist. You will be told of any new findings that may affect your care.  Call 911  Call 911 if any of the following occur:    Trouble breathing    Confusion    Very drowsy    Fainting or loss of consciousness    Rapid or very slow heart rate    Loss of  bowel or bladder control  When to seek medical advice  Call your healthcare provider right away if any of the following occur:    Pain becomes worse or spreads to your arms or legs    Weakness or numbness in one or both arms or legs    Numbness in the groin area  [employer] last reviewed this educational content on 11/1/2019 2000-2021 [employer]. All rights reserved. This information is not intended as a substitute for professional medical care. Always follow your healthcare professional's instructions.           Patient Education     Back Sprain or Strain     Injury to the muscles (strain) or ligaments (sprain) around the spine can be troubling. Injury may occur after a sudden forceful twisting or bending such as in a car accident, after a simple awkward movement, or after lifting something heavy with poor body positioning. In any case, muscle spasm is often present and adds to the pain.  Thankfully, most people feel better in 1 to 2 weeks. Most of the rest feel better in 1 to 2 months. Most people can remain active. Unless you had a forceful or traumatic physical injury such as a car accident or fall, X-rays may not be done for the first assessment of a back sprain or strain. If pain continues and doesn't respond to medical treatment, your healthcare provider may then do X-rays  and other tests.  Home care  These guidelines will help you care for your injury at home:    When in bed, try to find a comfortable position. A firm mattress is best. Try lying flat on your back with pillows under your knees. You can also try lying on your side with your knees bent up toward your chest and a pillow between your knees.    Don't sit for long periods. Try not to take long car rides or take other trips that have you sitting for a long time. This puts more stress on the lower back than standing or walking.    During the first 24 to 72 hours after an injury or flare-up, put an ice pack on the painful area for 20 minutes. Then remove it for 20 minutes. Do this for 60 to 90 minutes, or several times a day. This will reduce swelling and pain. Always wrap the ice pack in a thin towel or plastic to protect your skin.    You can start with ice, then switch to heat. Heat from a hot shower, hot bath, or heating pad reduces pain and works well for muscle spasms. Put heat on the painful area for 20 minutes, then remove for 20 minutes. Do this for 60 to 90 minutes, or several times a day. Don't use a heating pad while sleeping. It can burn the skin.    You can alternate the ice and heat. Talk with your healthcare provider to find out the best treatment or therapy for your back pain.    Therapeutic massage can help relax the back muscles without stretching them.    Be aware of safe lifting methods. Don't lift anything over 15 pounds until all of the pain is gone.  Medicines  Talk with your healthcare provider before using medicines, especially if you have other health problems or are taking other medicines.    You may use over-the-counter medicines such as acetaminophen, ibuprofen, or naproxen to control pain, unless another pain medicine was prescribed. Talk with your healthcare provider before taking any medicines if you have a chronic condition such as diabetes, liver or kidney disease, stomach ulcers, or  digestive bleeding, or are taking blood-thinner medicines.    Be careful if you are given prescription medicines, opioids, or medicine for muscle spasm. They can cause drowsiness, and affect your coordination, reflexes, and judgment. Don't drive or operate heavy machinery when taking these types of medicines. Only take pain medicine as prescribed by your healthcare provider.  Follow-up care  Follow up with your healthcare provider, or as advised. You may need physical therapy or more tests if your symptoms get worse.  If you had X-rays, your healthcare provider may be checking for any broken bones, breaks, or fractures. Bruises and sprains can sometimes hurt as much as a fracture. These injuries can take time to heal fully. If your symptoms don t get better or they get worse, talk with your healthcare provider. You may need a repeat X-ray or other tests.  Call 911  Call 911 if any of the following occur:    Trouble breathing    Confused    Very drowsy or trouble awakening    Fainting or loss of consciousness    Rapid or very slow heart rate    Loss of bowel or bladder control  When to seek medical advice  Call your healthcare provider right away if any of the following occur:    Pain gets worse or spreads to your arms or legs    Weakness or numbness in one or both arms or legs    Numbness in the groin or genital area  [employer] last reviewed this educational content on 11/1/2019 2000-2021 [employer]. All rights reserved. This information is not intended as a substitute for professional medical care. Always follow your healthcare professional's instructions.

## 2021-12-13 NOTE — PROGRESS NOTES
"Radha is a 61 year old who is being evaluated via a billable video visit.      How would you like to obtain your AVS? MyChart  If the video visit is dropped, the invitation should be resent by:   Will anyone else be joining your video visit? No    Video Start Time: 10:08 AM    Assessment & Plan     Upper back pain, chronic  Seen by orthopedics at [facility] and started on gabapentin and flexeril. Still having severe upper back pain and spasms when doing ordinary household activities like ironing. Does not do any lifting or vacuuming at home. Recommend occupational therapy evaluation and treatment. At this time to see if they have any suggestions.   - Occupational Therapy Referral; Future    DJD thoracic spine  No improvement in disability with current medical treatments. Will complete paperwork for another 3 months.   - Occupational Therapy Referral; Future             BMI:   Estimated body mass index is 30.01 kg/m  as calculated from the following:    Height as of 10/14/21: 1.638 m (5' 4.5\").    Weight as of 10/14/21: 80.6 kg (177 lb 9.6 oz).   Weight management plan: Discussed healthy diet and exercise guidelines    CONSULTATION/REFERRAL to occupational therapy   See Patient Instructions    No follow-ups on file.    Jelly Copeland MD  [facility]    Subjective   Radha is a 61 year old who presents for the following health issues     HPI     Chronic/Recurring Back Pain Follow Up      Where is your back pain located? (Select all that apply) upper back bilateral started suddenly the day after a prolonged dental procedure where she was doing a lot of bending over to assist her dentist. Woke up the next day with severe upper back spasms. This pain has not subsided significantly since this initial event.     How would you describe your back pain?  dull ache and sharp    Where does your back pain spread? nowhere    Since your last clinic visit for back pain, how has your pain changed? always " present, but gets better and worse    Does your back pain interfere with your job? YES- has been unable to return to work due to exacerbation of pain and disability with any lifting, bending, twisting or reaching with upper back and arms. 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     Since your last visit, have you tried any new treatment? No            Review of Systems   Constitutional, HEENT, cardiovascular, pulmonary, gi and gu systems are negative, except as otherwise noted.      Objective           Vitals:  No vitals were obtained today due to virtual visit.    Physical Exam   GENERAL: Healthy, alert and no distress  EYES: Eyes grossly normal to inspection.  No discharge or erythema, or obvious scleral/conjunctival abnormalities.  RESP: No audible wheeze, cough, or visible cyanosis.  No visible retractions or increased work of breathing.    SKIN: Visible skin clear. No significant rash, abnormal pigmentation or lesions.  NEURO: Cranial nerves grossly intact.  Mentation and speech appropriate for age.  PSYCH: Mentation appears normal, affect normal/bright, judgement and insight intact, normal speech and appearance well-groomed.    Lab on 10/11/2021   Component Date Value Ref Range Status     Creatinine 10/11/2021 0.77  0.52 - 1.04 mg/dL Final     GFR Estimate 10/11/2021 85  >60 mL/min/1.73m2 Final    As of July 11, 2021, eGFR is calculated by the CKD-EPI creatinine equation, without race adjustment. eGFR can be influenced by muscle mass, exercise, and diet. The reported eGFR is an estimation only and is only applicable if the renal function is stable.     Erythrocyte Sedimentation Rate 10/11/2021 7  0 - 30 mm/hr Final     CRP Inflammation 10/11/2021 4.0  0.0 - 8.0 mg/L Final     Bilirubin Total 10/11/2021 0.4  0.2 - 1.3 mg/dL Final     Bilirubin Direct 10/11/2021 0.1  0.0 - 0.2 mg/dL Final     Protein Total 10/11/2021 6.4* 6.8 - 8.8 g/dL Final     Albumin 10/11/2021 3.6  3.4 - 5.0 g/dL Final      Alkaline Phosphatase 10/11/2021 56  40 - 150 U/L Final     AST 10/11/2021 22  0 - 45 U/L Final     ALT 10/11/2021 28  0 - 50 U/L Final     WBC Count 10/11/2021 7.3  4.0 - 11.0 10e3/uL Final     RBC Count 10/11/2021 3.98  3.80 - 5.20 10e6/uL Final     Hemoglobin 10/11/2021 12.3  11.7 - 15.7 g/dL Final     Hematocrit 10/11/2021 37.2  35.0 - 47.0 % Final     MCV 10/11/2021 94  78 - 100 fL Final     MCH 10/11/2021 30.9  26.5 - 33.0 pg Final     MCHC 10/11/2021 33.1  31.5 - 36.5 g/dL Final     RDW 10/11/2021 12.5  10.0 - 15.0 % Final     Platelet Count 10/11/2021 215  150 - 450 10e3/uL Final     % Neutrophils 10/11/2021 70  % Final     % Lymphocytes 10/11/2021 21  % Final     % Monocytes 10/11/2021 7  % Final     % Eosinophils 10/11/2021 2  % Final     % Basophils 10/11/2021 1  % Final     % Immature Granulocytes 10/11/2021 0  % Final     Absolute Neutrophils 10/11/2021 5.1  1.6 - 8.3 10e3/uL Final     Absolute Lymphocytes 10/11/2021 1.5  0.8 - 5.3 10e3/uL Final     Absolute Monocytes 10/11/2021 0.5  0.0 - 1.3 10e3/uL Final     Absolute Eosinophils 10/11/2021 0.2  0.0 - 0.7 10e3/uL Final     Absolute Basophils 10/11/2021 0.0  0.0 - 0.2 10e3/uL Final     Absolute Immature Granulocytes 10/11/2021 0.0  <=0.0 10e3/uL Final               Video-Visit Details    Type of service:  Video Visit    Video End Time:10:25 AM    Originating Location (pt. Location): Home    Distant Location (provider location):  [facility]     Platform used for Video Visit: Jyothi

## 2021-12-13 NOTE — Clinical Note
Hi Dr. Eli,  I am still following Radha for her chronic upper back pain since injuring it at work last June. She has not improved with physical therapy, pain clinic or being off work. I am sending her to occupational therapy, but wonder if you have any suggestions or know any specialists who may be suitable. Please review her MRI scan. Not sure if the finding at [facility] needs follow up given that she is still having problems. FYI, I will be retiring at the end of the month. Thank you so much for taking great care of our patients. Jelly Copeland MD

## 2021-12-14 ENCOUNTER — TELEPHONE (OUTPATIENT)
Dept: FAMILY MEDICINE | Facility: CLINIC | Age: 61
End: 2021-12-14
Payer: COMMERCIAL

## 2021-12-14 NOTE — TELEPHONE ENCOUNTER
What type of form? FMLA  What day did you drop off your forms? 12/14/2021  Is there a due date? ASAP (7-10 business day to compete forms)   How would you like to receive these forms? Patient will  at the clinic when completed  Which clinic was the form dropped off at? [facility]    What is the best number to contact you? Cell [phone]  What time works best to contact you with in 4 hrs? ANY  Is it okay to leave a message? Yes   [employer] [place]

## 2021-12-14 NOTE — TELEPHONE ENCOUNTER
Form placed in [employer] bin to address. Non-FMLA request forms. Please advise if pt needs an appt for this.

## 2021-12-16 NOTE — TELEPHONE ENCOUNTER
Emergency Department  [address] [place] [id number]  Phone:  [phone]  Fax:  [phone]                                    Lisbet Archer   MRN: [id number]    Department:   Emergency Department   Date of Visit:  2/2/2019           After Visit Summary Signature Page    I have received my discharge instructions, and my questions have been answered. I have discussed any challenges I see with this plan with the nurse or doctor.    ..........................................................................................................................................  Patient/Patient Representative Signature      ..........................................................................................................................................  Patient Representative Print Name and Relationship to Patient    ..................................................               ................................................  Date                                   Time    ..........................................................................................................................................  Reviewed by Signature/Title    ...................................................              ..............................................  Date                                               Time          22EPIC Rev 08/18        Form completed. Jelly Copeland MD

## 2021-12-16 NOTE — TELEPHONE ENCOUNTER
Copy placed in abstract and tc bin. Original forms placed at  for pt . Called pt and lvm so she is aware.

## 2021-12-16 NOTE — TELEPHONE ENCOUNTER
Form faxed to [facility] [phone], copy placed in abstract and original placed in tc bin

## 2021-12-27 ENCOUNTER — MYC MEDICAL ADVICE (OUTPATIENT)
Dept: FAMILY MEDICINE | Facility: CLINIC | Age: 61
End: 2021-12-27

## 2022-01-24 ENCOUNTER — TRANSFERRED RECORDS (OUTPATIENT)
Dept: HEALTH INFORMATION MANAGEMENT | Facility: CLINIC | Age: 62
End: 2022-01-24
Payer: COMMERCIAL

## 2022-01-27 ENCOUNTER — TRANSFERRED RECORDS (OUTPATIENT)
Dept: HEALTH INFORMATION MANAGEMENT | Facility: CLINIC | Age: 62
End: 2022-01-27
Payer: COMMERCIAL

## 2022-02-07 ENCOUNTER — OFFICE VISIT (OUTPATIENT)
Dept: RHEUMATOLOGY | Facility: CLINIC | Age: 62
End: 2022-02-07
Payer: COMMERCIAL

## 2022-02-07 VITALS
SYSTOLIC BLOOD PRESSURE: 175 MMHG | WEIGHT: 186.2 LBS | OXYGEN SATURATION: 99 % | HEART RATE: 72 BPM | BODY MASS INDEX: 32.99 KG/M2 | DIASTOLIC BLOOD PRESSURE: 107 MMHG | HEIGHT: 63 IN

## 2022-02-07 DIAGNOSIS — M06.4 INFLAMMATORY POLYARTHROPATHY (H): Primary | ICD-10-CM

## 2022-02-07 DIAGNOSIS — M25.561 CHRONIC PAIN OF BOTH KNEES: ICD-10-CM

## 2022-02-07 DIAGNOSIS — M25.562 CHRONIC PAIN OF BOTH KNEES: ICD-10-CM

## 2022-02-07 DIAGNOSIS — Z79.899 HIGH RISK MEDICATION USE: ICD-10-CM

## 2022-02-07 DIAGNOSIS — G89.29 CHRONIC PAIN OF BOTH KNEES: ICD-10-CM

## 2022-02-07 PROCEDURE — 20610 DRAIN/INJ JOINT/BURSA W/O US: CPT | Mod: 50 | Performed by: INTERNAL MEDICINE

## 2022-02-07 PROCEDURE — 99215 OFFICE O/P EST HI 40 MIN: CPT | Mod: 25 | Performed by: INTERNAL MEDICINE

## 2022-02-07 RX ORDER — FOLIC ACID 1 MG/1
1 TABLET ORAL DAILY
Qty: 90 TABLET | Refills: 2 | Status: SHIPPED | OUTPATIENT
Start: 2022-02-07 | End: 2022-10-12

## 2022-02-07 RX ORDER — HYDROXYCHLOROQUINE SULFATE 200 MG/1
400 TABLET, FILM COATED ORAL DAILY
Qty: 180 TABLET | Refills: 1 | Status: SHIPPED | OUTPATIENT
Start: 2022-02-07 | End: 2022-03-15

## 2022-02-07 RX ORDER — METHOTREXATE 2.5 MG/1
TABLET ORAL
Qty: 32 TABLET | Refills: 3 | Status: SHIPPED | OUTPATIENT
Start: 2022-02-07 | End: 2022-05-16

## 2022-02-07 RX ORDER — TRIAMCINOLONE ACETONIDE 40 MG/ML
40 INJECTION, SUSPENSION INTRA-ARTICULAR; INTRAMUSCULAR ONCE
Status: COMPLETED | OUTPATIENT
Start: 2022-02-07 | End: 2022-02-07

## 2022-02-07 RX ORDER — IBUPROFEN 200 MG
600 TABLET ORAL EVERY 6 HOURS PRN
COMMUNITY
Start: 2022-02-07 | End: 2022-07-01

## 2022-02-07 RX ADMIN — TRIAMCINOLONE ACETONIDE 40 MG: 40 INJECTION, SUSPENSION INTRA-ARTICULAR; INTRAMUSCULAR at 10:43

## 2022-02-07 RX ADMIN — TRIAMCINOLONE ACETONIDE 40 MG: 40 INJECTION, SUSPENSION INTRA-ARTICULAR; INTRAMUSCULAR at 10:42

## 2022-02-07 ASSESSMENT — MIFFLIN-ST. JEOR: SCORE: 1388.73

## 2022-02-07 NOTE — NURSING NOTE
Radha to follow up with Primary Care provider regarding elevated blood pressure.               RAPID3 (0-30) Cumulative Score  20.8          RAPID3 Weighted Score (divide #4 by 3 and that is the weighted score)  6.8

## 2022-02-07 NOTE — PROGRESS NOTES
Rheumatology Clinic Visit      Radha Rodriguez MRN# [id number]   YOB: 1960 Age: 61 year old      Date of visit: 2/07/22   PCP: Dr. Temo Fletcher    Chief Complaint   Patient presents with:  RECHECK    Assessment and Plan     1. Inflammatory polyarthropathy: Previously followed by Rojas Vasques and Sh[facility].  Established care with me on 4/24/2018.  Previously on Humira (ineffective), HCQ (ineffective; used with MTX), MTX (25mg wkly was effective and dose reductions resulted in worsening joint symptoms, but then she stopped on her own during the COVID19 pandemic without any worsening inflammatory arthritis symptoms).  Had been doing well for a while without a DMARD but then with full body aches that and stiffness that was worse in the morning and improved with time and activity; fullness of the bilateral second and third MCPs with prolonged morning stiffness; hydroxychloroquine was started with near resolution of the inflammatory MCP symptoms, but today presenting with severe arthritis at the MCPs and PIPs.  Discussed tx options and will start MTX today.  Chronic illness, progressive, severe  - Start Methotrexate 10mg once weekly for 1 week, then increase by 2.5mg each week until taking 20mg once weekly; then continue 20mg once weekly thereafter.  - Start folic acid 1mg daily  - Continue hydroxychloroquine 400mg daily  - Ophthalmology referral for hydroxychloroquine toxicity monitoring   - Labs monthly [id number]: CBC, Cr, Hepatic Panel  - Labs in 3 months: CBC, Creatinine, Hepatic Panel, ESR, CRP    High risk medication requiring intensive toxicity monitoring at least quarterly: labs ordered include CBC, Creatinine, Hepatic panel to monitor for cytopenia and hepatotoxicity; checking creatinine as it affects clearance of medication.      # Methotrexate Risks and Benefits: The risks and benefits of methotrexate were discussed in detail and the patient verbalized understanding.  The risks discussed include, but  are not limited to, the risk for hypersensitivity, anaphylaxis, anaphylactoid reactions, infections, bone marrow suppression, renal toxicity, hepatotoxicity, pulmonary toxicity, malignancy, impaired fertility, GI upset, alopecia, and oral and nasal sores.  Folic acid supplementation is recommended during methotrexate therapy to help prevent some of the side effects. Pregnancy prevention and planning was discussed; it is recommended that women of childbearing potential use reliable contraception during therapy.  The risks of taking both methotrexate and alcohol were reviewed; complete alcohol avoidance was discussed.  Routine laboratory monitoring is required during methotrexate therapy. Taking MTX once weekly, all within a 24 hour period was stressed and the patient verbalized this instruction back to me.  I encouraged reviewing the package insert and asking any questions about the medication.       2.  Primary osteoarthritis of the left first carpometacarpal joint: improved with steroid injections in the past.  Steroid injection needed again today but will space out given COVID19 risks and need for bilateral knee injections. Return in 1 week for injections.  Chronic illness, progressive.      3.  Bilateral knee osteoarthritis:  Previous injections were helpful. Continue PT exercises.   Repeat steroid injections today.  Chronic illness, progressive.      4. Chronic back pain: went to [facility] where steroids and gabapentin were Rx'd but she doesn't do well with steroids per patient and gabapentin has only been partially helpful. Was following at the [facility] pain clinic, but was referred by [facility] provider to [facility] per patient.  She would like another spine surgeon gio, discussed seeing Dr. Che or Dr. Xavi Guerrero. She plans to make an appointment for another opinion.  Chronic illness, progressive.      5.  Vaccinations: Vaccinations reviewed with Ms. Rodriguez.    - Influenza: up to date  - Hyifqaz26: up to date  -  Gbyoylpho07: up to date  - Shingrix: advised receiving 4 weeks after the COVID19 vaccine  - TDAP: up to date  - COVID-19: has received the Pfizer COVID-19 vaccine on 1/25/2021 and 2/15/2021; advised 3rd dose now    # Elevated blood pressure:  Radha to follow up with Primary Care provider regarding elevated blood pressure.       Total minutes spent in evaluation with patient, documentation, , and review of pertinent studies and chart notes: 24      Ms. Rodriguez verbalized agreement with and understanding of the rational for the diagnosis and treatment plan.  All questions were answered to best of my ability and the patient's satisfaction. Ms. Rodriguez was advised to contact the clinic with any questions that may arise after the clinic visit.      Thank you for involving me in the care of the patient    Return to clinic: 3-4 months    HPI   Radha Rodriguez is a 61 year old female with a past medical history significant for possible Crohn's disease requiring fistula surgery in the past, osteoarthritis, inflammatory arthritis who is seen for follow-up of arthritis.    Today, 2/7/2022: bilateral knee pain worse with activity and improved with rest; previous steroid injections effective and she'd like repeated today. Low back pain and has seen the [facility] pain clinic, [facility], and was referred to see Mya. Gabapentin partially helpful, makes her tired.  Feels terrible with any prednisone dose.  Morning stiffness for >1 hour. Swelling and pain and stiffness at the MCPs and PIPs that is worse with inactivity and improves with activity.     Denies fevers, chills, nausea, vomiting, constipation, diarrhea. No abdominal pain. No chest pain/pressure, palpitations, or shortness of breath. LE swelling worse at the end of the day.  No neck pain. No oral sores.  Chronic recurrent nasal sore for years, per patient.  No rash. No sicca symptoms.    Tobacco: none  EtOH: occasional  Drugs: none    ROS   12 point review of  system was completed and negative except as noted in the HPI     Active Problem List     Patient Active Problem List   Diagnosis     CARDIOVASCULAR SCREENING; LDL GOAL LESS THAN 160     Vitamin D deficiency     Inflammatory polyarthropathy (H)     High risk medications (not anticoagulants) long-term use     Osteoarthritis     Menopausal syndrome (hot flashes)     Right lateral epicondylitis     Immunosuppressed status (H)     DJD thoracic spine     Back muscle spasm     Upper back strain     Upper back pain, chronic     Past Medical History     Past Medical History:   Diagnosis Date     Arthritis      Headache(784.0)      Irritable bowel syndrome      Other and unspecified noninfectious gastroenteritis and colitis(558.9)     chronic     Past Surgical History     Past Surgical History:   Procedure Laterality Date     CATARACT IOL, RT/LT       COLONOSCOPY  10/14/2011    Procedure:COLONOSCOPY; Colonoscopy; Surgeon:SCOTTY LEDEZMA; Location:[facility]     ENHANCE LASER REFRACTIVE BILATERAL EXISTING PT IN PARAMETERS       HYSTERECTOMY, VAGINAL  1/1/2000    TVH, fibroids (still has ovaries)     SURGICAL HISTORY OF -       Tubal Ligation     SURGICAL HISTORY OF -       Appy     SURGICAL HISTORY OF -       Tonsils     SURGICAL HISTORY OF -   12/94    Anal Fistulotomy     ZZC REPLACEMENT,URETER,BOWEL SEGMENT  10/01/2008    Part of her ureter was repaired.     Allergy     Allergies   Allergen Reactions     Sulfa Drugs Rash     Codeine Hives     Clindamycin Rash     Current Medication List     Current Outpatient Medications   Medication Sig     gabapentin (NEURONTIN) 100 MG capsule TAKE 1 TO 3 CAPSULES BY MOUTH THREE TIMES DAILY     hydroxychloroquine (PLAQUENIL) 200 MG tablet Take 2 tablets (400 mg) by mouth daily     ibuprofen (ADVIL/MOTRIN) 200 MG tablet Take 600 mg by mouth every 6 hours as needed for mild pain      PREMARIN 0.45 MG tablet TAKE 1 TABLET(0.45 MG) BY MOUTH DAILY     cyclobenzaprine (FLEXERIL) 5 MG  "tablet TAKE 1 TO 2 TABLETS BY MOUTH THREE TIMES DAILY AS NEEDED (Patient not taking: Reported on 2/7/2022)     No current facility-administered medications for this visit.         Social History   See HPI    Family History     Family History   Problem Relation Age of Onset     Heart Disease Father         Heart attack     C.A.D. Father         age 50     Hypertension Father      Cancer Maternal Grandfather      Alzheimer Disease Maternal Grandfather      Cancer Paternal Grandfather      Diabetes No family hx of      Cerebrovascular Disease No family hx of      Breast Cancer No family hx of      Cancer - colorectal No family hx of      Prostate Cancer No family hx of        Physical Exam     Temp Readings from Last 3 Encounters:   08/06/21 97.8  F (36.6  C) (Tympanic)   07/01/21 97.6  F (36.4  C) (Temporal)   06/11/21 97.6  F (36.4  C) (Tympanic)     BP Readings from Last 5 Encounters:   02/07/22 (!) 164/91   10/14/21 131/81   10/06/21 (!) 144/86   09/13/21 (!) 153/91   08/09/21 (!) 144/86     Pulse Readings from Last 1 Encounters:   02/07/22 72     Resp Readings from Last 1 Encounters:   09/13/21 16     Estimated body mass index is 32.98 kg/m  as calculated from the following:    Height as of this encounter: 1.6 m (5' 3\").    Weight as of this encounter: 84.5 kg (186 lb 3.2 oz).      GEN: NAD.   HEENT:  Anicteric, noninjected sclera. No obvious external lesions of the ear and nose. Hearing intact.  PULM: No increased work of breathing.    MSK: swelling and tenderness to palpation of the R>>L 2nd-3rd MCPs and PIPs. Large heberdens nodes.  Squaring and tender to palpation without effusion or increased warmth of the bilateral 1st CMCs. Crepitation and medial joint line tenderness of both knees; no effusion or increased warmth or overlying erythema. Ankles and feet without swelling or tenderness to palpation.     SKIN: No rash or jaundice seen  PSYCH: Alert. Appropriate.         Labs / Imaging (select studies) "     RF/CCP  Recent Labs   Lab Test 06/01/15  1139 01/31/14  1534   CCPABY <20  Interpretation:  Negative   <20 Interpretation:  Negative   RHF <20  --      CBC  Recent Labs   Lab Test 10/11/21  1704 03/29/21  1431 01/04/21  1619 12/23/19  1620 09/25/19  1117   WBC 7.3 6.9 8.6 8.4 7.4   RBC 3.98 4.47 4.30 3.86 3.96   HGB 12.3 13.6 13.3 12.9 13.3   HCT 37.2 41.3 39.9 37.8 38.4   MCV 94 92 93 98 97   RDW 12.5 12.5 13.0 13.1 13.9    226 237 220 219   MCH 30.9 30.4 30.9 33.4* 33.6*   MCHC 33.1 32.9 33.3 34.1 34.6   NEUTROPHIL 70 64.0  --  69.7 73.0   LYMPH 21 26.5  --  20.6 17.8   MONOCYTE 7 6.4  --  6.7 6.5   EOSINOPHIL 2 2.8  --  2.6 2.3   BASOPHIL 1 0.3  --  0.4 0.4   ANEU  --  4.4  --  5.8 5.4   ALYM  --  1.8  --  1.7 1.3   EDWARD  --  0.4  --  0.6 0.5   AEOS  --  0.2  --  0.2 0.2   ABAS  --  0.0  --  0.0 0.0   ANEUTAUTO 5.1  --   --   --   --    ALYMPAUTO 1.5  --   --   --   --    AMONOAUTO 0.5  --   --   --   --    AEOSAUTO 0.2  --   --   --   --    ABSBASO 0.0  --   --   --   --      CMP  Recent Labs   Lab Test 10/11/21  1704 03/29/21  1431 01/04/21  1619 12/23/19  1620 04/24/18  1557 10/11/17  1207   NA  --  140 140  --   --  138   POTASSIUM  --  4.0 3.8  --   --  4.1   CHLORIDE  --  109 105  --   --  105   CO2  --  29 27  --   --  28   ANIONGAP  --  2* 8  --   --  5   GLC  --  85 86  --   --  86   BUN  --  11 14  --   --  18   CR 0.77 0.77 0.75 0.73   < > 0.73   GFRESTIMATED 85 85 87 >90   < > 82   GFRESTBLACK  --  >90 >90 >90   < > >90   ENEIDA  --  9.0 9.1  --   --  9.2   BILITOTAL 0.4 0.5 0.4 0.3   < > 0.7   ALBUMIN 3.6 3.9 3.9 3.8   < > 3.9   PROTTOTAL 6.4* 6.6* 7.1 6.7*   < > 6.9   ALKPHOS 56 62 58 68   < > 57   AST 22 17 18 21   < > 28   ALT 28 28 32 37   < > 44    < > = values in this interval not displayed.     Calcium/VitaminD  Recent Labs   Lab Test 06/11/21  1014 03/29/21  1431 01/04/21  1619 10/11/17  1207 06/01/15  1139 09/18/14  1129 04/29/14  1522   ENEIDA  --  9.0 9.1 9.2   < >  --   --    VITDT  26  --   --   --   --  38 42    < > = values in this interval not displayed.     ESR/CRP  Recent Labs   Lab Test 10/11/21  1704 03/29/21  1431 03/27/19  1053   SED 7 5 6   CRP 4.0 <2.9 3.0     Lipid Panel  No results for input(s): CHOL, TRIG, HDL, LDL, VLDL, CHOLHDLRATIO, NHDL in the last 59950 hours.  Hepatitis B  Recent Labs   Lab Test 04/24/18  1557   HBCAB Nonreactive   HEPBANG Nonreactive     Hepatitis C  Recent Labs   Lab Test 06/01/15  1139   HCVAB Nonreactive   Assay performance characteristics have not been established for newborns,   infants, and children       Lyme ab screening  Recent Labs   Lab Test 03/29/21  1431   LYMEGM 0.04     Lyme confirmation testing by Western Blot  No results for input(s): LYWG, LYWM in the last 57140 hours.  Tuberculosis Screening  Recent Labs   Lab Test 06/01/15  1139   TBRSLT Negative   TBAGN 0.00     HIV Screening  No results for input(s): HIAGAB in the last 77992 hours.    Immunization History     Immunization History   Administered Date(s) Administered     COVID-19,PF,Pfizer (12+ Yrs) 01/25/2021, 02/15/2021     HepB 01/24/1991, 02/28/1991, 09/05/1991     Influenza Quad, Recombinant, pf(RIV4) (Flublok) 10/01/2019, 10/14/2021     Pneumo Conj 13-V (2010&after) 10/01/2019     Pneumococcal 23 valent 12/31/2019     TD (ADULT, 7+) 03/12/1990, 01/01/2000     TDAP Vaccine (Adacel) 09/10/2008     TDAP Vaccine (Boostrix) 09/10/2008     Tdap (Adacel,Boostrix) 09/10/2008, 03/01/2011, 10/14/2021       Procedure     Procedure: Steroid injection of the bilateral knees  Indication: Pain, bilateral knee osteoarthritis    The procedure was explained in detail. Risks including infection, pain, structural damage such as cartilage damage and tendon rupture, fat atrophy, skin hyper-/hypo-pigmentation, and medication reaction was explained. The need for rest of the affected joint for one week after the procedure was explained.  The option of not doing the procedure was also provided. All  questions were answered and the patient consented to the procedure.     A time-out was performed and the correct patient, procedure, and laterality were verified.    The right knee was examined and location for injection was identified - anterior medial. The area was cleaned with chlorhexidine, twice.  Ethyl chloride was then used for topical anaesthetic.  Then a mixture of lidocaine 1% 2 mL and Kenalog 40mg was injected into the intra-articular space.     The left knee was examined and location for injection was identified - anterior medial. The area was cleaned with chlorhexidine, twice.  Ethyl chloride was then used for topical anaesthetic.  Then a mixture of lidocaine 1% 2 mL and Kenalog 40mg was injected into the intra-articular space.     The patient tolerated the procedure well. No complications.    1% Lidocaine  : [employer]  Lot #: [id number]  EXPIRATION DATE: 04/2023  NDC: 4964-2155-65    MEDICATION: Triamcinolone 40 mg  LOT #: [id number]  EXPIRATION DATE:  4/2023  NDC#: [address]-4    MEDICATION: Triamcinolone 40 mg  LOT #: [id number]  EXPIRATION DATE:  4/2023  NDC#: [address]-4         Chart documentation done in part with Dragon Voice recognition Software. Although reviewed after completion, some word and grammatical error may remain.    Abdoul Eli MD

## 2022-02-08 ENCOUNTER — VIRTUAL VISIT (OUTPATIENT)
Dept: FAMILY MEDICINE | Facility: CLINIC | Age: 62
End: 2022-02-08
Payer: COMMERCIAL

## 2022-02-08 ENCOUNTER — MYC MEDICAL ADVICE (OUTPATIENT)
Dept: FAMILY MEDICINE | Facility: CLINIC | Age: 62
End: 2022-02-08

## 2022-02-08 DIAGNOSIS — M51.34 THORACIC DEGENERATIVE DISC DISEASE: ICD-10-CM

## 2022-02-08 DIAGNOSIS — N95.1 MENOPAUSAL SYNDROME (HOT FLASHES): ICD-10-CM

## 2022-02-08 DIAGNOSIS — G89.29 CHRONIC MIDLINE THORACIC BACK PAIN: ICD-10-CM

## 2022-02-08 DIAGNOSIS — I10 REACTIVE HYPERTENSION: ICD-10-CM

## 2022-02-08 DIAGNOSIS — M54.6 CHRONIC MIDLINE THORACIC BACK PAIN: ICD-10-CM

## 2022-02-08 DIAGNOSIS — M46.84: Primary | ICD-10-CM

## 2022-02-08 DIAGNOSIS — M47.814 FACET ARTHROPATHY, THORACIC: ICD-10-CM

## 2022-02-08 PROCEDURE — 99214 OFFICE O/P EST MOD 30 MIN: CPT | Mod: 95 | Performed by: INTERNAL MEDICINE

## 2022-02-08 RX ORDER — TRAMADOL HYDROCHLORIDE 50 MG/1
50 TABLET ORAL 2 TIMES DAILY PRN
Qty: 60 TABLET | Refills: 0 | Status: SHIPPED | OUTPATIENT
Start: 2022-02-08 | End: 2022-03-07

## 2022-02-08 RX ORDER — NORTRIPTYLINE HCL 10 MG
10-20 CAPSULE ORAL AT BEDTIME
Qty: 60 CAPSULE | Refills: 5 | Status: SHIPPED | OUTPATIENT
Start: 2022-02-08 | End: 2022-05-25

## 2022-02-08 RX ORDER — AMLODIPINE BESYLATE 5 MG/1
5 TABLET ORAL 2 TIMES DAILY
Qty: 60 TABLET | Refills: 5 | Status: SHIPPED | OUTPATIENT
Start: 2022-02-08 | End: 2022-07-22

## 2022-02-09 NOTE — PROGRESS NOTES
Radha is a 61 year old who is being evaluated via a billable video visit.      How would you like to obtain your AVS? MyChart  If the video visit is dropped, the invitation should be resent by: Text to cell phone: [phone]  Will anyone else be joining your video visit? 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  Alleviating factors: Improved by: nothing  Therapies Tried and outcome: pharmacotherapy, facet injections and physical therapy.      Review of Systems   CONSTITUTIONAL: NEGATIVE for fever, chills, change in weight  INTEGUMENTARY/SKIN: NEGATIVE for worrisome rashes, moles or lesions  EYES: NEGATIVE for vision changes or irritation  ENT/MOUTH: NEGATIVE for ear, mouth and throat problems  RESP: NEGATIVE for significant cough or SOB  BREAST: NEGATIVE for masses, tenderness or discharge  CV: POSITIVE for high blood pressures due to persistent back pain.  GI: NEGATIVE for nausea, abdominal pain, heartburn, or change in bowel habits  : NEGATIVE for frequency, dysuria, or hematuria  MUSCULOSKELETAL:As above.  NEURO: NEGATIVE for weakness, dizziness or paresthesias  ENDOCRINE: POSITIVE  for hot flashes, patient is requesting refills for Premarin.  HEME: NEGATIVE for bleeding problems  PSYCHIATRIC: NEGATIVE for changes in mood or affect      Objective    Vitals:  No vitals were obtained today due to virtual visit.  Physical Exam   GENERAL: Healthy, alert and no distress  EYES: Eyes grossly normal to inspection.  No discharge or  erythema, or obvious scleral/conjunctival abnormalities.  HENT: normal cephalic/atraumatic and oral mucous membranes moist  RESP: No lung retractions nor audible wheezes.  NEURO: mentation intact  PSYCH: anxious and fatigued    Diagnostics  MRI THORACIC SPINE WITHOUT CONTRAST  7/8/2021 6:33 PM      HISTORY: Mid-back pain; Pain in thoracic spine.     TECHNIQUE: Multiplanar multisequence MRI of the thoracic spine without  contrast.     COMPARISON: CT of the thoracic spine 6/28/2021.     FINDINGS:  Sagittal alignment is normal. Normal vertebral body heights. 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 No definite aggressive appearing osseous lesion.     There is Modic type I degenerative endplate signal change along the  right anterolateral aspect of the T8-T9 disc space. Multilevel  prominent predominantly right anterolateral marginal/appositional  endplate osteophytes are present, as seen on prior CT. The  intervertebral discs appear largely maintained in height, although  there may be some multilevel disc desiccation/signal loss. There is  mild multilevel degenerative facet arthropathy primarily in the upper  to mid thoracic spine. No significant disc herniation is seen. There  is no spinal canal stenosis. There is no high-grade neural foraminal  stenosis. The morphology and signal intensity of the spinal cord is  normal. The visualized paraspinous soft tissues are grossly  unremarkable.                                                                      IMPRESSION:  Multilevel degenerative changes, as described, including prominent  multilevel right anterolateral marginal/appositional endplate  osteophytes, Modic type I degenerative endplate signal change at  T8-T9, and mild upper to mid thoracic facet arthropathy.  No  significant disc herniation seen. No spinal canal or neural foraminal  stenosis.     ROXANNE TRUJILLO MD     ASSESSMENT/PLAN  Neuropathic spondylopathy of thoracic region (H)  - nortriptyline (PAMELOR) 10 MG capsule; Take 1-2 capsules (10-20 mg) by mouth At Bedtime  - Physical Therapy Referral; Future    Chronic midline thoracic back pain  - traMADol (ULTRAM) 50 MG tablet; Take 1 tablet (50 mg) by mouth 2 times daily as needed for severe pain    Thoracic degenerative disc disease  - Physical Therapy Referral; Future    Facet arthropathy, thoracic  - Physical Therapy Referral; Future    Reactive hypertension  - amLODIPine (NORVASC) 5 MG tablet; Take 1 tablet (5 mg) by mouth 2 times daily    Menopausal syndrome (hot flashes)  - estrogen conj (PREMARIN) 0.45 MG tablet; Take 1 tablet (0.45 mg) by mouth daily      Video-Visit Details    Type of service:  Video Visit    Video Start Time: 6:20 PM  Video End Time:6:50 PM    Originating Location (pt. Location): Home    Distant Location (provider location):  [facility]     Platform used for Video Visit: Well     Disposition:  Follow-up in 4 weeks.    Temo Fletcher MD  Internal Medicine

## 2022-02-09 NOTE — TELEPHONE ENCOUNTER
See mychart message, unsure if patient was able to be seen for visit last night, provider visit documentation from yesterday not complete yet.    If patient needs to reschedule appointment, can defer to /scheduling    Shakila Olivo RN  [facility]

## 2022-02-11 ENCOUNTER — OFFICE VISIT (OUTPATIENT)
Dept: OPHTHALMOLOGY | Facility: CLINIC | Age: 62
End: 2022-02-11
Payer: COMMERCIAL

## 2022-02-11 DIAGNOSIS — H52.4 PRESBYOPIA: ICD-10-CM

## 2022-02-11 DIAGNOSIS — Z96.1 PSEUDOPHAKIA: ICD-10-CM

## 2022-02-11 DIAGNOSIS — H43.813 POSTERIOR VITREOUS DETACHMENT OF BOTH EYES: ICD-10-CM

## 2022-02-11 DIAGNOSIS — Z79.899 HIGH RISK MEDICATION USE: Primary | ICD-10-CM

## 2022-02-11 DIAGNOSIS — Z01.01 ENCOUNTER FOR EXAMINATION OF EYES AND VISION WITH ABNORMAL FINDINGS: ICD-10-CM

## 2022-02-11 PROCEDURE — 92015 DETERMINE REFRACTIVE STATE: CPT | Performed by: OPHTHALMOLOGY

## 2022-02-11 PROCEDURE — 92134 CPTRZ OPH DX IMG PST SGM RTA: CPT | Performed by: OPHTHALMOLOGY

## 2022-02-11 PROCEDURE — 92004 COMPRE OPH EXAM NEW PT 1/>: CPT | Performed by: OPHTHALMOLOGY

## 2022-02-11 PROCEDURE — 92083 EXTENDED VISUAL FIELD XM: CPT | Performed by: OPHTHALMOLOGY

## 2022-02-11 ASSESSMENT — EXTERNAL EXAM - LEFT EYE: OS_EXAM: NORMAL

## 2022-02-11 ASSESSMENT — EXTERNAL EXAM - RIGHT EYE: OD_EXAM: NORMAL

## 2022-02-11 ASSESSMENT — CONF VISUAL FIELD
OD_NORMAL: 1
OS_NORMAL: 1

## 2022-02-11 ASSESSMENT — REFRACTION_MANIFEST
OD_ADD: +2.50
OD_CYLINDER: +0.50
OS_CYLINDER: +0.50
OD_SPHERE: -0.75
OS_ADD: +2.50
OS_AXIS: 005
OS_SPHERE: -1.75
OD_AXIS: 172

## 2022-02-11 ASSESSMENT — VISUAL ACUITY
OS_SC: J1+-1
OS_PH_SC: 20/25
METHOD: SNELLEN - LINEAR
OS_SC: 20/125
OD_SC: 20/25

## 2022-02-11 ASSESSMENT — CUP TO DISC RATIO
OD_RATIO: 0.0
OS_RATIO: 0.1

## 2022-02-11 ASSESSMENT — SLIT LAMP EXAM - LIDS
COMMENTS: 1+ DERMATOCHALASIS
COMMENTS: 1+ DERMATOCHALASIS

## 2022-02-11 ASSESSMENT — TONOMETRY
OS_IOP_MMHG: 14
OD_IOP_MMHG: 14
IOP_METHOD: APPLANATION

## 2022-02-11 NOTE — PATIENT INSTRUCTIONS
"Glasses prescription given - optional  Use artificial tears up to four times a day (like Refresh Optive, Systane Balance, TheraTears, or generic artificial tears are ok. Avoid \"get the red out\" drops).   Return visit in 1 year for retinal OCT and central Bass Visual Field.  Woody Aly M.D.  [phone]   "

## 2022-02-11 NOTE — PROGRESS NOTES
" Current Eye Medications:  Plaquenil 400 mg daily.      Subjective:  Comprehensive eye exam. Pt notes she had  refractive lens exchanges with [place] and states she has mono vision. States distance vision isn't as crisp. Occasional floater and no flashes.      Objective:  See Ophthalmology Exam.       Assessment:  Baseline eye exam, retinal OCT, and central Bass Visual Field in patient with hx of refractive lens exchanges who is on chronic Plaquenil therapy for inflammatory polyarthropathy.      ICD-10-CM    1. High risk medication use  Z79.899 Adult Eye Referral   2. Pseudophakia, Yag Caps, ou (Hx of refractive lens exchange, ou (Lobanoff)  Z96.1    3. Posterior vitreous detachment of both eyes  H43.813    4. Encounter for examination of eyes and vision with abnormal findings  Z01.01    5. Presbyopia  H52.4         Plan:  Glasses prescription given - optional  Use artificial tears up to four times a day (like Refresh Optive, Systane Balance, TheraTears, or generic artificial tears are ok. Avoid \"get the red out\" drops).   Return visit in 1 year for retinal OCT and central Bass Visual Field.  Woody Aly M.D.  [phone]          "

## 2022-02-11 NOTE — LETTER
"    2/11/2022         RE: Radha Rodriguez  [address]  [place] [address]        Dear Colleague,    Thank you for referring your patient, Radha Rodriguez, to the [facility]. Please see a copy of my visit note below.     Current Eye Medications:  Plaquenil 400 mg daily.      Subjective:  Comprehensive eye exam. Pt notes she had  refractive lens exchanges with [place] and states she has mono vision. States distance vision isn't as crisp. Occasional floater and no flashes.      Objective:  See Ophthalmology Exam.       Assessment:  Baseline eye exam, retinal OCT, and central Bass Visual Field in patient with hx of refractive lens exchanges who is on chronic Plaquenil therapy for inflammatory polyarthropathy.      ICD-10-CM    1. High risk medication use  Z79.899 Adult Eye Referral   2. Pseudophakia, Yag Caps, ou (Hx of refractive lens exchange, ou (Lobanoff)  Z96.1    3. Posterior vitreous detachment of both eyes  H43.813    4. Encounter for examination of eyes and vision with abnormal findings  Z01.01    5. Presbyopia  H52.4         Plan:  Glasses prescription given - optional  Use artificial tears up to four times a day (like Refresh Optive, Systane Balance, TheraTears, or generic artificial tears are ok. Avoid \"get the red out\" drops).   Return visit in 1 year for retinal OCT and central Bass Visual Field.  Woody Aly M.D.  [phone]              Again, thank you for allowing me to participate in the care of your patient.        Sincerely,        Woody Aly MD    "

## 2022-02-12 PROBLEM — Z79.899 HIGH RISK MEDICATION USE: Status: ACTIVE | Noted: 2022-02-12

## 2022-02-12 PROBLEM — Z96.1 PSEUDOPHAKIA: Status: ACTIVE | Noted: 2022-02-12

## 2022-02-14 ENCOUNTER — TELEPHONE (OUTPATIENT)
Dept: RHEUMATOLOGY | Facility: CLINIC | Age: 62
End: 2022-02-14

## 2022-02-14 NOTE — TELEPHONE ENCOUNTER
[facility] Call Center    Phone Message    May a detailed message be left on voicemail: yes     Reason for Call: Other: Appt    Patient had to cancel today's appt because she has the stomach flu.  She is wondering if Dr. Eli can squeeze her in ASAP for a injection.  Next available appt is 5/19/2022.    Action Taken: Other: [facility]    Travel Screening: Not Applicable

## 2022-02-18 ENCOUNTER — TRANSFERRED RECORDS (OUTPATIENT)
Dept: HEALTH INFORMATION MANAGEMENT | Facility: CLINIC | Age: 62
End: 2022-02-18
Payer: COMMERCIAL

## 2022-02-21 ENCOUNTER — OFFICE VISIT (OUTPATIENT)
Dept: RHEUMATOLOGY | Facility: CLINIC | Age: 62
End: 2022-02-21
Payer: COMMERCIAL

## 2022-02-21 VITALS
DIASTOLIC BLOOD PRESSURE: 84 MMHG | BODY MASS INDEX: 31.82 KG/M2 | HEIGHT: 63 IN | HEART RATE: 75 BPM | OXYGEN SATURATION: 99 % | WEIGHT: 179.6 LBS | SYSTOLIC BLOOD PRESSURE: 132 MMHG

## 2022-02-21 DIAGNOSIS — M18.12 PRIMARY OSTEOARTHRITIS OF FIRST CARPOMETACARPAL JOINT OF LEFT HAND: Primary | ICD-10-CM

## 2022-02-21 PROCEDURE — 20600 DRAIN/INJ JOINT/BURSA W/O US: CPT | Mod: LT | Performed by: INTERNAL MEDICINE

## 2022-02-21 RX ORDER — METHYLPREDNISOLONE ACETATE 40 MG/ML
10 INJECTION, SUSPENSION INTRA-ARTICULAR; INTRALESIONAL; INTRAMUSCULAR; SOFT TISSUE ONCE
Status: COMPLETED | OUTPATIENT
Start: 2022-02-21 | End: 2022-02-21

## 2022-02-21 RX ADMIN — METHYLPREDNISOLONE ACETATE 10 MG: 40 INJECTION, SUSPENSION INTRA-ARTICULAR; INTRALESIONAL; INTRAMUSCULAR; SOFT TISSUE at 10:11

## 2022-02-21 NOTE — PATIENT INSTRUCTIONS
RHEUMATOLOGY    Dr. Abdoul Eli    [address] [place] [address]  Phone number: [phone]  Fax number: [phone]      Thank you for choosing [facility]!    Mi Irwin CMA Rheumatology

## 2022-02-21 NOTE — PROGRESS NOTES
Rheumatology Clinic Visit      Radha Rodriguez MRN# [id number]   YOB: 1960 Age: 61 year old      Date of visit: 2/21/22   PCP: Dr. Temo Fletcher    Chief Complaint   Patient presents with:  Inflammatory polyarthropathy    Assessment and Plan     1.  Primary osteoarthritis of the left first carpometacarpal joint: improved with steroid injections in the past.  Steroid injection repeated today as documented in the procedure section.      Total minutes spent in evaluation with patient, documentation, , and review of pertinent studies and chart notes: 5      Ms. Rodriguez verbalized agreement with and understanding of the rational for the diagnosis and treatment plan.  All questions were answered to best of my ability and the patient's satisfaction. Ms. Rodriguez was advised to contact the clinic with any questions that may arise after the clinic visit.      Thank you for involving me in the care of the patient    Return to clinic: 3-4 months    HPI   Radha Rodriguez is a 61 year old female with a past medical history significant for possible Crohn's disease requiring fistula surgery in the past, osteoarthritis, inflammatory arthritis who is seen for planned steroid injection of the left first CMC joint.    Today, 2/21/2022: Here for repeat steroid injection of the left first CMC joint previously planned.    Tobacco: none  EtOH: occasional  Drugs: none    ROS   GEN: No fevers or chills  SKIN: No rash  CV: No chest pain, pressure, palpitations, or dyspnea on exertion.  PULM: No wheeze, cough, or shortness of breath.     Active Problem List     Patient Active Problem List   Diagnosis     CARDIOVASCULAR SCREENING; LDL GOAL LESS THAN 160     Vitamin D deficiency     Inflammatory polyarthropathy (H)     High risk medications (not anticoagulants) long-term use     Osteoarthritis     Menopausal syndrome (hot flashes)     Right lateral epicondylitis     Immunosuppressed status (H)     Facet arthropathy, thoracic      Back muscle spasm     Upper back strain     Upper back pain, chronic     Chronic midline thoracic back pain     Thoracic degenerative disc disease     High risk medication use     Pseudophakia, Yag Caps, ou (Hx of refractive lens exchange, ou (Lobanoff)     Past Medical History     Past Medical History:   Diagnosis Date     Arthritis      Headache(784.0)      Irritable bowel syndrome      Other and unspecified noninfectious gastroenteritis and colitis(558.9)     chronic     Past Surgical History     Past Surgical History:   Procedure Laterality Date     CATARACT IOL, RT/LT      Refractive lens exchange ou (Lobanoff)     COLONOSCOPY  10/14/2011    Procedure:COLONOSCOPY; Colonoscopy; Surgeon:SCOTTY LEDEZMA; Location:[facility]     ENHANCE LASER REFRACTIVE BILATERAL EXISTING PT IN PARAMETERS       HYSTERECTOMY, VAGINAL  01/01/2000    TVH, fibroids (still has ovaries)     SURGICAL HISTORY OF -       Tubal Ligation     SURGICAL HISTORY OF -       Appy     SURGICAL HISTORY OF -       Tonsils     SURGICAL HISTORY OF -   12/1994    Anal Fistulotomy     ZZC REPLACEMENT,URETER,BOWEL SEGMENT  10/01/2008    Part of her ureter was repaired.     Allergy     Allergies   Allergen Reactions     Sulfa Drugs Rash     Codeine Hives     Clindamycin Rash     Current Medication List     Current Outpatient Medications   Medication Sig     amLODIPine (NORVASC) 5 MG tablet Take 1 tablet (5 mg) by mouth 2 times daily     estrogen conj (PREMARIN) 0.45 MG tablet Take 1 tablet (0.45 mg) by mouth daily     folic acid (FOLVITE) 1 MG tablet Take 1 tablet (1 mg) by mouth daily     gabapentin (NEURONTIN) 100 MG capsule Take 100-200 mg by mouth At Bedtime      hydroxychloroquine (PLAQUENIL) 200 MG tablet Take 2 tablets (400 mg) by mouth daily     ibuprofen (ADVIL/MOTRIN) 200 MG tablet Take 3 tablets (600 mg) by mouth every 6 hours as needed for moderate pain     methotrexate sodium 2.5 MG TABS 10mg (4 tabs) once weekly x1 week, then  "increase by 2.5mg (1 tab) each week until taking the goal weekly dose of 20mg (8 tabs) once weekly     nortriptyline (PAMELOR) 10 MG capsule Take 1-2 capsules (10-20 mg) by mouth At Bedtime     traMADol (ULTRAM) 50 MG tablet Take 1 tablet (50 mg) by mouth 2 times daily as needed for severe pain     No current facility-administered medications for this visit.         Social History   See HPI    Family History     Family History   Problem Relation Age of Onset     Heart Disease Father         Heart attack     C.A.D. Father         age 50     Hypertension Father      Cancer Maternal Grandfather      Alzheimer Disease Maternal Grandfather      Cancer Paternal Grandfather      Diabetes No family hx of      Cerebrovascular Disease No family hx of      Breast Cancer No family hx of      Cancer - colorectal No family hx of      Prostate Cancer No family hx of        Physical Exam     Temp Readings from Last 3 Encounters:   08/06/21 97.8  F (36.6  C) (Tympanic)   07/01/21 97.6  F (36.4  C) (Temporal)   06/11/21 97.6  F (36.4  C) (Tympanic)     BP Readings from Last 5 Encounters:   02/21/22 132/84   02/07/22 (!) 175/107   10/14/21 131/81   10/06/21 (!) 144/86   09/13/21 (!) 153/91     Pulse Readings from Last 1 Encounters:   02/21/22 75     Resp Readings from Last 1 Encounters:   09/13/21 16     Estimated body mass index is 31.81 kg/m  as calculated from the following:    Height as of this encounter: 1.6 m (5' 3\").    Weight as of this encounter: 81.5 kg (179 lb 9.6 oz).      GEN: NAD.   PULM: No increased work of breathing.    MSK:  Squaring and tender to palpation without effusion or increased warmth of the bilateral 1st CMC joints, worse on the left.  SKIN: No rash or jaundice seen  PSYCH: Alert. Appropriate.        Labs / Imaging (select studies)     RF/CCP  Recent Labs   Lab Test 06/01/15  1139 01/31/14  1534   CCPABY <20  Interpretation:  Negative   <20 Interpretation:  Negative   RHF <20  --      CBC  Recent Labs   Lab " Test 10/11/21  1704 03/29/21  1431 01/04/21  1619 12/23/19  1620 09/25/19  1117   WBC 7.3 6.9 8.6 8.4 7.4   RBC 3.98 4.47 4.30 3.86 3.96   HGB 12.3 13.6 13.3 12.9 13.3   HCT 37.2 41.3 39.9 37.8 38.4   MCV 94 92 93 98 97   RDW 12.5 12.5 13.0 13.1 13.9    226 237 220 219   MCH 30.9 30.4 30.9 33.4* 33.6*   MCHC 33.1 32.9 33.3 34.1 34.6   NEUTROPHIL 70 64.0  --  69.7 73.0   LYMPH 21 26.5  --  20.6 17.8   MONOCYTE 7 6.4  --  6.7 6.5   EOSINOPHIL 2 2.8  --  2.6 2.3   BASOPHIL 1 0.3  --  0.4 0.4   ANEU  --  4.4  --  5.8 5.4   ALYM  --  1.8  --  1.7 1.3   EDWARD  --  0.4  --  0.6 0.5   AEOS  --  0.2  --  0.2 0.2   ABAS  --  0.0  --  0.0 0.0   ANEUTAUTO 5.1  --   --   --   --    ALYMPAUTO 1.5  --   --   --   --    AMONOAUTO 0.5  --   --   --   --    AEOSAUTO 0.2  --   --   --   --    ABSBASO 0.0  --   --   --   --      CMP  Recent Labs   Lab Test 10/11/21  1704 03/29/21  1431 01/04/21  1619 12/23/19  1620 04/24/18  1557 10/11/17  1207   NA  --  140 140  --   --  138   POTASSIUM  --  4.0 3.8  --   --  4.1   CHLORIDE  --  109 105  --   --  105   CO2  --  29 27  --   --  28   ANIONGAP  --  2* 8  --   --  5   GLC  --  85 86  --   --  86   BUN  --  11 14  --   --  18   CR 0.77 0.77 0.75 0.73   < > 0.73   GFRESTIMATED 85 85 87 >90   < > 82   GFRESTBLACK  --  >90 >90 >90   < > >90   ENEIDA  --  9.0 9.1  --   --  9.2   BILITOTAL 0.4 0.5 0.4 0.3   < > 0.7   ALBUMIN 3.6 3.9 3.9 3.8   < > 3.9   PROTTOTAL 6.4* 6.6* 7.1 6.7*   < > 6.9   ALKPHOS 56 62 58 68   < > 57   AST 22 17 18 21   < > 28   ALT 28 28 32 37   < > 44    < > = values in this interval not displayed.     Calcium/VitaminD  Recent Labs   Lab Test 06/11/21  1014 03/29/21  1431 01/04/21  1619 10/11/17  1207 06/01/15  1139 09/18/14  1129 04/29/14  1522   ENEIDA  --  9.0 9.1 9.2   < >  --   --    VITDT 26  --   --   --   --  38 42    < > = values in this interval not displayed.     ESR/CRP  Recent Labs   Lab Test 10/11/21  1704 03/29/21  1431 03/27/19  1053   SED 7 5 6   CRP 4.0  <2.9 3.0     Lipid Panel  No results for input(s): CHOL, TRIG, HDL, LDL, VLDL, CHOLHDLRATIO, NHDL in the last 81430 hours.  Hepatitis B  Recent Labs   Lab Test 04/24/18  1557   HBCAB Nonreactive   HEPBANG Nonreactive     Hepatitis C  Recent Labs   Lab Test 06/01/15  1139   HCVAB Nonreactive   Assay performance characteristics have not been established for newborns,   infants, and children       Lyme ab screening  Recent Labs   Lab Test 03/29/21  1431   LYMEGM 0.04     Tuberculosis Screening  Recent Labs   Lab Test 06/01/15  1139   TBRSLT Negative   TBAGN 0.00         Immunization History     Immunization History   Administered Date(s) Administered     COVID-19,PF,Pfizer (12+ Yrs) 01/25/2021, 02/15/2021     HepB 01/24/1991, 02/28/1991, 09/05/1991     Influenza Quad, Recombinant, pf(RIV4) (Flublok) 10/01/2019, 10/14/2021     Pneumo Conj 13-V (2010&after) 10/01/2019     Pneumococcal 23 valent 12/31/2019     TD (ADULT, 7+) 03/12/1990, 01/01/2000     TDAP Vaccine (Adacel) 09/10/2008     TDAP Vaccine (Boostrix) 09/10/2008     Tdap (Adacel,Boostrix) 09/10/2008, 03/01/2011, 10/14/2021     Procedure     Procedure: Steroid injection of the left 1st CMC joint  Indication: Pain, osteoarthritis     The procedure was explained in detail. Risks including infection, pain, structural damage such as cartilage damage and tendon rupture, fat atrophy, skin hyper-/hypo-pigmentation, and medication reaction was explained. The need for rest of the affected joint for one week after the procedure was explained.  The option of not doing the procedure was also provided. All questions were answered and the patient consented to the procedure.     A time-out was performed and the correct patient, procedure, and laterality were verified.    The left 1st carpometacarpal joint was examined and location for injection was identified. The area was cleaned with chlorhexidine, twice.  Ethyl chloride was then used for topical anaesthetic.  Then a mixture  of lidocaine 1% 0.1 mL and methylprednisolone 10mg was injected into the intra-articular space.     The patient tolerated the procedure well. No complications.    1% Lidocaine  : [employer]  Lot #: [id number]  EXPIRATION DATE: 05/2023  NDC: 1206-6126-05    MEDICATION: Methylprednisolone  LOT #: [id number]  EXPIRATION DATE:  3/2023  NDC#: 5940-1660-92         Chart documentation done in part with Dragon Voice recognition Software. Although reviewed after completion, some word and grammatical error may remain.    Abdoul Eli MD

## 2022-02-21 NOTE — NURSING NOTE
RAPID3 (0-30) Cumulative Score  17.0          RAPID3 Weighted Score (divide #4 by 3 and that is the weighted score)  5.6

## 2022-03-07 ENCOUNTER — ANCILLARY PROCEDURE (OUTPATIENT)
Dept: GENERAL RADIOLOGY | Facility: CLINIC | Age: 62
End: 2022-03-07
Attending: INTERNAL MEDICINE
Payer: COMMERCIAL

## 2022-03-07 ENCOUNTER — VIRTUAL VISIT (OUTPATIENT)
Dept: FAMILY MEDICINE | Facility: CLINIC | Age: 62
End: 2022-03-07

## 2022-03-07 VITALS
HEART RATE: 71 BPM | WEIGHT: 176.6 LBS | SYSTOLIC BLOOD PRESSURE: 144 MMHG | BODY MASS INDEX: 31.28 KG/M2 | OXYGEN SATURATION: 97 % | TEMPERATURE: 97.5 F | DIASTOLIC BLOOD PRESSURE: 91 MMHG

## 2022-03-07 DIAGNOSIS — S29.012S RHOMBOID MUSCLE STRAIN, SEQUELA: ICD-10-CM

## 2022-03-07 DIAGNOSIS — G89.29 CHRONIC MIDLINE THORACIC BACK PAIN: ICD-10-CM

## 2022-03-07 DIAGNOSIS — M54.6 CHRONIC MIDLINE THORACIC BACK PAIN: ICD-10-CM

## 2022-03-07 DIAGNOSIS — M06.4 INFLAMMATORY POLYARTHROPATHY (H): ICD-10-CM

## 2022-03-07 DIAGNOSIS — M47.814 ARTHROPATHY OF THORACIC FACET JOINT: Primary | ICD-10-CM

## 2022-03-07 DIAGNOSIS — M54.14 THORACIC RADICULOPATHY: ICD-10-CM

## 2022-03-07 DIAGNOSIS — D84.9 IMMUNOSUPPRESSED STATUS (H): ICD-10-CM

## 2022-03-07 PROCEDURE — 99214 OFFICE O/P EST MOD 30 MIN: CPT | Mod: 95 | Performed by: INTERNAL MEDICINE

## 2022-03-07 PROCEDURE — 72074 X-RAY EXAM THORAC SPINE4/>VW: CPT | Performed by: RADIOLOGY

## 2022-03-07 RX ORDER — TRAMADOL HYDROCHLORIDE 50 MG/1
50 TABLET ORAL 2 TIMES DAILY PRN
Qty: 60 TABLET | Refills: 1 | Status: SHIPPED | OUTPATIENT
Start: 2022-03-07 | End: 2022-06-15

## 2022-03-07 NOTE — PROGRESS NOTES
Radha is a 61 year old who is being evaluated via a billable video visit.      How would you like to obtain your AVS? MyChart  If the video visit is dropped, the invitation should be resent by: Send to e-mail at: [email or web address]  Will anyone else be joining your video visit? No     [facility] Internal Medicine Progress Note           Assessment and Plan:     Arthropathy of thoracic facet joint  - Pain Management Referral; Future    Right rhomboid muscle strain, sequela  - Pain Management Referral; Future    Chronic midline thoracic back pain  - traMADol (ULTRAM) 50 MG tablet; Take 1 tablet (50 mg) by mouth 2 times daily as needed for severe pain    Inflammatory polyarthropathy (H)  Currently on Methotrexate, managed by Rheumatology.    Immunosuppressed status (H)  Secondary to Methotrexate use.           Interval History:     This is a 61 year old who presents for the following health issues     History of Present Illness     Back Pain:  She presents for follow up of back pain. Patient's back pain is a chronic problem.  Location of back pain:  Right middle of back  Description of back pain: burning, cramping, dull ache, fullness, gnawing, sharp and shooting  Back pain spreads: nowhere    Since patient first noticed back pain, pain is: always present, but gets better and worse  Does back pain interfere with her job:  Yes    Answers for HPI/ROS submitted by the patient on 3/7/2022  Your back pain is: chronic  Where is your back pain located? : right middle of back  How would you describe your back pain? : burning, cramping, dull ache, fullness, gnawing, sharp, shooting  Where does your back pain spread? : nowhere  Since you noticed your back pain, how has it changed? : always present, but gets better and worse  Does your back pain interfere with your job?: Yes  If yes, which:: NSAIDS (Ibuprofen, Naproxen)  How many servings of fruits and vegetables do you eat daily?: 2-3  On average, how many  sweetened beverages do you drink each day (Examples: soda, juice, sweet tea, etc.  Do NOT count diet or artificially sweetened beverages)?: 0  How many minutes a day do you exercise enough to make your heart beat faster?: 9 or less  How many days a week do you exercise enough to make your heart beat faster?: 3 or less  How many days per week do you miss taking your medication?: 0                Significant Problems:   Patient Active Problem List   Diagnosis     CARDIOVASCULAR SCREENING; LDL GOAL LESS THAN 160     Vitamin D deficiency     Inflammatory polyarthropathy (H)     High risk medications (not anticoagulants) long-term use     Osteoarthritis     Menopausal syndrome (hot flashes)     Right lateral epicondylitis     Immunosuppressed status (H)     Facet arthropathy, thoracic     Back muscle spasm     Upper back strain     Upper back pain, chronic     Chronic midline thoracic back pain     Thoracic degenerative disc disease     High risk medication use     Pseudophakia, Yag Caps, ou (Hx of refractive lens exchange, ou ([place])              Review of Systems:   CONSTITUTIONAL: NEGATIVE for fever, chills, change in weight  INTEGUMENTARY/SKIN: NEGATIVE for worrisome rashes, moles or lesions  EYES: NEGATIVE for vision changes or irritation  ENT/MOUTH: NEGATIVE for ear, mouth and throat problems  RESP: NEGATIVE for significant cough or SOB  BREAST: NEGATIVE for masses, tenderness or discharge  CV: NEGATIVE for chest pain, palpitations or peripheral edema  GI: NEGATIVE for nausea, abdominal pain, heartburn, or change in bowel habits  : NEGATIVE for frequency, dysuria, or hematuria  MUSCULOSKELETAL:As above.  NEURO: NEGATIVE for weakness, dizziness or paresthesias  ENDOCRINE: NEGATIVE for temperature intolerance, skin/hair changes  HEME: NEGATIVE for bleeding problems  PSYCHIATRIC: NEGATIVE for changes in mood or affect             Physical Exam:   BP (!) 144/91 (BP Location: Left arm, Patient Position: Sitting,  Cuff Size: Adult Regular)   Pulse 71   Temp 97.5  F (36.4  C) (Tympanic)   Wt 80.1 kg (176 lb 9.6 oz)   SpO2 97%   BMI 31.28 kg/m    Constitutional: Awake, alert, cooperative, no apparent distress, and appears stated age.  Eyes: Extra ocular muscles intact, sclera clear, conjunctiva normal.  ENT: Normocephalic, without obvious abnormality, .  Back: paraspinous muscles are tender on palpation , no costal vertebral tenderness and range of motion limited on flexion.  Lungs: no increased work of breathing, good air exchange, no retractions and   Cardiovascular: regular rate and rhythm  Neurologic: Motor Exam:  moves all extremities well and symmetrically  Sensory:  Sensory intact  Neuropsychiatric: Normal affect, mood, orientation, memory and insight.          Data:      XR THORACIC SPINE FOUR OR MORE VIEWS   3/7/2022 11:34 AM      COMPARISON: Thoracic spine MRI 7/8/2021     HISTORY: Chronic right-sided thoracic back pain.                                                                        IMPRESSION: There is continued normal alignment of the thoracic  vertebrae. Vertebral body heights remain normal. No evidence for  fracture. Flowing right anterolateral syndesmophytes throughout the  majority of the thoracic spine from the T4-T5 level down to the  thoracolumbar junction again noted suggesting DISH.     HANNA CHUNG MD       Disposition:  Follow-up in 4 weeks.      Temo Fletcher MD  Internal Medicine  [facility] Team       Video-Visit Details     Type of service:  Video Visit     Video Start Time: 9:45 AM  Video End Time: 10:00 AM    Originating Location (pt. Location): Home     Distant Location (provider location):  [facility]      Platform used for Video Visit: Madvenue

## 2022-03-08 ENCOUNTER — TELEPHONE (OUTPATIENT)
Dept: PALLIATIVE MEDICINE | Facility: CLINIC | Age: 62
End: 2022-03-08

## 2022-03-08 NOTE — TELEPHONE ENCOUNTER
Screening questions for MBB Injections:    Injection to be done at which interventional clinic site? [facility] [place]     Instruct patient to arrive as directed prior to the scheduled appointment time:    [place] and [place]: 30 minutes before      Procedure ordered by Dr. Fletcher    Procedure ordered? Thoracic Medial Branch Block    What insurance would patient like us to bill for this procedure? BC      MEDICA: REQUIRES A PA FOR BOTH MBB     Worker's comp- Any injection DO NOT SCHEDULE and route to [place] Elizalde.      [employer] insurance - If scheduling an SI joint injection DO NOT SCHEDULE and route to Sydnee Davila.       MBBs must be scheduled with elapsed time interval of at least 2 weeks and not more than 6 months between the First MBB and the Second MBB for insurance purposes       [employer] - Any injection besides hip/shoulder/knee joint DO NOT SCHEDULE and route to Sydnee Davila. She will obtain PA and call pt back to schedule procedure or notify pt of denial.       [employer]- PA required for all MBB's      **BCBS- ALL need to be routed to Sydnee for review if a PA is needed**    IF SCHEDULING IN [place] AND NEEDS A PA, IT IS OKAY TO SCHEDULE. [place] HANDLES THEIR OWN PA'S AFTER THE PATIENT IS SCHEDULED. PLEASE SCHEDULE AT LEAST 1 WEEK OUT SO A PA CAN BE OBTAINED.    Any chance of pregnancy? NO   If YES, do NOT schedule and route to RN pool    Is an  needed? No     Patient has a drive home? (mandatory) Yes     Is patient taking any blood thinners (plavix, coumadin, jantoven, warfarin, heparin, pradaxa or dabigatran )? No    If hold needed, do NOT schedule, route to RN pool     Is patient taking any aspirin products? No     If more than 325mg/day, OK to schedule; Instruct pt to decrease to less than 325 mg for 7 days AND route to RN pool    For CERVICAL procedures, hold all aspirin products for 6 days.     Tell pt that if aspirin product is not held for 6 days, the  procedure WILL BE cancelled.      Does the patient have a bleeding or clotting disorder? No    If YES, okay to schedule AND route to RN nurse pool    **For any patients with platelet count <100, must be forwarded to provider**    Is patient diabetic? NO If YES, have them bring their glucometer.    Does patient have an active infection or treated for one within the past week? No    Is patient currently taking any antibiotics?  No    For patients on chronic, preventative, or prophylactic antibiotics, procedures may be scheduled.     For patients on antibiotics for active or recent infection:antibiotic course must have been completed for 4 days    Is patient currently taking any steroid medications? (i.e. Prednisone, Medrol)  No     For patients on steroid medications, course must have been completed for 4 days    Is patient actively being treated for cancer or immunocompromised? No   If YES, do NOT schedule and route to RN    Are you able to get on and off an exam table with minimal or no assistance? Yes   If NO, do NOT schedule and route to RN    Are you able to roll over and lay on your stomach with minimal or no assistance? Yes   If NO, do NOT schedule and route to RN    Any allergies to contrast dye, iodine, shellfish, or numbing and steroid medications? No  (If so, inform nursing and note in scheduling comments.)    Allergies: Sulfa drugs, Codeine, and Clindamycin     Does patient have an MRI/CT?  Not Applicable  Check Procedure Scheduling Grid to see if required.      Was the MRI done within the last 3 years?  NA    If yes, where was the MRI done i.e.[facility], [facility], [place], [facility] etc?       If no, do not schedule and route to nursing    If MRI was not done at [place], [facility] or [facility] do NOT schedule and route to nursing.      If pt has an imaging disc, the injection MAY be scheduled but pt has to bring disc to appt.     If they show up without the disc the injection cannot be  done      Medial Branch Block Pre-Procedure Instructions    It is okay to take long acting pain medications (if you are on them) the day of the procedure but try not to take any short acting medications unless absolutely necessary.    YES: ok   Long acting meds would include: Gabapentin (Neurontin), MS Contin, Oxycontin        Short acting meds would include:  Percocet, Oxycodone, Vicodin, Ibuprofen     The day of the procedure, you should try to do things that provoke your pain, since the injection is being done to see if it will relieve your pain . YES: ok     If your pain level is a 4 out of 10 or less on the day of the procedu re, please call [phone] to reschedule.  YES: ok     Reminders:      If you are started on any steroids or antibiotics between now and your appointment, you must contact us because it may affect our ability to perform your procedure ok      Instructed pt to arrive 30 minutes early for IV start if required. (Check Procedure Scheduling Grid) Ryan       If this is for a cervical MBB aspirin needs to be held for 6 days.  Not Applicable       Do not schedule procedures requiring IV placement in the first appointment of the day or first appointment after lunch. Do NOT schedule at 0745, 0815 or 1245.  ok      For patients 85 or older we recommend having an adult stay w/ them for the remainder of the day.      Does the patient have any questions? no

## 2022-03-08 NOTE — TELEPHONE ENCOUNTER
No PA required, okay to schedule    Sydnee RODGERS    [facility]

## 2022-03-09 ENCOUNTER — MYC MEDICAL ADVICE (OUTPATIENT)
Dept: FAMILY MEDICINE | Facility: CLINIC | Age: 62
End: 2022-03-09

## 2022-03-09 NOTE — TELEPHONE ENCOUNTER
See [employer] message.   To provider to review and advise.   Elaine Ye BSN, RN

## 2022-03-09 NOTE — TELEPHONE ENCOUNTER
Called patient to schedule Thoracic Medial Branch Block. Informed patient of provider status. There are no other providers available at [facility] to perform the procedure. Patient stated she would need to consider her options of going to [place] or [place] and would call back to schedule.    Pt was also established with Dr. Colindres, routing to review care plan.    Anitha BOWEN    [facility]

## 2022-03-22 NOTE — TELEPHONE ENCOUNTER
Routing back to referring provider as FYI. We no longer have a provider with the [facility] able to perform Thoracic MBB's. [facility] may be [facility] that are able to.    Anitha BOWEN    [facility]

## 2022-03-22 NOTE — TELEPHONE ENCOUNTER
Unfortunately I do no perform thoracic medial branch blocks.    Jelly Delgado MD  [facility]

## 2022-03-25 ENCOUNTER — TRANSFERRED RECORDS (OUTPATIENT)
Dept: HEALTH INFORMATION MANAGEMENT | Facility: CLINIC | Age: 62
End: 2022-03-25
Payer: COMMERCIAL

## 2022-04-11 ENCOUNTER — MYC MEDICAL ADVICE (OUTPATIENT)
Dept: FAMILY MEDICINE | Facility: CLINIC | Age: 62
End: 2022-04-11

## 2022-04-11 NOTE — TELEPHONE ENCOUNTER
See [employer] message below.     Routing to provider to review and advise.    Kait Tyler RN  [facility]

## 2022-04-14 ENCOUNTER — TELEPHONE (OUTPATIENT)
Dept: FAMILY MEDICINE | Facility: CLINIC | Age: 62
End: 2022-04-14
Payer: COMMERCIAL

## 2022-04-14 NOTE — TELEPHONE ENCOUNTER
Norm with [employer] called for a disability claim for pt.    Pt was previously followed by Dr. Copeland and they are wondering if provider if Dr. Fletcher is following her.    They are wanting clarification if pt is on any restrictions or if previous restrictions by Dr. Copeland still enforced.      Routing to provider.    Call [phone]      Karen Soares RN  [facility]

## 2022-04-15 NOTE — TELEPHONE ENCOUNTER
Yes, I am following this patient now.    But notify Norm from [facility] that previous restrictions recommended by Dr. Copeland will continue (based on disability form completed by Dr. Copeland last 12/15/2021).

## 2022-04-24 ENCOUNTER — HEALTH MAINTENANCE LETTER (OUTPATIENT)
Age: 62
End: 2022-04-24

## 2022-05-02 ENCOUNTER — LAB (OUTPATIENT)
Dept: LAB | Facility: CLINIC | Age: 62
End: 2022-05-02
Payer: COMMERCIAL

## 2022-05-02 DIAGNOSIS — M06.4 INFLAMMATORY POLYARTHROPATHY (H): ICD-10-CM

## 2022-05-02 DIAGNOSIS — Z79.899 HIGH RISK MEDICATION USE: ICD-10-CM

## 2022-05-02 LAB
ALBUMIN SERPL-MCNC: 3.7 G/DL (ref 3.4–5)
ALP SERPL-CCNC: 60 U/L (ref 40–150)
ALT SERPL W P-5'-P-CCNC: 32 U/L (ref 0–50)
AST SERPL W P-5'-P-CCNC: 16 U/L (ref 0–45)
BASOPHILS # BLD AUTO: 0 10E3/UL (ref 0–0.2)
BASOPHILS NFR BLD AUTO: 1 %
BILIRUB DIRECT SERPL-MCNC: <0.1 MG/DL (ref 0–0.2)
BILIRUB SERPL-MCNC: 0.5 MG/DL (ref 0.2–1.3)
CREAT SERPL-MCNC: 0.81 MG/DL (ref 0.52–1.04)
CRP SERPL-MCNC: <2.9 MG/L (ref 0–8)
EOSINOPHIL # BLD AUTO: 0.1 10E3/UL (ref 0–0.7)
EOSINOPHIL NFR BLD AUTO: 1 %
ERYTHROCYTE [DISTWIDTH] IN BLOOD BY AUTOMATED COUNT: 14 % (ref 10–15)
ERYTHROCYTE [SEDIMENTATION RATE] IN BLOOD BY WESTERGREN METHOD: 7 MM/HR (ref 0–30)
GFR SERPL CREATININE-BSD FRML MDRD: 83 ML/MIN/1.73M2
HCT VFR BLD AUTO: 40 % (ref 35–47)
HGB BLD-MCNC: 13.3 G/DL (ref 11.7–15.7)
IMM GRANULOCYTES # BLD: 0 10E3/UL
IMM GRANULOCYTES NFR BLD: 0 %
LYMPHOCYTES # BLD AUTO: 1.4 10E3/UL (ref 0.8–5.3)
LYMPHOCYTES NFR BLD AUTO: 17 %
MCH RBC QN AUTO: 31.9 PG (ref 26.5–33)
MCHC RBC AUTO-ENTMCNC: 33.3 G/DL (ref 31.5–36.5)
MCV RBC AUTO: 96 FL (ref 78–100)
MONOCYTES # BLD AUTO: 0.4 10E3/UL (ref 0–1.3)
MONOCYTES NFR BLD AUTO: 5 %
NEUTROPHILS # BLD AUTO: 6.3 10E3/UL (ref 1.6–8.3)
NEUTROPHILS NFR BLD AUTO: 76 %
PLATELET # BLD AUTO: 248 10E3/UL (ref 150–450)
PROT SERPL-MCNC: 6.6 G/DL (ref 6.8–8.8)
RBC # BLD AUTO: 4.17 10E6/UL (ref 3.8–5.2)
WBC # BLD AUTO: 8.3 10E3/UL (ref 4–11)

## 2022-05-02 PROCEDURE — 85652 RBC SED RATE AUTOMATED: CPT

## 2022-05-02 PROCEDURE — 36415 COLL VENOUS BLD VENIPUNCTURE: CPT

## 2022-05-02 PROCEDURE — 85025 COMPLETE CBC W/AUTO DIFF WBC: CPT

## 2022-05-02 PROCEDURE — 80076 HEPATIC FUNCTION PANEL: CPT

## 2022-05-02 PROCEDURE — 86140 C-REACTIVE PROTEIN: CPT

## 2022-05-02 PROCEDURE — 82565 ASSAY OF CREATININE: CPT

## 2022-05-16 ENCOUNTER — OFFICE VISIT (OUTPATIENT)
Dept: RHEUMATOLOGY | Facility: CLINIC | Age: 62
End: 2022-05-16
Payer: COMMERCIAL

## 2022-05-16 VITALS — SYSTOLIC BLOOD PRESSURE: 146 MMHG | HEART RATE: 77 BPM | OXYGEN SATURATION: 98 % | DIASTOLIC BLOOD PRESSURE: 85 MMHG

## 2022-05-16 DIAGNOSIS — M06.4 INFLAMMATORY POLYARTHROPATHY (H): Primary | ICD-10-CM

## 2022-05-16 DIAGNOSIS — Z79.899 HIGH RISK MEDICATION USE: ICD-10-CM

## 2022-05-16 DIAGNOSIS — M18.12 PRIMARY OSTEOARTHRITIS OF FIRST CARPOMETACARPAL JOINT OF LEFT HAND: ICD-10-CM

## 2022-05-16 DIAGNOSIS — M17.12 PRIMARY OSTEOARTHRITIS OF LEFT KNEE: ICD-10-CM

## 2022-05-16 PROCEDURE — 99214 OFFICE O/P EST MOD 30 MIN: CPT | Mod: 25 | Performed by: INTERNAL MEDICINE

## 2022-05-16 PROCEDURE — 20610 DRAIN/INJ JOINT/BURSA W/O US: CPT | Mod: LT | Performed by: INTERNAL MEDICINE

## 2022-05-16 PROCEDURE — 20600 DRAIN/INJ JOINT/BURSA W/O US: CPT | Mod: LT | Performed by: INTERNAL MEDICINE

## 2022-05-16 RX ORDER — METHYLPREDNISOLONE ACETATE 40 MG/ML
10 INJECTION, SUSPENSION INTRA-ARTICULAR; INTRALESIONAL; INTRAMUSCULAR; SOFT TISSUE ONCE
Status: COMPLETED | OUTPATIENT
Start: 2022-05-16 | End: 2022-05-16

## 2022-05-16 RX ORDER — HYDROXYCHLOROQUINE SULFATE 200 MG/1
400 TABLET, FILM COATED ORAL DAILY
Qty: 180 TABLET | Refills: 1 | Status: SHIPPED | OUTPATIENT
Start: 2022-05-16 | End: 2022-05-25

## 2022-05-16 RX ORDER — TRIAMCINOLONE ACETONIDE 40 MG/ML
40 INJECTION, SUSPENSION INTRA-ARTICULAR; INTRAMUSCULAR ONCE
Status: COMPLETED | OUTPATIENT
Start: 2022-05-16 | End: 2022-05-16

## 2022-05-16 RX ORDER — METHOTREXATE 2.5 MG/1
25 TABLET ORAL
Qty: 40 TABLET | Refills: 3 | Status: SHIPPED | OUTPATIENT
Start: 2022-05-16 | End: 2022-06-13

## 2022-05-16 RX ADMIN — TRIAMCINOLONE ACETONIDE 40 MG: 40 INJECTION, SUSPENSION INTRA-ARTICULAR; INTRAMUSCULAR at 10:50

## 2022-05-16 RX ADMIN — METHYLPREDNISOLONE ACETATE 10 MG: 40 INJECTION, SUSPENSION INTRA-ARTICULAR; INTRALESIONAL; INTRAMUSCULAR; SOFT TISSUE at 10:49

## 2022-05-16 NOTE — NURSING NOTE
RAPID3 (0-30) Cumulative Score  17.8          RAPID3 Weighted Score (divide #4 by 3 and that is the weighted score)  5.9

## 2022-05-16 NOTE — PROGRESS NOTES
Rheumatology Clinic Visit      Radha Rodriguez MRN# [id number]   YOB: 1960 Age: 62 year old      Date of visit: 5/16/22   PCP: Dr. Temo Fletcher    Chief Complaint   Patient presents with:  Inflammatory polyarthropathy: Left knee pain    Assessment and Plan     1. Inflammatory polyarthropathy: Previously followed by Rojas Vasques and [facility].  Established care with me on 4/24/2018.  Previously on Humira (ineffective), HCQ (ineffective; used with MTX), MTX (25mg wkly was effective and dose reductions resulted in worsening joint symptoms, but then she stopped on her own during the COVID19 pandemic without any worsening inflammatory arthritis symptoms).  Had been doing well for a while without a DMARD but then with full body aches that and stiffness that was worse in the morning and improved with time and activity; fullness of the bilateral second and third MCPs with prolonged morning stiffness; hydroxychloroquine was started with near resolution of the inflammatory MCP symptoms, but then presented with severe arthritis at the MCPs and PIPs so methotrexate was started with significant improvement but mild synovitis still on exam today so we will increase methotrexate again.  Chronic illness, progressive.    - Increase methotrexate from 20 mg once weekly, to 25 mg once weekly (split dose on the same day of each week)  - Continue folic acid 1mg daily  - Continue hydroxychloroquine 400mg daily (last eye exam on 2/11/2022 by Dr. Aly)  - Labs monthly [id number]: CBC, Cr, Hepatic Panel  - Labs in 3 months: CBC, Creatinine, Hepatic Panel, ESR, CRP    High risk medication requiring intensive toxicity monitoring at least quarterly: labs ordered include CBC, Creatinine, Hepatic panel to monitor for cytopenia and hepatotoxicity; checking creatinine as it affects clearance of medication.      2.  Primary osteoarthritis of the left first carpometacarpal joint: improved with steroid injections in the past.  Steroid  injection needed again today, as documented in the procedure section.  Chronic illness, progressive.      3.  Bilateral knee osteoarthritis hx; now with just left knee pain:  Previous injections were helpful. Continue PT exercises.   Repeat steroid injection needed today of the left knee.  Note that the left knee is now starting to give out but does not lock; medial joint line tenderness on exam.  Possible that she has a meniscal tear so if no improvement then plan to check x-ray and likely MRI of the left knee.  Chronic illness, progressive.        4. Chronic back pain: went to [facility] where steroids and gabapentin were Rx'd but she doesn't do well with steroids per patient and gabapentin has only been partially helpful. Was following at the [facility] pain clinic, but was referred by [facility] provider to [facility] per patient.  Now following with Dr. Xavi Guerrero at [facility] where she is planning to have another injection of her back at [facility] but if no improvement then she is going to return to Dr. Guerrero.      5.  Vaccinations: Vaccinations reviewed with Ms. Rodriguez.    - Influenza: up to date  - [id number]: up to date  - [id number]: up to date  - Shingrix: advised receiving 4 weeks after the COVID19 vaccine  - TDAP: up to date  - COVID-19: has received the Pfizer COVID-19 vaccine on 1/25/2021 and 2/15/2021.  A third dose of an mRNA COVID-19 vaccination is recommended to receive 28 days after the second mRNA COVID-19 vaccination was received; this 3rd dose should be from the same  as the first two doses, and will complete the initial vaccine series.   A 4th dose (1st booster) of the mRNA COVID-19 vaccination is recommended to be received 3 months after the third mRNA COVID-19 vaccination was received.  A 5th mRNA vaccine (2nd booster) may be received at least 4 months after the 4th dose.   Based on our current understanding (and this may change over time as we learn more), medications should be  adjusted as noted below, if disease activity allows:  - NSAIDs and Acetaminophen: hold for 24 hours prior to vaccination if able to do so  - Methotrexate should be held for 1-2 weeks after each COVID-19 vaccine (as disease activity allows)       # Elevated blood pressure:  Radha to follow up with Primary Care provider regarding elevated blood pressure.       Total minutes spent in evaluation with patient, documentation, , and review of pertinent studies and chart notes: 24      Ms. Rodriguez verbalized agreement with and understanding of the rational for the diagnosis and treatment plan.  All questions were answered to best of my ability and the patient's satisfaction. Ms. Rodriguez was advised to contact the clinic with any questions that may arise after the clinic visit.      Thank you for involving me in the care of the patient    Return to clinic: 3-4 months    HPI   Radha Rodriguez is a 62 year old female with a past medical history significant for possible Crohn's disease requiring fistula surgery in the past, osteoarthritis, inflammatory arthritis who is seen for follow-up of arthritis.    Today, 2022: seen by Dr. Guerrero; was referred to [facility] where injection wasn't helpful, but planning on another and if no improvement then planning to see Dr. Guerrero again. Also she says that they discussed seeing PT at [facility] for a different type of PT than what she had had in the past. Back pain is worse with activity and improved with rest. Uses ibuprofen and tramadol for back pain as needed.  In early April she drove to a  where she had tightness in the left thigh; was told by her son who is a  that she needs to stretch and drink more water; this has helped.  Left knee more achy, sometimes feels like it is going to give out, but no locking.    Denies fevers, chills, nausea, vomiting, constipation, diarrhea. No abdominal pain. No chest pain/pressure, palpitations, or shortness of breath. TATI  swelling worse at the end of the day.  No neck pain. No oral sores.  Chronic recurrent nasal sore for years, per patient.  No rash. No sicca symptoms.    Tobacco: none  EtOH: occasional  Drugs: none    ROS   12 point review of system was completed and negative except as noted in the HPI     Active Problem List     Patient Active Problem List   Diagnosis     CARDIOVASCULAR SCREENING; LDL GOAL LESS THAN 160     Vitamin D deficiency     Inflammatory polyarthropathy (H)     High risk medications (not anticoagulants) long-term use     Osteoarthritis     Menopausal syndrome (hot flashes)     Right lateral epicondylitis     Immunosuppressed status (H)     Facet arthropathy, thoracic     Back muscle spasm     Upper back strain     Upper back pain, chronic     Chronic midline thoracic back pain     Thoracic degenerative disc disease     High risk medication use     Pseudophakia, Yag Caps, ou (Hx of refractive lens exchange, ou (Lobanoff)     Past Medical History     Past Medical History:   Diagnosis Date     Arthritis      Headache(784.0)      Irritable bowel syndrome      Other and unspecified noninfectious gastroenteritis and colitis(558.9)     chronic     Past Surgical History     Past Surgical History:   Procedure Laterality Date     CATARACT IOL, RT/LT      Refractive lens exchange ou (Lobanoff)     COLONOSCOPY  10/14/2011    Procedure:COLONOSCOPY; Colonoscopy; Surgeon:SCOTTY LEDEZMA; Location:[facility]     ENHANCE LASER REFRACTIVE BILATERAL EXISTING PT IN PARAMETERS       HYSTERECTOMY, VAGINAL  01/01/2000    TVH, fibroids (still has ovaries)     SURGICAL HISTORY OF -       Tubal Ligation     SURGICAL HISTORY OF -       Appy     SURGICAL HISTORY OF -       Tonsils     SURGICAL HISTORY OF -   12/1994    Anal Fistulotomy     ZZC REPLACEMENT,URETER,BOWEL SEGMENT  10/01/2008    Part of her ureter was repaired.     Allergy     Allergies   Allergen Reactions     Sulfa Drugs Rash     Codeine Hives     Clindamycin Rash      Current Medication List     Current Outpatient Medications   Medication Sig     amLODIPine (NORVASC) 5 MG tablet Take 1 tablet (5 mg) by mouth 2 times daily     estrogen conj (PREMARIN) 0.45 MG tablet Take 1 tablet (0.45 mg) by mouth daily     folic acid (FOLVITE) 1 MG tablet Take 1 tablet (1 mg) by mouth daily     ibuprofen (ADVIL/MOTRIN) 200 MG tablet Take 3 tablets (600 mg) by mouth every 6 hours as needed for moderate pain     methotrexate sodium 2.5 MG TABS 10mg (4 tabs) once weekly x1 week, then increase by 2.5mg (1 tab) each week until taking the goal weekly dose of 20mg (8 tabs) once weekly     traMADol (ULTRAM) 50 MG tablet Take 1 tablet (50 mg) by mouth 2 times daily as needed for severe pain     gabapentin (NEURONTIN) 100 MG capsule Take 100-200 mg by mouth At Bedtime  (Patient not taking: Reported on 5/16/2022)     nortriptyline (PAMELOR) 10 MG capsule Take 1-2 capsules (10-20 mg) by mouth At Bedtime (Patient not taking: Reported on 5/16/2022)     No current facility-administered medications for this visit.         Social History   See HPI    Family History     Family History   Problem Relation Age of Onset     Heart Disease Father         Heart attack     C.A.D. Father         age 50     Hypertension Father      Cancer Maternal Grandfather      Alzheimer Disease Maternal Grandfather      Cancer Paternal Grandfather      Diabetes No family hx of      Cerebrovascular Disease No family hx of      Breast Cancer No family hx of      Cancer - colorectal No family hx of      Prostate Cancer No family hx of        Physical Exam     Temp Readings from Last 3 Encounters:   03/07/22 97.5  F (36.4  C) (Tympanic)   08/06/21 97.8  F (36.6  C) (Tympanic)   07/01/21 97.6  F (36.4  C) (Temporal)     BP Readings from Last 5 Encounters:   05/16/22 (!) 146/85   03/07/22 (!) 144/91   02/21/22 132/84   02/07/22 (!) 175/107   10/14/21 131/81     Pulse Readings from Last 1 Encounters:   05/16/22 77     Resp Readings from  "Last 1 Encounters:   09/13/21 16     Estimated body mass index is 31.28 kg/m  as calculated from the following:    Height as of 2/21/22: 1.6 m (5' 3\").    Weight as of 3/7/22: 80.1 kg (176 lb 9.6 oz).      GEN: NAD.   HEENT:  Anicteric, noninjected sclera. No obvious external lesions of the ear and nose. Hearing intact.  PULM: No increased work of breathing.    MSK: Mild synovial swelling and tenderness palpation of the bilateral second and third MCPs, significantly improved since the last exam.  Large heberdens nodes.  Squaring and tender to palpation without effusion or increased warmth of the left first CMC joint.  Right knee without swelling or tenderness palpation.  Left knee with medial joint line tenderness and crepitation; no increased warmth or overlying erythema; negative anterior/posterior drawer; no excess valgus or varus laxity.  Ankles and feet without swelling or tenderness to palpation.     SKIN: No rash or jaundice seen  PSYCH: Alert. Appropriate.         Labs / Imaging (select studies)   RF/CCP  Recent Labs   Lab Test 06/01/15  1139   CCPABY <20  Interpretation:  Negative     RHF <20     CBC  Recent Labs   Lab Test 05/02/22  1252 10/11/21  1704 03/29/21  1431 01/04/21  1619 12/23/19  1620 09/25/19  1117   WBC 8.3 7.3 6.9   < > 8.4 7.4   RBC 4.17 3.98 4.47   < > 3.86 3.96   HGB 13.3 12.3 13.6   < > 12.9 13.3   HCT 40.0 37.2 41.3   < > 37.8 38.4   MCV 96 94 92   < > 98 97   RDW 14.0 12.5 12.5   < > 13.1 13.9    215 226   < > 220 219   MCH 31.9 30.9 30.4   < > 33.4* 33.6*   MCHC 33.3 33.1 32.9   < > 34.1 34.6   NEUTROPHIL 76 70 64.0  --  69.7 73.0   LYMPH 17 21 26.5  --  20.6 17.8   MONOCYTE 5 7 6.4  --  6.7 6.5   EOSINOPHIL 1 2 2.8  --  2.6 2.3   BASOPHIL 1 1 0.3  --  0.4 0.4   ANEU  --   --  4.4  --  5.8 5.4   ALYM  --   --  1.8  --  1.7 1.3   EDWARD  --   --  0.4  --  0.6 0.5   AEOS  --   --  0.2  --  0.2 0.2   ABAS  --   --  0.0  --  0.0 0.0   ANEUTAUTO 6.3 5.1  --   --   --   --    ALYMPAUTO " 1.4 1.5  --   --   --   --    AMONOAUTO 0.4 0.5  --   --   --   --    AEOSAUTO 0.1 0.2  --   --   --   --    ABSBASO 0.0 0.0  --   --   --   --     < > = values in this interval not displayed.     CMP  Recent Labs   Lab Test 05/02/22  1252 10/11/21  1704 03/29/21  1431 01/04/21  1619 12/23/19  1620 04/24/18  1557 10/11/17  1207   NA  --   --  140 140  --   --  138   POTASSIUM  --   --  4.0 3.8  --   --  4.1   CHLORIDE  --   --  109 105  --   --  105   CO2  --   --  29 27  --   --  28   ANIONGAP  --   --  2* 8  --   --  5   GLC  --   --  85 86  --   --  86   BUN  --   --  11 14  --   --  18   CR 0.81 0.77 0.77 0.75 0.73   < > 0.73   GFRESTIMATED 83 85 85 87 >90   < > 82   GFRESTBLACK  --   --  >90 >90 >90   < > >90   ENEIDA  --   --  9.0 9.1  --   --  9.2   BILITOTAL 0.5 0.4 0.5 0.4 0.3   < > 0.7   ALBUMIN 3.7 3.6 3.9 3.9 3.8   < > 3.9   PROTTOTAL 6.6* 6.4* 6.6* 7.1 6.7*   < > 6.9   ALKPHOS 60 56 62 58 68   < > 57   AST 16 22 17 18 21   < > 28   ALT 32 28 28 32 37   < > 44    < > = values in this interval not displayed.     Calcium/VitaminD  Recent Labs   Lab Test 06/11/21  1014 03/29/21  1431 01/04/21  1619 10/11/17  1207 06/01/15  1139 09/18/14  1129 04/29/14  1522   ENEIDA  --  9.0 9.1 9.2   < >  --   --    VITDT 26  --   --   --   --  38 42    < > = values in this interval not displayed.     ESR/CRP  Recent Labs   Lab Test 05/02/22  1252 10/11/21  1704 03/29/21  1431   SED 7 7 5   CRP <2.9 4.0 <2.9       Hepatitis B  Recent Labs   Lab Test 04/24/18  1557   HBCAB Nonreactive   HEPBANG Nonreactive     Hepatitis C  Recent Labs   Lab Test 06/01/15  1139   HCVAB Nonreactive   Assay performance characteristics have not been established for newborns,   infants, and children       Lyme ab screening  Recent Labs   Lab Test 03/29/21  1431   LYMEGM 0.04     Tuberculosis Screening  Recent Labs   Lab Test 06/01/15  1139   TBRSLT Negative   TBAGN 0.00       Immunization History     Immunization History   Administered Date(s)  Administered     COVID-19,PF,Pfizer (12+ Yrs) 01/25/2021, 02/15/2021     HepB 01/24/1991, 02/28/1991, 09/05/1991     Influenza Quad, Recombinant, pf(RIV4) (Flublok) 10/01/2019, 10/14/2021     Pneumo Conj 13-V (2010&after) 10/01/2019     Pneumococcal 23 valent 12/31/2019     TD (ADULT, 7+) 03/12/1990, 01/01/2000     TDAP Vaccine (Adacel) 09/10/2008     TDAP Vaccine (Boostrix) 09/10/2008     Tdap (Adacel,Boostrix) 09/10/2008, 03/01/2011, 10/14/2021       Procedure     Procedure: Steroid injection of the left knee  Indication: Pain, left knee osteoarthritis    The procedure was explained in detail. Risks including infection, pain, structural damage such as cartilage damage and tendon rupture, fat atrophy, skin hyper-/hypo-pigmentation, and medication reaction was explained. The need for rest of the affected joint for one week after the procedure was explained.  The option of not doing the procedure was also provided. All questions were answered and the patient consented to the procedure.     A time-out was performed and the correct patient, procedure, and laterality were verified.    The left knee was examined and location for injection was identified - anterior medial. The area was cleaned with chlorhexidine, twice.  Ethyl chloride was then used for topical anaesthetic.  Then a mixture of lidocaine 1% 2 mL and Kenalog 40mg was injected into the intra-articular space.     The patient tolerated the procedure well. No complications.    1% Lidocaine  : Hospira  Lot #: [id number]  EXPIRATION DATE: 01 JUL 2023  NDC: 1401-8056-92    MEDICATION: Triamcinolone 40 mg  LOT #: [id number]  EXPIRATION DATE: 8/2023  NDC#: [address]-1    Procedure     Procedure: Steroid injection of the left 1st CMC joint  Indication: Pain, osteoarthritis     The procedure was explained in detail. Risks including infection, pain, structural damage such as cartilage damage and tendon rupture, fat atrophy, skin hyper-/hypo-pigmentation, and  medication reaction was explained. The need for rest of the affected joint for one week after the procedure was explained.  The option of not doing the procedure was also provided. All questions were answered and the patient consented to the procedure.     A time-out was performed and the correct patient, procedure, and laterality were verified.    The left 1st carpometacarpal joint was examined and location for injection was identified. The area was cleaned with chlorhexidine, twice.  Ethyl chloride was then used for topical anaesthetic.  Then a mixture of lidocaine 1% 0.1 mL and methylprednisolone 10mg was injected into the intra-articular space.     The patient tolerated the procedure well. No complications.    1% Lidocaine  : Hospira  Lot #: [id number]  EXPIRATION DATE: 01 JUL 2023  NDC: 4330-4330-11    MEDICATION: Methylprednisolone  LOT #: [id number]  EXPIRATION DATE: 11/2023  NDC#: [address]-9         Chart documentation done in part with Dragon Voice recognition Software. Although reviewed after completion, some word and grammatical error may remain.    Abdoul Eli MD

## 2022-05-16 NOTE — PATIENT INSTRUCTIONS
RHEUMATOLOGY    Dr. Abdoul Eli    [address] [place] [address]  Phone number: [phone]  Fax number: [phone]      Thank you for choosing [facility]!    Mi Irwin CMA Rheumatology

## 2022-05-23 ENCOUNTER — ANCILLARY PROCEDURE (OUTPATIENT)
Dept: GENERAL RADIOLOGY | Facility: CLINIC | Age: 62
End: 2022-05-23
Payer: COMMERCIAL

## 2022-05-23 ENCOUNTER — VIRTUAL VISIT (OUTPATIENT)
Dept: FAMILY MEDICINE | Facility: CLINIC | Age: 62
End: 2022-05-23

## 2022-05-23 DIAGNOSIS — M23.92 INTERNAL DERANGEMENT OF KNEE, LEFT: ICD-10-CM

## 2022-05-23 DIAGNOSIS — M48.15: Primary | ICD-10-CM

## 2022-05-23 DIAGNOSIS — M25.562 ACUTE PAIN OF LEFT KNEE: ICD-10-CM

## 2022-05-23 PROCEDURE — 73562 X-RAY EXAM OF KNEE 3: CPT | Mod: TC | Performed by: RADIOLOGY

## 2022-05-23 PROCEDURE — 99214 OFFICE O/P EST MOD 30 MIN: CPT | Mod: 95 | Performed by: INTERNAL MEDICINE

## 2022-05-23 RX ORDER — MELOXICAM 15 MG/1
15 TABLET ORAL
Qty: 30 TABLET | Refills: 2 | Status: SHIPPED | OUTPATIENT
Start: 2022-05-23 | End: 2022-07-18

## 2022-05-23 NOTE — PROGRESS NOTES
Radha is a 62 year old who is being evaluated via a billable video visit.      How would you like to obtain your AVS? MyChart  If the video visit is dropped, the invitation should be resent by: Text to cell phone: [phone]  Will anyone else be joining your video visit? No     SUBJECTIVE  This is a 62 year old who presents for the following health issues     Back Pain:  She presents for follow up of back pain. Patient's back pain is a chronic problem.  Location of back pain:  Right middle of back  Description of back pain: burning, cramping, dull ache, fullness, gnawing and sharp  Back pain spreads: right shoulder    Since patient first noticed back pain, pain is: always present, but gets better and worse  Does back pain interfere with her job:  Yes    She was told by [facility] that there is inflammation of T8.  Trigger point injections worsened her condition.  Told to start physical therapy at [facility].    Other concerns:  Left knee pain. Worse when kneeling and using stairs. Denies any injuries.    Answers for HPI/ROS submitted by the patient on 5/23/2022  Your back pain is: chronic  Where is your back pain located? : right middle of back  How would you describe your back pain? : burning, cramping, dull ache, fullness, gnawing, sharp  Where does your back pain spread? : right shoulder  Since you noticed your back pain, how has it changed? : always present, but gets better and worse  Does your back pain interfere with your job?: Yes  If yes, which:: NSAIDS (Ibuprofen, Naproxen), steroid injection, stretching, TENS unit, topical pain relievers  How many servings of fruits and vegetables do you eat daily?: 2-3  On average, how many sweetened beverages do you drink each day (Examples: soda, juice, sweet tea, etc.  Do NOT count diet or artificially sweetened beverages)?: 0  How many minutes a day do you exercise enough to make your heart beat faster?: 9 or less  How many days a week do you exercise enough to make your  heart beat faster?: 3 or less  How many days per week do you miss taking your medication?: 0      REVIEW OF SYSTEMS  CONSTITUTIONAL: NEGATIVE for fever, chills, change in weight  INTEGUMENTARY/SKIN: NEGATIVE for worrisome rashes, moles or lesions  EYES: NEGATIVE for vision changes or irritation  ENT/MOUTH: NEGATIVE for ear, mouth and throat problems  RESP: NEGATIVE for significant cough or SOB  BREAST: NEGATIVE for masses, tenderness or discharge  CV: NEGATIVE for chest pain, palpitations or peripheral edema  GI: NEGATIVE for nausea, abdominal pain, heartburn, or change in bowel habits  : NEGATIVE for frequency, dysuria, or hematuria  MUSCULOSKELETAL:As above.  NEURO: NEGATIVE for weakness, dizziness or paresthesias  ENDOCRINE: NEGATIVE for temperature intolerance, skin/hair changes  HEME: NEGATIVE for bleeding problems  PSYCHIATRIC: NEGATIVE for changes in mood or affect    OBJECTIVE   Vitals:  No vitals were obtained today due to virtual visit.  Physical Exam   GENERAL: Healthy, alert and no distress  EYES: Eyes grossly normal to inspection  HENT: normal cephalic/atraumatic  RESP: No audible wheezes.  NEURO:   Mentation and speech appropriate for age.  PSYCH: Mentation appears normal, affect normal/bright, judgement and insight intact, normal speech and appearance well-groomed.    ASSESSMENT/PLAN  Diffuse idiopathic skeletal hyperostosis of thoracolumbar spine  - meloxicam (MOBIC) 15 MG tablet; Take 1 tablet (15 mg) by mouth daily (with breakfast)  - Physical Therapy Adult IP Consult    Internal derangement of knee, left  - XR Knee Left 3 Views; Future  - MR Knee Left w/o Contrast; Future  - meloxicam (MOBIC) 15 MG tablet; Take 1 tablet (15 mg) by mouth daily (with breakfast)    Disposition:  Follow-up in 4 weeks or as needed.    Temo Fletcher MD  Internal Medicine       Video-Visit Details     Type of service:  Video Visit     Start: 05/23/2022 12:00 pm  Stop: 05/23/2022 12:32 pm    Originating Location (pt.  Location): Home     Distant Location (provider location):  [facility]      Platform used for Video Visit: Marina

## 2022-05-23 NOTE — PATIENT INSTRUCTIONS
At [facility], we strive to deliver an exceptional experience to you, every time we see you. If you receive a survey, please complete it as we do value your feedback.  If you have MyChart, you can expect to receive results automatically within 24 hours of their completion.  Your provider will send a note interpreting your results as well.   If you do not have MyChart, you should receive your results in about a week by mail.    Your care team:                            Family Medicine Internal Medicine   MD Temo Lott MD Shantel Branch-Fleming, MD Srinivasa Vaka, MD Katya Belousova, PHONG Prieto CNP, MD ([facility]   Jeremy Rodriguez, MD Marzena Mccullough MD Amelia Massimini APRN CNP Kim Thein, APRN CNP Bethany Templen, MD             Clinic hours: Monday - Thursday 7 am-6 pm; Fridays 7 am-5 pm.   Urgent care: Monday - Friday 10 am- 8 pm; Saturday and Sunday 9 am-5 pm.    Clinic: [phone]       [facility]: Monday - Thursday 8 am - 7 pm; Friday 8 am - 6 pm  [facility]: [phone]

## 2022-05-23 NOTE — TELEPHONE ENCOUNTER
DIAGNOSIS: Left knee pain    APPOINTMENT DATE: 06/01/2022   NOTES STATUS DETAILS   OFFICE NOTE from referring provider Internal 05/23/2022 Dr Fletcher [facility]   OFFICE NOTE from other specialist N/A    DISCHARGE SUMMARY from hospital N/A    DISCHARGE REPORT from the ER N/A    OPERATIVE REPORT N/A    EMG report N/A    MEDICATION LIST N/A    MRI N/A    DEXA (osteoporosis/bone health) N/A    CT SCAN N/A    XRAYS (IMAGES & REPORTS) Internal 05/23/2022 LFT knee

## 2022-05-31 ENCOUNTER — HOSPITAL ENCOUNTER (OUTPATIENT)
Dept: MRI IMAGING | Facility: CLINIC | Age: 62
Discharge: HOME | End: 2022-05-31
Attending: INTERNAL MEDICINE | Admitting: INTERNAL MEDICINE
Payer: COMMERCIAL

## 2022-05-31 DIAGNOSIS — M23.92 INTERNAL DERANGEMENT OF KNEE, LEFT: ICD-10-CM

## 2022-05-31 PROCEDURE — 73721 MRI JNT OF LWR EXTRE W/O DYE: CPT | Mod: LT

## 2022-05-31 PROCEDURE — 73721 MRI JNT OF LWR EXTRE W/O DYE: CPT | Mod: 26 | Performed by: RADIOLOGY

## 2022-06-01 ENCOUNTER — ANCILLARY PROCEDURE (OUTPATIENT)
Dept: GENERAL RADIOLOGY | Facility: CLINIC | Age: 62
End: 2022-06-01
Attending: FAMILY MEDICINE
Payer: COMMERCIAL

## 2022-06-01 ENCOUNTER — PRE VISIT (OUTPATIENT)
Dept: ORTHOPEDICS | Facility: CLINIC | Age: 62
End: 2022-06-01
Payer: COMMERCIAL

## 2022-06-01 ENCOUNTER — OFFICE VISIT (OUTPATIENT)
Dept: ORTHOPEDICS | Facility: CLINIC | Age: 62
End: 2022-06-01
Payer: COMMERCIAL

## 2022-06-01 DIAGNOSIS — M25.562 ACUTE PAIN OF LEFT KNEE: Primary | ICD-10-CM

## 2022-06-01 DIAGNOSIS — M25.562 LEFT KNEE PAIN: ICD-10-CM

## 2022-06-01 LAB — RADIOLOGIST FLAGS: NORMAL

## 2022-06-01 PROCEDURE — 99214 OFFICE O/P EST MOD 30 MIN: CPT | Performed by: FAMILY MEDICINE

## 2022-06-01 PROCEDURE — 73560 X-RAY EXAM OF KNEE 1 OR 2: CPT | Mod: LT | Performed by: RADIOLOGY

## 2022-06-01 NOTE — LETTER
6/1/2022         RE: Radha Rodriguez  [address]  [place] [address]        Dear Colleague,    Thank you for referring your patient, Rdaha Rodriguez, to the [facility] SPORTS MEDICINE CLINIC [place]. Please see a copy of my visit note below.      [facility]  SPORTS MEDICINE CLINIC VISIT     Jun 1, 2022        ASSESSMENT & PLAN    62-year-old with persistent medial left knee pain and worsening over the last 2 months that is likely secondary to insufficiency fracture seen on the recent MRI.  This is likely secondary to meniscal injury that may have been sustained during her injury in November 2021.    Reviewed imaging and assessment with patient in detail  Given the persistence and severity of her current symptoms have recommended period of nonweightbearing or protected weightbearing to offload the area of insufficiency injury seen on MRI.  This can be done with crutches, cane, wheelchair or combination of the 3.  Have recommended plan for 2 weeks of protected or nonweightbearing.  We also discussed trial of medial  brace.  a referral was placed to physical therapy for this indication.  If she finds this to be helpful and she is pain-free we may consider increasing her weightbearing in the brace sooner.  She will follow-up in clinic in 1 month for reevaluation.    Rivera Caruso MD  [facility] SPORTS MEDICINE [facility]    Face-to-face time:30 minutes  Record review time: 5Minutes  Documentation time: 3Minutes  Total time: 38 Minutes    -----  Chief Complaint   Patient presents with     Consult     Left knee pain       SUBJECTIVE  Radha Rodriguez is a/an 62 year old female who is seen as a self referral for evaluation of  Left knee pain.     The patient is seen by themselves.    Onset: 2 month(s) ago. Reports insidious onset without acute precipitating event. Possible compensation; Did fall on it in November but has been having worsening pain for the last 2  months.  Pain feels different since April.  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 No    Do you have any vision problems? No    Do you have any chest pain or edema? No    Do you have any shortness of breath or wheezing?  No    Do you have stomach problems? No    Do you have any numbness or focal weakness? No    Do you have diabetes? No    Do you have problems with bleeding or clotting? No    Do you have an rashes or other skin lesions? No    OBJECTIVE:  There were no vitals taken for this visit.     Patient is alert, No acute distress, pleasant and conversational.    Gait: Antalgic    left knee:   Skin intact. No erythema or ecchymosis.  No effusion or soft tissue swelling.    AROM: Zero to approximately 135  with pain with flexion beyond 90 degrees.  No mechanical restriction is noted    Palpation: No medial or lateral facet joint tenderness.  Significant tenderness over the medial knee and medial femoral condyle    Special Tests:  None    Full Isometric quad strength, extensor mechanism in place     Neurovascularly intact in the lower extremity      RADIOLOGY:    Reviewed previous x-rays of the left knee dated 5/23/2022 in addition to single view of the knee performed today which demonstrates medial compartment joint space loss, mild bilaterally.  See EMR for formal radiology report.    MRI of the left knee dated 5/31/2022.  Per radiology report:  IMPRESSION:  1. Small left knee joint effusion. Small popliteal cyst.  2. Extensive bone marrow edema in the left knee medial  femoral condyle  with subchondral linear low signal paralleling the subchondral bone,  findings most consistent with an insufficiency fracture.  3. Multidirectional tearing involving the body and posterior horn of  the left knee medial meniscus with a radial component involving the  posterior horn.  4. Osteoarthrosis in the left knee with scattered areas of cartilage  loss, greatest in the medial femorotibial joint compartment with  scattered areas of grade 3/4 cartilage loss, as well as along the  medial patellar facet, as above.  5. The anterior and posterior cruciate ligaments, medial and lateral  supporting structures, and lateral meniscus are intact.             Again, thank you for allowing me to participate in the care of your patient.        Sincerely,        Rivera Caruso MD

## 2022-06-01 NOTE — PROGRESS NOTES
[facility]  SPORTS MEDICINE CLINIC VISIT     Jun 1, 2022        ASSESSMENT & PLAN    62-year-old with persistent medial left knee pain and worsening over the last 2 months that is likely secondary to insufficiency fracture seen on the recent MRI.  This is likely secondary to meniscal injury that may have been sustained during her injury in November 2021.    Reviewed imaging and assessment with patient in detail  Given the persistence and severity of her current symptoms have recommended period of nonweightbearing or protected weightbearing to offload the area of insufficiency injury seen on MRI.  This can be done with crutches, cane, wheelchair or combination of the 3.  Have recommended plan for 2 weeks of protected or nonweightbearing.  We also discussed trial of medial  brace.  a referral was placed to physical therapy for this indication.  If she finds this to be helpful and she is pain-free we may consider increasing her weightbearing in the brace sooner.  She will follow-up in clinic in 1 month for reevaluation.    Rivera Caruso MD  [facility] SPORTS MEDICINE CLINIC [place]    Face-to-face time:30 minutes  Record review time: 5Minutes  Documentation time: 3Minutes  Total time: 38 Minutes    -----  Chief Complaint   Patient presents with     Consult     Left knee pain       SUBJECTIVE  Radha Rodriguez is a/an 62 year old female who is seen as a self referral for evaluation of  Left knee pain.     The patient is seen by themselves.    Onset: 2 month(s) ago. Reports insidious onset without acute precipitating event. Possible compensation; Did fall on it in November but has been having worsening pain for the last 2 months.  Pain feels different since April.  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 No    Do you have any vision problems? No    Do you have any chest pain or edema? No    Do you have any shortness of breath or wheezing?  No    Do you have stomach problems? No    Do you have any numbness or focal weakness? No    Do you have diabetes? No    Do you have problems with bleeding or clotting? No    Do you have an rashes or other skin lesions? No    OBJECTIVE:  There were no vitals taken for this visit.     Patient is alert, No acute distress, pleasant and conversational.    Gait: Antalgic    left knee:   Skin intact. No erythema or ecchymosis.  No effusion or soft tissue swelling.    AROM: Zero to approximately 135  with pain with flexion beyond 90 degrees.  No mechanical restriction is noted    Palpation: No medial or lateral facet joint tenderness.  Significant tenderness over the medial knee and medial femoral condyle    Special Tests:  None    Full Isometric quad strength, extensor mechanism in place     Neurovascularly intact in the lower extremity      RADIOLOGY:    Reviewed previous x-rays of the left knee dated 5/23/2022 in addition to single view of the knee performed today which demonstrates medial compartment joint space loss, mild bilaterally.  See EMR for formal radiology report.    MRI of the left knee dated 5/31/2022.  Per radiology report:  IMPRESSION:  1. Small left knee joint effusion. Small popliteal cyst.  2. Extensive bone marrow edema in the left knee medial femoral condyle  with subchondral linear low signal paralleling the subchondral bone,  findings most consistent with an insufficiency fracture.  3. Multidirectional tearing involving the body and posterior horn of  the left knee medial meniscus with a radial component  involving the  posterior horn.  4. Osteoarthrosis in the left knee with scattered areas of cartilage  loss, greatest in the medial femorotibial joint compartment with  scattered areas of grade 3/4 cartilage loss, as well as along the  medial patellar facet, as above.  5. The anterior and posterior cruciate ligaments, medial and lateral  supporting structures, and lateral meniscus are intact.

## 2022-06-02 ENCOUNTER — MYC MEDICAL ADVICE (OUTPATIENT)
Dept: ORTHOPEDICS | Facility: CLINIC | Age: 62
End: 2022-06-02
Payer: COMMERCIAL

## 2022-06-08 ENCOUNTER — MYC MEDICAL ADVICE (OUTPATIENT)
Dept: FAMILY MEDICINE | Facility: CLINIC | Age: 62
End: 2022-06-08
Payer: COMMERCIAL

## 2022-06-08 DIAGNOSIS — N95.1 SYMPTOMATIC MENOPAUSAL OR FEMALE CLIMACTERIC STATES: Primary | ICD-10-CM

## 2022-06-09 ENCOUNTER — TELEPHONE (OUTPATIENT)
Dept: FAMILY MEDICINE | Facility: CLINIC | Age: 62
End: 2022-06-09
Payer: COMMERCIAL

## 2022-06-09 NOTE — TELEPHONE ENCOUNTER
[facility] pharmacy calling, states there is an interaction with meloxicam and methotrexate that is prescribed by rheumatology. Pharmacist wants to make sure provider is aware of medicaction interaction.     Shakila Olivo RN  [facility]

## 2022-06-10 ENCOUNTER — TELEPHONE (OUTPATIENT)
Dept: FAMILY MEDICINE | Facility: CLINIC | Age: 62
End: 2022-06-10

## 2022-06-10 RX ORDER — ESTRADIOL 0.5 MG/1
0.5 TABLET ORAL DAILY
Qty: 84 TABLET | Refills: 3 | Status: SHIPPED | OUTPATIENT
Start: 2022-06-10 | End: 2022-08-15

## 2022-06-13 DIAGNOSIS — M06.4 INFLAMMATORY POLYARTHROPATHY (H): ICD-10-CM

## 2022-06-13 RX ORDER — METHOTREXATE 2.5 MG/1
25 TABLET ORAL
Qty: 120 TABLET | Refills: 0 | Status: SHIPPED | OUTPATIENT
Start: 2022-06-13 | End: 2022-08-15

## 2022-06-14 ENCOUNTER — MYC MEDICAL ADVICE (OUTPATIENT)
Dept: FAMILY MEDICINE | Facility: CLINIC | Age: 62
End: 2022-06-14

## 2022-06-14 NOTE — TELEPHONE ENCOUNTER
Routed to provider for further follow up and advise.    Elen iPnedo RN, BSN   [facility]

## 2022-06-15 ENCOUNTER — DOCUMENTATION ONLY (OUTPATIENT)
Dept: ORTHOPEDICS | Facility: CLINIC | Age: 62
End: 2022-06-15
Payer: COMMERCIAL

## 2022-06-21 NOTE — TELEPHONE ENCOUNTER
[employer] calling regarding the forms that need to be completed. Please advise.  Tania Ramirez [facility]  2nd Floor  Primary Care

## 2022-06-23 NOTE — TELEPHONE ENCOUNTER
Form faxed to [employer] [phone], copy placed in abstract and original placed in tc bin.

## 2022-06-28 ENCOUNTER — TELEPHONE (OUTPATIENT)
Dept: FAMILY MEDICINE | Facility: CLINIC | Age: 62
End: 2022-06-28

## 2022-06-28 NOTE — TELEPHONE ENCOUNTER
Patient called back to say that she dropped off these forms on 6/6/2022. Went back further in the chart and retrieved the forms from Dr Fletcher's Monday folder. Placed in Dr Allison Stuart's box to review and advise.  Tania Ramirez [facility]  2nd Floor  Primary Care

## 2022-06-28 NOTE — TELEPHONE ENCOUNTER
Pt called regarding her mychart message from 6/14/22.  States that the coloring of her knee is better and swelling went down a little. Said she ha been trying to schedule a visit but there are no openings soon.  She has an appointment coming up with sports medicine and will wait to see her PCP after.       Pt is wanting a copy of the forms that were faxed to [facility] on 6/10/22.  Pt is still waiting to get her FMLA forms back.  Did tell pt that provider has been out of the office and will be back at the end of the week.     Call pt back with an update on forms.      Karen Soares RN  [facility]

## 2022-06-28 NOTE — TELEPHONE ENCOUNTER
Retrieved forms that were faxed to [facility] on 6/10/2022. Printing a copy and mailing to the patient's home address. I do not see that we received FMLA forms. Called and left a voicemail message to return our call regarding FMLA forms.  Placed [employer] forms in 's copy basket under 6/28/2022 date.  Tania Ramirez [facility]  2nd Floor  Primary Care

## 2022-06-29 NOTE — TELEPHONE ENCOUNTER
Called pt and lvm relaying provider's message.    Waiting for call back from patient. Form placed in tc hold bin.

## 2022-06-29 NOTE — TELEPHONE ENCOUNTER
Needs visit or to wait for [employer] since I don't have knowledge of what she needs for FMLA.    Camelia Espinosa M.D.

## 2022-07-01 ENCOUNTER — OFFICE VISIT (OUTPATIENT)
Dept: ORTHOPEDICS | Facility: CLINIC | Age: 62
End: 2022-07-01
Payer: COMMERCIAL

## 2022-07-01 DIAGNOSIS — M79.89 LOCALIZED SWELLING OF LOWER EXTREMITY: Primary | ICD-10-CM

## 2022-07-01 PROCEDURE — 99213 OFFICE O/P EST LOW 20 MIN: CPT | Performed by: FAMILY MEDICINE

## 2022-07-01 NOTE — PROGRESS NOTES
[facility]  SPORTS MEDICINE CLINIC VISIT     Jul 1, 2022      ASSESSMENT & PLAN    61 yo with left knee pain with meniscus injury and insufficiency fx. Has been improving very slowly. Now having increased swelling in lower leg with some discoloration.     Reviewed imaging and assessment with patient in detail  Recommended stat US for DVT  Continue with resting and avoiding aggravating activitiies  Consider using knee brace with compression stocking for comfort  Follow up in one month    Rivera Caruso MD  [facility] SPORTS MEDICINE CLINIC [place]    -----  Chief Complaint   Patient presents with     RECHECK     Left knee follow up        SUBJECTIVE  Radha Rodriguez is a/an 62 year old female who is seen for follow up of left knee pain.   Symptoms have improved over past month. Still having some pain, especially with twisting activities.     The patient is seen by themselves.    Date of injury: April 2022  Date of last visit: 6/1/22  Worsened by: Everything, sleeping  Better with: Ice  Treatments tried: other medications: Tramadol (Ultram) and corticosteroid injection (most recent date: 5/16/22) that provided 0 hour(s) of relief, ice. Walker, crutches and cane.   Associated symptoms: swelling, numbness, tingling and feeling of instability        REVIEW OF SYSTEMS:    See HPI     OBJECTIVE:  There were no vitals taken for this visit.     Left lower extremity: ttp over medial joint line and medial femoral condyle. No effusion. Asymmetric edema to knee of left lower extremity. Some purplish discoloration. No ttp of calf. Homans negative.     RADIOLOGY:    No new imaging

## 2022-07-01 NOTE — LETTER
7/1/2022         RE: Radha Rodriguez  [address]  [place] [address]        Dear Colleague,    Thank you for referring your patient, Radha Rodriguez, to the [facility] SPORTS MEDICINE CLINIC [place]. Please see a copy of my visit note below.      [facility]  SPORTS MEDICINE CLINIC VISIT     Jul 1, 2022      ASSESSMENT & PLAN    61 yo with left knee pain with meniscus injury and insufficiency fx. Has been improving very slowly. Now having increased swelling in lower leg with some discoloration.     Reviewed imaging and assessment with patient in detail  Recommended stat US for DVT  Continue with resting and avoiding aggravating activitiies  Consider using knee brace with compression stocking for comfort  Follow up in one month    Rivera Caruso MD  [facility] SPORTS MEDICINE [facility]    -----  Chief Complaint   Patient presents with     RECHECK     Left knee follow up        SUBJECTIVE  Rdaha Rodriguez is a/an 62 year old female who is seen for follow up of left knee pain.   Symptoms have improved over past month. Still having some pain, especially with twisting activities.     The patient is seen by themselves.    Date of injury: April 2022  Date of last visit: 6/1/22  Worsened by: Everything, sleeping  Better with: Ice  Treatments tried: other medications: Tramadol (Ultram) and corticosteroid injection (most recent date: 5/16/22) that provided 0 hour(s) of relief, ice. Walker, crutches and cane.   Associated symptoms: swelling, numbness, tingling and feeling of instability        REVIEW OF SYSTEMS:    See HPI     OBJECTIVE:  There were no vitals taken for this visit.     Left lower extremity: ttp over medial joint line and medial femoral condyle. No effusion. Asymmetric edema to knee of left lower extremity. Some purplish discoloration. No ttp of calf. Homans negative.     RADIOLOGY:    No new imaging             Again, thank you for allowing me to participate  in the care of your patient.        Sincerely,        Rivera Caruso MD

## 2022-07-05 NOTE — TELEPHONE ENCOUNTER
Form placed at the  for pt . Called pt and lvm that this has been completed. Copy placed in abstract and tc bin

## 2022-07-12 ENCOUNTER — VIRTUAL VISIT (OUTPATIENT)
Dept: FAMILY MEDICINE | Facility: CLINIC | Age: 62
End: 2022-07-12
Payer: COMMERCIAL

## 2022-07-12 DIAGNOSIS — T20.22XA: Primary | ICD-10-CM

## 2022-07-12 PROCEDURE — 99214 OFFICE O/P EST MOD 30 MIN: CPT | Mod: 95 | Performed by: FAMILY MEDICINE

## 2022-07-12 RX ORDER — LIDOCAINE HYDROCHLORIDE 40 MG/ML
SOLUTION TOPICAL PRN
Qty: 50 ML | Refills: 0 | Status: SHIPPED | OUTPATIENT
Start: 2022-07-12 | End: 2022-08-15

## 2022-07-12 ASSESSMENT — ENCOUNTER SYMPTOMS: CONSTITUTIONAL NEGATIVE: 1

## 2022-07-12 NOTE — PROGRESS NOTES
"Radha is a 62 year old who is being evaluated via a billable video visit.      How would you like to obtain your AVS? MyChart  If the video visit is dropped, the invitation should be resent by: Text to cell phone: [phone]  Will anyone else be joining your video visit? No          Assessment and Plan    (T20.22XA) Burn of lip, second degree, initial encounter  (primary encounter diagnosis)  Comment: abx to treat possible infection, tpical lido for pain.  Plan: amoxicillin-clavulanate (AUGMENTIN) 875-125 MG         tablet, lidocaine (XYLOCAINE) 4 % external         solution                RTC in 1w prn    Zay Tobar MD      Subjective   Radha is a 62 year old, presenting for the following health issues:  No chief complaint on file.      History of Present Illness       Reason for visit:  Sunburned my lips  Symptom onset:  3-7 days ago  Symptoms include:  Swelling, drainage, pain, chapped  Symptom intensity:  Moderate  Symptom progression:  Worsening  Had these symptoms before:  No  What makes it worse:  Eating  What makes it better:  Moisturizers    She eats 0-1 servings of fruits and vegetables daily.She consumes 0 sweetened beverage(s) daily. She exercises with enough effort to increase her heart rate 3 or less days per week.   She is taking medications regularly.     PATIENT SAYS PAIN IS SO SEVERE AT NIGHT THAT SHE HAS BEEN TAKING TRAMADOL FOR THE PAIN. She states that she did not get sun burnt anywhere else.     Started from a sunburn about one week ago.  Will wake in the morning with \"pus\" that will ooze out, very painful by the end of the day.  Does have Tramadol for back pain and has needed to use it twice just for her lips.  Does have picture on her phone that shows thin blisters on lower lip with cloudy white fluid.    Review of Systems   Constitutional: Negative.    HENT:        Lip sunburn            Objective           Vitals:  No vitals were obtained today due to virtual visit.    Physical Exam "   GENERAL: Healthy, alert and no distress  EYES: Eyes grossly normal to inspection.  No discharge or erythema, or obvious scleral/conjunctival abnormalities.  RESP: No audible wheeze, cough, or visible cyanosis.  No visible retractions or increased work of breathing.    SKIN: Visible skin clear. No significant rash, abnormal pigmentation or lesions.  NEURO: Cranial nerves grossly intact.  Mentation and speech appropriate for age.  PSYCH: Mentation appears normal, affect normal/bright, judgement and insight intact, normal speech and appearance well-groomed.                Video-Visit Details    Video Start Time: 0937    Type of service:  Video Visit    Video End Time:9:48 AM    Originating Location (pt. Location): Home    Distant Location (provider location):  [facility] [employer]     Platform used for Video Visit: DxUpClose    Christine King.

## 2022-07-18 ENCOUNTER — VIRTUAL VISIT (OUTPATIENT)
Dept: FAMILY MEDICINE | Facility: CLINIC | Age: 62
End: 2022-07-18
Payer: COMMERCIAL

## 2022-07-18 DIAGNOSIS — T20.22XA: ICD-10-CM

## 2022-07-18 DIAGNOSIS — M48.15: ICD-10-CM

## 2022-07-18 DIAGNOSIS — I82.432 ACUTE DEEP VEIN THROMBOSIS OF LEFT POPLITEAL VEIN (H): Primary | ICD-10-CM

## 2022-07-18 PROCEDURE — 99214 OFFICE O/P EST MOD 30 MIN: CPT | Mod: 95 | Performed by: INTERNAL MEDICINE

## 2022-07-18 NOTE — PROGRESS NOTES
Radha is a 62 year old who is being evaluated via a billable video visit.      How would you like to obtain your AVS? MyChart  If the video visit is dropped, the invitation should be resent by: Text to cell phone: [phone]  Will anyone else be joining your video visit? No     Subjective   Radha is a 62 year old presenting for the following health issues:  Sunburn (Lips)    Reason for visit:  Sunburned my lips  Symptom onset:  3-7 days ago  Symptoms include:  Swelling, drainage, pain, chapped  Symptom intensity:  Moderate  Symptom progression:  Worsening  Had these symptoms before:  No  What makes it worse:  Eating  What makes it better:  Moisturizers    My back is sore and maintaining.  My knee is acting up more.  Cannot take Ibuprofen due to   My leg is still swollen due to DVT of left popliteal vein.  Rash on both upper extremities after each dose of Methotrexate.  Burnt my lips, blisters with pus. Improving with Augmentin.  Skipped Methotrexate due to both upper extremity lesions.  [place]  Received call from [facility] regarding nerve block for thoracic spine.        Factor 5 Mutation  Order: [id number]   Ref Range & Units 2 wk ago   Factor V Mutation Interpretation This individual does not have the Factor V Leiden ([id number]) mutation.  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 This mutation is a risk factor for venous thrombosis, miscarriage, and possible arterial thrombosis.   Comment: Cole Balderas MD   FACTOR V LEIDEN Normal by PCR. Heterozygous by PCR. Abnormal         ASSESSMENT/PLAN  Acute deep vein thrombosis of left popliteal vein (H)  - apixaban ANTICOAGULANT (ELIQUIS) 5 MG tablet; Take 1 tablet (5 mg) by mouth 2 times daily  - US Lower Extremity Venous Duplex Left; Future    Burn of lip, second degree, initial encounter  - amoxicillin-clavulanate (AUGMENTIN) 875-125 MG tablet; Take 1 tablet by mouth 2 times  daily    Diffuse idiopathic skeletal hyperostosis of thoracolumbar spine  Etiology of chronic right upper back pain.    Disposition:  Follow-up in 4 weeks.    Tmeo Fletcher MD  Internal Medicine       Video-Visit Details     Type of service:  Video Visit     Start: 07/18/2022 09:14 am  Stop: 07/18/2022 09:37 am    Originating Location (pt. Location): Home     Distant Location (provider location):  [facility]      Platform used for Video Visit: Pump!.

## 2022-07-28 ENCOUNTER — TELEPHONE (OUTPATIENT)
Dept: FAMILY MEDICINE | Facility: CLINIC | Age: 62
End: 2022-07-28

## 2022-07-28 NOTE — LETTER
July 28, 2022    Radha Rodriguez  [address]  [place] [address]    Dear Radha Rodriguez,     At [facility] we care about your health and are committed to providing quality patient care.    Which includes staying current on preventive cancer screenings.  You can increase your chances of finding and treating cancers through regular screenings.      Our records indicate you may be due for the following preventive screening(s):    Colonoscopy    Colonoscopy is recommended every ten years for everyone age 50 and older. Please take a moment to read over the enclosed information packet about colon cancer screening. We strongly urge our patient's to consider having a colonoscopy done, which is the best screening test available and only needs to be done every 10 years if normal. If you are unwilling or unable to have a colonoscopy then we recommend the annual stool testing for blood. This test is called a FIT test and it looks for blood in the stool.     To schedule an appointment for your colonoscopy, please see the attached referral.     Mammogram    Mammogram for breast cancer   Recommended every 1-2 years for women age 50 and older  Mammograms help detect breast cancer, which is the most common cancer among women in the [place].  You may need to start having mammograms earlier and more often if you have had breast cancer, breast problems, or a family history of breast cancer.   Immunizations  Annual Wellness Visit  Urine Drug Screen  [place] -7   PHQ9    To schedule an appointment or discuss this screening further, you may contact us by phone at the [facility] at [phone] or online through the patient portal/[email or web address] @ [email or web address]    If you have had any of the screenings listed above at another facility, please call us so that we may update your chart.      Your partners in health,      Quality Committee at [facility]  Clinic

## 2022-07-28 NOTE — TELEPHONE ENCOUNTER
Patient Quality Outreach    Patient is due for the following:   Colon Cancer Screening -  Colonoscopy  Breast Cancer Screening - Mammogram  Urine Drug Screen  Depression  -  PHQ-9 Needed   [place]-7  Physical  - Due after 6/28/2012  Immunizations  -  Covid, Influenza and Zoster    NEXT STEPS:   Schedule a yearly physical    Type of outreach:    Sent letter.    Questions for provider review:    None     Jocelyn Lamar

## 2022-07-29 ENCOUNTER — MYC MEDICAL ADVICE (OUTPATIENT)
Dept: FAMILY MEDICINE | Facility: CLINIC | Age: 62
End: 2022-07-29

## 2022-07-29 ENCOUNTER — TELEPHONE (OUTPATIENT)
Dept: FAMILY MEDICINE | Facility: CLINIC | Age: 62
End: 2022-07-29

## 2022-07-29 DIAGNOSIS — D68.51 FACTOR 5 LEIDEN MUTATION, HETEROZYGOUS (H): ICD-10-CM

## 2022-07-29 DIAGNOSIS — I82.402 ACUTE DEEP VEIN THROMBOSIS (DVT) OF LEFT LOWER EXTREMITY, UNSPECIFIED VEIN (H): Primary | ICD-10-CM

## 2022-07-29 NOTE — TELEPHONE ENCOUNTER
Pt calling to follow up on this message, she is concerned. Would pt be a good fit for ADS or do you want her seen in ER?  Jennifer CLEMENTN, RN

## 2022-08-01 ENCOUNTER — MYC MEDICAL ADVICE (OUTPATIENT)
Dept: FAMILY MEDICINE | Facility: CLINIC | Age: 62
End: 2022-08-01

## 2022-08-01 DIAGNOSIS — I82.402 ACUTE DEEP VEIN THROMBOSIS (DVT) OF LEFT LOWER EXTREMITY, UNSPECIFIED VEIN (H): Primary | ICD-10-CM

## 2022-08-01 NOTE — TELEPHONE ENCOUNTER
I spoke with patient today. She has DVT of left popliteal vein, on Eliquis. However, left leg swelling is worse and Radha went to ER last 7/29/2022. 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 She was switched to Xarelto. Hematology consultation sent. Appointment scheduled with this provider on August 15, 2022. I advised Radha to send a message to this provider if her condition worsens. I informed Radha that the ideal treatment is Warfarin.

## 2022-08-01 NOTE — TELEPHONE ENCOUNTER
Patient is calling to follow up from conversation with PCP below.     Patient stated that she does want to do the Warfarin.     Patient is wondering if she has to be on injections? Patient was unable to recall the injection name or what it was for?     [facility] would [facility] [place] [facility] [place] off of [address]    Routing to provider to review and advise.     Aylin Lagunas RN, BSN  [facility]

## 2022-08-02 ENCOUNTER — ANTICOAGULATION THERAPY VISIT (OUTPATIENT)
Dept: ANTICOAGULATION | Facility: CLINIC | Age: 62
End: 2022-08-02

## 2022-08-02 DIAGNOSIS — I82.402 ACUTE DEEP VEIN THROMBOSIS (DVT) OF LEFT LOWER EXTREMITY, UNSPECIFIED VEIN (H): Primary | ICD-10-CM

## 2022-08-02 RX ORDER — ENOXAPARIN SODIUM 100 MG/ML
80 INJECTION SUBCUTANEOUS 2 TIMES DAILY
Qty: 8 ML | Refills: 0 | Status: SHIPPED | OUTPATIENT
Start: 2022-08-03 | End: 2022-08-08

## 2022-08-02 RX ORDER — WARFARIN SODIUM 5 MG/1
5 TABLET ORAL DAILY
Qty: 30 TABLET | Refills: 5 | Status: SHIPPED | OUTPATIENT
Start: 2022-08-04 | End: 2022-08-24

## 2022-08-02 NOTE — TELEPHONE ENCOUNTER
Patient wants the Rx for the Coumadin... to be [facility] [place]. Patient still has swelling.  Please advise.  Tania Ramirez [facility]  2nd Floor  Primary Care

## 2022-08-02 NOTE — PROGRESS NOTES
ANTICOAGULATION MANAGEMENT     New start up info    No results for input(s): INR in the last 168 hours.    ASSESSMENT     Starting Lovenox injection tonight stopped xeralto yesterday, will start coumadin 5 mg daily on 8/4 and check INR Monday 8/8 because she is out of town.     PLAN     5 mg daily starting 8/4 along with Bid lovenox injections and new patient start up sent today.     Left call back for patient to call for new start up of warfarin instuction.  Lesia Carmichael Rn  [facility]

## 2022-08-08 ENCOUNTER — ANTICOAGULATION THERAPY VISIT (OUTPATIENT)
Dept: ANTICOAGULATION | Facility: CLINIC | Age: 62
End: 2022-08-08

## 2022-08-08 ENCOUNTER — LAB (OUTPATIENT)
Dept: LAB | Facility: CLINIC | Age: 62
End: 2022-08-08
Payer: COMMERCIAL

## 2022-08-08 DIAGNOSIS — Z79.899 HIGH RISK MEDICATION USE: ICD-10-CM

## 2022-08-08 DIAGNOSIS — M06.4 INFLAMMATORY POLYARTHROPATHY (H): ICD-10-CM

## 2022-08-08 DIAGNOSIS — I82.402 ACUTE DEEP VEIN THROMBOSIS (DVT) OF LEFT LOWER EXTREMITY, UNSPECIFIED VEIN (H): ICD-10-CM

## 2022-08-08 DIAGNOSIS — I82.402 ACUTE DEEP VEIN THROMBOSIS (DVT) OF LEFT LOWER EXTREMITY, UNSPECIFIED VEIN (H): Primary | ICD-10-CM

## 2022-08-08 LAB
ALBUMIN SERPL-MCNC: 3.8 G/DL (ref 3.4–5)
ALP SERPL-CCNC: 60 U/L (ref 40–150)
ALT SERPL W P-5'-P-CCNC: 136 U/L (ref 0–50)
AST SERPL W P-5'-P-CCNC: 122 U/L (ref 0–45)
BASOPHILS # BLD AUTO: 0 10E3/UL (ref 0–0.2)
BASOPHILS NFR BLD AUTO: 1 %
BILIRUB DIRECT SERPL-MCNC: 0.1 MG/DL (ref 0–0.2)
BILIRUB SERPL-MCNC: 0.4 MG/DL (ref 0.2–1.3)
CREAT SERPL-MCNC: 0.71 MG/DL (ref 0.52–1.04)
CRP SERPL-MCNC: 5.5 MG/L (ref 0–8)
EOSINOPHIL # BLD AUTO: 0.1 10E3/UL (ref 0–0.7)
EOSINOPHIL NFR BLD AUTO: 2 %
ERYTHROCYTE [DISTWIDTH] IN BLOOD BY AUTOMATED COUNT: 14.4 % (ref 10–15)
ERYTHROCYTE [SEDIMENTATION RATE] IN BLOOD BY WESTERGREN METHOD: 8 MM/HR (ref 0–30)
GFR SERPL CREATININE-BSD FRML MDRD: >90 ML/MIN/1.73M2
HCT VFR BLD AUTO: 39.3 % (ref 35–47)
HGB BLD-MCNC: 13.1 G/DL (ref 11.7–15.7)
IMM GRANULOCYTES # BLD: 0 10E3/UL
IMM GRANULOCYTES NFR BLD: 0 %
INR PPP: 1.26 (ref 0.85–1.15)
LYMPHOCYTES # BLD AUTO: 1.2 10E3/UL (ref 0.8–5.3)
LYMPHOCYTES NFR BLD AUTO: 19 %
MCH RBC QN AUTO: 32.3 PG (ref 26.5–33)
MCHC RBC AUTO-ENTMCNC: 33.3 G/DL (ref 31.5–36.5)
MCV RBC AUTO: 97 FL (ref 78–100)
MONOCYTES # BLD AUTO: 0.6 10E3/UL (ref 0–1.3)
MONOCYTES NFR BLD AUTO: 9 %
NEUTROPHILS # BLD AUTO: 4.7 10E3/UL (ref 1.6–8.3)
NEUTROPHILS NFR BLD AUTO: 70 %
PLATELET # BLD AUTO: 242 10E3/UL (ref 150–450)
PROT SERPL-MCNC: 6.7 G/DL (ref 6.8–8.8)
RBC # BLD AUTO: 4.06 10E6/UL (ref 3.8–5.2)
WBC # BLD AUTO: 6.7 10E3/UL (ref 4–11)

## 2022-08-08 PROCEDURE — 80076 HEPATIC FUNCTION PANEL: CPT

## 2022-08-08 PROCEDURE — 85652 RBC SED RATE AUTOMATED: CPT

## 2022-08-08 PROCEDURE — 85610 PROTHROMBIN TIME: CPT

## 2022-08-08 PROCEDURE — 85025 COMPLETE CBC W/AUTO DIFF WBC: CPT

## 2022-08-08 PROCEDURE — 82565 ASSAY OF CREATININE: CPT

## 2022-08-08 PROCEDURE — 36415 COLL VENOUS BLD VENIPUNCTURE: CPT

## 2022-08-08 PROCEDURE — 86140 C-REACTIVE PROTEIN: CPT

## 2022-08-08 RX ORDER — ENOXAPARIN SODIUM 100 MG/ML
80 INJECTION SUBCUTANEOUS 2 TIMES DAILY
Qty: 8 ML | Refills: 0 | Status: SHIPPED | OUTPATIENT
Start: 2022-08-08 | End: 2022-08-11

## 2022-08-08 NOTE — PROGRESS NOTES
ANTICOAGULATION MANAGEMENT     Radha Rodriguez 62 year old female is on warfarin with subtherapeutic INR result. (Goal INR 2.0-3.0)    Recent labs: (last 7 days)     08/08/22  1044   INR 1.26*       ASSESSMENT     Source(s): Chart Review and Patient/Caregiver Call     Warfarin doses taken: Warfarin taken as instructed  Diet: No new diet changes identified  New illness, injury, or hospitalization: No  Medication/supplement changes: None noted  Signs or symptoms of bleeding or clotting: No  Previous INR: new start day 5  Additional findings: Bridging with Enoxaparin until INR >= 2.3 or INR >= 2.0 x 2 ([facility] protocol goal INR 2-3 new start)       PLAN     Recommended plan for no diet, medication or health factor changes affecting INR     Dosing Instructions: Increase your warfarin dose (20% change) with next INR in 3 days       Summary  As of 8/8/2022    Full warfarin instructions:  7.5 mg every Mon, Wed, Fri; 5 mg all other days   Next INR check:  8/11/2022             Telephone call with Radha who verbalizes understanding and agrees to plan    Lab visit scheduled    Education provided: Please call back if any changes to your diet, medications or how you've been taking warfarin    Plan made per [facility] anticoagulation protocol    Dacia Rangel, RN  Anticoagulation Clinic  8/8/2022    _______________________________________________________________________     Anticoagulation Episode Summary     Current INR goal:  2.0-3.0   TTR:  --   [place] end date:  8/2/2023   Send INR reminders to:  WILLIAM POPE    Indications    Acute deep vein thrombosis (DVT) of left lower extremity  unspecified vein (H) [I82.402]           Comments:           Anticoagulation Care Providers     Provider Role Specialty Phone number    Vocal, Temo Grimes MD Referring Internal Medicine [phone]

## 2022-08-10 ENCOUNTER — OFFICE VISIT (OUTPATIENT)
Dept: ORTHOPEDICS | Facility: CLINIC | Age: 62
End: 2022-08-10
Payer: COMMERCIAL

## 2022-08-10 ENCOUNTER — TELEPHONE (OUTPATIENT)
Dept: ORTHOPEDICS | Facility: CLINIC | Age: 62
End: 2022-08-10

## 2022-08-10 DIAGNOSIS — M25.562 ACUTE PAIN OF LEFT KNEE: Primary | ICD-10-CM

## 2022-08-10 PROCEDURE — 99213 OFFICE O/P EST LOW 20 MIN: CPT | Performed by: FAMILY MEDICINE

## 2022-08-10 NOTE — PROGRESS NOTES
[facility]  SPORTS MEDICINE CLINIC VISIT     Aug 10, 2022      ASSESSMENT & PLAN    62-year-old with persistent left knee pain for 3+ months due to insufficiency fracture of the medial femoral condyle along with tearing of the body and posterior horn of the medial meniscus.  Subsequent to this she was diagnosed with deep vein thrombosis with extension of the clot while taking her initial anticoagulation regimen and is transitioning to warfarin currently.    Reviewed imaging and assessment with patient in detail  We discussed that surgery for her knee would likely be a arthroplasty given the extent of her osteoarthritis.  Possibly hemiarthroplasty.  Have discussed previously with knee surgeon who did not feel that meniscus surgery would be effective at treating her pain.  We additionally discussed physical therapy specifically a trial of a medial  brace.  She will be assisted with scheduling for  brace trial.  If this is helpful for her, I would be happy to place an order to orthotics.  She inquires about steroid injection which I would not pursue given her insufficiency fracture, however viscosupplementation could be considered.    Rivera Caruso MD  [facility] SPORTS MEDICINE CLINIC [place]    -----  Chief Complaint   Patient presents with     RECHECK     Left knee pain       SUBJECTIVE  Radha Rodriguez is a/an 62 year old female who is seen for follow up of left knee pain.  Overall her pain is improved but still persistent and limiting.  She has continued anticoagulation for her DVT.  She reports that she was found to have a clotting disorder on her lab evaluation.  She is recently been transitioned to Coumadin due to propagation of the clot while taking DOAC's.    The patient is seen by themselves.    Date of injury: April 2022  Date of last visit: 6/1/22  Worsened by: Everything, sleeping  Better with: Ice  Treatments tried: other medications: Tramadol (Ultram) and  corticosteroid injection (most recent date: 5/16/22) that provided 0 hour(s) of relief, ice. Walker, crutches and cane.   Associated symptoms: swelling, numbness, tingling and feeling of instability      REVIEW OF SYSTEMS:    See HPI     OBJECTIVE:  There were no vitals taken for this visit.     Left knee: TTP over the medial joint line    RADIOLOGY:    No new imaging.

## 2022-08-10 NOTE — LETTER
8/10/2022         RE: Radha Rodriguez  [address]  [place] [address]        Dear Colleague,    Thank you for referring your patient, Radha Rodriguez, to the [facility] SPORTS MEDICINE [facility]. Please see a copy of my visit note below.      [facility]  SPORTS MEDICINE CLINIC VISIT     Aug 10, 2022      ASSESSMENT & PLAN    62-year-old with persistent left knee pain for 3+ months due to insufficiency fracture of the medial femoral condyle along with tearing of the body and posterior horn of the medial meniscus.  Subsequent to this she was diagnosed with deep vein thrombosis with extension of the clot while taking her initial anticoagulation regimen and is transitioning to warfarin currently.    Reviewed imaging and assessment with patient in detail  We discussed that surgery for her knee would likely be a arthroplasty given the extent of her osteoarthritis.  Possibly hemiarthroplasty.  Have discussed previously with knee surgeon who did not feel that meniscus surgery would be effective at treating her pain.  We additionally discussed physical therapy specifically a trial of a medial  brace.  She will be assisted with scheduling for  brace trial.  If this is helpful for her, I would be happy to place an order to orthotics.  She inquires about steroid injection which I would not pursue given her insufficiency fracture, however viscosupplementation could be considered.    Rivera Caruso MD  [facility] SPORTS MEDICINE [facility]    -----  Chief Complaint   Patient presents with     RECHECK     Left knee pain       SUBJECTIVE  Radha Rodriguez is a/an 62 year old female who is seen for follow up of left knee pain.  Overall her pain is improved but still persistent and limiting.  She has continued anticoagulation for her DVT.  She reports that she was found to have a clotting disorder on her lab evaluation.  She is recently been transitioned to  Coumadin due to propagation of the clot while taking DOAC's.    The patient is seen by themselves.    Date of injury: April 2022  Date of last visit: 6/1/22  Worsened by: Everything, sleeping  Better with: Ice  Treatments tried: other medications: Tramadol (Ultram) and corticosteroid injection (most recent date: 5/16/22) that provided 0 hour(s) of relief, ice. Walker, crutches and cane.   Associated symptoms: swelling, numbness, tingling and feeling of instability      REVIEW OF SYSTEMS:    See HPI     OBJECTIVE:  There were no vitals taken for this visit.     Left knee: TTP over the medial joint line    RADIOLOGY:    No new imaging.          Again, thank you for allowing me to participate in the care of your patient.        Sincerely,        Rivera Caruso MD

## 2022-08-10 NOTE — TELEPHONE ENCOUNTER
Left Voicemail (1st Attempt) for the patient to call back and schedule the following:    Appointment type: evaluation with physical therapy   Specialty phone number: [phone]     Guerda kim Procedure   Orthopedics, Podiatry, Sports Medicine, ENT/Eye Specialties  [facility]   [phone]

## 2022-08-10 NOTE — PATIENT INSTRUCTIONS
Thanks for coming today.  Ortho/Sports Medicine Clinic  [address] [place] [address]    To schedule future appointments in Ortho Clinic, you may call [phone].    To schedule ordered imaging or an injection ordered by your provider:  Call Central Imaging Injection scheduling line: [phone]    MyChart available online at:  [email or web address]    Please call if any further questions or concerns ([phone]).  Clinic hours 8 am to 5 pm.    Return to clinic (call) if symptoms worsen or fail to improve.

## 2022-08-11 ENCOUNTER — ANTICOAGULATION THERAPY VISIT (OUTPATIENT)
Dept: ANTICOAGULATION | Facility: CLINIC | Age: 62
End: 2022-08-11

## 2022-08-11 ENCOUNTER — LAB (OUTPATIENT)
Dept: LAB | Facility: CLINIC | Age: 62
End: 2022-08-11
Payer: COMMERCIAL

## 2022-08-11 DIAGNOSIS — I82.402 ACUTE DEEP VEIN THROMBOSIS (DVT) OF LEFT LOWER EXTREMITY, UNSPECIFIED VEIN (H): Primary | ICD-10-CM

## 2022-08-11 DIAGNOSIS — Z79.899 HIGH RISK MEDICATION USE: Primary | ICD-10-CM

## 2022-08-11 DIAGNOSIS — I82.402 ACUTE DEEP VEIN THROMBOSIS (DVT) OF LEFT LOWER EXTREMITY, UNSPECIFIED VEIN (H): ICD-10-CM

## 2022-08-11 LAB — INR BLD: 1.8 (ref 0.9–1.1)

## 2022-08-11 PROCEDURE — 85610 PROTHROMBIN TIME: CPT

## 2022-08-11 PROCEDURE — 36416 COLLJ CAPILLARY BLOOD SPEC: CPT

## 2022-08-11 RX ORDER — ENOXAPARIN SODIUM 100 MG/ML
80 INJECTION SUBCUTANEOUS 2 TIMES DAILY
Qty: 11 ML | Refills: 0 | Status: SHIPPED | OUTPATIENT
Start: 2022-08-11 | End: 2022-08-19

## 2022-08-11 NOTE — PROGRESS NOTES
ANTICOAGULATION MANAGEMENT     Radha Rodriguez 62 year old female is on warfarin with subtherapeutic INR result. (Goal INR 2.0-3.0)    Recent labs: (last 7 days)     08/11/22  0958   INR 1.8*       ASSESSMENT     Source(s): Chart Review and Patient/Caregiver Call     Warfarin doses taken: Warfarin taken as instructed  Diet: No new diet changes identified  New illness, injury, or hospitalization: No  Medication/supplement changes: None noted  Signs or symptoms of bleeding or clotting: No  Previous INR: Subtherapeutic  Additional findings: New start on day 8 of warfarin, Bridging with Enoxaparin until INR >= 2.3 or INR >= 2.0 x 2 ([facility] protocol goal INR 2-3 new start) and Refill needed today. Radha meets all criteria for refill (current [facility] referral, office visit with referring provider/group in last year, lab monitoring up to date or not exceeding 2 weeks overdue). Rx instructions and quantity match patient's current dosing plan. Enoxaparin (Lovenox) 7 day supply with 0 refills granted per [facility] protocol        PLAN     Recommended plan for no diet, medication or health factor changes affecting INR     Dosing Instructions: booster dose then Increase your warfarin dose (5.9% change) Continue bridging with Enoxaparin with next INR in 3 days       Summary  As of 8/11/2022    Full warfarin instructions:  8/11: 7.5 mg; Otherwise 5 mg every Sun, Tue, Thu; 7.5 mg all other days   Next INR check:  8/15/2022             Telephone call with Radha who verbalizes understanding and agrees to plan    Lab visit scheduled    Education provided: Goal range and significance of current result    Plan made per [facility] anticoagulation protocol    Loretta Gordon, CHANELL  Anticoagulation Clinic  8/11/2022    _______________________________________________________________________     Anticoagulation Episode Summary     Current INR goal:  2.0-3.0   TTR:  --   Target end date:  8/2/2023   Send INR reminders to:  WILLIAM POPE    Indications     Acute deep vein thrombosis (DVT) of left lower extremity  unspecified vein (H) [I82.402]           Comments:           Anticoagulation Care Providers     Provider Role Specialty Phone number    VocalTemo MD Referring Internal Medicine [phone]

## 2022-08-11 NOTE — RESULT ENCOUNTER NOTE
"RN: Please call to notify Radha Rodriguez of the information sent below in a [employer] message to ensure she receives it and assist her with scheduling a lab appointment to be 1-2 days prior to the 8/24/2022 rheumatology follow-up appointment     [employer] message sent:  \" Radha,    Liver enzymes AST and ALT are elevated.  It is possible that this is methotrexate related.  Please hold methotrexate until discussed at the 8/24/2022 rheumatology appointment.  Please have repeat labs 1-2 days prior to this appointment to reassess the AST and ALT.    Sincerely,  Abdoul Eli MD\"  "

## 2022-08-15 ENCOUNTER — TELEPHONE (OUTPATIENT)
Dept: FAMILY MEDICINE | Facility: CLINIC | Age: 62
End: 2022-08-15

## 2022-08-15 ENCOUNTER — LAB (OUTPATIENT)
Dept: LAB | Facility: CLINIC | Age: 62
End: 2022-08-15
Payer: COMMERCIAL

## 2022-08-15 ENCOUNTER — OFFICE VISIT (OUTPATIENT)
Dept: FAMILY MEDICINE | Facility: CLINIC | Age: 62
End: 2022-08-15
Payer: COMMERCIAL

## 2022-08-15 ENCOUNTER — ANTICOAGULATION THERAPY VISIT (OUTPATIENT)
Dept: ANTICOAGULATION | Facility: CLINIC | Age: 62
End: 2022-08-15

## 2022-08-15 VITALS
DIASTOLIC BLOOD PRESSURE: 82 MMHG | OXYGEN SATURATION: 99 % | WEIGHT: 178.6 LBS | SYSTOLIC BLOOD PRESSURE: 135 MMHG | HEART RATE: 80 BPM | TEMPERATURE: 99.1 F | BODY MASS INDEX: 31.64 KG/M2 | HEIGHT: 63 IN | RESPIRATION RATE: 20 BRPM

## 2022-08-15 DIAGNOSIS — G89.29 CHRONIC MIDLINE THORACIC BACK PAIN: Primary | ICD-10-CM

## 2022-08-15 DIAGNOSIS — M06.4 INFLAMMATORY POLYARTHROPATHY (H): ICD-10-CM

## 2022-08-15 DIAGNOSIS — M54.6 CHRONIC MIDLINE THORACIC BACK PAIN: Primary | ICD-10-CM

## 2022-08-15 DIAGNOSIS — I82.402 ACUTE DEEP VEIN THROMBOSIS (DVT) OF LEFT LOWER EXTREMITY, UNSPECIFIED VEIN (H): Primary | ICD-10-CM

## 2022-08-15 DIAGNOSIS — I82.402 ACUTE DEEP VEIN THROMBOSIS (DVT) OF LEFT LOWER EXTREMITY, UNSPECIFIED VEIN (H): ICD-10-CM

## 2022-08-15 LAB — INR BLD: 2.5 (ref 0.9–1.1)

## 2022-08-15 PROCEDURE — 85610 PROTHROMBIN TIME: CPT

## 2022-08-15 PROCEDURE — 36416 COLLJ CAPILLARY BLOOD SPEC: CPT

## 2022-08-15 PROCEDURE — 99214 OFFICE O/P EST MOD 30 MIN: CPT | Performed by: INTERNAL MEDICINE

## 2022-08-15 RX ORDER — DULOXETIN HYDROCHLORIDE 30 MG/1
30 CAPSULE, DELAYED RELEASE ORAL DAILY
Qty: 90 CAPSULE | Refills: 1 | Status: SHIPPED | OUTPATIENT
Start: 2022-08-15 | End: 2022-08-19

## 2022-08-15 RX ORDER — DULOXETIN HYDROCHLORIDE 30 MG/1
30 CAPSULE, DELAYED RELEASE ORAL 2 TIMES DAILY
Qty: 60 CAPSULE | Refills: 5 | Status: SHIPPED | OUTPATIENT
Start: 2022-08-15 | End: 2022-08-15

## 2022-08-15 ASSESSMENT — PAIN SCALES - GENERAL: PAINLEVEL: MODERATE PAIN (4)

## 2022-08-15 NOTE — TELEPHONE ENCOUNTER
Outpatient Medication Detail     Disp Refills Start End FELIBERTO   DULoxetine (CYMBALTA) 30 MG capsule 90 capsule 1 8/15/2022  No   Sig - Route: Take 1 capsule (30 mg) by mouth daily - Oral   Sent to pharmacy as: DULoxetine HCl 30 MG Oral Capsule Delayed Release Particles (CYMBALTA)   Class: E-Prescribe   Notes to Pharmacy: Correct dose. Disregard previous Rx for Duloxetine.   Order: [id number]   E-Prescribing Status: Receipt confirmed by pharmacy (8/15/2022  2:08 PM CDT)

## 2022-08-15 NOTE — PATIENT INSTRUCTIONS
Patient instructions:    Continue Warfarin.  Repeat venous doppler of left lower extremity on October 2022.  Do not use any left knee brace due to risk of worsening blood clots.  Do not recommend any joint injections.  Start Duloxetine 30 mg once daily x 2 weeks and then increase to 30 mg twice a day. However, you have the option of waiting until 3 - 4 weeks before adjusting dose of Duloxetine.  Use Voltaren gel on your joints (back and left knee) and other topical medications on muscles.

## 2022-08-15 NOTE — PROGRESS NOTES
DANIE Garcia is a 62 year old female presenting for the following health issues:  RECHECK (Left leg)      History of Present Illness       Back Pain:  She presents for follow up of back pain. Patient's back pain is a chronic problem.  Location of back pain:  Right middle of back  Description of back pain: burning, cramping, dull ache, gnawing and sharp  Back pain spreads: nowhere    Since patient first noticed back pain, pain is: unchanged  Does back pain interfere with her job:  Yes      Reason for visit:  DVT Left leg    She eats 2-3 servings of fruits and vegetables daily.She consumes 0 sweetened beverage(s) daily.She exercises with enough effort to increase her heart rate 10 to 19 minutes per day.  She exercises with enough effort to increase her heart rate 3 or less days per week.   She is taking medications regularly.     .  ..Novel anticoagulants only helped for two days and then left lower extremity swelling recurs.    Previously prescribed Nortriptyline and Gabapentin.      ASSESSMENT/PLAN  Chronic midline thoracic back pain  Start Duloxetine. NSAIDs are no trecommended due to DVT of left leg. Tylenol is not effective. Gabapentin only causes drowsiness.    Inflammatory polyarthropathy (H)  Followed by Rheumatology. Currently on Methotrexate.    Acute deep vein thrombosis (DVT) of left lower extremity, unspecified vein (H)  Improving with Warfarin.  - CBC with platelets and differential; Future    Disposition:  Follow-up in 4 weeks.    Temo Fletcher MD  Internal Medicine

## 2022-08-15 NOTE — PROGRESS NOTES
ANTICOAGULATION MANAGEMENT     Radha Rodriguez 62 year old female is on warfarin with therapeutic INR result. (Goal INR 2.0-3.0)    Recent labs: (last 7 days)     08/15/22  1124   INR 2.5*       ASSESSMENT     Source(s): Chart Review and Patient/Caregiver Call     Warfarin doses taken: Warfarin taken as instructed  Diet: No new diet changes identified  New illness, injury, or hospitalization: No  Medication/supplement changes: None noted  Signs or symptoms of bleeding or clotting: No  Previous INR: Subtherapeutic  Additional findings: New start on day 15 of warfarin       PLAN     Recommended plan for no diet, medication or health factor changes affecting INR     Dosing Instructions: Continue your current warfarin dose with next INR in 3 days       Summary  As of 8/15/2022    Full warfarin instructions:  5 mg every Sun, Tue, Thu; 7.5 mg all other days   Next INR check:  8/18/2022             Telephone call with Radha who verbalizes understanding and agrees to plan    Lab visit scheduled    Education provided: Please call back if any changes to your diet, medications or how you've been taking warfarin    Plan made per [facility] anticoagulation protocol    Dacia Rangel, RN  Anticoagulation Clinic  8/15/2022    _______________________________________________________________________     Anticoagulation Episode Summary     Current INR goal:  2.0-3.0   TTR:  83.4 % (3 d)   Target end date:  8/2/2023   Send INR reminders to:  WILLIAM POPE    Indications    Acute deep vein thrombosis (DVT) of left lower extremity  unspecified vein (H) [I82.402]           Comments:           Anticoagulation Care Providers     Provider Role Specialty Phone number    Vocal, Temo Grimes MD Referring Internal Medicine [phone]

## 2022-08-15 NOTE — TELEPHONE ENCOUNTER
Plan does not cover DULoxetine (CYMBALTA) 30 MG capsule.  Please call [phone] to initiate Prior Auth or switch to alternative medication.    Insurance type and ID number: ID# [id number]      Additional Information: Insurance will only pay for 1 cap a day.      Evelyn Shepherd

## 2022-08-18 ENCOUNTER — TELEPHONE (OUTPATIENT)
Dept: FAMILY MEDICINE | Facility: CLINIC | Age: 62
End: 2022-08-18

## 2022-08-18 ENCOUNTER — ANTICOAGULATION THERAPY VISIT (OUTPATIENT)
Dept: ANTICOAGULATION | Facility: CLINIC | Age: 62
End: 2022-08-18

## 2022-08-18 ENCOUNTER — E-VISIT (OUTPATIENT)
Dept: FAMILY MEDICINE | Facility: CLINIC | Age: 62
End: 2022-08-18

## 2022-08-18 ENCOUNTER — MYC MEDICAL ADVICE (OUTPATIENT)
Dept: FAMILY MEDICINE | Facility: CLINIC | Age: 62
End: 2022-08-18

## 2022-08-18 ENCOUNTER — LAB (OUTPATIENT)
Dept: LAB | Facility: CLINIC | Age: 62
End: 2022-08-18
Payer: COMMERCIAL

## 2022-08-18 DIAGNOSIS — M06.4 INFLAMMATORY POLYARTHROPATHY (H): ICD-10-CM

## 2022-08-18 DIAGNOSIS — Z79.899 HIGH RISK MEDICATION USE: ICD-10-CM

## 2022-08-18 DIAGNOSIS — M48.15: ICD-10-CM

## 2022-08-18 DIAGNOSIS — I82.402 ACUTE DEEP VEIN THROMBOSIS (DVT) OF LEFT LOWER EXTREMITY, UNSPECIFIED VEIN (H): ICD-10-CM

## 2022-08-18 DIAGNOSIS — E61.1 IRON DEFICIENCY: ICD-10-CM

## 2022-08-18 DIAGNOSIS — R21 RASH AND NONSPECIFIC SKIN ERUPTION: ICD-10-CM

## 2022-08-18 DIAGNOSIS — Z11.4 SCREENING FOR HIV (HUMAN IMMUNODEFICIENCY VIRUS): ICD-10-CM

## 2022-08-18 DIAGNOSIS — M47.814 FACET ARTHROPATHY, THORACIC: ICD-10-CM

## 2022-08-18 DIAGNOSIS — I82.402 ACUTE DEEP VEIN THROMBOSIS (DVT) OF LEFT LOWER EXTREMITY, UNSPECIFIED VEIN (H): Primary | ICD-10-CM

## 2022-08-18 DIAGNOSIS — Z13.220 SCREENING FOR HYPERLIPIDEMIA: ICD-10-CM

## 2022-08-18 DIAGNOSIS — E61.1 IRON DEFICIENCY: Primary | ICD-10-CM

## 2022-08-18 LAB
ALBUMIN SERPL-MCNC: 3.8 G/DL (ref 3.4–5)
ALP SERPL-CCNC: 58 U/L (ref 40–150)
ALT SERPL W P-5'-P-CCNC: 78 U/L (ref 0–50)
AST SERPL W P-5'-P-CCNC: 25 U/L (ref 0–45)
BASOPHILS # BLD AUTO: 0 10E3/UL (ref 0–0.2)
BASOPHILS NFR BLD AUTO: 0 %
BILIRUB DIRECT SERPL-MCNC: <0.1 MG/DL (ref 0–0.2)
BILIRUB SERPL-MCNC: 0.4 MG/DL (ref 0.2–1.3)
CHOLEST SERPL-MCNC: 250 MG/DL
CREAT SERPL-MCNC: 0.71 MG/DL (ref 0.52–1.04)
EOSINOPHIL # BLD AUTO: 0.2 10E3/UL (ref 0–0.7)
EOSINOPHIL NFR BLD AUTO: 2 %
ERYTHROCYTE [DISTWIDTH] IN BLOOD BY AUTOMATED COUNT: 13.4 % (ref 10–15)
FASTING STATUS PATIENT QL REPORTED: YES
GFR SERPL CREATININE-BSD FRML MDRD: >90 ML/MIN/1.73M2
HCT VFR BLD AUTO: 40.4 % (ref 35–47)
HDLC SERPL-MCNC: 48 MG/DL
HGB BLD-MCNC: 13.4 G/DL (ref 11.7–15.7)
HIV 1+2 AB+HIV1 P24 AG SERPL QL IA: NONREACTIVE
IMM GRANULOCYTES # BLD: 0 10E3/UL
IMM GRANULOCYTES NFR BLD: 1 %
INR BLD: 2.3 (ref 0.9–1.1)
LDLC SERPL CALC-MCNC: 182 MG/DL
LYMPHOCYTES # BLD AUTO: 1.2 10E3/UL (ref 0.8–5.3)
LYMPHOCYTES NFR BLD AUTO: 17 %
MCH RBC QN AUTO: 31.7 PG (ref 26.5–33)
MCHC RBC AUTO-ENTMCNC: 33.2 G/DL (ref 31.5–36.5)
MCV RBC AUTO: 96 FL (ref 78–100)
MONOCYTES # BLD AUTO: 0.5 10E3/UL (ref 0–1.3)
MONOCYTES NFR BLD AUTO: 7 %
NEUTROPHILS # BLD AUTO: 4.8 10E3/UL (ref 1.6–8.3)
NEUTROPHILS NFR BLD AUTO: 72 %
NONHDLC SERPL-MCNC: 202 MG/DL
PLATELET # BLD AUTO: 262 10E3/UL (ref 150–450)
PROT SERPL-MCNC: 6.8 G/DL (ref 6.8–8.8)
RBC # BLD AUTO: 4.23 10E6/UL (ref 3.8–5.2)
TRIGL SERPL-MCNC: 102 MG/DL
WBC # BLD AUTO: 6.7 10E3/UL (ref 4–11)

## 2022-08-18 PROCEDURE — 82565 ASSAY OF CREATININE: CPT

## 2022-08-18 PROCEDURE — 85025 COMPLETE CBC W/AUTO DIFF WBC: CPT

## 2022-08-18 PROCEDURE — 80061 LIPID PANEL: CPT

## 2022-08-18 PROCEDURE — 80076 HEPATIC FUNCTION PANEL: CPT

## 2022-08-18 PROCEDURE — 85610 PROTHROMBIN TIME: CPT

## 2022-08-18 PROCEDURE — 83550 IRON BINDING TEST: CPT

## 2022-08-18 PROCEDURE — 87389 HIV-1 AG W/HIV-1&-2 AB AG IA: CPT

## 2022-08-18 PROCEDURE — 36415 COLL VENOUS BLD VENIPUNCTURE: CPT

## 2022-08-18 PROCEDURE — 82728 ASSAY OF FERRITIN: CPT

## 2022-08-18 PROCEDURE — 99422 OL DIG E/M SVC 11-20 MIN: CPT | Performed by: INTERNAL MEDICINE

## 2022-08-18 NOTE — PROGRESS NOTES
ANTICOAGULATION MANAGEMENT     Radha Rodriguez 62 year old female is on warfarin with therapeutic INR result. (Goal INR 2.0-3.0)    Recent labs: (last 7 days)     08/18/22  1004   INR 2.3*       ASSESSMENT     Source(s): Chart Review and Patient/Caregiver Call     Warfarin doses taken: Warfarin taken as instructed  Diet: No new diet changes identified  New illness, injury, or hospitalization: Yes: Knee is increasing in size, denies numbness  Medication/supplement changes: None noted  Signs or symptoms of bleeding or clotting: No  Previous INR: Therapeutic last visit; previously outside of goal range  Additional findings: None       PLAN     Recommended plan for no diet, medication or health factor changes affecting INR     Dosing Instructions: Increase your warfarin dose (5.6% change) with next INR in 5 days       Summary  As of 8/18/2022    Full warfarin instructions:  5 mg every Sun, Thu; 7.5 mg all other days   Next INR check:  8/23/2022             Telephone call with Radha who verbalizes understanding and agrees to plan    Lab visit scheduled    Education provided: Goal range and significance of current result    Plan made per [facility] anticoagulation protocol    Loretta Gordon RN  Anticoagulation Clinic  8/18/2022    _______________________________________________________________________     Anticoagulation Episode Summary     Current INR goal:  2.0-3.0   TTR:  91.0 % (6 d)   Target end date:  8/2/2023   Send INR reminders to:  WILLIAM POPE    Indications    Acute deep vein thrombosis (DVT) of left lower extremity  unspecified vein (H) [I82.402]           Comments:           Anticoagulation Care Providers     Provider Role Specialty Phone number    Vocal, Temo Grimes MD Referring Internal Medicine [phone]

## 2022-08-18 NOTE — TELEPHONE ENCOUNTER
Patient transferred to [facility] nurse.  States that she noticed today that she has a red raised rash on her abdomen near her umbilical area, it is very itchy.  She started Cymbalta last evening taking one dose.  She also took second dose this AM and now the rash has appeared.  She denies any SOB or breathing issues. Afebrile.  Questions if could be related to the new medications. Has also recently started warfarin and lovenox has been discontinued as of 8/15.    Please advise.    Areli Alvarez RN  [facility] Anticoagulation Clinic

## 2022-08-19 LAB
FERRITIN SERPL-MCNC: 370 NG/ML (ref 8–252)
IRON SATN MFR SERPL: 27 % (ref 15–46)
IRON SERPL-MCNC: 91 UG/DL (ref 35–180)
TIBC SERPL-MCNC: 342 UG/DL (ref 240–430)

## 2022-08-19 RX ORDER — TRIAMCINOLONE ACETONIDE 1 MG/ML
LOTION TOPICAL 2 TIMES DAILY
Qty: 60 ML | Refills: 5 | Status: SHIPPED | OUTPATIENT
Start: 2022-08-19 | End: 2022-10-12

## 2022-08-19 RX ORDER — LIDOCAINE 50 MG/G
1 PATCH TOPICAL EVERY 24 HOURS
Qty: 30 PATCH | Refills: 11 | Status: SHIPPED | OUTPATIENT
Start: 2022-08-19 | End: 2022-10-12

## 2022-08-19 NOTE — TELEPHONE ENCOUNTER
Patient is calling to follow up on [employer] message below and e-visit on 8/18/22.    Patient stated that she developed a rash on her abdomen on Thursday 8/18/22. Please see picture in [employer] message below.     Patient stated that she recently started on Cymbalta on Wednesday 8/17/22. Patient has been taking Warfarin for 2 weeks and just finished her last Lovenox injection on Monday 8/15/22.     Patient stated that the rash is red, raised with irregular boards. The rash is blanchable per patient. Denies any flaking. Denies any pain. Patient stated that the rash is a burning/itchy sensation. Patient stated that the rash is staying the same and not worsening from yesterday to today.     Denies any difficulty breathing, itchy throat, difficulty swallowing. Denies rash spreading anywhere else.     Patient stated that she felt ill yesterday. Had chills, increased fatigue and some nausea. Patient stated that she feels better today but still has some nausea. Denies vomiting, chest pain or shortness of breath.     Patient stated that she did have knee swelling yesterday but the swelling has gone down today in left knee.     Patient is reaching out to provider for advisement.     Routing to provider to review and advise.     Aylin Lagunas RN, BSN  [facility]

## 2022-08-19 NOTE — TELEPHONE ENCOUNTER
Routing to provider to review and advise. Patient had an e-visit yesterday 8/18/22.    Aylin Lagunas RN, BSN  [facility]

## 2022-08-23 ENCOUNTER — ANTICOAGULATION THERAPY VISIT (OUTPATIENT)
Dept: ANTICOAGULATION | Facility: CLINIC | Age: 62
End: 2022-08-23

## 2022-08-23 ENCOUNTER — LAB (OUTPATIENT)
Dept: LAB | Facility: CLINIC | Age: 62
End: 2022-08-23
Payer: COMMERCIAL

## 2022-08-23 DIAGNOSIS — I82.402 ACUTE DEEP VEIN THROMBOSIS (DVT) OF LEFT LOWER EXTREMITY, UNSPECIFIED VEIN (H): Primary | ICD-10-CM

## 2022-08-23 DIAGNOSIS — I82.402 ACUTE DEEP VEIN THROMBOSIS (DVT) OF LEFT LOWER EXTREMITY, UNSPECIFIED VEIN (H): ICD-10-CM

## 2022-08-23 LAB — INR BLD: 4.1 (ref 0.9–1.1)

## 2022-08-23 PROCEDURE — 85610 PROTHROMBIN TIME: CPT

## 2022-08-23 PROCEDURE — 36416 COLLJ CAPILLARY BLOOD SPEC: CPT

## 2022-08-23 NOTE — PROGRESS NOTES
ANTICOAGULATION MANAGEMENT     Radha Rodriguez 62 year old female is on warfarin with supratherapeutic INR result. (Goal INR 2.0-3.0)    Recent labs: (last 7 days)     08/23/22  1101   INR 4.1*       ASSESSMENT     Source(s): Chart Review and Patient/Caregiver Call     Warfarin doses taken: Warfarin taken as instructed  Diet: No new diet changes identified  New illness, injury, or hospitalization: No  Medication/supplement changes: None noted  Signs or symptoms of bleeding or clotting: No  Previous INR: Therapeutic last 2(+) visits  Additional findings: recent stomach ache and rash to abdomen after starting on cymbalta for 2 days. has trimiciolone cream and rash is improving.        PLAN     Recommended plan for temporary change(s) affecting INR     Dosing Instructions: hold 1.3/4 doses doses then continue your current warfarin dose with next INR in 2 days       Summary  As of 8/23/2022    Full warfarin instructions:  8/23: Hold; 8/24: 2.5 mg; Otherwise 5 mg every Sun, Thu; 7.5 mg all other days   Next INR check:  8/26/2022             Telephone call with Radha who verbalizes understanding and agrees to plan    Lab visit scheduled    Education provided: Please call back if any changes to your diet, medications or how you've been taking warfarin and Monitoring for bleeding signs and symptoms    Plan made per [facility] anticoagulation protocol    Dacia Rangel RN  Anticoagulation Clinic  8/23/2022    _______________________________________________________________________     Anticoagulation Episode Summary     Current INR goal:  2.0-3.0   TTR:  68.1 % (1.6 wk)   Target end date:  8/2/2023   Send INR reminders to:  WILLIAM POPE    Indications    Acute deep vein thrombosis (DVT) of left lower extremity  unspecified vein (H) [I82.402]           Comments:           Anticoagulation Care Providers     Provider Role Specialty Phone number    VocalTemo MD Referring Internal Medicine [phone]

## 2022-08-24 ENCOUNTER — TELEPHONE (OUTPATIENT)
Dept: FAMILY MEDICINE | Facility: CLINIC | Age: 62
End: 2022-08-24

## 2022-08-24 DIAGNOSIS — I82.402 ACUTE DEEP VEIN THROMBOSIS (DVT) OF LEFT LOWER EXTREMITY, UNSPECIFIED VEIN (H): ICD-10-CM

## 2022-08-24 RX ORDER — WARFARIN SODIUM 5 MG/1
TABLET ORAL
Qty: 120 TABLET | Refills: 1 | Status: SHIPPED | OUTPATIENT
Start: 2022-08-24 | End: 2022-08-25

## 2022-08-24 NOTE — TELEPHONE ENCOUNTER
ANTICOAGULATION MANAGEMENT:  Medication Refill    Anticoagulation Summary  As of 8/23/2022    Warfarin maintenance plan:  5 mg (5 mg x 1) every Sun, Thu; 7.5 mg (5 mg x 1.5) all other days   Next INR check:  8/25/2022   Target end date:  8/2/2023    Indications    Acute deep vein thrombosis (DVT) of left lower extremity  unspecified vein (H) [I82.402]             Anticoagulation Care Providers     Provider Role Specialty Phone number    Temo Fletcher MD Referring Internal Medicine [phone]          Visit with referring provider/group within last year: Yes    [facility] referral signed within last year: Yes    Radha meets all criteria for refill (current [facility] referral, office visit with referring provider/group in last year, lab monitoring up to date or not exceeding 2 weeks overdue). Rx instructions and quantity supplied updated to match patient's current dosing plan. Warfarin 90 day supply with 1 refill granted per [facility] protocol     Areli Alvarez RN  Anticoagulation Clinic

## 2022-08-24 NOTE — TELEPHONE ENCOUNTER
Spoke with patient and she has cold symptoms now, cough, temp 100.0, nasal congestion.  Covid test negative x 2.  She will treat symptoms and rest, drink fluids.  She has an INR scheduled for tomorrow.    Areli Alvarez RN  [facility]

## 2022-08-24 NOTE — TELEPHONE ENCOUNTER
Reason for Call:  Other call back    Detailed comments: patient called and would like to schedule with Dacia queen  ANTI COAG NURSE.     Thank you.    Phone Number Patient can be reached at: Home number on file [phone] (home)    Best Time: any    Can we leave a detailed message on this number? YES    Call taken on 8/24/2022 at 9:11 AM by Nayely Monae

## 2022-08-25 ENCOUNTER — LAB (OUTPATIENT)
Dept: LAB | Facility: CLINIC | Age: 62
End: 2022-08-25
Payer: COMMERCIAL

## 2022-08-25 ENCOUNTER — ANTICOAGULATION THERAPY VISIT (OUTPATIENT)
Dept: ANTICOAGULATION | Facility: CLINIC | Age: 62
End: 2022-08-25

## 2022-08-25 DIAGNOSIS — I82.402 ACUTE DEEP VEIN THROMBOSIS (DVT) OF LEFT LOWER EXTREMITY, UNSPECIFIED VEIN (H): Primary | ICD-10-CM

## 2022-08-25 DIAGNOSIS — I82.402 ACUTE DEEP VEIN THROMBOSIS (DVT) OF LEFT LOWER EXTREMITY, UNSPECIFIED VEIN (H): ICD-10-CM

## 2022-08-25 LAB — INR BLD: 1.6 (ref 0.9–1.1)

## 2022-08-25 PROCEDURE — 85610 PROTHROMBIN TIME: CPT

## 2022-08-25 PROCEDURE — 36416 COLLJ CAPILLARY BLOOD SPEC: CPT

## 2022-08-25 RX ORDER — WARFARIN SODIUM 5 MG/1
TABLET ORAL
Qty: 120 TABLET | Refills: 1 | Status: SHIPPED | OUTPATIENT
Start: 2022-08-25 | End: 2022-10-17

## 2022-08-25 NOTE — PROGRESS NOTES
ANTICOAGULATION MANAGEMENT     Radha Rodriguez 62 year old female is on warfarin with subtherapeutic INR result. (Goal INR 2.0-3.0)    Recent labs: (last 7 days)     08/25/22  1108   INR 1.6*       ASSESSMENT     Source(s): Chart Review and Patient/Caregiver Call     Warfarin doses taken: Warfarin taken as instructed  Diet: No new diet changes identified  New illness, injury, or hospitalization: state has had cold symptoms since yesterday, cough, nasal congestion, temps yesterday were 103 but down today 100 degrees and less, states she is feeling better and covid tests negative.  Medication/supplement changes: continues on cymbalta  Signs or symptoms of bleeding or clotting: No  Previous INR: Supratherapeutic  Additional findings: None  States that she cannot come into the clinic tomorrow for INR, will come back on Monday.      PLAN     Recommended plan for temporary change(s) affecting INR     Dosing Instructions: booster dose then continue your current warfarin dose with next INR in 4 days       Summary  As of 8/25/2022    Full warfarin instructions:  8/25: 7.5 mg; Otherwise 5 mg every Sun, Thu; 7.5 mg all other days   Next INR check:  8/29/2022             Telephone call with Radha who verbalizes understanding and agrees to plan    Lab visit scheduled    Education provided: Goal range and significance of current result, Monitoring for bleeding signs and symptoms and Monitoring for clotting signs and symptoms    Plan made per [facility] anticoagulation protocol    Areli Alvarez RN  Anticoagulation Clinic  8/25/2022    _______________________________________________________________________     Anticoagulation Episode Summary     Current INR goal:  2.0-3.0   TTR:  63.9 % (1.9 wk)   Target end date:  8/2/2023   Send INR reminders to:  WILLIAM POPE    Indications    Acute deep vein thrombosis (DVT) of left lower extremity  unspecified vein (H) [I82.402]           Comments:           Anticoagulation Care  Providers     Provider Role Specialty Phone number    Temo Fletcher MD Referring Internal Medicine [phone]

## 2022-08-27 DIAGNOSIS — M06.4 INFLAMMATORY POLYARTHROPATHY (H): ICD-10-CM

## 2022-08-27 RX ORDER — METHOTREXATE 2.5 MG/1
20 TABLET ORAL
Qty: 120 TABLET | Refills: 0 | Status: SHIPPED | OUTPATIENT
Start: 2022-08-27 | End: 2022-10-12

## 2022-08-29 ENCOUNTER — TELEPHONE (OUTPATIENT)
Dept: FAMILY MEDICINE | Facility: CLINIC | Age: 62
End: 2022-08-29

## 2022-08-29 NOTE — TELEPHONE ENCOUNTER
Reason for Call:  Other call back    Detailed comments: Patient is Covid positive and would like to consult with INR nurse  on what they should do regarding medication and today's appointment.   Phone Number Patient can be reached at: Home number on file [phone] (home)    Best Time: Any time    Can we leave a detailed message on this number? YES    Call taken on 8/29/2022 at 8:12 AM by Vidya Baptiste

## 2022-08-29 NOTE — TELEPHONE ENCOUNTER
Was able to talk with Radha, INR scheduled for tomorrow, to take 7.5mg of warfarin today, she states she is out and will  her RX today, has not missed any doses.

## 2022-08-30 ENCOUNTER — LAB (OUTPATIENT)
Dept: LAB | Facility: CLINIC | Age: 62
End: 2022-08-30
Payer: COMMERCIAL

## 2022-08-30 ENCOUNTER — ANTICOAGULATION THERAPY VISIT (OUTPATIENT)
Dept: ANTICOAGULATION | Facility: CLINIC | Age: 62
End: 2022-08-30

## 2022-08-30 DIAGNOSIS — I82.402 ACUTE DEEP VEIN THROMBOSIS (DVT) OF LEFT LOWER EXTREMITY, UNSPECIFIED VEIN (H): ICD-10-CM

## 2022-08-30 DIAGNOSIS — I82.402 ACUTE DEEP VEIN THROMBOSIS (DVT) OF LEFT LOWER EXTREMITY, UNSPECIFIED VEIN (H): Primary | ICD-10-CM

## 2022-08-30 LAB — INR BLD: 2.3 (ref 0.9–1.1)

## 2022-08-30 PROCEDURE — 36416 COLLJ CAPILLARY BLOOD SPEC: CPT

## 2022-08-30 PROCEDURE — 85610 PROTHROMBIN TIME: CPT

## 2022-08-30 NOTE — PROGRESS NOTES
ANTICOAGULATION MANAGEMENT     Radha Rodriguez 62 year old female is on warfarin with therapeutic INR result. (Goal INR 2.0-3.0)    Recent labs: (last 7 days)     08/30/22  1358   INR 2.3*       ASSESSMENT     Source(s): Chart Review and Patient/Caregiver Call     Warfarin doses taken: Warfarin taken as instructed  Diet: No new diet changes identified  New illness, injury, or hospitalization: Yes: tested positive for covid. Symptoms improving  Medication/supplement changes: None noted  Signs or symptoms of bleeding or clotting: No  Previous INR: Subtherapeutic  Additional findings: None       PLAN     Recommended plan for temporary change(s) affecting INR     Dosing Instructions: Continue your current warfarin dose with next INR in 1 week       Summary  As of 8/30/2022    Full warfarin instructions:  5 mg every Sun, Thu; 7.5 mg all other days   Next INR check:  9/6/2022             Telephone call with Radha who verbalizes understanding and agrees to plan    Lab visit scheduled    Education provided: Please call back if any changes to your diet, medications or how you've been taking warfarin    Plan made per [facility] anticoagulation protocol    Dacia Rangel RN  Anticoagulation Clinic  8/30/2022    _______________________________________________________________________     Anticoagulation Episode Summary     Current INR goal:  2.0-3.0   TTR:  58.1 % (2.6 wk)   Target end date:  8/2/2023   Send INR reminders to:  WILLIAM POPE    Indications    Acute deep vein thrombosis (DVT) of left lower extremity  unspecified vein (H) [I82.402]           Comments:           Anticoagulation Care Providers     Provider Role Specialty Phone number    Vocal, Temo Grimes MD Referring Internal Medicine [phone]

## 2022-09-04 ENCOUNTER — NURSE TRIAGE (OUTPATIENT)
Dept: NURSING | Facility: CLINIC | Age: 62
End: 2022-09-04

## 2022-09-04 NOTE — TELEPHONE ENCOUNTER
"  Last night she popped a large blood vessel in her left eye.  It is right to the retna and to the beneath it.   The lid is a little swollen., but no redness or fever.    Patient is out of town in [place] today, so advised to be seen at an urgent care closer to her to have an eval and INR check.    Encouraged patient to follow-up with PCP.    Kait Craven RN on 9/4/2022 at 9:49 AM      Reason for Disposition    [1] Bleeding on white of the eye AND [2] taking Coumadin (warfarin) or other strong blood thinner, or known bleeding disorder (e.g., thrombocytopenia)    Additional Information    Negative: Chemical got in the eye    Negative: Piece of something got in the eye    Negative: Eye redness followed an eye injury    Negative: Yellow or green pus in the eyes    Negative: [1] Eyelid is swollen AND [2] no redness of white of eye (sclera)    Negative: Caused by pollen or other allergy OR main symptom is itchy eyes    Negative: Red, widespread rash also present    Negative: SEVERE eye pain    Negative: [1] Eyelids are very swollen (shut or almost) AND [2] fever    Negative: [1] Eyelid (outer) is very red (or tender to touch) AND [2] fever    Negative: [1] Foreign body sensation (\"feels like something is in there\") AND [2] irrigation didn't help    Negative: Vomiting    Negative: [1] Recent eye surgery AND [2] increasing eye pain    Negative: Patient sounds very sick or weak to the triager    Negative: MODERATE eye pain or discomfort (e.g., interferes with normal activities or awakens from sleep; more than mild)    Negative: New or worsening blurred vision    Negative: Looking at light causes MODERATE to SEVERE eye pain (i.e., photophobia)    Negative: Cloudy spot or sore seen on the cornea (clear part of the eye)    Negative: Eyelid is very swollen (shut or almost)    Negative: Eyelid is red and painful (or tender to touch)    Negative: Eye pain present > 24 hours    Protocols used: EYE - RED WITHOUT " PUS-A-AH

## 2022-09-06 ENCOUNTER — MYC MEDICAL ADVICE (OUTPATIENT)
Dept: FAMILY MEDICINE | Facility: CLINIC | Age: 62
End: 2022-09-06

## 2022-09-06 ENCOUNTER — TELEPHONE (OUTPATIENT)
Dept: FAMILY MEDICINE | Facility: CLINIC | Age: 62
End: 2022-09-06

## 2022-09-06 ENCOUNTER — ANTICOAGULATION THERAPY VISIT (OUTPATIENT)
Dept: ANTICOAGULATION | Facility: CLINIC | Age: 62
End: 2022-09-06

## 2022-09-06 ENCOUNTER — ANCILLARY PROCEDURE (OUTPATIENT)
Dept: ULTRASOUND IMAGING | Facility: CLINIC | Age: 62
End: 2022-09-06
Payer: COMMERCIAL

## 2022-09-06 ENCOUNTER — LAB (OUTPATIENT)
Dept: LAB | Facility: CLINIC | Age: 62
End: 2022-09-06
Payer: COMMERCIAL

## 2022-09-06 DIAGNOSIS — R10.2 SUPRAPUBIC PAIN, ACUTE: ICD-10-CM

## 2022-09-06 DIAGNOSIS — I82.402 ACUTE DEEP VEIN THROMBOSIS (DVT) OF LEFT LOWER EXTREMITY, UNSPECIFIED VEIN (H): Primary | ICD-10-CM

## 2022-09-06 DIAGNOSIS — I82.402 ACUTE DEEP VEIN THROMBOSIS (DVT) OF LEFT LOWER EXTREMITY, UNSPECIFIED VEIN (H): ICD-10-CM

## 2022-09-06 DIAGNOSIS — I82.432 ACUTE DEEP VEIN THROMBOSIS (DVT) OF POPLITEAL VEIN OF LEFT LOWER EXTREMITY (H): Primary | ICD-10-CM

## 2022-09-06 DIAGNOSIS — I82.432 ACUTE DEEP VEIN THROMBOSIS (DVT) OF POPLITEAL VEIN OF LEFT LOWER EXTREMITY (H): ICD-10-CM

## 2022-09-06 LAB — INR BLD: 3.3 (ref 0.9–1.1)

## 2022-09-06 PROCEDURE — 36416 COLLJ CAPILLARY BLOOD SPEC: CPT

## 2022-09-06 PROCEDURE — 93971 EXTREMITY STUDY: CPT | Mod: LT | Performed by: RADIOLOGY

## 2022-09-06 PROCEDURE — 85610 PROTHROMBIN TIME: CPT

## 2022-09-06 NOTE — TELEPHONE ENCOUNTER
"Patient states that she was in the ED this past weekend due to a broken blood vessel in her left eye. The ED R/O head bleed. The ED did not have ultrasound      Patient calls today with increase swelling and pain to her left leg. She explains that she recently got over COVID, HX of 3 blood clots and factor 5. Her INR is currently 3.3.    Patient denies numbness and tingling. Her skin tone is slightly \"browner\" then the other leg. She has warmth to the site and redness.     RN will advise with PCP to see if patient should be seen at the [facility].     Elen Pinedo RN, BSN   [facility]     "

## 2022-09-06 NOTE — TELEPHONE ENCOUNTER
Due to worsening left lower extremity leg swelling, in the setting of acute DVT of left leg, patient is scheduled for venous doppler of left lower extremity today at [facility]. Orders sent.    I informed patient that I will recommend a higher INR range of 2.5 - 3.5 if there is progression of left popliteal vein DVT in comparison to 7/1/2022 test/    Patient agrees with plan.

## 2022-09-06 NOTE — PROGRESS NOTES
ANTICOAGULATION MANAGEMENT     Radha Rodriguez 62 year old female is on warfarin with supratherapeutic INR result. (Goal INR 2.0-3.0)    Recent labs: (last 7 days)     09/06/22  1043   INR 3.3*       ASSESSMENT     Source(s): Chart Review and Patient/Caregiver Call     Warfarin doses taken: Warfarin taken as instructed  Diet: No new diet changes identified  New illness, injury, or hospitalization: Yes: Saturday went to ER popped vessel in eye was seen in [facility]. She had CT scan. Had some swelling in left leg and knee. INR was 3.2   Medication/supplement changes: None noted  Signs or symptoms of bleeding or clotting: No  Previous INR: Therapeutic last visit; previously outside of goal range  Additional findings: was seen in ER on 9/3 with ruptured vessal in eye. Inr in ER was 3.2 CT scan clear of any other bleed.  complain of left and knee pain and just getting over covid instructed to talk to pcp to see if US needed.       PLAN     Recommended plan for temporary change(s) affecting INR     Dosing Instructions: partial hold then continue your current warfarin dose with next INR in 1 week       Summary  As of 9/6/2022    Full warfarin instructions:  9/6: 5 mg; Otherwise 5 mg every Sun, Thu; 7.5 mg all other days   Next INR check:  9/13/2022             Telephone call with Radha who verbalizes understanding and agrees to plan    Lab visit scheduled    Education provided: Please call back if any changes to your diet, medications or how you've been taking warfarin and Monitoring for bleeding signs and symptoms    Plan made per [facility] anticoagulation protocol    Dacia Rangel, RN  Anticoagulation Clinic  9/6/2022    _______________________________________________________________________     Anticoagulation Episode Summary     Current INR goal:  2.0-3.0   TTR:  61.3 % (3.6 wk)   Target end date:  8/2/2023   Send INR reminders to:  WILLIAM POPE    Indications    Acute deep vein thrombosis (DVT) of left  lower extremity  unspecified vein (H) [I82.402]           Comments:           Anticoagulation Care Providers     Provider Role Specialty Phone number    Temo Fletcher MD Referring Internal Medicine [phone]

## 2022-09-13 ENCOUNTER — TELEPHONE (OUTPATIENT)
Dept: FAMILY MEDICINE | Facility: CLINIC | Age: 62
End: 2022-09-13

## 2022-09-13 ENCOUNTER — LAB (OUTPATIENT)
Dept: LAB | Facility: CLINIC | Age: 62
End: 2022-09-13
Payer: COMMERCIAL

## 2022-09-13 ENCOUNTER — ANTICOAGULATION THERAPY VISIT (OUTPATIENT)
Dept: ANTICOAGULATION | Facility: CLINIC | Age: 62
End: 2022-09-13

## 2022-09-13 DIAGNOSIS — I82.402 ACUTE DEEP VEIN THROMBOSIS (DVT) OF LEFT LOWER EXTREMITY, UNSPECIFIED VEIN (H): Primary | ICD-10-CM

## 2022-09-13 DIAGNOSIS — I82.402 ACUTE DEEP VEIN THROMBOSIS (DVT) OF LEFT LOWER EXTREMITY, UNSPECIFIED VEIN (H): ICD-10-CM

## 2022-09-13 LAB — INR BLD: 3.3 (ref 0.9–1.1)

## 2022-09-13 PROCEDURE — 85610 PROTHROMBIN TIME: CPT

## 2022-09-13 PROCEDURE — 36416 COLLJ CAPILLARY BLOOD SPEC: CPT

## 2022-09-13 NOTE — PROGRESS NOTES
ANTICOAGULATION MANAGEMENT     Radha Rodriguez 62 year old female is on warfarin with supratherapeutic INR result. (Goal INR 2.0-3.0)    Recent labs: (last 7 days)     09/13/22  1117   INR 3.3*       ASSESSMENT     Source(s): Chart Review and Patient/Caregiver Call     Warfarin doses taken: Warfarin taken as instructed  Diet: No new diet changes identified  New illness, injury, or hospitalization: Yes: left knee pain/left calvin area swelling- per patient she had ultrasound done last week ( Tues)and it was negative for clot.  Medication/supplement changes: Tylnenol ES taking 2 tablets per day which may be increasing INR today   Tramadol taken  Sat and Sun ~ due to unbearable pain - took edge off for her to sleep may be affecting INR today.  Signs or symptoms of bleeding or clotting: No  Previous INR: Supratherapeutic  Additional findings: Per patient she is waiting for a response from PCP for the compression stocking order       PLAN     Recommended plan for temporary change(s) and ongoing change(s) affecting INR     Dosing Instructions: decrease your warfarin dose (10.5 from previous plan but only 5.3% change from total taken this past week% change) with next INR in 1 week       Summary  As of 9/13/2022    Full warfarin instructions:  7.5 mg every Mon, Wed, Fri; 5 mg all other days   Next INR check:  9/20/2022             Telephone call with Radha who verbalizes understanding and agrees to plan    Lab visit scheduled    Education provided: Goal range and significance of current result, Importance of therapeutic range, Importance of following up at instructed interval, Potential interaction between warfarin and tylenol,tramadol and Monitoring for bleeding signs and symptoms    Plan made per [facility] anticoagulation protocol    Monica Rivera RN  Anticoagulation Clinic  9/13/2022    _______________________________________________________________________     Anticoagulation Episode Summary     Current INR goal:  2.0-3.0   TTR:   48.1 % (1.1 mo)   Target end date:  8/2/2023   Send INR reminders to:  WILLIAM POPE    Indications    Acute deep vein thrombosis (DVT) of left lower extremity  unspecified vein (H) [I82.402]           Comments:           Anticoagulation Care Providers     Provider Role Specialty Phone number    Temo Fletcher MD Referring Internal Medicine [phone]

## 2022-09-13 NOTE — TELEPHONE ENCOUNTER
"See US result, [employer] message,and telephone message from 9/6/2022.    Patient calling with update that she is still having swelling and pain in her leg. She had to use 2 tramadol  over the weekend to control the pain, 1 tramadol was not sufficient to control the pain.     1. Patient states that the measurements from 9/6/22 are the same   From the back of my heel to the back of my knee 16\"  Circumference is 15\"  Please order compression stockings     2. Does patient need to do anything further for swelling and pain? Please advise.     Jennifer CLEMENTN, RN    "

## 2022-09-14 ENCOUNTER — TELEPHONE (OUTPATIENT)
Dept: FAMILY MEDICINE | Facility: CLINIC | Age: 62
End: 2022-09-14

## 2022-09-14 NOTE — TELEPHONE ENCOUNTER
Received compression stocking order form from [facility] via fax. Form placed in provider's Wednesday folder to address.

## 2022-09-20 ENCOUNTER — LAB (OUTPATIENT)
Dept: LAB | Facility: CLINIC | Age: 62
End: 2022-09-20
Payer: COMMERCIAL

## 2022-09-20 ENCOUNTER — ANTICOAGULATION THERAPY VISIT (OUTPATIENT)
Dept: ANTICOAGULATION | Facility: CLINIC | Age: 62
End: 2022-09-20

## 2022-09-20 DIAGNOSIS — I82.402 ACUTE DEEP VEIN THROMBOSIS (DVT) OF LEFT LOWER EXTREMITY, UNSPECIFIED VEIN (H): Primary | ICD-10-CM

## 2022-09-20 DIAGNOSIS — M06.4 INFLAMMATORY POLYARTHROPATHY (H): ICD-10-CM

## 2022-09-20 DIAGNOSIS — I82.402 ACUTE DEEP VEIN THROMBOSIS (DVT) OF LEFT LOWER EXTREMITY, UNSPECIFIED VEIN (H): ICD-10-CM

## 2022-09-20 DIAGNOSIS — Z79.899 HIGH RISK MEDICATION USE: ICD-10-CM

## 2022-09-20 DIAGNOSIS — R10.2 SUPRAPUBIC PAIN, ACUTE: ICD-10-CM

## 2022-09-20 LAB
ALBUMIN UR-MCNC: NEGATIVE MG/DL
APPEARANCE UR: CLEAR
BASOPHILS # BLD AUTO: 0 10E3/UL (ref 0–0.2)
BASOPHILS NFR BLD AUTO: 1 %
BILIRUB UR QL STRIP: NEGATIVE
COLOR UR AUTO: NORMAL
EOSINOPHIL # BLD AUTO: 0.2 10E3/UL (ref 0–0.7)
EOSINOPHIL NFR BLD AUTO: 2 %
ERYTHROCYTE [DISTWIDTH] IN BLOOD BY AUTOMATED COUNT: 13.1 % (ref 10–15)
GLUCOSE UR STRIP-MCNC: NEGATIVE MG/DL
HCT VFR BLD AUTO: 40.9 % (ref 35–47)
HGB BLD-MCNC: 13.4 G/DL (ref 11.7–15.7)
HGB UR QL STRIP: NEGATIVE
IMM GRANULOCYTES # BLD: 0 10E3/UL
IMM GRANULOCYTES NFR BLD: 0 %
INR BLD: 2.2 (ref 0.9–1.1)
KETONES UR STRIP-MCNC: NEGATIVE MG/DL
LEUKOCYTE ESTERASE UR QL STRIP: NEGATIVE
LYMPHOCYTES # BLD AUTO: 1.4 10E3/UL (ref 0.8–5.3)
LYMPHOCYTES NFR BLD AUTO: 19 %
MCH RBC QN AUTO: 30.5 PG (ref 26.5–33)
MCHC RBC AUTO-ENTMCNC: 32.8 G/DL (ref 31.5–36.5)
MCV RBC AUTO: 93 FL (ref 78–100)
MONOCYTES # BLD AUTO: 0.7 10E3/UL (ref 0–1.3)
MONOCYTES NFR BLD AUTO: 9 %
NEUTROPHILS # BLD AUTO: 5.1 10E3/UL (ref 1.6–8.3)
NEUTROPHILS NFR BLD AUTO: 69 %
NITRATE UR QL: NEGATIVE
PH UR STRIP: 6 [PH] (ref 5–7)
PLATELET # BLD AUTO: 263 10E3/UL (ref 150–450)
RBC # BLD AUTO: 4.39 10E6/UL (ref 3.8–5.2)
RBC #/AREA URNS AUTO: ABNORMAL /HPF
SP GR UR STRIP: 1.01 (ref 1–1.03)
SQUAMOUS #/AREA URNS AUTO: ABNORMAL /LPF
UROBILINOGEN UR STRIP-ACNC: 0.2 E.U./DL
WBC # BLD AUTO: 7.4 10E3/UL (ref 4–11)
WBC #/AREA URNS AUTO: ABNORMAL /HPF

## 2022-09-20 PROCEDURE — 80076 HEPATIC FUNCTION PANEL: CPT

## 2022-09-20 PROCEDURE — 85025 COMPLETE CBC W/AUTO DIFF WBC: CPT

## 2022-09-20 PROCEDURE — 85610 PROTHROMBIN TIME: CPT

## 2022-09-20 PROCEDURE — 36415 COLL VENOUS BLD VENIPUNCTURE: CPT

## 2022-09-20 PROCEDURE — 82565 ASSAY OF CREATININE: CPT

## 2022-09-20 PROCEDURE — 81001 URINALYSIS AUTO W/SCOPE: CPT

## 2022-09-20 NOTE — PROGRESS NOTES
ANTICOAGULATION MANAGEMENT     Radha Rodriguez 62 year old female is on warfarin with therapeutic INR result. (Goal INR 2.0-3.0)    Recent labs: (last 7 days)     09/20/22  1121   INR 2.2*       ASSESSMENT     Source(s): Chart Review and Patient/Caregiver Call     Warfarin doses taken: Warfarin taken as instructed  Diet: No new diet changes identified  New illness, injury, or hospitalization: No  Medication/supplement changes: None noted  Signs or symptoms of bleeding or clotting: No  Previous INR: Therapeutic last visit; previously outside of goal range  Additional findings: None       PLAN     Recommended plan for no diet, medication or health factor changes affecting INR     Dosing Instructions: Continue your current warfarin dose with next INR in 1 week       Summary  As of 9/20/2022    Full warfarin instructions:  7.5 mg every Mon, Wed, Fri; 5 mg all other days   Next INR check:  9/27/2022             Telephone call with Radha who verbalizes understanding and agrees to plan    Lab visit scheduled    Education provided: Please call back if any changes to your diet, medications or how you've been taking warfarin    Plan made per [facility] anticoagulation protocol    Dacia Rangel RN  Anticoagulation Clinic  9/20/2022    _______________________________________________________________________     Anticoagulation Episode Summary     Current INR goal:  2.0-3.0   TTR:  52.4 % (1.3 mo)   Target end date:  8/2/2023   Send INR reminders to:  WILLIAM POPE    Indications    Acute deep vein thrombosis (DVT) of left lower extremity  unspecified vein (H) [I82.402]           Comments:           Anticoagulation Care Providers     Provider Role Specialty Phone number    Vocal, Temo Grimes MD Referring Internal Medicine [phone]

## 2022-09-21 LAB
ALBUMIN SERPL-MCNC: 4.1 G/DL (ref 3.4–5)
ALP SERPL-CCNC: 60 U/L (ref 40–150)
ALT SERPL W P-5'-P-CCNC: 32 U/L (ref 0–50)
AST SERPL W P-5'-P-CCNC: 21 U/L (ref 0–45)
BILIRUB DIRECT SERPL-MCNC: 0.1 MG/DL (ref 0–0.2)
BILIRUB SERPL-MCNC: 0.5 MG/DL (ref 0.2–1.3)
CREAT SERPL-MCNC: 0.76 MG/DL (ref 0.52–1.04)
GFR SERPL CREATININE-BSD FRML MDRD: 89 ML/MIN/1.73M2
PROT SERPL-MCNC: 6.9 G/DL (ref 6.8–8.8)

## 2022-09-23 ENCOUNTER — OFFICE VISIT (OUTPATIENT)
Dept: RHEUMATOLOGY | Facility: CLINIC | Age: 62
End: 2022-09-23
Payer: COMMERCIAL

## 2022-09-23 VITALS
BODY MASS INDEX: 32.31 KG/M2 | WEIGHT: 182.4 LBS | OXYGEN SATURATION: 98 % | SYSTOLIC BLOOD PRESSURE: 149 MMHG | HEART RATE: 82 BPM | DIASTOLIC BLOOD PRESSURE: 87 MMHG

## 2022-09-23 DIAGNOSIS — M06.4 INFLAMMATORY POLYARTHROPATHY (H): Primary | ICD-10-CM

## 2022-09-23 DIAGNOSIS — M17.12 PRIMARY OSTEOARTHRITIS OF LEFT KNEE: ICD-10-CM

## 2022-09-23 PROCEDURE — 99214 OFFICE O/P EST MOD 30 MIN: CPT | Performed by: INTERNAL MEDICINE

## 2022-09-23 RX ORDER — HYDROXYCHLOROQUINE SULFATE 200 MG/1
400 TABLET, FILM COATED ORAL DAILY
Qty: 180 TABLET | Refills: 1 | Status: SHIPPED | OUTPATIENT
Start: 2022-09-23 | End: 2022-10-12

## 2022-09-23 RX ORDER — FOLIC ACID 1 MG/1
1 TABLET ORAL DAILY
Qty: 90 TABLET | Refills: 2 | Status: CANCELLED | OUTPATIENT
Start: 2022-09-23

## 2022-09-23 RX ORDER — METHOTREXATE 2.5 MG/1
20 TABLET ORAL
Qty: 120 TABLET | Refills: 0 | Status: CANCELLED | OUTPATIENT
Start: 2022-09-23

## 2022-09-23 NOTE — PROGRESS NOTES
Rheumatology Clinic Visit      Radha Rodriguez MRN# [id number]   YOB: 1960 Age: 62 year old      Date of visit: 9/23/22   PCP: Dr. Temo Fletcher    Chief Complaint   Patient presents with:  Inflammatory polyarthropathy     Assessment and Plan     1. Inflammatory polyarthropathy: Previously followed by Rojas Vasques and Shazia.  Established care with me on 4/24/2018.  Previously on Humira (ineffective), HCQ (used with MTX), MTX (25mg wkly was effective and dose reductions resulted in worsening joint symptoms, but then she stopped on her own during the COVID19 pandemic without any worsening inflammatory arthritis symptoms).  Had been doing well for a while without a DMARD but then with full body aches that and stiffness that was worse in the morning and improved with time and activity; fullness of the bilateral second and third MCPs with prolonged morning stiffness; hydroxychloroquine was started with near resolution of the inflammatory MCP symptoms, but then presented with severe arthritis at the MCPs and PIPs so methotrexate was started with significant improvement but mild synovitis still on exam previously so methotrexate dose was increased with LFT elevation so had to be held for a couple weeks before being restarted in late August.  However, at this time she reports that she stopped hydroxychloroquine about 3 months ago and is no longer taking methotrexate because of skin lesions.  She had a rash on her arms and abdomen of unclear etiology; see #5.  Currently with active arthritis.  Discussed treatments for arthritis; start hydroxychloroquine now and consider adding leflunomide at follow-up if needed.  Chronic illness, progressive.    - Start hydroxychloroquine 400mg daily (last eye exam on 2/11/2022 by Dr. Aly)    # Hydroxychloroquine (Plaquenil) Risks and Benefits:  The risks and benefits of hydroxychloroquine were discussed in detail and the patient verbalized understanding; the patient also  verbalized agreement to get the required ophthalmologic toxicity monitoring.  The risks discussed include, but are not limited to, the risk for hypersensitivity, anaphylaxis, anaphylactoid reactions, irreversible retinal damage, rare hematologic reactions, and rare cardiomyopathy.  Patients with G6PD deficiency or hepatic impairment may be at an increased risk for adverse effects.  I encouraged reviewing the package insert and asking any questions about the medication.       2.  Primary osteoarthritis of the left first carpometacarpal joint: improved with steroid injections in the past.      3.  Bilateral knee osteoarthritis hx; now with just left knee pain:  Previous injections were helpful.  However, last injection did not provide any benefit.  MRI showed meniscal issue and evidence of insufficiency fracture.  She has followed with orthopedics where she says that she was advised to wear an immobilization brace but then she was told not to wear because of DVT but the DVT has since cleared and she is still on Coumadin; she is going to see hematology next week where she can determine if it is okay to immobilize and then follow-up with orthopedics for next steps.      4. Chronic back pain: went to [facility] where steroids and gabapentin were Rx'd but she doesn't do well with steroids per patient and gabapentin has only been partially helpful. Was following at the [facility] pain clinic, but was referred by [facility] provider to [facility] per patient.  Now following with Dr. Xavi Guerrero at [facility].    5. Rash: previously on abdomen and now just on arms. Unclear etiology. Less likely MTX associated but cannot rule out.  Previously advised that she see derm; she is following with her PCP.     6.  Vaccinations:   - Influenza: Advised yearly vaccination  - Tbjfogf24: up to date  - Uinchpgmv22: up to date  - TDAP: up to date  - COVID-19: Advised receiving the updated COVID-19 vaccination    7. Elevated blood pressure:   Radha to follow up with Primary Care provider regarding elevated blood pressure.     Total minutes spent in evaluation with patient, documentation, , and review of pertinent studies and chart notes: 20      Ms. Rodriguez verbalized agreement with and understanding of the rational for the diagnosis and treatment plan.  All questions were answered to best of my ability and the patient's satisfaction. Ms. Rodriguez was advised to contact the clinic with any questions that may arise after the clinic visit.      Thank you for involving me in the care of the patient    Return to clinic: 3-4 months    HPI   Radha Rodriguez is a 62 year old female with a past medical history significant for possible Crohn's disease requiring fistula surgery in the past, osteoarthritis, inflammatory arthritis who is seen for follow-up of arthritis.    Today, 9/23/2022: The steroid injection for her left knee was ineffective so she went to have an MRI showing meniscal pathology and insufficiency fracture.  She then went to orthopedics who told her to wear a immobilization splint but then she was told not to do that because of subsequent DVT and she is not sure what she is supposed to do for her knee that still hurts and is exquisitely tender over the medial joint line but no increased warmth or overlying erythema.  She also had a rash on her abdomen and arms; did not see dermatology but instead saw her PCP currently suspected that it was possibly a drug reaction.  She stopped taking methotrexate shortly after the last rheumatology visit.  She reports that she stopped hydroxychloroquine and methotrexate was initially started this year.  Dynamic pain at the MCPs and PIPs that is worse in the morning and improves with time and activity.  Morning stiffness for at least 1 hour.    Tobacco: none  EtOH: occasional  Drugs: none    ROS   12 point review of system was completed and negative except as noted in the HPI     Active Problem List      Patient Active Problem List   Diagnosis     CARDIOVASCULAR SCREENING; LDL GOAL LESS THAN 160     Vitamin D deficiency     Inflammatory polyarthropathy (H)     High risk medications (not anticoagulants) long-term use     Osteoarthritis     Menopausal syndrome (hot flashes)     Right lateral epicondylitis     Immunosuppressed status (H)     Facet arthropathy, thoracic     Back muscle spasm     Upper back strain     Upper back pain, chronic     Chronic midline thoracic back pain     Thoracic degenerative disc disease     High risk medication use     Pseudophakia, Yag Caps, ou (Hx of refractive lens exchange, ou (Lobanoff)     Diffuse idiopathic skeletal hyperostosis of thoracolumbar spine     Acute deep vein thrombosis (DVT) of left lower extremity, unspecified vein (H)     Past Medical History     Past Medical History:   Diagnosis Date     Arthritis      Headache(784.0)      Irritable bowel syndrome      Other and unspecified noninfectious gastroenteritis and colitis(558.9)     chronic     Past Surgical History     Past Surgical History:   Procedure Laterality Date     CATARACT IOL, RT/LT      Refractive lens exchange ou (Lobanoff)     COLONOSCOPY  10/14/2011    Procedure:COLONOSCOPY; Colonoscopy; Surgeon:SCOTTY LEDEZMA; Location:[facility]     ENHANCE LASER REFRACTIVE BILATERAL EXISTING PT IN PARAMETERS       HYSTERECTOMY, VAGINAL  01/01/2000    TVH, fibroids (still has ovaries)     SURGICAL HISTORY OF -       Tubal Ligation     SURGICAL HISTORY OF -       Appy     SURGICAL HISTORY OF -       Tonsils     SURGICAL HISTORY OF -   12/1994    Anal Fistulotomy     ZZC REPLACEMENT,URETER,BOWEL SEGMENT  10/01/2008    Part of her ureter was repaired.     Allergy     Allergies   Allergen Reactions     Sulfa Drugs Rash     Codeine Hives     Clindamycin Rash     Current Medication List     Current Outpatient Medications   Medication Sig     amLODIPine (NORVASC) 5 MG tablet TAKE 1 TABLET BY MOUTH TWICE A DAY      "hydroxychloroquine (PLAQUENIL) 200 MG tablet Take 2 tablets (400 mg) by mouth daily     lidocaine (LIDODERM) 5 % patch Place 1 patch onto the skin every 24 hours To prevent lidocaine toxicity, patient should be patch free for 12 hrs daily.     triamcinolone (KENALOG) 0.1 % external lotion Apply topically 2 times daily     warfarin ANTICOAGULANT (COUMADIN) 5 MG tablet Take one tablet every Sun/Thurs and 1 1/2 tablets all other days, as directed by the Anticoagulation clinic     folic acid (FOLVITE) 1 MG tablet Take 1 tablet (1 mg) by mouth daily (Patient not taking: Reported on 9/23/2022)     methotrexate sodium 2.5 MG TABS Take 8 tablets (20 mg) by mouth every 7 days (Patient not taking: Reported on 9/23/2022)     No current facility-administered medications for this visit.         Social History   See HPI    Family History     Family History   Problem Relation Age of Onset     Heart Disease Father         Heart attack     C.A.D. Father         age 50     Hypertension Father      Cancer Maternal Grandfather      Alzheimer Disease Maternal Grandfather      Cancer Paternal Grandfather      Diabetes No family hx of      Cerebrovascular Disease No family hx of      Breast Cancer No family hx of      Cancer - colorectal No family hx of      Prostate Cancer No family hx of        Physical Exam     Temp Readings from Last 3 Encounters:   08/15/22 99.1  F (37.3  C) (Oral)   03/07/22 97.5  F (36.4  C) (Tympanic)   08/06/21 97.8  F (36.6  C) (Tympanic)     BP Readings from Last 5 Encounters:   09/23/22 (!) 149/87   08/15/22 135/82   05/16/22 (!) 146/85   03/07/22 (!) 144/91   02/21/22 132/84     Pulse Readings from Last 1 Encounters:   09/23/22 82     Resp Readings from Last 1 Encounters:   08/15/22 20     Estimated body mass index is 32.31 kg/m  as calculated from the following:    Height as of 8/15/22: 1.6 m (5' 3\").    Weight as of this encounter: 82.7 kg (182 lb 6.4 oz).      GEN: NAD.   HEENT:  Anicteric, noninjected " sclera. No obvious external lesions of the ear and nose. Hearing intact.  PULM: No increased work of breathing.    MSK: Synovial swelling and tenderness to palpation of the bilateral second and third MCPs.  Heberden's nodes present.  Squaring and tenderness to palpation without effusion or increased warmth of the left first CMC joint.  Right knee without swelling or tenderness to palpation.  Left knee without swelling but was very tender over the pes anserine bursa and at the medial joint line; no effusion, increased warmth, or overlying erythema.  Ankles and MTPs without swelling or tenderness to palpation   SKIN: Nontender nonblanching erythematous areas on the bilateral dorsal forearms.  PSYCH: Alert. Appropriate.         Labs / Imaging (select studies)     RF/CCP  Recent Labs   Lab Test 06/01/15  1139   CCPABY <20  Interpretation:  Negative     RHF <20     CBC  Recent Labs   Lab Test 09/20/22  1121 08/18/22  1004 08/08/22  1044 10/11/21  1704 03/29/21  1431 01/04/21  1619 12/23/19  1620 09/25/19  1117   WBC 7.4 6.7 6.7   < > 6.9   < > 8.4 7.4   RBC 4.39 4.23 4.06   < > 4.47   < > 3.86 3.96   HGB 13.4 13.4 13.1   < > 13.6   < > 12.9 13.3   HCT 40.9 40.4 39.3   < > 41.3   < > 37.8 38.4   MCV 93 96 97   < > 92   < > 98 97   RDW 13.1 13.4 14.4   < > 12.5   < > 13.1 13.9    262 242   < > 226   < > 220 219   MCH 30.5 31.7 32.3   < > 30.4   < > 33.4* 33.6*   MCHC 32.8 33.2 33.3   < > 32.9   < > 34.1 34.6   NEUTROPHIL 69 72 70   < > 64.0  --  69.7 73.0   LYMPH 19 17 19   < > 26.5  --  20.6 17.8   MONOCYTE 9 7 9   < > 6.4  --  6.7 6.5   EOSINOPHIL 2 2 2   < > 2.8  --  2.6 2.3   BASOPHIL 1 0 1   < > 0.3  --  0.4 0.4   ANEU  --   --   --   --  4.4  --  5.8 5.4   ALYM  --   --   --   --  1.8  --  1.7 1.3   EDWARD  --   --   --   --  0.4  --  0.6 0.5   AEOS  --   --   --   --  0.2  --  0.2 0.2   ABAS  --   --   --   --  0.0  --  0.0 0.0   ANEUTAUTO 5.1 4.8 4.7   < >  --   --   --   --    ALYMPAUTO 1.4 1.2 1.2   < >  --    --   --   --    AMONOAUTO 0.7 0.5 0.6   < >  --   --   --   --    AEOSAUTO 0.2 0.2 0.1   < >  --   --   --   --    ABSBASO 0.0 0.0 0.0   < >  --   --   --   --     < > = values in this interval not displayed.     CMP  Recent Labs   Lab Test 09/20/22  1121 08/18/22  1004 08/08/22  1044 10/11/21  1704 03/29/21  1431 01/04/21  1619 12/23/19  1620 04/24/18  1557 10/11/17  1207   NA  --   --   --   --  140 140  --   --  138   POTASSIUM  --   --   --   --  4.0 3.8  --   --  4.1   CHLORIDE  --   --   --   --  109 105  --   --  105   CO2  --   --   --   --  29 27  --   --  28   ANIONGAP  --   --   --   --  2* 8  --   --  5   GLC  --   --   --   --  85 86  --   --  86   BUN  --   --   --   --  11 14  --   --  18   CR 0.76 0.71 0.71   < > 0.77 0.75 0.73   < > 0.73   GFRESTIMATED 89 >90 >90   < > 85 87 >90   < > 82   GFRESTBLACK  --   --   --   --  >90 >90 >90   < > >90   ENEIDA  --   --   --   --  9.0 9.1  --   --  9.2   BILITOTAL 0.5 0.4 0.4   < > 0.5 0.4 0.3   < > 0.7   ALBUMIN 4.1 3.8 3.8   < > 3.9 3.9 3.8   < > 3.9   PROTTOTAL 6.9 6.8 6.7*   < > 6.6* 7.1 6.7*   < > 6.9   ALKPHOS 60 58 60   < > 62 58 68   < > 57   AST 21 25 122*   < > 17 18 21   < > 28   ALT 32 78* 136*   < > 28 32 37   < > 44    < > = values in this interval not displayed.     Calcium/VitaminD  Recent Labs   Lab Test 06/11/21  1014 03/29/21  1431 01/04/21  1619 10/11/17  1207 06/01/15  1139 09/18/14  1129   ENEIDA  --  9.0 9.1 9.2   < >  --    VITDT 26  --   --   --   --  38    < > = values in this interval not displayed.     ESR/CRP  Recent Labs   Lab Test 08/08/22  1044 05/02/22  1252 10/11/21  1704   SED 8 7 7   CRP 5.5 <2.9 4.0     Hepatitis B  Recent Labs   Lab Test 04/24/18  1557   HBCAB Nonreactive   HEPBANG Nonreactive     Hepatitis C  Recent Labs   Lab Test 06/01/15  1139   HCVAB Nonreactive   Assay performance characteristics have not been established for newborns,   infants, and children       Lyme ab screening  Recent Labs   Lab Test 03/29/21  1431    LYMEGM 0.04     Tuberculosis Screening  Recent Labs   Lab Test 06/01/15  1139   TBRSLT Negative   TBAGN 0.00     HIV Screening  Recent Labs   Lab Test 08/18/22  1004   HIAGAB Nonreactive     Immunization History     Immunization History   Administered Date(s) Administered     COVID-19,PF,Pfizer (12+ Yrs) 01/25/2021, 02/15/2021     HepB 01/24/1991, 02/28/1991, 09/05/1991     Influenza Quad, Recombinant, pf(RIV4) (Flublok) 10/01/2019, 10/14/2021     Pneumo Conj 13-V (2010&after) 10/01/2019     Pneumococcal 23 valent 12/31/2019     TD (ADULT, 7+) 03/12/1990, 01/01/2000     TDAP Vaccine (Adacel) 09/10/2008     TDAP Vaccine (Boostrix) 09/10/2008     Tdap (Adacel,Boostrix) 09/10/2008, 03/01/2011, 10/14/2021          Chart documentation done in part with Dragon Voice recognition Software. Although reviewed after completion, some word and grammatical error may remain.    Abdoul Eli MD

## 2022-09-27 ENCOUNTER — ANTICOAGULATION THERAPY VISIT (OUTPATIENT)
Dept: ANTICOAGULATION | Facility: CLINIC | Age: 62
End: 2022-09-27

## 2022-09-27 ENCOUNTER — LAB (OUTPATIENT)
Dept: LAB | Facility: CLINIC | Age: 62
End: 2022-09-27
Payer: COMMERCIAL

## 2022-09-27 DIAGNOSIS — I82.402 ACUTE DEEP VEIN THROMBOSIS (DVT) OF LEFT LOWER EXTREMITY, UNSPECIFIED VEIN (H): Primary | ICD-10-CM

## 2022-09-27 DIAGNOSIS — I82.402 ACUTE DEEP VEIN THROMBOSIS (DVT) OF LEFT LOWER EXTREMITY, UNSPECIFIED VEIN (H): ICD-10-CM

## 2022-09-27 LAB — INR BLD: 2.2 (ref 0.9–1.1)

## 2022-09-27 PROCEDURE — 36416 COLLJ CAPILLARY BLOOD SPEC: CPT

## 2022-09-27 PROCEDURE — 85610 PROTHROMBIN TIME: CPT

## 2022-09-27 NOTE — PROGRESS NOTES
ANTICOAGULATION MANAGEMENT     Radha Rodriguez 62 year old female is on warfarin with therapeutic INR result. (Goal INR 2.0-3.0)    Recent labs: (last 7 days)     09/27/22  1100   INR 2.2*       ASSESSMENT     Source(s): Chart Review     Warfarin doses taken: Warfarin taken as instructed  Diet: No new diet changes identified  New illness, injury, or hospitalization: No  Medication/supplement changes: None noted  Signs or symptoms of bleeding or clotting: No  Previous INR: Therapeutic last visit; previously outside of goal range  Additional findings: None       PLAN     Recommended plan for no diet, medication or health factor changes affecting INR     Dosing Instructions: Continue your current warfarin dose with next INR in 2 weeks       Summary  As of 9/27/2022    Full warfarin instructions:  7.5 mg every Mon, Wed, Fri; 5 mg all other days   Next INR check:  10/25/2022             Telephone call with Radha who verbalizes understanding and agrees to plan    Lab visit scheduled    Education provided: Please call back if any changes to your diet, medications or how you've been taking warfarin    Plan made per [facility] anticoagulation protocol    Dacia Rangel RN  Anticoagulation Clinic  9/27/2022    _______________________________________________________________________     Anticoagulation Episode Summary     Current INR goal:  2.0-3.0   TTR:  59.6 % (1.5 mo)   Target end date:  8/2/2023   Send INR reminders to:  WILLIAM POPE    Indications    Acute deep vein thrombosis (DVT) of left lower extremity  unspecified vein (H) [I82.402]           Comments:           Anticoagulation Care Providers     Provider Role Specialty Phone number    Vocal, Temo Grimes MD Referring Internal Medicine [phone]

## 2022-09-30 ENCOUNTER — OFFICE VISIT (OUTPATIENT)
Dept: HEMATOLOGY | Facility: CLINIC | Age: 62
End: 2022-09-30
Attending: INTERNAL MEDICINE
Payer: COMMERCIAL

## 2022-09-30 ENCOUNTER — ANTICOAGULATION THERAPY VISIT (OUTPATIENT)
Dept: ANTICOAGULATION | Facility: CLINIC | Age: 62
End: 2022-09-30

## 2022-09-30 VITALS
HEART RATE: 75 BPM | HEIGHT: 63 IN | BODY MASS INDEX: 32.07 KG/M2 | OXYGEN SATURATION: 98 % | WEIGHT: 181 LBS | TEMPERATURE: 98.6 F | DIASTOLIC BLOOD PRESSURE: 80 MMHG | SYSTOLIC BLOOD PRESSURE: 145 MMHG

## 2022-09-30 DIAGNOSIS — I87.009 POST-THROMBOTIC SYNDROME: Primary | ICD-10-CM

## 2022-09-30 DIAGNOSIS — D68.51 FACTOR 5 LEIDEN MUTATION, HETEROZYGOUS (H): ICD-10-CM

## 2022-09-30 DIAGNOSIS — I82.402 ACUTE DEEP VEIN THROMBOSIS (DVT) OF LEFT LOWER EXTREMITY, UNSPECIFIED VEIN (H): Primary | ICD-10-CM

## 2022-09-30 DIAGNOSIS — I82.402 ACUTE DEEP VEIN THROMBOSIS (DVT) OF LEFT LOWER EXTREMITY, UNSPECIFIED VEIN (H): ICD-10-CM

## 2022-09-30 LAB — INR PPP: 1.89 (ref 0.85–1.15)

## 2022-09-30 PROCEDURE — G0463 HOSPITAL OUTPT CLINIC VISIT: HCPCS

## 2022-09-30 PROCEDURE — 99205 OFFICE O/P NEW HI 60 MIN: CPT | Performed by: PHYSICIAN ASSISTANT

## 2022-09-30 PROCEDURE — 36415 COLL VENOUS BLD VENIPUNCTURE: CPT | Performed by: PHYSICIAN ASSISTANT

## 2022-09-30 PROCEDURE — 85610 PROTHROMBIN TIME: CPT | Performed by: PHYSICIAN ASSISTANT

## 2022-09-30 NOTE — PROGRESS NOTES
[facility]  [facility]  [facility], [address], [place] [address]  Main: [phone], Fax: [phone]      Outpatient Visit Note:    Patient: Radha Rodriguez  MRN: [id number]  : 1960  JUAN: Sep 30, 2022    Reason for Consultation:  Radha Rodriguez is a 62 year old female with history of injury/immobility provoked DVT (2022) that was referred to the [facility] for evaluation/anticoagulation recommendations.    Pertinent Clinical History:    Seen 22 by sports medicine for an insufficiency fracture of the left medial femoral condyle + a torn left meniscus 2/2 a fall. Was put on non-weight bearing status x2 weeks. She was primarily using a wheelchair to ambulate throughout that timeframe.      2022--Presented to sports medicine follow-up appointment with progressive left lower extremity pain/swelling. Was found to have a left lower extremity DVT involving the left popliteal and posterior tibial veins. She was found to have heterozygous factor V leiden upon work-up. She was started on Eliquis.       22--After ~1 month of therapeutic anticoagulation with Eliquis, she had a repeat left lower extremity US as she felt that her left lower extremity pain/swelling was not improving, and potentially even worsening. This ultrasound demonstrated similar thrombus burden to the previous (thrombus in left popliteal and posterior tibial veins). However, it also noted thrombus in the peroneal vein, which was not specifically mentioned in the previous study. It is unclear to me if this was truly clot progression, or if this was perhaps just not visualized in the previous study. Nevertheless, she was switched to Xarelto.      After only 4-5 days on Xarelto, she she was worried that her left lower extremity was still painful, and was switched to warfarin with Lovenox bridging.      2022--Repeat left lower  "extremity US demonstrated complete resolution of the thrombus.    Interval History:  Remains on warfarin at this time. She notes easy bruisability but is otherwise tolerating this well with no bleeding issues--major or nuisance.    She is now >3 months out from her acute DVT, and the clot has resolved.     She has no residual left lower extremity swelling. She does still have ongoing pain in her left lower extremity that she attributes primarily to her injury (insufficiency fracture of the left medial femoral condyle + a torn left meniscus).    She has follow-up with sports medicine scheduled. She notes that future treatments may involve further immobilization +/- surgical intervention.    Notably, she was recently also diagnosed with inflammatory polyarthropathy and has established care with rheumatology.    ROS:  Denies epistaxis, oral/mucosal bleeding, excessive bruising/ecchymosis, hematuria, hematochezia, and melena. Denies lower extremity swelling. Does have some ongoing left lower extremity pain as discussed above in HPI. Denies chest pain. Denies shortness of breath.     Family History:  No known family history of venous thromboembolism.    Objectives:  BP (!) 145/80 (BP Location: Right arm, Patient Position: Sitting, Cuff Size: Adult Regular)   Pulse 75   Temp 98.6  F (37  C) (Oral)   Ht 1.6 m (5' 3\")   Wt 82.1 kg (181 lb)   SpO2 98%   BMI 32.06 kg/m    Exam:   Constitutional: Appears well, no distress  Respiratory: Normal work of breathing.  Skin: No petechiae or significant ecchymosis.  Neuro: AOx3.  Extremities: No lower extremity edema.    Labs:  Component      Latest Ref Rng & Units 9/30/2022   INR      0.85 - 1.15 1.89 (H)       07/07/2022:  Positive for heterozygous factor V leiden mutation  Negative for prothrombin mutation  Antithrombin Activity 80 - 119 % 98.0    PROTEIN C CHROMOGENIC 83 - 138 % 127.0    Protein S Activity 66 - 145 % 122.6    Lupus inhibitor negative  Cardiolipin ab " negative    Imaging:  US LOWER EXTREMITY VENOUS DUPLEX LEFT, 7/1/2022 1:58 PM                                                            IMPRESSION:  1.  Acute, occlusive thrombus in the left popliteal vein extending  into the posterior tibial vein to the ankle.  The preliminary results were communicated to the sports medicine  staff. The patient subsequently returned to clinic after the exam.      7/29/22 left lower extremity US @ [facility]  IMPRESSION:     Occlusive thrombus within the left popliteal, peroneal, posterior tibial veins.       ULTRASOUND LOWER EXTREMITY VENOUS DUPLEX LEFT 9/6/2022 4:53 PM                                              IMPRESSION: Previous left popliteal through posterior tibial deep  venous thrombosis has cleared. No deep venous thrombosis demonstrated  in the LEFT leg.      Assessment/Plan:  #Provoked left lower extremity DVT (07/01/2022).  This was clearly a provoked event in the setting of orthopedic injury and immobilization. She is s/p 3 months therapeutic anticoagulation and her thrombus has resolved. Thus, her treatment phase is complete.     Though she likely will not require life long anticoagulation, given that she still has an insufficiency fracture of the left medial femoral condyle + a torn left meniscus that will likely require further immobilization +/- surgical intervention, I recommend that she continue on anticoagulation as secondary prophylaxis until these provoking factors are no longer an issue. She was also recently diagnosed with an inflammatory condition that should be taken into consideration prior to discontinuation of anticoagulation in the future.    She is presently on warfarin due to a perceived failure of direct oral anticoagulants. I do not think that her history is consistent with direct oral anticoagulant failure and thus have no issue with her switching back to Xarelto. INR in clinic today is <2, so the patient can stop warfarin now, and just  begin Xarelto with tonight/tomorrow AM.    She should follow-up with me in 6-12 months, at which time, we can reassess her risk factors and discuss whether anticoagulation continues to be indicated for her.    We discussed that heterozygous Factor V Leiden exists in ~5% of the white population and that it is a relatively weak risk factor for VTE, with only ~10% of those affected expected to develop a venous thromboembolism event in their lifetime, and often those that do develop a venous thromboembolism have additional contributing risk factors.     We discussed that having heterozygous Factor V Leiden alone increases the risk of developing a venous thromboembolism by 5-7 fold, but the presence of heterozygous Factor V Leiden combined with the use of estrogen (ie combined oral contraceptives, HRT) increases the risk of developing venous thromboembolism by ~15 (for HRT) to 35 (for COCPs) fold. She was advised to avoid exogenous estrogen.       Summary of Plan:  1) INR is <2 today. OK to stop warfarin now and start Xarelto 20mg daily tonight or tomorrow AM, then continue q24h moving forward.     2) OK to proceed with interventions as needed per sports med (surgeries/procedures/immobilization/etc).   Prior to most surgeries, hold Xarelto for 48h prior and re-start 12-24 hours after barring any bleeding complications. The patient was instructed to contact our clinic for specific recommendations should she require surgery.      3) Follow-up with me in 6-12 months.    The patient understands and agrees with the above recommendations. The patient was given our center's contact information and was instructed to call if any further questions/concerns arise.    Flores Huerta RN, nurse clinician also met with the patient in clinic today.    60 minutes spent on the date of the encounter doing chart review, history and exam, documentation and further activities per the note.           Deanna Galindo PA-C, [facility]  [facility]  [address] [place] [address], [address] [address]  Main: [phone], Fax: [phone]  -----------------------------------------------------------------------------------------------------------------------------------  ADDENDUM 10/17/2022:    To review, this patient's ultrasound on 09/06/2022 demonstrated resolution of the left lower extremity thrombus.     She came to see me on 09/30/2022, at which time her INR was subtherapeutic. Of note, it had also been subtherapeutic on the previous check. She was switched to Xarelto.    Then, on 10/12/2022, she had a repeat left lower extremity ultrasound with an outside provider that demonstrated a new left lower extremity clot. It is difficult to discern when exactly this new clot developed but it is plausible that it developed when the patient was subtherapeutic on warfarin.     I recommended continuing Xarelto and advised her to keep her her follow-up appointment for a repeat ultrasound in November to assess progression.    VENOUS ULTRASOUND LEFT LEG  10/12/2022 10:09 AM   HISTORY: Acute deep vein thrombosis of left popliteal vein (H)  COMPARISON: None.  FINDINGS:  Examination of the deep veins with graded compression and  color flow Doppler with spectral wave form analysis was performed.  There is partially occlusive thrombus in one of the left posterior  tibial veins extending from the proximal to distal calf.         IMPRESSION: Left below-knee DVT in a posterior tibial vein.          Deanna Galindo PA-C, [facility]  [address] [place] [address], [address] [place] [address]  Main: [phone], Fax: [phone]    --------------------------------------------------------------------------------------------------------------  ADDENDUM 11/14/2022:                                                                    LEFT LOWER EXTREMITY ULTRASOUND 11/14/2022  IMPRESSION:  1.  Persistent thrombus in the midportion of the posterior tibial  vein, improved as compared to the prior exam.      ASSESSMENT/PLAN:  Left lower extremity clot is improved, but still present. Continue Xarelto 20mg daily. Repeat this ultrasound mid-late January 2023.       Patient informed us that her left knee surgery is tentatively planned for end of February 2023.          Deanna Galindo PA-C, [facility]  [facility]  [address] [place] [address], [address], [place] [address]  Main: [phone], Fax: [phone]

## 2022-09-30 NOTE — PATIENT INSTRUCTIONS
[facility]  [facility]  [facility], [address], [place] [address]  Main: [phone], Fax: [phone]          PLAN:    INR check today. What it is will determine when we start the Xarelto.    Once I OK it, start Xarelto 20mg daily.     While on it, it is OK to wear your knee brace, proceed with any necessary surgeries/procedures/immobilization/etc.     Prior to most surgeries, you will have to hold the Xarelto for 48h prior and re-start 12-24 hours after. Please contact us prior to scheduled surgeries to discuss specific recommendations.    Follow-up with me in 6-12 months.  ----    Heterozygous Factor V Leiden mutation can increase your risk for venous blood clots (such as deep vein thrombosis and pulmonary embolism), but it is a relatively weak risk factor.    Factor V Leiden exists in ~5% of the  population and only ~10% of those affected are expected to develop a blood clot in their lifetime, and typically those that do develop a blood clot have additional contributing risk factors (such as oral estrogen use, long distance travel lasting >6 hours, immobility, cancer/cancer treatments, trauma/injury/infection, major surgery)    Having heterozygous Factor V Leiden alone increases the risk of developing a venous thromboembolism by 5-7 fold, but the presence of heterozygous Factor V Leiden combined with the use of oral estrogen (ie combined oral contraceptives, HRT) increases the risk of developing venous thromboembolism by 15-35 fold.     The risk of venous blood clots increases with age.    Knowing this, I caution against the use of oral estrogen (ie birth control pills, oral hormone replacement therapy) in individuals with the Factor V Leiden mutation. So, if any women in your family plan on using oral estrogen or getting pregnant, it may be wise to have them tested for Factor V Leiden.    Always be aware of the symptoms on deep vein  thrombosis (lower extremity pain, swelling, redness) and pulmonary embolism (chest pain, shortness of breath, cough with bloody sputum).    If you have ANY questions at all, do not hesitate to contact us.

## 2022-09-30 NOTE — PROGRESS NOTES
ANTICOAGULATION  MANAGEMENT    Radha Rodriguez is being discharged from the [facility]ticoagulation [facility] ([facility]).    Reason for discharge: warfarin replaced by alternate therapy, Xarelto    Anticoagulation episode resolved, ACC referral closed and Standing order discontinued    If patient needs warfarin management in the future, please send a new referral    Loretta Gordon RN

## 2022-09-30 NOTE — PROGRESS NOTES
ANTICOAGULATION MANAGEMENT     Radha Rodriguez 62 year old female is on warfarin with subtherapeutic INR result. (Goal INR 2.0-3.0)    Recent labs: (last 7 days)     09/30/22  1159   INR 1.89*       ASSESSMENT     Source(s): Chart Review  Previous INR was Therapeutic last 2(+) visits  Medication, diet, health changes since last INR chart reviewed; none identified           PLAN     Unable to reach Radha today.    LMTCB    Follow up required to confirm warfarin dose taken and assess for changes and discuss out of range result     Loretta Gordon RN  Anticoagulation Clinic  9/30/2022

## 2022-10-05 NOTE — TELEPHONE ENCOUNTER
Form needs to go to provider who ordered compression stockings. Form from [facility] sent to Deanna Galindo in  Hematology via [facility] office

## 2022-10-07 ENCOUNTER — TELEPHONE (OUTPATIENT)
Dept: FAMILY MEDICINE | Facility: CLINIC | Age: 62
End: 2022-10-07

## 2022-10-07 NOTE — TELEPHONE ENCOUNTER
Patient Quality Outreach    Patient is due for the following:   Chronic Opioid Use -  Treatment Agreement (CSA), Urine Drug Screen, MICHELLE-7 and PHQ-9    Next Steps:   Patient has upcoming appointment, these items will be addressed at that time.    Type of outreach:    Chart review performed, no outreach needed.      Questions for provider review:    None     Ricarda Khalil MA

## 2022-10-12 ENCOUNTER — ANCILLARY PROCEDURE (OUTPATIENT)
Dept: ULTRASOUND IMAGING | Facility: CLINIC | Age: 62
End: 2022-10-12
Attending: INTERNAL MEDICINE
Payer: COMMERCIAL

## 2022-10-12 ENCOUNTER — DOCUMENTATION ONLY (OUTPATIENT)
Dept: LAB | Facility: OTHER | Age: 62
End: 2022-10-12

## 2022-10-12 DIAGNOSIS — I82.432 ACUTE DEEP VEIN THROMBOSIS OF LEFT POPLITEAL VEIN (H): ICD-10-CM

## 2022-10-12 DIAGNOSIS — I82.442 ACUTE DEEP VEIN THROMBOSIS (DVT) OF TIBIAL VEIN OF LEFT LOWER EXTREMITY (H): Primary | ICD-10-CM

## 2022-10-12 DIAGNOSIS — G89.4 CHRONIC PAIN SYNDROME: ICD-10-CM

## 2022-10-12 PROCEDURE — 93971 EXTREMITY STUDY: CPT | Mod: TC | Performed by: RADIOLOGY

## 2022-10-17 ENCOUNTER — VIRTUAL VISIT (OUTPATIENT)
Dept: FAMILY MEDICINE | Facility: CLINIC | Age: 62
End: 2022-10-17
Payer: COMMERCIAL

## 2022-10-17 ENCOUNTER — TELEPHONE (OUTPATIENT)
Dept: FAMILY MEDICINE | Facility: CLINIC | Age: 62
End: 2022-10-17

## 2022-10-17 ENCOUNTER — MYC MEDICAL ADVICE (OUTPATIENT)
Dept: RHEUMATOLOGY | Facility: CLINIC | Age: 62
End: 2022-10-17

## 2022-10-17 DIAGNOSIS — I82.442 ACUTE DEEP VEIN THROMBOSIS (DVT) OF TIBIAL VEIN OF LEFT LOWER EXTREMITY (H): Primary | ICD-10-CM

## 2022-10-17 DIAGNOSIS — G89.29 CHRONIC RIGHT-SIDED THORACIC BACK PAIN: ICD-10-CM

## 2022-10-17 DIAGNOSIS — M54.6 CHRONIC RIGHT-SIDED THORACIC BACK PAIN: ICD-10-CM

## 2022-10-17 PROCEDURE — 99213 OFFICE O/P EST LOW 20 MIN: CPT | Mod: 95 | Performed by: INTERNAL MEDICINE

## 2022-10-17 RX ORDER — METHOCARBAMOL 500 MG/1
500 TABLET, FILM COATED ORAL 2 TIMES DAILY
Qty: 56 TABLET | Refills: 5 | Status: SHIPPED | OUTPATIENT
Start: 2022-10-17 | End: 2023-06-15

## 2022-10-17 NOTE — PATIENT INSTRUCTIONS
At [facility], we strive to deliver an exceptional experience to you, every time we see you. If you receive a survey, please complete it as we do value your feedback.  If you have MyChart, you can expect to receive results automatically within 24 hours of their completion.  Your provider will send a note interpreting your results as well.   If you do not have MyChart, you should receive your results in about a week by mail.    Your care team:                            Family Medicine Internal Medicine   MD Temo Lott MD Shantel Branch-Fleming, MD Srinivasa Vaka, MD Katya Belousova, PHONG Prieto CNP, MD ([facility]   Jeremy Rodriguez, MD Marzena Mccullough MD Amelia Massimini APRN CNP Kim Thein, APRN CNP Bethany Templen, MD             Clinic hours: Monday - Thursday 7 am-6 pm; Fridays 7 am-5 pm.   Urgent care: Monday - Friday 10 am- 8 pm; Saturday and Sunday 9 am-5 pm.    Clinic: [phone]       [facility]: Monday - Thursday 8 am - 7 pm; Friday 8 am - 6 pm  [facility]: [phone]

## 2022-10-17 NOTE — PROGRESS NOTES
Radha is a 62 year old who is being evaluated via a billable video visit.      How would you like to obtain your AVS? MyChart  If the video visit is dropped, the invitation should be resent by: Text to cell phone: [phone]  Will anyone else be joining your video visit? No     SUBJECTIVE  This is a 62 year old presenting for the following health issues:    History of Present Illness       Back Pain:  She presents for follow up of back pain. Patient's back pain is a chronic problem.  Location of back pain:  Right middle of back  Description of back pain: burning, dull ache, gnawing, sharp and shooting  Back pain spreads: nowhere    Since patient first noticed back pain, pain is: always present, but gets better and worse  Does back pain interfere with her job:  Yes      Reason for visit:  Arthritis Pain, Right Back and Left Knee Pain, Blood Clot    She eats 2-3 servings of fruits and vegetables daily.She consumes 0 sweetened beverage(s) daily.She exercises with enough effort to increase her heart rate 10 to 19 minutes per day.  She exercises with enough effort to increase her heart rate 3 or less days per week.   She is taking medications regularly.     Of Note: Pt taking Hydroxychloroquine 200 mg 2 tablets PO daily. A lot of unbelievable pain (arthritis) at multiple joints. I was previously on Methotrexate, which is more effective.    I just dropped an object since my thumb couldn't grab it. I used muscle relaxants prescribed by Dr. Colindres (Methocarbamol twice a day). My knee has been really sore, like it's giving out. I wonder if my back hurts so much due to my knee.    Previously on Eliquis x 5 days, that led to worsening leg pain > another DVT. Switched to Warfarin. After Hematology consultation, patient was switched to Xarelto. She received VERENA stockings from [facility].    Travelling to [place] on October 30, 2022. Can I travel? Will the T.ELATOYA hose help?    REVIEW OF SYSTEMS   CONSTITUTIONAL:  NEGATIVE for fever, chills, change in weight  INTEGUMENTARY/SKIN: NEGATIVE for worrisome rashes, moles or lesions  EYES: NEGATIVE for vision changes or irritation  ENT/MOUTH: NEGATIVE for ear, mouth and throat problems  RESP: NEGATIVE for significant cough or SOB  BREAST: NEGATIVE for masses, tenderness or discharge  CV: NEGATIVE for chest pain, palpitations or peripheral edema  GI: NEGATIVE for nausea, abdominal pain, heartburn, or change in bowel habits  : NEGATIVE for frequency, dysuria, or hematuria  MUSCULOSKELETAL:As above.  NEURO: NEGATIVE for weakness, dizziness or paresthesias  ENDOCRINE: NEGATIVE for temperature intolerance, skin/hair changes  HEME/ALLERGY/IMMUNE: As above.  PSYCHIATRIC: NEGATIVE for changes in mood or affect      OBJECTIVE  Vitals:  No vitals were obtained today due to virtual visit.  Physical Exam   Remainder of exam not performed due to nature of visit.    ASSESSMENT/PLAN  VENOUS ULTRASOUND LEFT LEG  10/12/2022 10:09 AM      HISTORY: Acute deep vein thrombosis of left popliteal vein (H)     COMPARISON: None.     FINDINGS:  Examination of the deep veins with graded compression and  color flow Doppler with spectral wave form analysis was performed.  There is partially occlusive thrombus in one of the left posterior  tibial veins extending from the proximal to distal calf.                                                                      IMPRESSION: Left below-knee DVT in a posterior tibial vein.     TAMERA POWER MD     ULTRASOUND LOWER EXTREMITY VENOUS DUPLEX LEFT 9/6/2022 4:53 PM     CLINICAL HISTORY: Acute deep vein thrombosis (DVT) of popliteal vein  of left lower extremity (H). Occlusive left popliteal through  posterior tibial vein deep venous thrombosis in previous ultrasound.     COMPARISONS: Ultrasound lower extremity venous duplex left.     REFERRING PROVIDER: DARSHANA JAUREGUI VOCAL     TECHNIQUE: Grayscale, color Doppler, Doppler waveform ultrasound  evaluation was  performed through the left common femoral, femoral, and  popliteal veins. Left posterior tibial and peroneal veins were  evaluated with grayscale imaging and compression.     Right common femoral vein was evaluated for symmetry.     FINDINGS: Right common femoral vein is patent, fully compressible, and  demonstrates normal phasic Doppler waveform.     Left common femoral, femoral, and popliteal veins are fully  compressible, patent, and demonstrate normal phasic Doppler waveforms  and/or augment normally.     Left posterior tibial veins are fully compressible to the ankle.     Left peroneal veins are fully compressible to the distal calf.                                                                      IMPRESSION: Previous left popliteal through posterior tibial deep  venous thrombosis has cleared. No deep venous thrombosis demonstrated  in the LEFT leg.     MARCIAL TODD MD     ASSESSMENT/PLAN  Acute deep vein thrombosis (DVT) of tibial vein of left lower extremity (H)  Venous doppler last 10/12/2022 is POSITIVE for DVT, affecting left tibial vein. Patient was started on Xarelto last 9/30/2022. Venous doppler last 9/6/2022 is NEGATIVE for DVT.  - US Lower Extremity Venous Duplex Left; Future    Chronic right-sided thoracic back pain  Secondary to right anterolateral syndesmophytes/degenerative disc disease.  - methocarbamol (ROBAXIN) 500 MG tablet; Take 1 tablet (500 mg) by mouth 2 times daily    Disposition:  Follow-up in 4 weeks.    Temo Fletcher MD  Internal Medicine    Phone call duration: 20 minutes  Start: 10:40 AM  End: 11:00 AM

## 2022-10-17 NOTE — TELEPHONE ENCOUNTER
[employer] Dis[employer] form completed by Dr. Fletcher. Copy placed in abstract and tc bin and original placed at the  for pt . Called pt so she is aware this is ready for .

## 2022-10-18 ENCOUNTER — TELEPHONE (OUTPATIENT)
Dept: RHEUMATOLOGY | Facility: CLINIC | Age: 62
End: 2022-10-18

## 2022-10-18 NOTE — TELEPHONE ENCOUNTER
Flare in L thumb- proximal digit (same spot as usual) red, swollen, painful l/t dropping objects, not warm  L knee- painful, red, swollen, not warm  Right mid- (bra line) not able to tell if it is red, swollen, or warm.    Fwd to MD Monica CUI, RN Specialty Triage 10/18/2022 11:52 AM

## 2022-10-18 NOTE — TELEPHONE ENCOUNTER
Reason for Call:  Other call back    Detailed comments: Radha is wondering if she can get   a cortisone shot in her thumb or if she's not allowed to because she is having blood clots in her legs. Please call her back to advise.    Phone Number Patient can be reached at: Home number on file [phone] (home)    Best Time: any    Can we leave a detailed message on this number? YES    Call taken on 10/18/2022 at 10:25 AM by Polina Puente

## 2022-10-20 NOTE — TELEPHONE ENCOUNTER
RN: I replied to Radha in MyChart, but she will need a follow-up appointment next week either by video or in clinic.  There is a time on hold on Monday and virtual openings on Tuesday. Please call her to assist with scheduling.     Thank you,  Abdoul Eli MD  10/20/2022

## 2022-10-20 NOTE — TELEPHONE ENCOUNTER
RN: we can still do joint injections when on anticoagulation; please see if she would like to come in on Monday, 10/24/2022; I have a time on hold with her MRN.  Please also not that there is a Mychart encounter open for Radha as well.     Abdoul Eli MD  10/20/2022

## 2022-10-20 NOTE — CONFIDENTIAL NOTE
Called patient and scheduled appointment with Dr. Eli.     Ame BOOKER RN, Specialty Clinic 10/20/22 9:11 AM

## 2022-10-24 ENCOUNTER — OFFICE VISIT (OUTPATIENT)
Dept: RHEUMATOLOGY | Facility: CLINIC | Age: 62
End: 2022-10-24
Payer: COMMERCIAL

## 2022-10-24 VITALS
WEIGHT: 182.8 LBS | BODY MASS INDEX: 32.38 KG/M2 | OXYGEN SATURATION: 97 % | SYSTOLIC BLOOD PRESSURE: 129 MMHG | HEART RATE: 73 BPM | DIASTOLIC BLOOD PRESSURE: 74 MMHG

## 2022-10-24 DIAGNOSIS — M18.12 PRIMARY OSTEOARTHRITIS OF FIRST CARPOMETACARPAL JOINT OF LEFT HAND: ICD-10-CM

## 2022-10-24 DIAGNOSIS — M06.4 INFLAMMATORY POLYARTHROPATHY (H): Primary | ICD-10-CM

## 2022-10-24 PROCEDURE — 99214 OFFICE O/P EST MOD 30 MIN: CPT | Mod: 25 | Performed by: INTERNAL MEDICINE

## 2022-10-24 PROCEDURE — 20600 DRAIN/INJ JOINT/BURSA W/O US: CPT | Mod: LT | Performed by: INTERNAL MEDICINE

## 2022-10-24 RX ORDER — HYDROXYCHLOROQUINE SULFATE 200 MG/1
400 TABLET, FILM COATED ORAL DAILY
Qty: 180 TABLET | Refills: 1 | Status: ON HOLD
Start: 2022-10-24 | End: 2023-02-28

## 2022-10-24 RX ORDER — METHYLPREDNISOLONE ACETATE 40 MG/ML
10 INJECTION, SUSPENSION INTRA-ARTICULAR; INTRALESIONAL; INTRAMUSCULAR; SOFT TISSUE ONCE
Status: COMPLETED | OUTPATIENT
Start: 2022-10-24 | End: 2022-10-24

## 2022-10-24 RX ADMIN — METHYLPREDNISOLONE ACETATE 10 MG: 40 INJECTION, SUSPENSION INTRA-ARTICULAR; INTRALESIONAL; INTRAMUSCULAR; SOFT TISSUE at 08:47

## 2022-10-24 NOTE — PROGRESS NOTES
Rheumatology Clinic Visit      Radha Rodriguez MRN# [id number]   YOB: 1960 Age: 62 year old      Date of visit: 10/24/22   PCP: Dr. Temo Fletcher    Chief Complaint   Patient presents with:  Inflammatory polyarthropathy : Medication changes and would like an injection in left thumb if ok with blood clot. Back is worse and knee is worse    Assessment and Plan     1. Inflammatory polyarthropathy: Previously followed by Rojas Vasques and Shazia.  Established care with me on 4/24/2018.  Previously on Humira (ineffective), HCQ (used with MTX), MTX (25mg wkly was effective and dose reductions resulted in worsening joint symptoms, but then she stopped on her own during the COVID19 pandemic without any worsening inflammatory arthritis symptoms).  She did well for a while after stopping medications in 2020 without a DMARD but then with full body aches that and stiffness that was worse in the morning and improved with time and activity; fullness of the bilateral second and third MCPs with prolonged morning stiffness; hydroxychloroquine was started with near resolution of the inflammatory MCP symptoms, but then presented with severe arthritis at the MCPs and PIPs so methotrexate was started with significant improvement but mild synovitis still on exam previously so methotrexate dose was increased with LFT elevation so had to be held for a couple weeks before being restarted in late August, and then the patient associated skin lesions on her arms and abdomen with methotrexate but it was not clearly associated so she had stopped methotrexate.  She had also stopped hydroxychloroquine in June 2022.  Active arthritis at this time.  Hydroxychloroquine was restarted in September 2022.  Discussed adding another biologic such as Enbrel or Orencia, or simply waiting for Humira to have a longer duration of therapy to see if it will be effective on its own.  She would like to continue using hydroxychloroquine monotherapy for  now and this is reasonable.  At follow-up consider adding a biologic DMARD or leflunomide if needed. Chronic illness, progressive.    - Continue hydroxychloroquine 400mg daily (last eye exam on 2/11/2022 by Dr. Aly)    2.  Primary osteoarthritis of the left first carpometacarpal joint: improved with steroid injections in the past.  Repeat needed today.  Risks and side effects were reviewed in detail, including the risk for bleeding; steroid injection as documented in the procedure section.    3.  Bilateral knee osteoarthritis hx; now with just left knee pain:  Previous injections were helpful.  MRI showed meniscal issue and evidence of insufficiency fracture.  She has followed with orthopedics where she says that she was advised to wear an immobilization brace but then she was told not to wear because of DVT but the DVT has since cleared and she is still on Coumadin; she has since established care with hematology who is treating with rivaroxaban, advised to follow-up with orthopedics in early November 2022.       4. Chronic back pain: went to [facility] where steroids and gabapentin were Rx'd but she doesn't do well with steroids per patient and gabapentin has only been partially helpful. Was following at the [facility] clinic, but was referred by [facility] provider to [facility] per patient.  Now following with Dr. Xavi Guerrero at [facility].    5.  History of rash: previously on abdomen and now just on arms. Unclear etiology. Less likely MTX associated but cannot rule out.  She managed with her PCP.  Not an issue today.    6.  Vaccinations:   - Influenza: Advised yearly vaccination  - [id number]: up to date  - [id number]: up to date  - TDAP: up to date  - COVID-19: Advised receiving the updated COVID-19 vaccination    Total minutes spent in evaluation with patient, documentation, , and review of pertinent studies and chart notes: 16      Ms. Rodriguez verbalized agreement with and understanding of  the rational for the diagnosis and treatment plan.  All questions were answered to best of my ability and the patient's satisfaction. Ms. Rodriguez was advised to contact the clinic with any questions that may arise after the clinic visit.      Thank you for involving me in the care of the patient    Return to clinic: 3-4 months    HPI   Radha Rodriguez is a 62 year old female with a past medical history significant for possible Crohn's disease requiring fistula surgery in the past, osteoarthritis, inflammatory arthritis who is seen for follow-up of arthritis.    Today, 10/24/2022: Taking hydroxychloroquine twice daily.  Planning to see orthopedics for the knee pain in early November.  Has seen hematology for the DVT and is now on rivaroxaban.  No recurrence of the rash that was on her abdomen and arms.  Morning stiffness for at least 2 hours.  Pain with activity at the left first carpometacarpal joint and she would like to have a steroid injection of this joint today    Tobacco: none  EtOH: occasional  Drugs: none    ROS   12 point review of system was completed and negative except as noted in the HPI     Active Problem List     Patient Active Problem List   Diagnosis     CARDIOVASCULAR SCREENING; LDL GOAL LESS THAN 160     Vitamin D deficiency     Inflammatory polyarthropathy (H)     High risk medications (not anticoagulants) long-term use     Osteoarthritis     Menopausal syndrome (hot flashes)     Right lateral epicondylitis     Immunosuppressed status (H)     Facet arthropathy, thoracic     Back muscle spasm     Upper back strain     Upper back pain, chronic     Chronic midline thoracic back pain     Thoracic degenerative disc disease     High risk medication use     Pseudophakia, Yag Caps, ou (Hx of refractive lens exchange, ou (Lobanoff)     Diffuse idiopathic skeletal hyperostosis of thoracolumbar spine     Acute deep vein thrombosis (DVT) of left lower extremity, unspecified vein (H)     Acute deep vein  thrombosis (DVT) of tibial vein of left lower extremity (H)     Chronic pain syndrome     Past Medical History     Past Medical History:   Diagnosis Date     Arthritis      Headache(784.0)      Irritable bowel syndrome      Other and unspecified noninfectious gastroenteritis and colitis(558.9)     chronic     Past Surgical History     Past Surgical History:   Procedure Laterality Date     CATARACT IOL, RT/LT      Refractive lens exchange ou (Lobanoff)     COLONOSCOPY  10/14/2011    Procedure:COLONOSCOPY; Colonoscopy; Surgeon:SCOTTY LEDEZMA; Location:[facility]     ENHANCE LASER REFRACTIVE BILATERAL EXISTING PT IN PARAMETERS       HYSTERECTOMY, VAGINAL  01/01/2000    TVH, fibroids (still has ovaries)     SURGICAL HISTORY OF -       Tubal Ligation     SURGICAL HISTORY OF -       Appy     SURGICAL HISTORY OF -       Tonsils     SURGICAL HISTORY OF -   12/1994    Anal Fistulotomy     ZZC REPLACEMENT,URETER,BOWEL SEGMENT  10/01/2008    Part of her ureter was repaired.     Allergy     Allergies   Allergen Reactions     Sulfa Drugs Rash     Codeine Hives     Clindamycin Rash     Current Medication List     Current Outpatient Medications   Medication Sig     amLODIPine (NORVASC) 5 MG tablet TAKE 1 TABLET BY MOUTH TWICE A DAY     methocarbamol (ROBAXIN) 500 MG tablet Take 1 tablet (500 mg) by mouth 2 times daily     rivaroxaban ANTICOAGULANT (XARELTO) 20 MG TABS tablet Take 1 tablet (20 mg) by mouth daily with food     No current facility-administered medications for this visit.     Social History   See HPI    Family History     Family History   Problem Relation Age of Onset     Heart Disease Father         Heart attack     C.A.D. Father         age 50     Hypertension Father      Cancer Maternal Grandfather      Alzheimer Disease Maternal Grandfather      Cancer Paternal Grandfather      Diabetes No family hx of      Cerebrovascular Disease No family hx of      Breast Cancer No family hx of      Cancer - colorectal  "No family hx of      Prostate Cancer No family hx of      Physical Exam     Temp Readings from Last 3 Encounters:   09/30/22 98.6  F (37  C) (Oral)   08/15/22 99.1  F (37.3  C) (Oral)   03/07/22 97.5  F (36.4  C) (Tympanic)     BP Readings from Last 5 Encounters:   10/24/22 129/74   09/30/22 (!) 145/80   09/23/22 (!) 149/87   08/15/22 135/82   05/16/22 (!) 146/85     Pulse Readings from Last 1 Encounters:   10/24/22 73     Resp Readings from Last 1 Encounters:   08/15/22 20     Estimated body mass index is 32.38 kg/m  as calculated from the following:    Height as of 9/30/22: 1.6 m (5' 3\").    Weight as of this encounter: 82.9 kg (182 lb 12.8 oz).    GEN: NAD.   HEENT:  Anicteric, noninjected sclera. No obvious external lesions of the ear and nose. Hearing intact.  PULM: No increased work of breathing.    MSK: Synovial swelling and tenderness to palpation of the bilateral second and third MCPs.  Heberden's nodes present.  Squaring and tenderness to palpation without effusion or increased warmth of the left first CMC joint.  Right first carpometacarpal joint without swelling or tenderness to palpation.  Right knee without swelling or tenderness to palpation.  Left knee without swelling but was very tender over the pes anserine bursa and at the medial joint line; no effusion, increased warmth, or overlying erythema.  Ankles and MTPs without swelling or tenderness to palpation   SKIN: No rash  PSYCH: Alert. Appropriate.       Labs / Imaging (select studies)     RF/CCP  Recent Labs   Lab Test 06/01/15  1139   CCPABY <20  Interpretation:  Negative     RHF <20     CBC  Recent Labs   Lab Test 09/20/22  1121 08/18/22  1004 08/08/22  1044 10/11/21  1704 03/29/21  1431 01/04/21  1619 12/23/19  1620 09/25/19  1117   WBC 7.4 6.7 6.7   < > 6.9   < > 8.4 7.4   RBC 4.39 4.23 4.06   < > 4.47   < > 3.86 3.96   HGB 13.4 13.4 13.1   < > 13.6   < > 12.9 13.3   HCT 40.9 40.4 39.3   < > 41.3   < > 37.8 38.4   MCV 93 96 97   < > 92   < > " 98 97   RDW 13.1 13.4 14.4   < > 12.5   < > 13.1 13.9    262 242   < > 226   < > 220 219   MCH 30.5 31.7 32.3   < > 30.4   < > 33.4* 33.6*   MCHC 32.8 33.2 33.3   < > 32.9   < > 34.1 34.6   NEUTROPHIL 69 72 70   < > 64.0  --  69.7 73.0   LYMPH 19 17 19   < > 26.5  --  20.6 17.8   MONOCYTE 9 7 9   < > 6.4  --  6.7 6.5   EOSINOPHIL 2 2 2   < > 2.8  --  2.6 2.3   BASOPHIL 1 0 1   < > 0.3  --  0.4 0.4   ANEU  --   --   --   --  4.4  --  5.8 5.4   ALYM  --   --   --   --  1.8  --  1.7 1.3   EDWARD  --   --   --   --  0.4  --  0.6 0.5   AEOS  --   --   --   --  0.2  --  0.2 0.2   ABAS  --   --   --   --  0.0  --  0.0 0.0   ANEUTAUTO 5.1 4.8 4.7   < >  --   --   --   --    ALYMPAUTO 1.4 1.2 1.2   < >  --   --   --   --    AMONOAUTO 0.7 0.5 0.6   < >  --   --   --   --    AEOSAUTO 0.2 0.2 0.1   < >  --   --   --   --    ABSBASO 0.0 0.0 0.0   < >  --   --   --   --     < > = values in this interval not displayed.     CMP  Recent Labs   Lab Test 09/20/22  1121 08/18/22  1004 08/08/22  1044 10/11/21  1704 03/29/21  1431 01/04/21  1619 12/23/19  1620 04/24/18  1557 10/11/17  1207   NA  --   --   --   --  140 140  --   --  138   POTASSIUM  --   --   --   --  4.0 3.8  --   --  4.1   CHLORIDE  --   --   --   --  109 105  --   --  105   CO2  --   --   --   --  29 27  --   --  28   ANIONGAP  --   --   --   --  2* 8  --   --  5   GLC  --   --   --   --  85 86  --   --  86   BUN  --   --   --   --  11 14  --   --  18   CR 0.76 0.71 0.71   < > 0.77 0.75 0.73   < > 0.73   GFRESTIMATED 89 >90 >90   < > 85 87 >90   < > 82   GFRESTBLACK  --   --   --   --  >90 >90 >90   < > >90   ENEIDA  --   --   --   --  9.0 9.1  --   --  9.2   BILITOTAL 0.5 0.4 0.4   < > 0.5 0.4 0.3   < > 0.7   ALBUMIN 4.1 3.8 3.8   < > 3.9 3.9 3.8   < > 3.9   PROTTOTAL 6.9 6.8 6.7*   < > 6.6* 7.1 6.7*   < > 6.9   ALKPHOS 60 58 60   < > 62 58 68   < > 57   AST 21 25 122*   < > 17 18 21   < > 28   ALT 32 78* 136*   < > 28 32 37   < > 44    < > = values in this interval  not displayed.     Calcium/VitaminD  Recent Labs   Lab Test 06/11/21  1014 03/29/21  1431 01/04/21  1619 10/11/17  1207 06/01/15  1139 09/18/14  1129   ENEIDA  --  9.0 9.1 9.2   < >  --    VITDT 26  --   --   --   --  38    < > = values in this interval not displayed.     ESR/CRP  Recent Labs   Lab Test 08/08/22  1044 05/02/22  1252 10/11/21  1704   SED 8 7 7   CRP 5.5 <2.9 4.0     Hepatitis B  Recent Labs   Lab Test 04/24/18  1557   HBCAB Nonreactive   HEPBANG Nonreactive     Hepatitis C  Recent Labs   Lab Test 06/01/15  1139   HCVAB Nonreactive   Assay performance characteristics have not been established for newborns,   infants, and children       Lyme ab screening  Recent Labs   Lab Test 03/29/21  1431   LYMEGM 0.04     Tuberculosis Screening  Recent Labs   Lab Test 06/01/15  1139   TBRSLT Negative   TBAGN 0.00     HIV Screening  Recent Labs   Lab Test 08/18/22  1004   HIAGAB Nonreactive     Immunization History     Immunization History   Administered Date(s) Administered     COVID-19,PF,Pfizer (12+ Yrs) 01/25/2021, 02/15/2021     HepB 01/24/1991, 02/28/1991, 09/05/1991     Influenza Quad, Recombinant, pf(RIV4) (Flublok) 10/01/2019, 10/14/2021     Pneumo Conj 13-V (2010&after) 10/01/2019     Pneumococcal 23 valent 12/31/2019     TD (ADULT, 7+) 03/12/1990, 01/01/2000     TDAP Vaccine (Adacel) 09/10/2008     TDAP Vaccine (Boostrix) 09/10/2008     Tdap (Adacel,Boostrix) 09/10/2008, 03/01/2011, 10/14/2021     Procedure     Procedure: Steroid injection of the left 1st CMC joint  Indication: Pain, osteoarthritis     The procedure was explained in detail. Risks including infection, pain, structural damage such as cartilage damage and tendon rupture, fat atrophy, skin hyper-/hypo-pigmentation, and medication reaction was explained. The need for rest of the affected joint for one week after the procedure was explained.  The option of not doing the procedure was also provided. All questions were answered and the patient  consented to the procedure.     A time-out was performed and the correct patient, procedure, and laterality were verified.    The left 1st carpometacarpal joint was examined and location for injection was identified. The area was cleaned with chlorhexidine, twice.  Ethyl chloride was then used for topical anaesthetic.  Then methylprednisolone 10mg was injected into the intra-articular space.     The patient tolerated the procedure well. No complications.    1% Lidocaine  : Hospira  Lot #: [id number]  EXPIRATION DATE: 1 SEPT 2023  NDC: 6748-1555-59    MEDICATION: Methylprednisolone  : Amneal  LOT #: [id number]  EXPIRATION DATE:  12/2023  NDC#: [address]-0         Chart documentation done in part with Dragon Voice recognition Software. Although reviewed after completion, some word and grammatical error may remain.    Abdoul Eli MD

## 2022-10-24 NOTE — NURSING NOTE
RAPID3 (0-30) Cumulative Score  17.7          RAPID3 Weighted Score (divide #4 by 3 and that is the weighted score)  5.9

## 2022-10-24 NOTE — PATIENT INSTRUCTIONS
RHEUMATOLOGY    Dr. Abdoul Eli    [facility]  [address] [place] [address]  [place] [address]  Phone number: [phone]  Fax number: [phone]    You may schedule your FLU shot by calling [phone] or if you would like to get your shot at a [place] pharmacy you may schedule online at [email or web address].    Thank you for choosing [facility]!    Mi Irwin CMA Rheumatology

## 2022-11-10 ENCOUNTER — TRANSFERRED RECORDS (OUTPATIENT)
Dept: HEALTH INFORMATION MANAGEMENT | Facility: CLINIC | Age: 62
End: 2022-11-10

## 2022-11-14 ENCOUNTER — ANCILLARY PROCEDURE (OUTPATIENT)
Dept: ULTRASOUND IMAGING | Facility: CLINIC | Age: 62
End: 2022-11-14
Attending: INTERNAL MEDICINE
Payer: COMMERCIAL

## 2022-11-14 DIAGNOSIS — I82.442 ACUTE DEEP VEIN THROMBOSIS (DVT) OF TIBIAL VEIN OF LEFT LOWER EXTREMITY (H): ICD-10-CM

## 2022-11-14 DIAGNOSIS — I82.402 ACUTE DEEP VEIN THROMBOSIS (DVT) OF LEFT LOWER EXTREMITY, UNSPECIFIED VEIN (H): Primary | ICD-10-CM

## 2022-11-14 DIAGNOSIS — I87.009 POST-THROMBOTIC SYNDROME: ICD-10-CM

## 2022-11-14 PROCEDURE — 93971 EXTREMITY STUDY: CPT | Mod: LT | Performed by: RADIOLOGY

## 2022-11-15 ENCOUNTER — VIRTUAL VISIT (OUTPATIENT)
Dept: FAMILY MEDICINE | Facility: CLINIC | Age: 62
End: 2022-11-15
Payer: COMMERCIAL

## 2022-11-15 DIAGNOSIS — I82.502 CHRONIC DEEP VEIN THROMBOSIS (DVT) OF LEFT LOWER EXTREMITY, UNSPECIFIED VEIN (H): ICD-10-CM

## 2022-11-15 DIAGNOSIS — M48.10 DISH (DIFFUSE IDIOPATHIC SKELETAL HYPEROSTOSIS): Primary | ICD-10-CM

## 2022-11-15 PROCEDURE — 99213 OFFICE O/P EST LOW 20 MIN: CPT | Mod: 95 | Performed by: INTERNAL MEDICINE

## 2022-11-15 NOTE — PROGRESS NOTES
Radha is a 62 year old who is being evaluated via a billable video visit.      How would you like to obtain your AVS? MyChart  If the video visit is dropped, the invitation should be resent by: Send to e-mail at: [email or web address]  Will anyone else be joining your video visit? No     SUBJECTIVE  Radha is a 62 year old female with inflammatory polyarthropathy, currently on Hydroxychloroquine, presenting for the following health issues:    Back Pain:  She presents for follow up of back pain. Patient's back pain is a chronic problem.  Location of back pain:  Right middle of back  Description of back pain: burning, cramping, dull ache, fullness, gnawing, sharp, shooting and stabbing  Back pain spreads: nowhere    Since patient first noticed back pain, pain is: always present, but gets better and worse  Does back pain interfere with her job:  Yes    Reason for visit:  Blood clots  Symptom onset:  More than a month  Symptom progression:  Staying the same  Had these symptoms before:  Yes  Has tried/received treatment for these symptoms:  Yes  Previous treatment was successful:  No      REVIEW OF SYSTEMS   CONSTITUTIONAL: NEGATIVE for fever, chills, change in weight  INTEGUMENTARY/SKIN: NEGATIVE for worrisome rashes, moles or lesions  EYES: NEGATIVE for vision changes or irritation  ENT/MOUTH: NEGATIVE for ear, mouth and throat problems  RESP: NEGATIVE for significant cough or SOB  BREAST: NEGATIVE for masses, tenderness or discharge  CV: NEGATIVE for chest pain, palpitations or peripheral edema  GI: NEGATIVE for nausea, abdominal pain, heartburn, or change in bowel habits  : NEGATIVE for frequency, dysuria, or hematuria  MUSCULOSKELETAL:As above.  NEURO: NEGATIVE for weakness, dizziness or paresthesias  ENDOCRINE: NEGATIVE for temperature intolerance, skin/hair changes  HEME/ALLERGY/IMMUNE: POSITIVE  for recurrent DVT of left lower extremity.  PSYCHIATRIC: NEGATIVE for changes in mood or affect       OBJECTIVE  Vitals:  No vitals were obtained today due to virtual visit.  Physical Exam   GENERAL: Healthy, alert and no distress  EYES: Eyes grossly normal to inspection  HENT: normal cephalic/atraumatic, oropharynx clear and oral mucous membranes moist  RESP: No audible wheezes.  NEURO: mentation intact  PSYCH: Mentation appears normal, affect normal/bright, judgement and insight intact, normal speech and appearance well-groomed.    DIAGNOSTICS     EXAMINATION: US LOWER EXTREMITY VENOUS DUPLEX LEFT, 11/14/2022 10:01  AM      COMPARISON: DVT ultrasound 10/12/2022     HISTORY: Currently on anticoagulant therapy.  Acute deep vein thrombosis (DVT) of tibial vein of left lower  extremity (H)     TECHNIQUE:  Gray-scale evaluation with compression, spectral flow, and  color Doppler assessment of the deep venous system of the left leg  from groin to knee, and then at the ankle.     FINDINGS:  In the left lower extremity, the common femoral, superficial femoral,  popliteal veins demonstrate normal compressibility and blood flow.  There is normal compressibility, phasicity, and augmentation in the  right common femoral vein.     The midportion of the left posterior tibial vein is partially  compressible. The proximal and distal portion are fully compressible.                                                                      IMPRESSION:  1.  Persistent thrombus in the midportion of the posterior tibial  vein, improved as compared to the prior exam.     AN MD TORY     ASSESSMENT/PLAN  DISH (diffuse idiopathic skeletal hyperostosis), thoracic  No response to any type of pharmacotherapy.  - Physical Therapy Adult IP Consult    Chronic deep vein thrombosis (DVT) of left lower extremity, unspecified vein (H)  Affecting the left posterior tibial vein. Previously resolved with Warfarin, but currently on Xarelto. Otherwise, defer to Hematology.    Disposition:  Follow-up in 4 weeks.    Temo Fletcher MD  Internal  Medicine      Video-Visit Details    Type of service:  Video Visit    Joined the call at 11/15/2022, 10:27:09 am.  Left the call at 11/15/2022, 10:46:26 am.  You were on the call for 19 minutes 16 seconds .    Originating Location (pt. Location): Home    Distant Location (provider location):  On-site    Platform used for Video Visit: [employer]

## 2022-11-15 NOTE — PATIENT INSTRUCTIONS
At [facility], we strive to deliver an exceptional experience to you, every time we see you. If you receive a survey, please complete it as we do value your feedback.  If you have MyChart, you can expect to receive results automatically within 24 hours of their completion.  Your provider will send a note interpreting your results as well.   If you do not have MyChart, you should receive your results in about a week by mail.    Your care team:                            Family Medicine Internal Medicine   MD Temo Lott MD Shantel Branch-Fleming, MD Srinivasa Vaka, MD Katya Belousova, PAPHONG Higuera CNP, MD ([facility]   Jeremy Rodriguez, MD Marzena Mccullough MD Amelia Massimini APRN DAMIÁN Tejeda APRN MD Candido Acevedo MD          Clinic hours: Monday - Thursday 7 am-6 pm; Fridays 7 am-5 pm.   Urgent care: Monday - Friday 10 am- 8 pm; Saturday and Sunday 9 am-5 pm.    Clinic: [phone]       [facility]: Monday - Thursday 8 am - 7 pm; Friday 8 am - 6 pm  [facility]: [phone]

## 2022-11-19 ENCOUNTER — HEALTH MAINTENANCE LETTER (OUTPATIENT)
Age: 62
End: 2022-11-19

## 2022-12-18 ENCOUNTER — HEALTH MAINTENANCE LETTER (OUTPATIENT)
Age: 62
End: 2022-12-18

## 2023-01-16 ENCOUNTER — ANCILLARY PROCEDURE (OUTPATIENT)
Dept: ULTRASOUND IMAGING | Facility: CLINIC | Age: 63
End: 2023-01-16
Attending: PHYSICIAN ASSISTANT
Payer: COMMERCIAL

## 2023-01-16 DIAGNOSIS — I82.402 ACUTE DEEP VEIN THROMBOSIS (DVT) OF LEFT LOWER EXTREMITY, UNSPECIFIED VEIN (H): ICD-10-CM

## 2023-01-16 DIAGNOSIS — I87.009 POST-THROMBOTIC SYNDROME: ICD-10-CM

## 2023-01-16 PROCEDURE — 93971 EXTREMITY STUDY: CPT | Mod: LT | Performed by: RADIOLOGY

## 2023-01-17 ENCOUNTER — VIRTUAL VISIT (OUTPATIENT)
Dept: FAMILY MEDICINE | Facility: CLINIC | Age: 63
End: 2023-01-17
Payer: COMMERCIAL

## 2023-01-17 ENCOUNTER — TELEPHONE (OUTPATIENT)
Dept: HEMATOLOGY | Facility: CLINIC | Age: 63
End: 2023-01-17

## 2023-01-17 DIAGNOSIS — M48.10 DISH (DIFFUSE IDIOPATHIC SKELETAL HYPEROSTOSIS): Primary | ICD-10-CM

## 2023-01-17 DIAGNOSIS — I82.432 ACUTE DEEP VEIN THROMBOSIS (DVT) OF POPLITEAL VEIN OF LEFT LOWER EXTREMITY (H): ICD-10-CM

## 2023-01-17 PROCEDURE — 99213 OFFICE O/P EST LOW 20 MIN: CPT | Mod: 95 | Performed by: INTERNAL MEDICINE

## 2023-01-17 NOTE — PROGRESS NOTES
Radha is a 62 year old who is being evaluated via a billable video visit.      How would you like to obtain your AVS? MyChart  If the video visit is dropped, the invitation should be resent by: Text to cell phone: [phone]  Will anyone else be joining your video visit? No    [facility] Internal Medicine Progress Note           Assessment and Plan:   (M48.10) DISH (diffuse idiopathic skeletal hyperostosis) of the thoracic spine  (primary encounter diagnosis)  Comment: I explained to the patient that I highly recommend facet joint injection. However, she needs to discontinue anticoagulants before any procedure. Facet joint injections justified due to failure with conservative treatment.      (I82.432) Acute deep vein thrombosis (DVT) of popliteal vein of left lower extremity (H)  Comment: Results of venous doppler, obtained yesterday, explained to the patient. Defer to Hematology.           Interval History:     Radha is a 62 year female with nonspecific inflammatory polyarthropathy,  presenting for the following health issues:  Back Pain and Knee Pain    Back Pain:  She presents for follow up of back pain. Patient's back pain is a chronic problem.  Location of back pain:  Right middle of back  Description of back pain: burning, cramping, dull ache, fullness, gnawing, sharp, shooting and stabbing  Back pain spreads: nowhere  Since patient first noticed back pain, pain is: always present, but gets better and worse  Does back pain interfere with her job:  Yes    Left knee pain and left leg pain  Onset: 7/2022  Description: DVT of left popliteal vein  Course: improving  Associated symptoms: swelling resolved yet pain persists.  History: none prior to July 2022  Evaluation: Yes. Heterozygous for Factor V Leiden mutation.  Precipitating factor: Unknown, but patient also bilateral knee osteoarthritis  Alleviating factor: pharmacotherapy  Complications: None  Therapies tried and outcome: Eliquis                Significant Problems:   Patient Active Problem List   Diagnosis     CARDIOVASCULAR SCREENING; LDL GOAL LESS THAN 160     Vitamin D deficiency     Inflammatory polyarthropathy (H)     High risk medications (not anticoagulants) long-term use     Osteoarthritis     Menopausal syndrome (hot flashes)     Right lateral epicondylitis     Immunosuppressed status (H)     Facet arthropathy, thoracic     Back muscle spasm     Upper back strain     Upper back pain, chronic     Chronic midline thoracic back pain     Thoracic degenerative disc disease     High risk medication use     Pseudophakia, Yag Caps, ou (Hx of refractive lens exchange, ou ([place])     Diffuse idiopathic skeletal hyperostosis of thoracolumbar spine     Acute deep vein thrombosis (DVT) of left lower extremity, unspecified vein (H)     Acute deep vein thrombosis (DVT) of tibial vein of left lower extremity (H)     Chronic pain syndrome     DISH (diffuse idiopathic skeletal hyperostosis) of the thoracic spine              Review of Systems:   CONSTITUTIONAL: NEGATIVE for fever, chills, change in weight  INTEGUMENTARY/SKIN: NEGATIVE for worrisome rashes, moles or lesions  EYES: NEGATIVE for vision changes or irritation  ENT/MOUTH: NEGATIVE for ear, mouth and throat problems  RESP: NEGATIVE for significant cough or SOB  BREAST: NEGATIVE for masses, tenderness or discharge  CV: NEGATIVE for chest pain, palpitations or peripheral edema  GI: NEGATIVE for nausea, abdominal pain, heartburn, or change in bowel habits  : NEGATIVE for frequency, dysuria, or hematuria  MUSCULOSKELETAL:As above.  NEURO: NEGATIVE for weakness, dizziness or paresthesias  ENDOCRINE: NEGATIVE for temperature intolerance, skin/hair changes  HEME/ALLERGY/IMMUNE: As above.  PSYCHIATRIC: NEGATIVE for changes in mood or affect            Medications:     Current Outpatient Medications   Medication Sig     amLODIPine (NORVASC) 5 MG tablet TAKE 1 TABLET BY MOUTH TWICE A DAY      hydroxychloroquine (PLAQUENIL) 200 MG tablet Take 2 tablets (400 mg) by mouth daily     methocarbamol (ROBAXIN) 500 MG tablet Take 1 tablet (500 mg) by mouth 2 times daily     rivaroxaban ANTICOAGULANT (XARELTO) 20 MG TABS tablet Take 1 tablet (20 mg) by mouth daily with food     No current facility-administered medications for this visit.             Physical Exam:   There were no vitals taken for this visit.     Constitutional: Awake, alert, cooperative, no apparent distress, and appears stated age.  Eyes: extra-ocular muscles intact and sclera clear  ENT: normocepalic, without obvious abnormality  Lungs: no increased work of breathing and no retractions  Cardiovascular: regular rate and rhythm  Neurologic: Mental Status Exam:  Level of Alertness:   awake  Orientation:   person, place, time  Memory:   normal  Fund of Knowledge:  normal  Attention/Concentration:  normal  Language:  normal  Neuropsychiatric: Normal affect, mood, orientation, memory and insight.          Data:   ULTRASOUND LOWER EXTREMITY VENOUS DUPLEX LEFT 1/16/2023 10:55 AM     CLINICAL HISTORY: Hx of LLE DVT. Please assess for residual clot after  3 months of anticoagulation.; Acute deep vein thrombosis (DVT) of left  lower extremity, unspecified vein (H); Post-thrombotic syndrome.      COMPARISONS: Ultrasound lower extremity venous duplex left 11/14/2022.     REFERRING PROVIDER: SETH GARCIA     TECHNIQUE: Grayscale, color Doppler, Doppler waveform ultrasound  evaluation was performed through the left common femoral, femoral, and  popliteal veins. Left posterior tibial and peroneal veins were  evaluated with grayscale imaging and compression.     Right common femoral vein was evaluated for symmetry.     FINDINGS: Right common femoral vein is patent, fully compressible, and  demonstrates normal phasic Doppler waveform.     Left common femoral, femoral, and popliteal veins are fully  compressible, patent, and demonstrate normal phasic Doppler  "waveforms.     Left posterior tibial veins are fully compressible to the ankle.     Left peroneal veins are fully compressible to the distal calf.                                                                      IMPRESSION: Previous non occlusive deep venous thrombosis in the left  posterior tibial veins has cleared. No deep venous thrombosis  demonstrated in the LEFT leg.     Reference: \"Duplex Ultrasound in the Diagnosis of Lower-Extremity Deep  Venous Thrombosis\"- Yamilka Peralta MD, S; Oleg Powers MD  (Circulation. 2014;129:917-921. http://circ.ahajournals.org)     MARCIAL TODD MD     XR THORACIC SPINE FOUR OR MORE VIEWS   3/7/2022 11:34 AM      COMPARISON: Thoracic spine MRI 7/8/2021     HISTORY: Chronic right-sided thoracic back pain.                                                                        IMPRESSION: There is continued normal alignment of the thoracic  vertebrae. Vertebral body heights remain normal. No evidence for  fracture. Flowing right anterolateral syndesmophytes throughout the  majority of the thoracic spine from the T4-T5 level down to the  thoracolumbar junction again noted suggesting DISH.     HANNA CHUNG MD     Video-Visit Details    Type of service:  Video Visit     Joined the call at 1/17/2023, 12:20:18 pm.  Left the call at 1/17/2023, 12:31:01 pm.  You were on the call for 10 minutes 42 seconds .    Originating Location (pt. Location): Home    Distant Location (provider location):  On-site  Platform used for Video Visit: [facility]       Disposition:  Follow-up in 4 weeks.    Temo Fletcher MD  Internal Medicine  [facility] Team    "

## 2023-01-17 NOTE — PATIENT INSTRUCTIONS
At [facility], we strive to deliver an exceptional experience to you, every time we see you. If you receive a survey, please complete it as we do value your feedback.  If you have MyChart, you can expect to receive results automatically within 24 hours of their completion.  Your provider will send a note interpreting your results as well.   If you do not have MyChart, you should receive your results in about a week by mail.    Your care team:                            Family Medicine Internal Medicine   MD Temo Lott MD Shantel Branch-Fleming, MD Srinivasa Vaka, MD Katya Belousova, PAPHONG Higuera CNP, MD ([facility]   Jeremy Rodriguez, MD Marzena Mccullough MD Amelia Massimini APRN DAMIÁN Tejeda APRN MD Caniddo Acevedo MD          Clinic hours: Monday - Thursday 7 am-6 pm; Fridays 7 am-5 pm.   Urgent care: Monday - Friday 10 am- 8 pm; Saturday and Sunday 9 am-5 pm.    Clinic: [phone]       [facility]: Monday - Thursday 8 am - 7 pm; Friday 8 am - 6 pm  [facility]: [phone]

## 2023-01-17 NOTE — TELEPHONE ENCOUNTER
[facility]  [facility]  [facility], [address], [place] [address]  Main: [phone], Fax: [phone]    Telephone Note:    Patient: Radha Rodriguez  MRN: [id number]  : 1960  Date of this note written: 2023  Time: 16:15    Brief History:  Radha is a 62 year old female with a history of injury/immobility provoked DVT in 2022 who is currently on anticoagulation therapy with rivaroxaban at 20 mg PO Qday, contacted our clinic via [employer] inquiring about the ultimate duration of anticoagulation therapy and plans for her upcoming left total knee arthroplasty scheduled on 2023 and also thoracic spinal injection later in 2023. She was last seen by Deanna Galindo on 2022.     Telephone conversation:  This writer call the patient on 2023 at 16:15 and spoke with the patient.   Radha reports that she is not currently using any brace or any immobilizer over her left leg. She is now scheduled to undergo total left knee replacement on 2023. She is not currently immobilized and is able to ambulate.   Repeat venous ultrasound of the left leg showed no DVT.   She has completed >6 months of anticoagulation therapy at this time for a provoked DVT that has resolved and she is no longer immobilized and thus no clear indications for further anticoagulation therapy at this time. However, she will need pharmacological DVT/PE prophylaxis after her upcoming left total knee arthroplasty on 2023.     Plan is as follow:    I have instructed Radha to stop taking her rivaroxaban as of today.    She is cleared from our standpoint to proceed with thoracic spinal injection any time desires.     For her left total knee arthroplasty on 2023, it is our recommendation that she should be placed on rivaroxaban at 10 mg PO Qday dosing for a total of 6 weeks post surgery starting 12-24 hours after surgery assuming that she is  hemostatically stable.     Patient is in agreement with the above plan. She is instructed to call our clinic if she should have any further questions or concerns.      Jamar Tran PA-C, MPAS  Physician Assistant  [facility].

## 2023-02-05 PROBLEM — M48.10 DISH (DIFFUSE IDIOPATHIC SKELETAL HYPEROSTOSIS): Status: ACTIVE | Noted: 2023-02-05

## 2023-02-17 ENCOUNTER — OFFICE VISIT (OUTPATIENT)
Dept: FAMILY MEDICINE | Facility: CLINIC | Age: 63
End: 2023-02-17
Payer: COMMERCIAL

## 2023-02-17 VITALS
TEMPERATURE: 98.3 F | DIASTOLIC BLOOD PRESSURE: 82 MMHG | SYSTOLIC BLOOD PRESSURE: 122 MMHG | BODY MASS INDEX: 32.18 KG/M2 | HEIGHT: 63 IN | RESPIRATION RATE: 15 BRPM | OXYGEN SATURATION: 98 % | WEIGHT: 181.6 LBS | HEART RATE: 78 BPM

## 2023-02-17 DIAGNOSIS — M06.4 INFLAMMATORY POLYARTHROPATHY (H): ICD-10-CM

## 2023-02-17 DIAGNOSIS — D68.51 FACTOR 5 LEIDEN MUTATION, HETEROZYGOUS (H): ICD-10-CM

## 2023-02-17 DIAGNOSIS — D84.9 IMMUNOSUPPRESSED STATUS (H): ICD-10-CM

## 2023-02-17 DIAGNOSIS — Z29.89 NEED FOR SBE (SUBACUTE BACTERIAL ENDOCARDITIS) PROPHYLAXIS: ICD-10-CM

## 2023-02-17 DIAGNOSIS — Z01.818 PREOP GENERAL PHYSICAL EXAM: Primary | ICD-10-CM

## 2023-02-17 LAB
ALBUMIN SERPL-MCNC: 4 G/DL (ref 3.4–5)
ALP SERPL-CCNC: 63 U/L (ref 40–150)
ALT SERPL W P-5'-P-CCNC: 24 U/L (ref 0–50)
ANION GAP SERPL CALCULATED.3IONS-SCNC: 5 MMOL/L (ref 3–14)
AST SERPL W P-5'-P-CCNC: 11 U/L (ref 0–45)
BASOPHILS # BLD AUTO: 0 10E3/UL (ref 0–0.2)
BASOPHILS NFR BLD AUTO: 0 %
BILIRUB SERPL-MCNC: 0.4 MG/DL (ref 0.2–1.3)
BUN SERPL-MCNC: 17 MG/DL (ref 7–30)
CALCIUM SERPL-MCNC: 9.2 MG/DL (ref 8.5–10.1)
CHLORIDE BLD-SCNC: 109 MMOL/L (ref 94–109)
CO2 SERPL-SCNC: 29 MMOL/L (ref 20–32)
CREAT SERPL-MCNC: 0.8 MG/DL (ref 0.52–1.04)
EOSINOPHIL # BLD AUTO: 0.1 10E3/UL (ref 0–0.7)
EOSINOPHIL NFR BLD AUTO: 1 %
ERYTHROCYTE [DISTWIDTH] IN BLOOD BY AUTOMATED COUNT: 12.8 % (ref 10–15)
GFR SERPL CREATININE-BSD FRML MDRD: 84 ML/MIN/1.73M2
GLUCOSE BLD-MCNC: 101 MG/DL (ref 70–99)
HCT VFR BLD AUTO: 41.6 % (ref 35–47)
HGB BLD-MCNC: 13.8 G/DL (ref 11.7–15.7)
IMM GRANULOCYTES # BLD: 0.1 10E3/UL
IMM GRANULOCYTES NFR BLD: 1 %
INR PPP: 0.91 (ref 0.85–1.15)
LYMPHOCYTES # BLD AUTO: 1.7 10E3/UL (ref 0.8–5.3)
LYMPHOCYTES NFR BLD AUTO: 16 %
MCH RBC QN AUTO: 30.6 PG (ref 26.5–33)
MCHC RBC AUTO-ENTMCNC: 33.2 G/DL (ref 31.5–36.5)
MCV RBC AUTO: 92 FL (ref 78–100)
MONOCYTES # BLD AUTO: 0.7 10E3/UL (ref 0–1.3)
MONOCYTES NFR BLD AUTO: 7 %
NEUTROPHILS # BLD AUTO: 8 10E3/UL (ref 1.6–8.3)
NEUTROPHILS NFR BLD AUTO: 75 %
PLATELET # BLD AUTO: 249 10E3/UL (ref 150–450)
POTASSIUM BLD-SCNC: 4.8 MMOL/L (ref 3.4–5.3)
PROT SERPL-MCNC: 6.9 G/DL (ref 6.8–8.8)
RBC # BLD AUTO: 4.51 10E6/UL (ref 3.8–5.2)
SODIUM SERPL-SCNC: 143 MMOL/L (ref 133–144)
WBC # BLD AUTO: 10.6 10E3/UL (ref 4–11)

## 2023-02-17 PROCEDURE — 85610 PROTHROMBIN TIME: CPT | Performed by: INTERNAL MEDICINE

## 2023-02-17 PROCEDURE — 85025 COMPLETE CBC W/AUTO DIFF WBC: CPT | Performed by: INTERNAL MEDICINE

## 2023-02-17 PROCEDURE — 80053 COMPREHEN METABOLIC PANEL: CPT | Performed by: INTERNAL MEDICINE

## 2023-02-17 PROCEDURE — 36415 COLL VENOUS BLD VENIPUNCTURE: CPT | Performed by: INTERNAL MEDICINE

## 2023-02-17 PROCEDURE — 99214 OFFICE O/P EST MOD 30 MIN: CPT | Performed by: INTERNAL MEDICINE

## 2023-02-17 RX ORDER — AMOXICILLIN 500 MG/1
2000 CAPSULE ORAL ONCE
Qty: 4 CAPSULE | Refills: 11 | Status: SHIPPED | OUTPATIENT
Start: 2023-02-17 | End: 2023-02-17

## 2023-02-17 ASSESSMENT — PAIN SCALES - GENERAL: PAINLEVEL: MODERATE PAIN (5)

## 2023-02-17 NOTE — PATIENT INSTRUCTIONS
For informational purposes only. Not to replace the advice of your health care provider. Copyright   2003,  [facility]. All rights reserved. Clinically reviewed by Susan Correa MD. [employer] [id number] - REV .  Preparing for Your Surgery  Getting started  A nurse will call you to review your health history and instructions. They will give you an arrival time based on your scheduled surgery time. Please be ready to share:    Your doctor's clinic name and phone number    Your medical, surgical, and anesthesia history    A list of allergies and sensitivities    A list of medicines, including herbal treatments and over-the-counter drugs    Whether the patient has a legal guardian (ask how to send us the papers in advance)  Please tell us if you're pregnant--or if there's any chance you might be pregnant. Some surgeries may injure a fetus (unborn baby), so they require a pregnancy test. Surgeries that are safe for a fetus don't always need a test, and you can choose whether to have one.   If you have a child who's having surgery, please ask for a copy of Preparing for Your Child's Surgery.    Preparing for surgery    Within 10 to 30 days of surgery: Have a pre-op exam (sometimes called an H&P, or History and Physical). This can be done at a clinic or pre-operative center.  ? If you're having a , you may not need this exam. Talk to your care team.    At your pre-op exam, talk to your care team about all medicines you take. If you need to stop any medicines before surgery, ask when to start taking them again.  ? We do this for your safety. Many medicines can make you bleed too much during surgery. Some change how well surgery (anesthesia) drugs work.    Call your insurance company to let them know you're having surgery. (If you don't have insurance, call [phone].)    Call your clinic if there's any change in your health. This includes signs of a cold or flu (sore throat, runny nose,  cough, rash, fever). It also includes a scrape or scratch near the surgery site.    If you have questions on the day of surgery, call your hospital or surgery center.  Eating and drinking guidelines  For your safety: Unless your surgeon tells you otherwise, follow the guidelines below.    Eat and drink as usual until 8 hours before you arrive for surgery. After that, no food or milk.    Drink clear liquids until 2 hours before you arrive. These are liquids you can see through, like water, Gatorade, and Propel Water. They also include plain black coffee and tea (no cream or milk), candy, and breath mints. You can spit out gum when you arrive.    If you drink alcohol: Stop drinking it the night before surgery.    If your care team tells you to take medicine on the morning of surgery, it's okay to take it with a sip of water.  Preventing infection    Shower or bathe the night before and morning of your surgery. Follow the instructions your clinic gave you. (If no instructions, use regular soap.)    Don't shave or clip hair near your surgery site. We'll remove the hair if needed.    Don't smoke or vape the morning of surgery. You may chew nicotine gum up to 2 hours before surgery. A nicotine patch is okay.  ? Note: Some surgeries require you to completely quit smoking and nicotine. Check with your surgeon.    Your care team will make every effort to keep you safe from infection. We will:  ? Clean our hands often with soap and water (or an alcohol-based hand rub).  ? Clean the skin at your surgery site with a special soap that kills germs.  ? Give you a special gown to keep you warm. (Cold raises the risk of infection.)  ? Wear special hair covers, masks, gowns and gloves during surgery.  ? Give antibiotic medicine, if prescribed. Not all surgeries need antibiotics.  What to bring on the day of surgery    Photo ID and insurance card    Copy of your health care directive, if you have one    Glasses and hearing aids (bring  cases)  ? You can't wear contacts during surgery    Inhaler and eye drops, if you use them (tell us about these when you arrive)    CPAP machine or breathing device, if you use them    A few personal items, if spending the night    If you have . . .  ? A pacemaker, ICD (cardiac defibrillator) or other implant: Bring the ID card.  ? An implanted stimulator: Bring the remote control.  ? A legal guardian: Bring a copy of the certified (court-stamped) guardianship papers.  Please remove any jewelry, including body piercings. Leave jewelry and other valuables at home.  If you're going home the day of surgery    You must have a responsible adult drive you home. They should stay with you overnight as well.    If you don't have someone to stay with you, and you aren't safe to go home alone, we may keep you overnight. Insurance often won't pay for this.  After surgery  If it's hard to control your pain or you need more pain medicine, please call your surgeon's office.  Questions?   If you have any questions for your care team, list them here: _________________________________________________________________________________________________________________________________________________________________________ ____________________________________ ____________________________________ ____________________________________

## 2023-02-17 NOTE — PROGRESS NOTES
[address] [address]  Phone: [phone]  Primary Provider: Temo Fletcher  Pre-op Performing Provider: TEMO FLETCHER      PREOPERATIVE EVALUATION:  Today's date: 2/17/2023    Radha Rodriguez is a 62 year old female who presents for a preoperative evaluation.    Surgical Information:  Surgery/Procedure: LEFT MEDIAL UNICOMPARTMENTAL KNEE ARTHROPLASTY  Surgery Location: [facility]  Surgeon: Micah Mena MD   Surgery Date: 02/28/2023  Time of Surgery: 10:00 AM   Where patient plans to recover: At home with family  Fax number for surgical facility: Note does not need to be faxed, will be available electronically in Epic.    Type of Anesthesia Anticipated: General    HPI related to upcoming procedure:     Preop Questions 2/17/2023   1. Have you ever had a heart attack or stroke? No   2. Have you ever had surgery on your heart or blood vessels, such as a stent placement, a coronary artery bypass, or surgery on an artery in your head, neck, heart, or legs? No   3. Do you have chest pain with activity? No   4. Do you have a history of  heart failure? No   5. Do you currently have a cold, bronchitis or symptoms of other infection? No   6. Do you have a cough, shortness of breath, or wheezing? No   7. Do you or anyone in your family have previous history of blood clots? YES    8. Do you or does anyone in your family have a serious bleeding problem such as prolonged bleeding following surgeries or cuts? No   9. Have you ever had problems with anemia or been told to take iron pills? YES    10. Have you had any abnormal blood loss such as black, tarry or bloody stools, or abnormal vaginal bleeding? No   11. Have you ever had a blood transfusion? No   12. Are you willing to have a blood transfusion if it is medically needed before, during, or after your surgery? Yes   13. Have you or any of your relatives ever had  problems with anesthesia? No   14. Do you have sleep apnea, excessive snoring or daytime drowsiness? No   15. Do you have any artifical heart valves or other implanted medical devices like a pacemaker, defibrillator, or continuous glucose monitor? No   16. Do you have artificial joints? No   17. Are you allergic to latex? No   18. Is there any chance that you may be pregnant? No       Health Care Directive:  Patient does not have a Health Care Directive or Living Will: Patient wants FULL CODE.    Preoperative Review of :   reviewed - controlled substances reflected in medication list.        Review of Systems  CONSTITUTIONAL: NEGATIVE for fever, chills, change in weight  INTEGUMENTARY/SKIN: NEGATIVE for worrisome rashes, moles or lesions  EYES: NEGATIVE for vision changes or irritation  ENT/MOUTH: NEGATIVE for ear, mouth and throat problems  RESP: NEGATIVE for significant cough or SOB  CV: NEGATIVE for chest pain, palpitations or peripheral edema  GI: NEGATIVE for nausea, abdominal pain, heartburn, or change in bowel habits  : NEGATIVE for frequency, dysuria, or hematuria  MUSCULOSKELETAL:POSITIVE  for inflammatory polyarthritis, currently on Hydroxychloroquine.  NEURO: NEGATIVE for weakness, dizziness or paresthesias  ENDOCRINE: NEGATIVE for temperature intolerance, skin/hair changes  HEME/ALLERGY/IMMUNE: POSITIVE  for hx of left lower extremity DVT and Factor 5 Leiden mutation (heterozygous).  PSYCHIATRIC: NEGATIVE for changes in mood or affect    Patient Active Problem List    Diagnosis Date Noted     DISH (diffuse idiopathic skeletal hyperostosis) of the thoracic spine 02/05/2023     Priority: Medium     Acute deep vein thrombosis (DVT) of tibial vein of left lower extremity (H) 10/12/2022     Priority: Medium     Chronic pain syndrome 10/12/2022     Priority: Medium     Acute deep vein thrombosis (DVT) of left lower extremity, unspecified vein (H) 08/02/2022     Priority: Medium     Diffuse idiopathic  skeletal hyperostosis of thoracolumbar spine 05/23/2022     Priority: Medium     High risk medication use 02/12/2022     Priority: Medium     Pseudophakia, Yag Caps, ou (Hx of refractive lens exchange, ou (Lobanoff) 02/12/2022     Priority: Medium     Chronic midline thoracic back pain 02/08/2022     Priority: Medium     Thoracic degenerative disc disease 02/08/2022     Priority: Medium     Upper back pain, chronic 10/11/2021     Priority: Medium     initially acute back pain due to work related injury and overuse. Now progressed into chronic back pain not responding to medical treatment.        Upper back strain 06/27/2021     Priority: Medium     Facet arthropathy, thoracic 06/11/2021     Priority: Medium     Back muscle spasm 06/08/2021     Priority: Medium     Immunosuppressed status (H) 01/05/2021     Priority: Medium     Right lateral epicondylitis 08/07/2018     Priority: Medium     Menopausal syndrome (hot flashes) 01/02/2017     Priority: Medium     Osteoarthritis 03/03/2015     Priority: Medium     High risk medications (not anticoagulants) long-term use 09/19/2014     Priority: Medium     Inflammatory polyarthropathy (H) 01/31/2014     Priority: Medium     Vitamin D deficiency 11/25/2013     Priority: Medium     Problem list name updated by automated process. Provider to review       CARDIOVASCULAR SCREENING; LDL GOAL LESS THAN 160 10/31/2010     Priority: Medium      Past Medical History:   Diagnosis Date     Arthritis      Headache(784.0)      Irritable bowel syndrome      Other and unspecified noninfectious gastroenteritis and colitis(558.9)     chronic     Past Surgical History:   Procedure Laterality Date     CATARACT IOL, RT/LT      Refractive lens exchange ou (Lobanoff)     COLONOSCOPY  10/14/2011    Procedure:COLONOSCOPY; Colonoscopy; Surgeon:SCOTTY LEDEZMA; Location:[facility]     ENHANCE LASER REFRACTIVE BILATERAL EXISTING PT IN PARAMETERS       HYSTERECTOMY, VAGINAL  01/01/2000    TVH,  "fibroids (still has ovaries)     SURGICAL HISTORY OF -       Tubal Ligation     SURGICAL HISTORY OF -       Appy     SURGICAL HISTORY OF -       Tonsils     SURGICAL HISTORY OF -   12/1994    Anal Fistulotomy     ZZC REPLACEMENT,URETER,BOWEL SEGMENT  10/01/2008    Part of her ureter was repaired.     Current Outpatient Medications   Medication Sig Dispense Refill     amLODIPine (NORVASC) 5 MG tablet TAKE 1 TABLET BY MOUTH TWICE A  tablet 1     hydroxychloroquine (PLAQUENIL) 200 MG tablet Take 2 tablets (400 mg) by mouth daily 180 tablet 1     methocarbamol (ROBAXIN) 500 MG tablet Take 1 tablet (500 mg) by mouth 2 times daily 56 tablet 5     rivaroxaban ANTICOAGULANT (XARELTO) 20 MG TABS tablet Take 1 tablet (20 mg) by mouth daily with food 30 tablet 11       Allergies   Allergen Reactions     Sulfa Drugs Rash     Codeine Hives     Clindamycin Rash        Social History     Tobacco Use     Smoking status: Never     Smokeless tobacco: Never   Substance Use Topics     Alcohol use: Yes     Comment: Occasional     OBJECTIVE  Physical Exam   /82 (BP Location: Left arm, Patient Position: Sitting, Cuff Size: Adult Large)   Pulse 78   Temp 98.3  F (36.8  C) (Oral)   Resp 15   Ht 1.598 m (5' 2.91\")   Wt 82.4 kg (181 lb 9.6 oz)   SpO2 98%   BMI 32.26 kg/m      GENERAL APPEARANCE: healthy, alert and no distress     EYES: Eyes grossly normal to inspection and conjunctivae and sclerae normal     HENT: normal cephalic/atraumatic     RESP: lungs clear to auscultation - no rales, rhonchi or wheezes     CV: regular rates and rhythm     NEURO: Normal strength and tone, sensory exam grossly normal, mentation intact and speech normal     PSYCH: mentation appears normal. and affect normal/bright      Recent Labs   Lab Test 09/30/22  1159 09/27/22  1100 09/20/22  1121 08/23/22  1101 08/18/22  1004 10/11/21  1704 03/29/21  1431   HGB  --   --  13.4  --  13.4   < > 13.6   PLT  --   --  263  --  262   < > 226   INR 1.89* " 2.2* 2.2*   < > 2.3*   < >  --    NA  --   --   --   --   --   --  140   POTASSIUM  --   --   --   --   --   --  4.0   CR  --   --  0.76  --  0.71   < > 0.77    < > = values in this interval not displayed.        Diagnostics:  No EKG required, no history of coronary heart disease, significant arrhythmia, peripheral arterial disease or other structural heart disease.  2/17/2023 12:03 PM     Component Value Flag Ref Range Units Status   WBC Count 10.6   4.0 - 11.0 10e3/uL Final   RBC Count 4.51   3.80 - 5.20 10e6/uL Final   Hemoglobin 13.8   11.7 - 15.7 g/dL Final   Hematocrit 41.6   35.0 - 47.0 % Final   MCV 92   78 - 100 fL Final   MCH 30.6   26.5 - 33.0 pg Final   MCHC 33.2   31.5 - 36.5 g/dL Final   RDW 12.8   10.0 - 15.0 % Final   Platelet Count 249   150 - 450 10e3/uL Final   % Neutrophils 75    % Final   % Lymphocytes 16    % Final   % Monocytes 7    % Final   % Eosinophils 1    % Final   % Basophils 0    % Final   % Immature Granulocytes 1    % Final   Absolute Neutrophils 8.0   1.6 - 8.3 10e3/uL Final   Absolute Lymphocytes 1.7   0.8 - 5.3 10e3/uL Final   Absolute Monocytes 0.7   0.0 - 1.3 10e3/uL Final   Absolute Eosinophils 0.1   0.0 - 0.7 10e3/uL Final   Absolute Basophils 0.0   0.0 - 0.2 10e3/uL Final   Absolute Immature Granulocytes 0.1   <=0.4 10e3/uL Final   2/17/2023  7:11 PM     Component Value Flag Ref Range Units Status   Sodium 143   133 - 144 mmol/L Final   Potassium 4.8   3.4 - 5.3 mmol/L Final   Chloride 109   94 - 109 mmol/L Final   Carbon Dioxide (CO2) 29   20 - 32 mmol/L Final   Anion Gap 5   3 - 14 mmol/L Final   Urea Nitrogen 17   7 - 30 mg/dL Final   Creatinine 0.80   0.52 - 1.04 mg/dL Final   Calcium 9.2   8.5 - 10.1 mg/dL Final   Glucose 101   High   70 - 99 mg/dL Final   Alkaline Phosphatase 63   40 - 150 U/L Final   AST 11   0 - 45 U/L Final   ALT 24   0 - 50 U/L Final   Protein Total 6.9   6.8 - 8.8 g/dL Final   Albumin 4.0   3.4 - 5.0 g/dL Final   Bilirubin Total 0.4   0.2 - 1.3  mg/dL Final   GFR Estimate 84   >60 mL/min/1.73m2 Final   Comment:   eGFR calculated using 2021 CKD-EPI equation.   /17/2023  6:59 PM     Component Value Flag Ref Range Units Status   INR 0.91   0.85 - 1.15  Final         ASSESSMENT/PLAN  Preop general physical exam  Revised Cardiac Risk Index (RCRI):  The patient has the following serious cardiovascular risks for perioperative complications:   - No serious cardiac risks = 0 points   RCRI Interpretation: 0 points: Class I (very low risk - 0.4% complication rate)  Patient is cleared for surgery.  - Comprehensive metabolic panel  - CBC with platelets and differential  - INR    Factor 5 Leiden mutation, heterozygous (H)  Recommend DVT prophylaxis with Xarelto for 6 weeks.  - rivaroxaban ANTICOAGULANT (XARELTO) 10 MG TABS tablet; Take 1 tablet (10 mg) by mouth daily (with dinner)  - CBC with platelets and differential    Immunosuppressed status (H)  Secondary to Hydroxychloroquine use.    Inflammatory polyarthropathy (H)  Controlled with Hydroxychloroquine.    DVT prophylaxis  Due to history of left lower extremity DVT and Factor 5 Leiden mutation, postoperative DVT prophylaxis with Xarelto is medically necessary for 6 weeks.  - rivaroxaban ANTICOAGULANT (XARELTO) 10 MG TABS tablet; Take 1 tablet (10 mg) by mouth daily (with dinner)    Need for SBE (subacute bacterial endocarditis) prophylaxis  - amoxicillin (AMOXIL) 500 MG capsule; Take 4 capsules (2,000 mg) by mouth once for 1 dose Take 1 hour before any dental procedure.    Signed Electronically by: Temo Fletcher MD  Copy of this evaluation report is provided to requesting physician.

## 2023-02-17 NOTE — H&P (VIEW-ONLY)
[address] [address]  Phone: [phone]  Primary Provider: Temo Fletcher  Pre-op Performing Provider: TEMO FLETCHER      PREOPERATIVE EVALUATION:  Today's date: 2/17/2023    Radha Rodriguez is a 62 year old female who presents for a preoperative evaluation.    Surgical Information:  Surgery/Procedure: LEFT MEDIAL UNICOMPARTMENTAL KNEE ARTHROPLASTY  Surgery Location: [facility]  Surgeon: Micah Mena MD   Surgery Date: 02/28/2023  Time of Surgery: 10:00 AM   Where patient plans to recover: At home with family  Fax number for surgical facility: Note does not need to be faxed, will be available electronically in Epic.    Type of Anesthesia Anticipated: General    HPI related to upcoming procedure:     Preop Questions 2/17/2023   1. Have you ever had a heart attack or stroke? No   2. Have you ever had surgery on your heart or blood vessels, such as a stent placement, a coronary artery bypass, or surgery on an artery in your head, neck, heart, or legs? No   3. Do you have chest pain with activity? No   4. Do you have a history of  heart failure? No   5. Do you currently have a cold, bronchitis or symptoms of other infection? No   6. Do you have a cough, shortness of breath, or wheezing? No   7. Do you or anyone in your family have previous history of blood clots? YES    8. Do you or does anyone in your family have a serious bleeding problem such as prolonged bleeding following surgeries or cuts? No   9. Have you ever had problems with anemia or been told to take iron pills? YES    10. Have you had any abnormal blood loss such as black, tarry or bloody stools, or abnormal vaginal bleeding? No   11. Have you ever had a blood transfusion? No   12. Are you willing to have a blood transfusion if it is medically needed before, during, or after your surgery? Yes   13. Have you or any of your relatives ever had  problems with anesthesia? No   14. Do you have sleep apnea, excessive snoring or daytime drowsiness? No   15. Do you have any artifical heart valves or other implanted medical devices like a pacemaker, defibrillator, or continuous glucose monitor? No   16. Do you have artificial joints? No   17. Are you allergic to latex? No   18. Is there any chance that you may be pregnant? No       Health Care Directive:  Patient does not have a Health Care Directive or Living Will: Patient wants FULL CODE.    Preoperative Review of :   reviewed - controlled substances reflected in medication list.        Review of Systems  CONSTITUTIONAL: NEGATIVE for fever, chills, change in weight  INTEGUMENTARY/SKIN: NEGATIVE for worrisome rashes, moles or lesions  EYES: NEGATIVE for vision changes or irritation  ENT/MOUTH: NEGATIVE for ear, mouth and throat problems  RESP: NEGATIVE for significant cough or SOB  CV: NEGATIVE for chest pain, palpitations or peripheral edema  GI: NEGATIVE for nausea, abdominal pain, heartburn, or change in bowel habits  : NEGATIVE for frequency, dysuria, or hematuria  MUSCULOSKELETAL:POSITIVE  for inflammatory polyarthritis, currently on Hydroxychloroquine.  NEURO: NEGATIVE for weakness, dizziness or paresthesias  ENDOCRINE: NEGATIVE for temperature intolerance, skin/hair changes  HEME/ALLERGY/IMMUNE: POSITIVE  for hx of left lower extremity DVT and Factor 5 Leiden mutation (heterozygous).  PSYCHIATRIC: NEGATIVE for changes in mood or affect    Patient Active Problem List    Diagnosis Date Noted     DISH (diffuse idiopathic skeletal hyperostosis) of the thoracic spine 02/05/2023     Priority: Medium     Acute deep vein thrombosis (DVT) of tibial vein of left lower extremity (H) 10/12/2022     Priority: Medium     Chronic pain syndrome 10/12/2022     Priority: Medium     Acute deep vein thrombosis (DVT) of left lower extremity, unspecified vein (H) 08/02/2022     Priority: Medium     Diffuse idiopathic  skeletal hyperostosis of thoracolumbar spine 05/23/2022     Priority: Medium     High risk medication use 02/12/2022     Priority: Medium     Pseudophakia, Yag Caps, ou (Hx of refractive lens exchange, ou (Lobanoff) 02/12/2022     Priority: Medium     Chronic midline thoracic back pain 02/08/2022     Priority: Medium     Thoracic degenerative disc disease 02/08/2022     Priority: Medium     Upper back pain, chronic 10/11/2021     Priority: Medium     initially acute back pain due to work related injury and overuse. Now progressed into chronic back pain not responding to medical treatment.        Upper back strain 06/27/2021     Priority: Medium     Facet arthropathy, thoracic 06/11/2021     Priority: Medium     Back muscle spasm 06/08/2021     Priority: Medium     Immunosuppressed status (H) 01/05/2021     Priority: Medium     Right lateral epicondylitis 08/07/2018     Priority: Medium     Menopausal syndrome (hot flashes) 01/02/2017     Priority: Medium     Osteoarthritis 03/03/2015     Priority: Medium     High risk medications (not anticoagulants) long-term use 09/19/2014     Priority: Medium     Inflammatory polyarthropathy (H) 01/31/2014     Priority: Medium     Vitamin D deficiency 11/25/2013     Priority: Medium     Problem list name updated by automated process. Provider to review       CARDIOVASCULAR SCREENING; LDL GOAL LESS THAN 160 10/31/2010     Priority: Medium      Past Medical History:   Diagnosis Date     Arthritis      Headache(784.0)      Irritable bowel syndrome      Other and unspecified noninfectious gastroenteritis and colitis(558.9)     chronic     Past Surgical History:   Procedure Laterality Date     CATARACT IOL, RT/LT      Refractive lens exchange ou (Lobanoff)     COLONOSCOPY  10/14/2011    Procedure:COLONOSCOPY; Colonoscopy; Surgeon:SCOTTY LEDEZMA; Location:[facility]     ENHANCE LASER REFRACTIVE BILATERAL EXISTING PT IN PARAMETERS       HYSTERECTOMY, VAGINAL  01/01/2000    TVH,  "fibroids (still has ovaries)     SURGICAL HISTORY OF -       Tubal Ligation     SURGICAL HISTORY OF -       Appy     SURGICAL HISTORY OF -       Tonsils     SURGICAL HISTORY OF -   12/1994    Anal Fistulotomy     ZZC REPLACEMENT,URETER,BOWEL SEGMENT  10/01/2008    Part of her ureter was repaired.     Current Outpatient Medications   Medication Sig Dispense Refill     amLODIPine (NORVASC) 5 MG tablet TAKE 1 TABLET BY MOUTH TWICE A  tablet 1     hydroxychloroquine (PLAQUENIL) 200 MG tablet Take 2 tablets (400 mg) by mouth daily 180 tablet 1     methocarbamol (ROBAXIN) 500 MG tablet Take 1 tablet (500 mg) by mouth 2 times daily 56 tablet 5     rivaroxaban ANTICOAGULANT (XARELTO) 20 MG TABS tablet Take 1 tablet (20 mg) by mouth daily with food 30 tablet 11       Allergies   Allergen Reactions     Sulfa Drugs Rash     Codeine Hives     Clindamycin Rash        Social History     Tobacco Use     Smoking status: Never     Smokeless tobacco: Never   Substance Use Topics     Alcohol use: Yes     Comment: Occasional     OBJECTIVE  Physical Exam   /82 (BP Location: Left arm, Patient Position: Sitting, Cuff Size: Adult Large)   Pulse 78   Temp 98.3  F (36.8  C) (Oral)   Resp 15   Ht 1.598 m (5' 2.91\")   Wt 82.4 kg (181 lb 9.6 oz)   SpO2 98%   BMI 32.26 kg/m      GENERAL APPEARANCE: healthy, alert and no distress     EYES: Eyes grossly normal to inspection and conjunctivae and sclerae normal     HENT: normal cephalic/atraumatic     RESP: lungs clear to auscultation - no rales, rhonchi or wheezes     CV: regular rates and rhythm     NEURO: Normal strength and tone, sensory exam grossly normal, mentation intact and speech normal     PSYCH: mentation appears normal. and affect normal/bright      Recent Labs   Lab Test 09/30/22  1159 09/27/22  1100 09/20/22  1121 08/23/22  1101 08/18/22  1004 10/11/21  1704 03/29/21  1431   HGB  --   --  13.4  --  13.4   < > 13.6   PLT  --   --  263  --  262   < > 226   INR 1.89* " 2.2* 2.2*   < > 2.3*   < >  --    NA  --   --   --   --   --   --  140   POTASSIUM  --   --   --   --   --   --  4.0   CR  --   --  0.76  --  0.71   < > 0.77    < > = values in this interval not displayed.        Diagnostics:  No EKG required, no history of coronary heart disease, significant arrhythmia, peripheral arterial disease or other structural heart disease.  2/17/2023 12:03 PM     Component Value Flag Ref Range Units Status   WBC Count 10.6   4.0 - 11.0 10e3/uL Final   RBC Count 4.51   3.80 - 5.20 10e6/uL Final   Hemoglobin 13.8   11.7 - 15.7 g/dL Final   Hematocrit 41.6   35.0 - 47.0 % Final   MCV 92   78 - 100 fL Final   MCH 30.6   26.5 - 33.0 pg Final   MCHC 33.2   31.5 - 36.5 g/dL Final   RDW 12.8   10.0 - 15.0 % Final   Platelet Count 249   150 - 450 10e3/uL Final   % Neutrophils 75    % Final   % Lymphocytes 16    % Final   % Monocytes 7    % Final   % Eosinophils 1    % Final   % Basophils 0    % Final   % Immature Granulocytes 1    % Final   Absolute Neutrophils 8.0   1.6 - 8.3 10e3/uL Final   Absolute Lymphocytes 1.7   0.8 - 5.3 10e3/uL Final   Absolute Monocytes 0.7   0.0 - 1.3 10e3/uL Final   Absolute Eosinophils 0.1   0.0 - 0.7 10e3/uL Final   Absolute Basophils 0.0   0.0 - 0.2 10e3/uL Final   Absolute Immature Granulocytes 0.1   <=0.4 10e3/uL Final   2/17/2023  7:11 PM     Component Value Flag Ref Range Units Status   Sodium 143   133 - 144 mmol/L Final   Potassium 4.8   3.4 - 5.3 mmol/L Final   Chloride 109   94 - 109 mmol/L Final   Carbon Dioxide (CO2) 29   20 - 32 mmol/L Final   Anion Gap 5   3 - 14 mmol/L Final   Urea Nitrogen 17   7 - 30 mg/dL Final   Creatinine 0.80   0.52 - 1.04 mg/dL Final   Calcium 9.2   8.5 - 10.1 mg/dL Final   Glucose 101   High   70 - 99 mg/dL Final   Alkaline Phosphatase 63   40 - 150 U/L Final   AST 11   0 - 45 U/L Final   ALT 24   0 - 50 U/L Final   Protein Total 6.9   6.8 - 8.8 g/dL Final   Albumin 4.0   3.4 - 5.0 g/dL Final   Bilirubin Total 0.4   0.2 - 1.3  mg/dL Final   GFR Estimate 84   >60 mL/min/1.73m2 Final   Comment:   eGFR calculated using 2021 CKD-EPI equation.   /17/2023  6:59 PM     Component Value Flag Ref Range Units Status   INR 0.91   0.85 - 1.15  Final         ASSESSMENT/PLAN  Preop general physical exam  Revised Cardiac Risk Index (RCRI):  The patient has the following serious cardiovascular risks for perioperative complications:   - No serious cardiac risks = 0 points   RCRI Interpretation: 0 points: Class I (very low risk - 0.4% complication rate)  Patient is cleared for surgery.  - Comprehensive metabolic panel  - CBC with platelets and differential  - INR    Factor 5 Leiden mutation, heterozygous (H)  Recommend DVT prophylaxis with Xarelto for 6 weeks.  - rivaroxaban ANTICOAGULANT (XARELTO) 10 MG TABS tablet; Take 1 tablet (10 mg) by mouth daily (with dinner)  - CBC with platelets and differential    Immunosuppressed status (H)  Secondary to Hydroxychloroquine use.    Inflammatory polyarthropathy (H)  Controlled with Hydroxychloroquine.    DVT prophylaxis  Due to history of left lower extremity DVT and Factor 5 Leiden mutation, postoperative DVT prophylaxis with Xarelto is medically necessary for 6 weeks.  - rivaroxaban ANTICOAGULANT (XARELTO) 10 MG TABS tablet; Take 1 tablet (10 mg) by mouth daily (with dinner)    Need for SBE (subacute bacterial endocarditis) prophylaxis  - amoxicillin (AMOXIL) 500 MG capsule; Take 4 capsules (2,000 mg) by mouth once for 1 dose Take 1 hour before any dental procedure.    Signed Electronically by: Temo Fletcher MD  Copy of this evaluation report is provided to requesting physician.

## 2023-02-27 ENCOUNTER — ANESTHESIA EVENT (OUTPATIENT)
Dept: SURGERY | Facility: CLINIC | Age: 63
End: 2023-02-27
Payer: COMMERCIAL

## 2023-02-27 NOTE — PROGRESS NOTES
PTA medications updated by Medication Scribe prior to surgery via phone call with patient (last doses completed by Nurse)     Medication history sources: Patient, Surescripts and H&P  In the past week, patient estimated taking medication this percent of the time: Greater than 90%  Adherence assessment: N/A Not Observed    Significant changes made to the medication list:  None      Additional medication history information:   None    Medication reconciliation completed by provider prior to medication history? No    Time spent in this activity: 15 MINUTES    The information provided in this note is only as accurate as the sources available at the time of update(s)      Prior to Admission medications    Medication Sig Last Dose Taking? Auth Provider Long Term End Date   amLODIPine (NORVASC) 5 MG tablet TAKE 1 TABLET BY MOUTH TWICE A DAY  at AM Yes Temo Fletcher MD Yes    hydroxychloroquine (PLAQUENIL) 200 MG tablet Take 2 tablets (400 mg) by mouth daily  at AM Yes Abdoul Eli MD     methocarbamol (ROBAXIN) 500 MG tablet Take 1 tablet (500 mg) by mouth 2 times daily  at PM Yes Temo Fletcher MD

## 2023-02-27 NOTE — ANESTHESIA PREPROCEDURE EVALUATION
Anesthesia Pre-Procedure Evaluation    Patient: Radha Rodriguez   MRN: [id number] : 1960        Procedure : Procedure(s):  LEFT MEDIAL UNICOMPARTMENTAL KNEE ARTHROPLASTY          Past Medical History:   Diagnosis Date     Arthritis      Headache(784.0)      Irritable bowel syndrome      Other and unspecified noninfectious gastroenteritis and colitis(558.9)     chronic      Past Surgical History:   Procedure Laterality Date     CATARACT IOL, RT/LT      Refractive lens exchange ou ([place])     COLONOSCOPY  10/14/2011    Procedure:COLONOSCOPY; Colonoscopy; Surgeon:SCOTTY LEDEZMA; Location:[facility]     ENHANCE LASER REFRACTIVE BILATERAL EXISTING PT IN PARAMETERS       HYSTERECTOMY, VAGINAL  2000    TVH, fibroids (still has ovaries)     SURGICAL HISTORY OF -       Tubal Ligation     SURGICAL HISTORY OF -       Appy     SURGICAL HISTORY OF -       Tonsils     SURGICAL HISTORY OF -   1994    Anal Fistulotomy     ZZC REPLACEMENT,URETER,BOWEL SEGMENT  10/01/2008    Part of her ureter was repaired.      Allergies   Allergen Reactions     Sulfa Drugs Rash     Codeine Hives     Clindamycin Rash      Social History     Tobacco Use     Smoking status: Never     Smokeless tobacco: Never   Substance Use Topics     Alcohol use: Yes     Comment: Occasional      Wt Readings from Last 1 Encounters:   23 82.4 kg (181 lb 9.6 oz)        Anesthesia Evaluation   Pt has had prior anesthetic.     History of anesthetic complications  - PONV.      ROS/MED HX  ENT/Pulmonary:    (-) sleep apnea   Neurologic:     (+) no peripheral neuropathy     Cardiovascular:       METS/Exercise Tolerance:     Hematologic: Comments: Factor V Leiden - on Xarelto    (+) History of blood clots (off xarelto for 6 weeks),     Musculoskeletal: Comment: Diffuse idiopathic skeletal hyperostosis - thoracolumbar  (+) arthritis,     GI/Hepatic:     (+) Inflammatory bowel disease,  (-) GERD   Renal/Genitourinary:  - neg Renal ROS     Endo:   - neg endo ROS     Psychiatric/Substance Use:  - neg psychiatric ROS     Infectious Disease:  - neg infectious disease ROS     Malignancy:       Other:      (+) , H/O Chronic Pain,        Physical Exam    Airway        Mallampati: II   TM distance: > 3 FB   Neck ROM: full   Mouth opening: > 3 cm    Respiratory Devices and Support         Dental       (+) Completely normal teeth      Cardiovascular   cardiovascular exam normal          Pulmonary   pulmonary exam normal                OUTSIDE LABS:  CBC:   Lab Results   Component Value Date    WBC 10.6 02/17/2023    WBC 7.4 09/20/2022    HGB 13.8 02/17/2023    HGB 13.4 09/20/2022    HCT 41.6 02/17/2023    HCT 40.9 09/20/2022     02/17/2023     09/20/2022     BMP:   Lab Results   Component Value Date     02/17/2023     03/29/2021    POTASSIUM 4.8 02/17/2023    POTASSIUM 4.0 03/29/2021    CHLORIDE 109 02/17/2023    CHLORIDE 109 03/29/2021    CO2 29 02/17/2023    CO2 29 03/29/2021    BUN 17 02/17/2023    BUN 11 03/29/2021    CR 0.80 02/17/2023    CR 0.76 09/20/2022     (H) 02/17/2023    GLC 85 03/29/2021     COAGS:   Lab Results   Component Value Date    INR 0.91 02/17/2023     POC: No results found for: BGM, HCG, HCGS  HEPATIC:   Lab Results   Component Value Date    ALBUMIN 4.0 02/17/2023    PROTTOTAL 6.9 02/17/2023    ALT 24 02/17/2023    AST 11 02/17/2023    ALKPHOS 63 02/17/2023    BILITOTAL 0.4 02/17/2023     OTHER:   Lab Results   Component Value Date    ENEIDA 9.2 02/17/2023    LIPASE 111 02/04/2013    TSH 0.80 03/27/2019    T4 0.93 04/20/2018    CRP 5.5 08/08/2022    SED 8 08/08/2022       Anesthesia Plan    ASA Status:  3      Anesthesia Type: Spinal.              Consents    Anesthesia Plan(s) and associated risks, benefits, and realistic alternatives discussed. Questions answered and patient/representative(s) expressed understanding.     - Discussed: Risks, Benefits and Alternatives for BOTH SEDATION and the PROCEDURE were  discussed     - Discussed with:  Patient         Postoperative Care    Pain management: IV analgesics, Oral pain medications, Peripheral nerve block (Single Shot).   PONV prophylaxis: Ondansetron (or other 5HT-3), Background Propofol Infusion     Comments:    Other Comments: The surgeon has given a verbal order transferring care of this patient to me for the performance of a regional analgesia block for post-op pain control. It is requested of me because I am uniquely trained and qualified to perform this block and the surgeon is neither trained nor qualified to perform this procedure.            Dylan Stewart, DO, DO

## 2023-02-28 ENCOUNTER — HOSPITAL ENCOUNTER (OUTPATIENT)
Facility: CLINIC | Age: 63
Discharge: HOME | End: 2023-03-01
Attending: ORTHOPAEDIC SURGERY | Admitting: ORTHOPAEDIC SURGERY
Payer: COMMERCIAL

## 2023-02-28 ENCOUNTER — APPOINTMENT (OUTPATIENT)
Dept: PHYSICAL THERAPY | Facility: CLINIC | Age: 63
End: 2023-02-28
Attending: ORTHOPAEDIC SURGERY
Payer: COMMERCIAL

## 2023-02-28 ENCOUNTER — APPOINTMENT (OUTPATIENT)
Dept: GENERAL RADIOLOGY | Facility: CLINIC | Age: 63
End: 2023-02-28
Attending: PHYSICIAN ASSISTANT
Payer: COMMERCIAL

## 2023-02-28 ENCOUNTER — ANESTHESIA (OUTPATIENT)
Dept: SURGERY | Facility: CLINIC | Age: 63
End: 2023-02-28
Payer: COMMERCIAL

## 2023-02-28 DIAGNOSIS — M17.12 OSTEOARTHRITIS OF LEFT KNEE, UNSPECIFIED OSTEOARTHRITIS TYPE: ICD-10-CM

## 2023-02-28 DIAGNOSIS — Z98.890 POST-OPERATIVE STATE: Primary | ICD-10-CM

## 2023-02-28 PROCEDURE — 250N000011 HC RX IP 250 OP 636: Performed by: PHYSICIAN ASSISTANT

## 2023-02-28 PROCEDURE — 250N000013 HC RX MED GY IP 250 OP 250 PS 637: Performed by: PHYSICIAN ASSISTANT

## 2023-02-28 PROCEDURE — 370N000017 HC ANESTHESIA TECHNICAL FEE, PER MIN: Performed by: ORTHOPAEDIC SURGERY

## 2023-02-28 PROCEDURE — 250N000011 HC RX IP 250 OP 636: Performed by: ANESTHESIOLOGY

## 2023-02-28 PROCEDURE — 97161 PT EVAL LOW COMPLEX 20 MIN: CPT | Mod: GP

## 2023-02-28 PROCEDURE — 97116 GAIT TRAINING THERAPY: CPT | Mod: GP

## 2023-02-28 PROCEDURE — 258N000003 HC RX IP 258 OP 636: Performed by: PHYSICIAN ASSISTANT

## 2023-02-28 PROCEDURE — 999N000063 XR KNEE PORT LEFT 1/2 VIEWS: Mod: LT

## 2023-02-28 PROCEDURE — 360N000077 HC SURGERY LEVEL 4, PER MIN: Performed by: ORTHOPAEDIC SURGERY

## 2023-02-28 PROCEDURE — 97530 THERAPEUTIC ACTIVITIES: CPT | Mod: GP

## 2023-02-28 PROCEDURE — C1776 JOINT DEVICE (IMPLANTABLE): HCPCS | Performed by: ORTHOPAEDIC SURGERY

## 2023-02-28 PROCEDURE — 250N000011 HC RX IP 250 OP 636: Performed by: NURSE ANESTHETIST, CERTIFIED REGISTERED

## 2023-02-28 PROCEDURE — 710N000009 HC RECOVERY PHASE 1, LEVEL 1, PER MIN: Performed by: ORTHOPAEDIC SURGERY

## 2023-02-28 PROCEDURE — 99207 PR NO BILLABLE SERVICE THIS VISIT: CPT | Performed by: NURSE PRACTITIONER

## 2023-02-28 PROCEDURE — 250N000013 HC RX MED GY IP 250 OP 250 PS 637: Performed by: ANESTHESIOLOGY

## 2023-02-28 PROCEDURE — C1713 ANCHOR/SCREW BN/BN,TIS/BN: HCPCS | Performed by: ORTHOPAEDIC SURGERY

## 2023-02-28 PROCEDURE — 258N000001 HC RX 258: Performed by: ORTHOPAEDIC SURGERY

## 2023-02-28 PROCEDURE — 258N000003 HC RX IP 258 OP 636: Performed by: NURSE ANESTHETIST, CERTIFIED REGISTERED

## 2023-02-28 PROCEDURE — 258N000003 HC RX IP 258 OP 636: Performed by: ANESTHESIOLOGY

## 2023-02-28 PROCEDURE — 999N000141 HC STATISTIC PRE-PROCEDURE NURSING ASSESSMENT: Performed by: ORTHOPAEDIC SURGERY

## 2023-02-28 PROCEDURE — 272N000001 HC OR GENERAL SUPPLY STERILE: Performed by: ORTHOPAEDIC SURGERY

## 2023-02-28 PROCEDURE — 250N000009 HC RX 250: Performed by: ANESTHESIOLOGY

## 2023-02-28 PROCEDURE — 250N000013 HC RX MED GY IP 250 OP 250 PS 637: Performed by: NURSE PRACTITIONER

## 2023-02-28 PROCEDURE — 250N000009 HC RX 250: Performed by: ORTHOPAEDIC SURGERY

## 2023-02-28 DEVICE — IMPLANTABLE DEVICE: Type: IMPLANTABLE DEVICE | Site: KNEE | Status: FUNCTIONAL

## 2023-02-28 DEVICE — TWIN PEG FEMORAL
Type: IMPLANTABLE DEVICE | Site: KNEE | Status: FUNCTIONAL
Brand: OXFORD PARTIAL KNEE SYSTEM

## 2023-02-28 DEVICE — BONE CEMENT SIMPLEX FULL DOSE 6191-1-001: Type: IMPLANTABLE DEVICE | Site: KNEE | Status: FUNCTIONAL

## 2023-02-28 RX ORDER — FENTANYL CITRATE 50 UG/ML
25 INJECTION, SOLUTION INTRAMUSCULAR; INTRAVENOUS EVERY 5 MIN PRN
Status: DISCONTINUED | OUTPATIENT
Start: 2023-02-28 | End: 2023-02-28 | Stop reason: HOSPADM

## 2023-02-28 RX ORDER — LIDOCAINE 40 MG/G
CREAM TOPICAL
Status: DISCONTINUED | OUTPATIENT
Start: 2023-02-28 | End: 2023-03-01 | Stop reason: HOSPADM

## 2023-02-28 RX ORDER — DIPHENHYDRAMINE HCL 25 MG
25 CAPSULE ORAL EVERY 6 HOURS PRN
Status: DISCONTINUED | OUTPATIENT
Start: 2023-02-28 | End: 2023-03-01 | Stop reason: HOSPADM

## 2023-02-28 RX ORDER — NALOXONE HYDROCHLORIDE 0.4 MG/ML
0.4 INJECTION, SOLUTION INTRAMUSCULAR; INTRAVENOUS; SUBCUTANEOUS
Status: DISCONTINUED | OUTPATIENT
Start: 2023-02-28 | End: 2023-03-01 | Stop reason: HOSPADM

## 2023-02-28 RX ORDER — AMLODIPINE BESYLATE 5 MG/1
5 TABLET ORAL 2 TIMES DAILY
Status: DISCONTINUED | OUTPATIENT
Start: 2023-02-28 | End: 2023-03-01 | Stop reason: HOSPADM

## 2023-02-28 RX ORDER — ONDANSETRON 2 MG/ML
4 INJECTION INTRAMUSCULAR; INTRAVENOUS EVERY 30 MIN PRN
Status: DISCONTINUED | OUTPATIENT
Start: 2023-02-28 | End: 2023-02-28 | Stop reason: HOSPADM

## 2023-02-28 RX ORDER — POLYETHYLENE GLYCOL 3350 17 G/17G
17 POWDER, FOR SOLUTION ORAL DAILY
Status: DISCONTINUED | OUTPATIENT
Start: 2023-03-01 | End: 2023-03-01 | Stop reason: HOSPADM

## 2023-02-28 RX ORDER — CEFAZOLIN SODIUM 2 G/100ML
2 INJECTION, SOLUTION INTRAVENOUS EVERY 8 HOURS
Status: COMPLETED | OUTPATIENT
Start: 2023-02-28 | End: 2023-03-01

## 2023-02-28 RX ORDER — HYDROMORPHONE HCL IN WATER/PF 6 MG/30 ML
0.4 PATIENT CONTROLLED ANALGESIA SYRINGE INTRAVENOUS
Status: DISCONTINUED | OUTPATIENT
Start: 2023-02-28 | End: 2023-03-01 | Stop reason: HOSPADM

## 2023-02-28 RX ORDER — OXYCODONE HYDROCHLORIDE 5 MG/1
5 TABLET ORAL EVERY 4 HOURS PRN
Qty: 33 TABLET | Refills: 0 | Status: SHIPPED | OUTPATIENT
Start: 2023-02-28 | End: 2023-05-02

## 2023-02-28 RX ORDER — MAGNESIUM HYDROXIDE 1200 MG/15ML
LIQUID ORAL PRN
Status: DISCONTINUED | OUTPATIENT
Start: 2023-02-28 | End: 2023-02-28 | Stop reason: HOSPADM

## 2023-02-28 RX ORDER — OXYCODONE HYDROCHLORIDE 5 MG/1
5 TABLET ORAL EVERY 4 HOURS PRN
Status: DISCONTINUED | OUTPATIENT
Start: 2023-02-28 | End: 2023-03-01 | Stop reason: HOSPADM

## 2023-02-28 RX ORDER — FENTANYL CITRATE 50 UG/ML
50 INJECTION, SOLUTION INTRAMUSCULAR; INTRAVENOUS EVERY 5 MIN PRN
Status: DISCONTINUED | OUTPATIENT
Start: 2023-02-28 | End: 2023-02-28 | Stop reason: HOSPADM

## 2023-02-28 RX ORDER — HYDROMORPHONE HCL IN WATER/PF 6 MG/30 ML
0.2 PATIENT CONTROLLED ANALGESIA SYRINGE INTRAVENOUS EVERY 5 MIN PRN
Status: DISCONTINUED | OUTPATIENT
Start: 2023-02-28 | End: 2023-02-28 | Stop reason: HOSPADM

## 2023-02-28 RX ORDER — SODIUM CHLORIDE, SODIUM LACTATE, POTASSIUM CHLORIDE, CALCIUM CHLORIDE 600; 310; 30; 20 MG/100ML; MG/100ML; MG/100ML; MG/100ML
INJECTION, SOLUTION INTRAVENOUS CONTINUOUS
Status: DISCONTINUED | OUTPATIENT
Start: 2023-02-28 | End: 2023-03-01 | Stop reason: HOSPADM

## 2023-02-28 RX ORDER — HYDROXYZINE HYDROCHLORIDE 25 MG/1
25 TABLET, FILM COATED ORAL EVERY 6 HOURS PRN
Status: DISCONTINUED | OUTPATIENT
Start: 2023-02-28 | End: 2023-03-01 | Stop reason: HOSPADM

## 2023-02-28 RX ORDER — OXYCODONE HYDROCHLORIDE 5 MG/1
5 TABLET ORAL ONCE
Status: COMPLETED | OUTPATIENT
Start: 2023-02-28 | End: 2023-02-28

## 2023-02-28 RX ORDER — ACETAMINOPHEN 325 MG/1
650 TABLET ORAL EVERY 4 HOURS PRN
Qty: 100 TABLET | Refills: 0 | Status: SHIPPED | OUTPATIENT
Start: 2023-02-28 | End: 2024-06-03

## 2023-02-28 RX ORDER — HYDROMORPHONE HCL IN WATER/PF 6 MG/30 ML
0.4 PATIENT CONTROLLED ANALGESIA SYRINGE INTRAVENOUS EVERY 5 MIN PRN
Status: DISCONTINUED | OUTPATIENT
Start: 2023-02-28 | End: 2023-02-28 | Stop reason: HOSPADM

## 2023-02-28 RX ORDER — ONDANSETRON 2 MG/ML
4 INJECTION INTRAMUSCULAR; INTRAVENOUS EVERY 6 HOURS PRN
Status: DISCONTINUED | OUTPATIENT
Start: 2023-02-28 | End: 2023-03-01 | Stop reason: HOSPADM

## 2023-02-28 RX ORDER — OXYCODONE HYDROCHLORIDE 5 MG/1
10 TABLET ORAL EVERY 4 HOURS PRN
Status: DISCONTINUED | OUTPATIENT
Start: 2023-02-28 | End: 2023-03-01 | Stop reason: HOSPADM

## 2023-02-28 RX ORDER — PROCHLORPERAZINE MALEATE 10 MG
10 TABLET ORAL EVERY 6 HOURS PRN
Status: DISCONTINUED | OUTPATIENT
Start: 2023-02-28 | End: 2023-03-01 | Stop reason: HOSPADM

## 2023-02-28 RX ORDER — BISACODYL 10 MG
10 SUPPOSITORY, RECTAL RECTAL DAILY PRN
Status: DISCONTINUED | OUTPATIENT
Start: 2023-02-28 | End: 2023-03-01 | Stop reason: HOSPADM

## 2023-02-28 RX ORDER — CEFAZOLIN SODIUM/WATER 2 G/20 ML
2 SYRINGE (ML) INTRAVENOUS
Status: COMPLETED | OUTPATIENT
Start: 2023-02-28 | End: 2023-02-28

## 2023-02-28 RX ORDER — ONDANSETRON 2 MG/ML
INJECTION INTRAMUSCULAR; INTRAVENOUS PRN
Status: DISCONTINUED | OUTPATIENT
Start: 2023-02-28 | End: 2023-02-28

## 2023-02-28 RX ORDER — HYDROMORPHONE HCL IN WATER/PF 6 MG/30 ML
0.2 PATIENT CONTROLLED ANALGESIA SYRINGE INTRAVENOUS
Status: DISCONTINUED | OUTPATIENT
Start: 2023-02-28 | End: 2023-03-01 | Stop reason: HOSPADM

## 2023-02-28 RX ORDER — DEXAMETHASONE SODIUM PHOSPHATE 10 MG/ML
INJECTION, SOLUTION INTRAMUSCULAR; INTRAVENOUS
Status: COMPLETED | OUTPATIENT
Start: 2023-02-28 | End: 2023-02-28

## 2023-02-28 RX ORDER — TRANEXAMIC ACID 650 MG/1
1950 TABLET ORAL ONCE
Status: COMPLETED | OUTPATIENT
Start: 2023-02-28 | End: 2023-02-28

## 2023-02-28 RX ORDER — LIDOCAINE 40 MG/G
CREAM TOPICAL
Status: DISCONTINUED | OUTPATIENT
Start: 2023-02-28 | End: 2023-02-28 | Stop reason: HOSPADM

## 2023-02-28 RX ORDER — ACETAMINOPHEN 325 MG/1
975 TABLET ORAL ONCE
Status: COMPLETED | OUTPATIENT
Start: 2023-02-28 | End: 2023-02-28

## 2023-02-28 RX ORDER — NALOXONE HYDROCHLORIDE 0.4 MG/ML
0.2 INJECTION, SOLUTION INTRAMUSCULAR; INTRAVENOUS; SUBCUTANEOUS
Status: DISCONTINUED | OUTPATIENT
Start: 2023-02-28 | End: 2023-03-01 | Stop reason: HOSPADM

## 2023-02-28 RX ORDER — FENTANYL CITRATE 50 UG/ML
INJECTION, SOLUTION INTRAMUSCULAR; INTRAVENOUS PRN
Status: DISCONTINUED | OUTPATIENT
Start: 2023-02-28 | End: 2023-02-28

## 2023-02-28 RX ORDER — CEFAZOLIN SODIUM/WATER 2 G/20 ML
2 SYRINGE (ML) INTRAVENOUS SEE ADMIN INSTRUCTIONS
Status: DISCONTINUED | OUTPATIENT
Start: 2023-02-28 | End: 2023-02-28 | Stop reason: HOSPADM

## 2023-02-28 RX ORDER — AMOXICILLIN 250 MG
1-2 CAPSULE ORAL 2 TIMES DAILY
Qty: 100 TABLET | Refills: 0 | Status: SHIPPED | OUTPATIENT
Start: 2023-02-28 | End: 2023-05-02

## 2023-02-28 RX ORDER — PROPOFOL 10 MG/ML
INJECTION, EMULSION INTRAVENOUS CONTINUOUS PRN
Status: DISCONTINUED | OUTPATIENT
Start: 2023-02-28 | End: 2023-02-28

## 2023-02-28 RX ORDER — AMOXICILLIN 250 MG
1 CAPSULE ORAL 2 TIMES DAILY
Status: DISCONTINUED | OUTPATIENT
Start: 2023-02-28 | End: 2023-03-01 | Stop reason: HOSPADM

## 2023-02-28 RX ORDER — CELECOXIB 200 MG/1
400 CAPSULE ORAL ONCE
Status: COMPLETED | OUTPATIENT
Start: 2023-02-28 | End: 2023-02-28

## 2023-02-28 RX ORDER — SODIUM CHLORIDE, SODIUM LACTATE, POTASSIUM CHLORIDE, CALCIUM CHLORIDE 600; 310; 30; 20 MG/100ML; MG/100ML; MG/100ML; MG/100ML
INJECTION, SOLUTION INTRAVENOUS CONTINUOUS
Status: DISCONTINUED | OUTPATIENT
Start: 2023-02-28 | End: 2023-02-28 | Stop reason: HOSPADM

## 2023-02-28 RX ORDER — ONDANSETRON 4 MG/1
4 TABLET, ORALLY DISINTEGRATING ORAL EVERY 30 MIN PRN
Status: DISCONTINUED | OUTPATIENT
Start: 2023-02-28 | End: 2023-02-28 | Stop reason: HOSPADM

## 2023-02-28 RX ORDER — ACETAMINOPHEN 325 MG/1
975 TABLET ORAL EVERY 8 HOURS
Status: DISCONTINUED | OUTPATIENT
Start: 2023-02-28 | End: 2023-03-01 | Stop reason: HOSPADM

## 2023-02-28 RX ORDER — CELECOXIB 100 MG/1
100 CAPSULE ORAL 2 TIMES DAILY
Status: DISCONTINUED | OUTPATIENT
Start: 2023-02-28 | End: 2023-03-01 | Stop reason: HOSPADM

## 2023-02-28 RX ORDER — ACETAMINOPHEN 325 MG/1
650 TABLET ORAL EVERY 4 HOURS PRN
Status: DISCONTINUED | OUTPATIENT
Start: 2023-03-03 | End: 2023-03-01 | Stop reason: HOSPADM

## 2023-02-28 RX ORDER — ONDANSETRON 4 MG/1
4 TABLET, ORALLY DISINTEGRATING ORAL EVERY 6 HOURS PRN
Status: DISCONTINUED | OUTPATIENT
Start: 2023-02-28 | End: 2023-03-01 | Stop reason: HOSPADM

## 2023-02-28 RX ORDER — CHLOROPROCAINE HYDROCHLORIDE 20 MG/ML
INJECTION, SOLUTION EPIDURAL; INFILTRATION; INTRACAUDAL; PERINEURAL
Status: COMPLETED | OUTPATIENT
Start: 2023-02-28 | End: 2023-02-28

## 2023-02-28 RX ORDER — FENTANYL CITRATE 0.05 MG/ML
50 INJECTION, SOLUTION INTRAMUSCULAR; INTRAVENOUS
Status: DISCONTINUED | OUTPATIENT
Start: 2023-02-28 | End: 2023-02-28 | Stop reason: HOSPADM

## 2023-02-28 RX ADMIN — SODIUM CHLORIDE, POTASSIUM CHLORIDE, SODIUM LACTATE AND CALCIUM CHLORIDE: 600; 310; 30; 20 INJECTION, SOLUTION INTRAVENOUS at 17:11

## 2023-02-28 RX ADMIN — SODIUM CHLORIDE, POTASSIUM CHLORIDE, SODIUM LACTATE AND CALCIUM CHLORIDE: 600; 310; 30; 20 INJECTION, SOLUTION INTRAVENOUS at 11:16

## 2023-02-28 RX ADMIN — OXYCODONE HYDROCHLORIDE 10 MG: 5 TABLET ORAL at 15:55

## 2023-02-28 RX ADMIN — MIDAZOLAM 2 MG: 1 INJECTION INTRAMUSCULAR; INTRAVENOUS at 10:06

## 2023-02-28 RX ADMIN — SODIUM CHLORIDE, POTASSIUM CHLORIDE, SODIUM LACTATE AND CALCIUM CHLORIDE: 600; 310; 30; 20 INJECTION, SOLUTION INTRAVENOUS at 08:39

## 2023-02-28 RX ADMIN — CELECOXIB 100 MG: 100 CAPSULE ORAL at 20:35

## 2023-02-28 RX ADMIN — DEXAMETHASONE SODIUM PHOSPHATE 3 MG: 10 INJECTION, SOLUTION INTRAMUSCULAR; INTRAVENOUS at 10:05

## 2023-02-28 RX ADMIN — ACETAMINOPHEN 975 MG: 325 TABLET, FILM COATED ORAL at 15:55

## 2023-02-28 RX ADMIN — OXYCODONE HYDROCHLORIDE 5 MG: 5 TABLET ORAL at 12:52

## 2023-02-28 RX ADMIN — SENNOSIDES AND DOCUSATE SODIUM 1 TABLET: 50; 8.6 TABLET ORAL at 20:35

## 2023-02-28 RX ADMIN — ACETAMINOPHEN 975 MG: 325 TABLET ORAL at 08:16

## 2023-02-28 RX ADMIN — ONDANSETRON 4 MG: 2 INJECTION INTRAMUSCULAR; INTRAVENOUS at 11:15

## 2023-02-28 RX ADMIN — Medication 2 G: at 10:05

## 2023-02-28 RX ADMIN — ONDANSETRON 4 MG: 4 TABLET, ORALLY DISINTEGRATING ORAL at 20:38

## 2023-02-28 RX ADMIN — PROPOFOL 100 MCG/KG/MIN: 10 INJECTION, EMULSION INTRAVENOUS at 10:15

## 2023-02-28 RX ADMIN — OXYCODONE HYDROCHLORIDE 10 MG: 5 TABLET ORAL at 20:35

## 2023-02-28 RX ADMIN — FENTANYL CITRATE 50 MCG: 50 INJECTION, SOLUTION INTRAMUSCULAR; INTRAVENOUS at 09:16

## 2023-02-28 RX ADMIN — BUPIVACAINE HYDROCHLORIDE 15 ML: 5 INJECTION, SOLUTION EPIDURAL; INTRACAUDAL at 10:05

## 2023-02-28 RX ADMIN — MIDAZOLAM 1 MG: 1 INJECTION INTRAMUSCULAR; INTRAVENOUS at 09:15

## 2023-02-28 RX ADMIN — CELECOXIB 400 MG: 200 CAPSULE ORAL at 08:18

## 2023-02-28 RX ADMIN — CHLOROPROCAINE HYDROCHLORIDE 50 MG: 20 INJECTION, SOLUTION EPIDURAL; INFILTRATION; INTRACAUDAL; PERINEURAL at 10:26

## 2023-02-28 RX ADMIN — AMLODIPINE BESYLATE 5 MG: 5 TABLET ORAL at 20:35

## 2023-02-28 RX ADMIN — CEFAZOLIN SODIUM 2 G: 2 INJECTION, SOLUTION INTRAVENOUS at 17:03

## 2023-02-28 RX ADMIN — FENTANYL CITRATE 50 MCG: 50 INJECTION, SOLUTION INTRAMUSCULAR; INTRAVENOUS at 10:10

## 2023-02-28 RX ADMIN — TRANEXAMIC ACID 1950 MG: 650 TABLET ORAL at 08:18

## 2023-02-28 RX ADMIN — PHENYLEPHRINE HYDROCHLORIDE 0.3 MCG/KG/MIN: 10 INJECTION INTRAVENOUS at 10:25

## 2023-02-28 ASSESSMENT — ACTIVITIES OF DAILY LIVING (ADL)
ADLS_ACUITY_SCORE: 22
ADLS_ACUITY_SCORE: 18
ADLS_ACUITY_SCORE: 22
ADLS_ACUITY_SCORE: 22
ADLS_ACUITY_SCORE: 18
ADLS_ACUITY_SCORE: 22
ADLS_ACUITY_SCORE: 22
ADLS_ACUITY_SCORE: 18

## 2023-02-28 NOTE — PROGRESS NOTES
Pt transferred to floor with left partial knee replacement. VSS on 2L of O2 NC. Pt stated 3/10 pain. Knee in immobilizer and ace wrap, CDI. LR running at 100ml/hr. Updated oncoming nurse.

## 2023-02-28 NOTE — PROCEDURES
DATE OF SERVICE: 2/28/2023    SURGEON   SONA WESTON MD     FIRST ASSISTANT   GILL WARREN PA-C   Please bill for Mr. Warren as first assistant as there was no resident available to assist in this case. Assistants were necessary and performed positioning, draping, retraction, suturing and wound dressing tasks throughout the surgical procedure. The assistant was present for the entire procedure.    PREOPERATIVE DIAGNOSIS  Isolated left knee medial compartment osteoarthritis.     POSTOPERATIVE DIAGNOSIS   Isolated left knee medial compartment osteoarthritis.     PROCEDURE   Left medial unicompartmental arthroplasty using a [employer] [place] components, a size small  femur, a size A left tibia and a 4 mm polyethylene insert.     ANESTHESIA  Spinal with Adductor Canal Block    INDICATION FOR SURGERY   Radha Rodriguez [id number] is an 62 year old-year-old female with a long history of increasing left medial knee pain. The patient had failed all reasonable non-operative options as far as controlling their pain. It was obvious there was significant narrowing of the joint space on the preoperative x-rays. After discussion, Radha Rodriguez decided they would benefit from the above-named procedure. Informed consent was obtained.     FINDINGS   The ACL was intact. There was grade IV chondromalacia of the medial femoral condyle and a posterior horn medial meniscal tear. The patellofemoral joint and lateral compartments appeared to be pristine.     PROCEDURE   Radha Rodriguez was identified by myself in the PAR and their left extremity was marked. A single-shot adductor canal block was placed. The patient was taken to the operating room and placed under spinal anesthetic. The patient was placed supine and a tourniquet was placed around the left proximal thigh. The patient was then prepped with Avagard, followed by a 10 minute Betadine scrub, alcohol paint and DuraPrep and draped in the usual fashion. A time-out was then performed.  The tourniquet was placed at 300 mmHg. The incision was made and carried down with sharp dissection after injection with the anaesthetic solution. A paramedian arthrotomy was made in the usual fashion and a self-retainer was placed. The anterior capsule was released and a Z retractor placed medially and a bent Hohmann into the notch laterally. I then partially excised the fat pad. Next I excised the anterior medial meniscus. With this we placed the [employer] external tibial cutting jig and aligned it down center of the ankle and the foot. The axial and sagittal cuts were made and I could get either a 4 or a 5 polyethylene spacer to fit with the boot. Attention was next turned to the femur. An intramedullary guide was placed just anterior to the notch. With this I placed the distal femoral guide, drilling the small lug hole and the large lug hole through the guide, keeping this in the center third of the femur with the knee flexed at 100 degrees. Once this was done we then placed the posterior femoral cutting block and made the cut with an oscillating saw. The posterior bone fragment was removed. We started with a 0 spigot and reamed. We then placed a size small  femoral trial and impacted it into place. We then sized the tibia and it was felt that a A would fit nicely. We then balanced the knee. It was easy to get a 4 in at this point with the knee flexed, however it was very tight anteriorly. Therefore we removed the femoral trial and placed a 3 spigot, reamed and felt that the 4 gave us a nice fit at both 100 degrees of flexion and near full extension. All osteophytes were removed, as well as the collar from around the reamer. The trial components were removed. The tibia was then prepared by impacting the nail in and then cutting the keel using the toothbrush cutting blade. A gouge was used to verify the depth of the keel cut. The keeled tibial trial was impacted in place and this fit well. The medial meniscectomy  was completed. The rest of the anaesthetic solution was injected in the posterior capsule of the joint taking care to aspirate with every injection as to not get to a vascular structure. Then multiple small drill holes were then placed in all hard bone with a small 3mm drill bit. The wound was then thoroughly irrigated and dried. The tibia, followed by the femur, were cemented in place using one batch of Simplex cement impregnated with tobramycin. The knee was then placed in 45 degrees of flexion and the  4 spacer was placed. The wound was then lavaged with a dilute Betadine solution for 3 minutes and then thoroughly irrigated again with normal saline. With the trial it was felt that 4 gave us the best fit and balance. A gram of vancomycin powder was placed in the medial compartment of the knee and the permanent implant was snapped into place. The tourniquet was deflated and the retinaculum was closed with a running #2 Quill suture. The deep subdermal layer was closed with running 0 Stratafix and the subdermal layer was closed a running 2-0 Stratafix suture. The skin was closed with a running 3-0 Quill suture. Steri-Strips were applied, as well as sterile dressings, an ACE bandage and an ice pack. There were no complications. Sponge and needle counts correct. Tourniquet time was 37. The patient taken to PAR in stable condition.     DRAINS  None    SPECIMENS  None    COMPLICATIONS  None    ESTIMATED BLOOD LOSS  25 mL    CONDITION ON DISCHARGE FROM OR  Satisfactory    PLAN  1. Antibiotic prophylaxis was given, including Ancef 2 gm within 1 hour of surgical incision and discontinued within 24 hours.  2. DVT prophylaxis ASA will be started within 24 hours of surgery  3. Weight bearing as tolerated WBAT  4. Discharge anticipated POD # 1 to Home  5. Pain Medication- oxycodone, Tylenol and Celebrex    SONA WESTON MD    @C(1)@ [facility] - [phone]

## 2023-02-28 NOTE — ANESTHESIA PROCEDURE NOTES
Adductor canal and Femoral Procedure Note    Pre-Procedure   Staff -        Anesthesiologist:  Hayley Brian       Performed By: anesthesiologist       Location: pre-op       Pre-Anesthestic Checklist: patient identified, IV checked, site marked, risks and benefits discussed, informed consent, monitors and equipment checked, pre-op evaluation, at physician/surgeon's request and post-op pain management  Timeout:       Correct Patient: Yes        Correct Procedure: Yes        Correct Site: Yes        Correct Position: Yes        Correct Laterality: Yes        Site Marked: Yes  Procedure Documentation  Procedure: Adductor canal, Femoral       Laterality: left       Patient Position: supine       Patient Prep/Sterile Barriers: sterile gloves, mask       Skin prep: Chloraprep       Local skin infiltrated with 2 mL of 1% lidocaine.        Needle Gauge: 20.        Needle Length (millimeters): 100        Ultrasound guided       1. Ultrasound was used to identify targeted nerve, plexus, vascular marker, or fascial plane and place a needle adjacent to it in real-time.       2. Ultrasound was used to visualize the spread of anesthetic in close proximity to the above referenced structure.       3. A permanent image is entered into the patient's record.       4. The visualized anatomic structures appeared normal.       5. There were no apparent abnormal pathologic findings.    Assessment/Narrative         The placement was negative for: blood aspirated, painful injection and site bleeding       Paresthesias: No.       Test dose of mL at.         Test dose negative, 3 minutes after injection, for signs of intravascular, subdural, or intrathecal injection.       Bolus given via needle..        Secured via.        Insertion/Infusion Method: Single Shot       Complications: none       Injection made incrementally with aspirations every 3 mL.    Medication(s) Administered   Bupivacaine 0.5% w/ 1:400K Epi (Injection) - Injection   15 mL  "- 2/28/2023 10:05:00 AM  Dexamethasone 10 mg/mL PF (Perineural) - Perineural   3 mg - 2/28/2023 10:05:00 AM   Comments:  Placed anterior to the artery near the femoral nerve branches in the adductor canal    Pt tolerated well.    No complications.      The surgeon has given a verbal order transferring care of this patient to me for the performance of a regional analgesia block for post-op pain control. It is requested of me because I am uniquely trained and qualified to perform this block and the surgeon is neither trained nor qualified to perform this procedure.        FOR [facility] ([facility] ONLY:   Pain Team Contact information: please page the Pain Team Via [employer]. Search \"Pain\". During daytime hours, please page the attending first. At night please page the resident first.    "

## 2023-02-28 NOTE — ANESTHESIA CARE TRANSFER NOTE
Patient: Radha Rodriguez    Procedure: Procedure(s):  LEFT MEDIAL UNICOMPARTMENTAL KNEE ARTHROPLASTY       Diagnosis: Osteoarthritis of left knee, unspecified osteoarthritis type [M17.12]  Diagnosis Additional Information: No value filed.    Anesthesia Type:   Spinal     Note:    Oropharynx: oropharynx clear of all foreign objects  Level of Consciousness: awake  Oxygen Supplementation: face mask  Level of Supplemental Oxygen (L/min / FiO2): 10  Independent Airway: airway patency satisfactory and stable  Dentition: dentition unchanged  Vital Signs Stable: post-procedure vital signs reviewed and stable  Report to RN Given: handoff report given  Patient transferred to: PACU    Handoff Report: Identifed the Patient, Identified the Reponsible Provider, Reviewed the pertinent medical history, Discussed the surgical course, Reviewed Intra-OP anesthesia mangement and issues during anesthesia, Set expectations for post-procedure period and Allowed opportunity for questions and acknowledgement of understanding      Vitals:  Vitals Value Taken Time   /68 02/28/23 1141   Temp 37  C (98.6  F) 02/28/23 1141   Pulse 81 02/28/23 1143   Resp 11 02/28/23 1143   SpO2 97 % 02/28/23 1143   Vitals shown include unvalidated device data.    Electronically Signed By: PHONG Maharaj CRNA  February 28, 2023  11:44 AM

## 2023-02-28 NOTE — INTERVAL H&P NOTE
"I have reviewed the surgical (or preoperative) H&P that is linked to this encounter, and examined the patient. There are no significant changes    Clinical Conditions Present on Arrival:  Clinically Significant Risk Factors Present on Admission                    # Obesity: Estimated body mass index is 32.06 kg/m  as calculated from the following:    Height as of this encounter: 1.6 m (5' 3\").    Weight as of this encounter: 82.1 kg (181 lb).       "

## 2023-02-28 NOTE — ANESTHESIA POSTPROCEDURE EVALUATION
Patient: Radha Rodriguez    Procedure: Procedure(s):  LEFT MEDIAL UNICOMPARTMENTAL KNEE ARTHROPLASTY       Anesthesia Type:  Spinal    Note:  Disposition: Inpatient   Postop Pain Control: Uneventful            Sign Out: Well controlled pain   PONV: No   Neuro/Psych: Uneventful            Sign Out: Acceptable/Baseline neuro status   Airway/Respiratory: Uneventful            Sign Out: Acceptable/Baseline resp. status   CV/Hemodynamics: Uneventful            Sign Out: Acceptable CV status; No obvious hypovolemia; No obvious fluid overload   Other NRE: NONE   DID A NON-ROUTINE EVENT OCCUR?            Last vitals:  Vitals Value Taken Time   /69 02/28/23 1330   Temp 37  C (98.6  F) 02/28/23 1141   Pulse 84 02/28/23 1335   Resp 14 02/28/23 1335   SpO2 94 % 02/28/23 1335   Vitals shown include unvalidated device data.    Electronically Signed By: Hayley Brian  February 28, 2023  2:05 PM

## 2023-02-28 NOTE — ANESTHESIA PROCEDURE NOTES
"Intrathecal injection Procedure Note    Pre-Procedure   Staff -        Anesthesiologist:  Hayley Brian       Performed By: anesthesiologist       Location: OR       Pre-Anesthestic Checklist: patient identified, IV checked, risks and benefits discussed, informed consent, monitors and equipment checked, pre-op evaluation, at physician/surgeon's request and post-op pain management  Timeout:       Correct Patient: Yes        Correct Procedure: Yes        Correct Site: Yes        Correct Position: Yes   Procedure Documentation  Procedure: intrathecal injection       Patient Position: sitting       Patient Prep/Sterile Barriers: sterile gloves, mask, patient draped       Skin prep: Betadine       Insertion Site: L3-4. (midline approach).       Needle Gauge: 24.        Needle Length (Inches): 4        Spinal Needle Type: Pencan       Introducer used       Introducer: 20 G       # of attempts: 1 and  # of redirects:  0    Assessment/Narrative         Paresthesias: No.       CSF fluid: clear.    Medication(s) Administered   2% Chloroprocaine PF (Intrathecal) - Intrathecal   50 mg - 2/28/2023 10:26:00 AM   Comments:  Pt tolerated procedure well.   Immediately to supine + SAMANTHA.   FHTs stable post-procedure.       FOR [facility] ([facility] [place]) ONLY:   Pain Team Contact information: please page the Pain Team Via [employer]. Search \"Pain\". During daytime hours, please page the attending first. At night please page the resident first.    "

## 2023-02-28 NOTE — PROGRESS NOTES
02/28/23 1600   Appointment Info   Signing Clinician's Name / Credentials (PT) Favio Em DPT   Living Environment   People in Home spouse   Current Living Arrangements house   Home Accessibility stairs to enter home;stairs within home   Number of Stairs, Main Entrance 2   Stair Railings, Main Entrance none   Number of Stairs, Within Home, Primary seven   Stair Railings, Within Home, Primary railing on right side (ascending)   Transportation Anticipated family or friend will provide   Living Environment Comments Pt lives in split level home with spouse who will provide good 24/7 assist, 2 STEPHANIE with 7 steps down to basement bedroom   Self-Care   Usual Activity Tolerance good   Current Activity Tolerance moderate   Equipment Currently Used at Home walker, rolling;cane, straight;raised toilet seat   Fall history within last six months yes   Number of times patient has fallen within last six months 1   Activity/Exercise/Self-Care Comment Pt IND with mobility and ADL's at baseline, has walk-in shower   General Information   Onset of Illness/Injury or Date of Surgery 02/28/23   Referring Physician Davy Godinez PA-C   Patient/Family Therapy Goals Statement (PT) go home   Pertinent History of Current Problem (include personal factors and/or comorbidities that impact the POC) Pt is a 62 y.o. female s/p left unicompartmental TKA o n 2/28/2023, POD#0   Existing Precautions/Restrictions fall;weight bearing   Weight-Bearing Status - LLE weight-bearing as tolerated   Cognition   Affect/Mental Status (Cognition) WNL   Orientation Status (Cognition) oriented x 4   Follows Commands (Cognition) WNL   Pain Assessment   Patient Currently in Pain Yes, see Vital Sign flowsheet  (2/10 left knee)   Integumentary/Edema   Integumentary/Edema Comments left knee covered in ace wrap   Posture    Posture Not impaired   Range of Motion (ROM)   Range of Motion ROM deficits secondary to surgical procedure;ROM deficits secondary to  pain;Knee, Left (Group)   ROM Comment deficits with left knee after surgery, otherwise appears grossly WFL   Left Knee Extension/Flexion ROM   Left Knee Extension/Flexion AROM - degrees 0-110   Strength (Manual Muscle Testing)   Strength (Manual Muscle Testing) Able to perform R SLR;Able to perform L SLR;Deficits observed during functional mobility   Strength Comments not formally assessed, deficits with left knee, appears grossly antigravity   Bed Mobility   Comment, (Bed Mobility) supine<>sit SBA   Transfers   Comment, (Transfers) sit<>stand with FWW CGA   Gait/Stairs (Locomotion)   Sharp Level (Gait) contact guard   Assistive Device (Gait) walker, front-wheeled   Distance in Feet 10'   Distance in Feet (Gait) 350'   Pattern (Gait) step-through   Deviations/Abnormal Patterns (Gait) antalgic;lonnie decreased;gait speed decreased;weight shifting decreased;stride length decreased   Maintains Weight-bearing Status (Gait) able to maintain   Balance   Balance Comments sitting EOB unsupported and steady, good standing balance with FWW, mildly impaired dynamic balance   Sensory Examination   Sensory Perception Comments pt reports mild numbness/tingling in LLE into foot   Coordination   Coordination no deficits were identified   Muscle Tone   Muscle Tone no deficits were identified   Clinical Impression   Criteria for Skilled Therapeutic Intervention Yes, treatment indicated   PT Diagnosis (PT) impaired gait   Influenced by the following impairments pain, deficits with ROM, strength, balance   Functional limitations due to impairments decreased ind with bed mobility, transfers, amb, ADL's   Clinical Presentation (PT Evaluation Complexity) Stable/Uncomplicated   Clinical Presentation Rationale PMH and clinical judgement   Clinical Decision Making (Complexity) low complexity   Planned Therapy Interventions (PT) balance training;bed mobility training;cryotherapy;gait training;patient/family education;ROM (range of  motion);stair training;strengthening;stretching;transfer training;progressive activity/exercise   Anticipated Equipment Needs at Discharge (PT)   (pt will provide FWW, 4WW, SEC)   Risk & Benefits of therapy have been explained evaluation/treatment results reviewed;care plan/treatment goals reviewed;risks/benefits reviewed;current/potential barriers reviewed;participants voiced agreement with care plan;participants included;patient;spouse/significant other   PT Total Evaluation Time   PT Eval, Low Complexity Minutes (25912) 10   Plan of Care Review   Plan of Care Reviewed With patient;spouse   Physical Therapy Goals   PT Frequency 2x/day   PT Predicted Duration/Target Date for Goal Attainment 03/01/23   PT Goals Bed Mobility;Transfers;Gait;Stairs   PT: Bed Mobility Independent;Supine to/from sit;Rolling;Bridging   PT: Transfers Modified independent;Sit to/from stand;Assistive device   PT: Gait Modified independent;Rolling walker;150 feet   PT: Stairs Minimal assist;7 stairs;Rail on right   Interventions   Interventions Quick Adds Gait Training;Therapeutic Activity;Therapeutic Procedure   Therapeutic Procedure/Exercise   Ther. Procedure: strength, endurance, ROM, flexibillity Minutes (06278) 6   Symptoms Noted During/After Treatment none   Treatment Detail/Skilled Intervention Educated on post surgical benefits of TE on circulation, functional ROM, and strength. Left pt with TE handout. With pt in supine cued for AP's x 20, on LLE: quad sets x 10, heel slides x 10. Pt tolerated all TE well.   Therapeutic Activity   Therapeutic Activities: dynamic activities to improve functional performance Minutes (66109) 10   Symptoms Noted During/After Treatment None   Treatment Detail/Skilled Intervention Greeted pt supine in bed with spouse present. Eval completed. Educated on orders for WBAT, ROM to flexion tolerance, and importance of keeping knee straight when resting and demonstrated how to do so. With HOB elevated supine>sit  SBA, with HOB flat sit>supine SBA. Pt able to reposition in bed IND. Sit<>stand with FWW CGA progressing to SBA, cues for safe walker and hand placement and pt able to demo back well. Stand<>sit from raised toilet seat with FWW and grab bar, cues for sequencing. Increased time for line management and room set up.   Gait Training   Gait Training Minutes (93175) 8   Symptoms Noted During/After Treatment (Gait Training) increased pain   Treatment Detail/Skilled Intervention Gait training in hallway, FWW height raised to fit pt better and pt educated on how to size walker. Pt amb with CGA progressing to SBA, good step through pattern cue for even step length and pt able to demo back well. Moved well overall with no overt LOB noted. Encourged pt to amb with nursing as able   Roberts Level (Gait Training) stand-by assist   Physical Assistance Level (Gait Training) supervision;verbal cues;nonverbal cues (demo/gestures)   Weight Bearing (Gait Training) weight-bearing as tolerated   Assistive Device (Gait Training) rolling walker   Gait Analysis Deviations decreased lonnie;decreased velocity of limb motion;decreased step length;decreased stride length;decreased weight-shifting ability   Impairments (Gait Analysis/Training) pain;strength decreased   PT Discharge Planning   PT Plan review/progress HEP, progress gait distance, bed moblity, safe transfers   PT Discharge Recommendation (DC Rec)   (defer to ortho)   PT Rationale for DC Rec Pt below baseline but moving well and using safe technique. Anticipate pt will progress and by discharge be at/near IND bed mobility, Mod-I transfers with FWW, Mod-I amb with FWW, Job for steps. Pt will have good 24/7 support at home from spouse   PT Brief overview of current status bed mobility SBA, sit<>stand with FWW SBA, amb with FWW SBA   Total Session Time   Timed Code Treatment Minutes 24   Total Session Time (sum of timed and untimed services) 34

## 2023-02-28 NOTE — CONSULTS
Hospitalist Consult Note  2/28/2023  7:52 AM    Ms. Rodriguez is a 63 yo female with PMH significant for inflammatory polyarthropathy on Hydroxychloroquine, history of left lower extremity DVT and Factor 5 Leiden mutation, and history chronic pain syndrome who was admitted for elective left medial unicompartmental knee arthroplasty with Dr. Mena on 2/28/23. Hospitalist service was consulted post-op for medical management.     The patient was not seen. EMR was reviewed that included pre-op H&P and PTA medications, labs/diagnostics, notes and vitals. The patient is hemodynamically stable with vital signs within normal limits, pain is well managed, and care thus far is as expected. I have reviewed the H&P, reviewed and reconciled prior to admission medications. Given the above, will defer formal consult. Routine post-operative cares, IVF, DVT prophylaxis, and pain management to orthopedic service. Will check some basic lab work in the AM to assess for acute blood loss anemia given surgery. PT and OT to assess per Ortho. Bowel regimen in place while on pain regimen. No changes to PTA medication regimen at discharge unless issues arise throughout stay. Plan is outlined below.     POD # 0 s/p Left Medial Unicompartmental Knee Arthroplasty  Isolated Left Knee Medial Compartment OA  Hemodynamically stable with BP's in the 100's to 1-teens over 60's, heart rates 80's to 90's, and satting 93% on RA. EBL of 25 mL. Spinal with adductor canal anesthesia.  -Post-op management as per Ortho  -Pain, activity, anticoagulation, IVF's, therapies, diet and BM regimen as per Primary Service  -Aggressive pulmonary hygiene post-op   -Wean oxygen to maintain sats >90%   -Incentive spirometry every hour while awake   -Cough and deep breathe  -PRN pain meds  -BM regimen  -Therapies  -Monitor VS per order  -Hgb in AM      Chronic Pain Syndrome  -Chronic pain, including chronic back pain, primary OA of the left first carpometacarpal joint, left  knee OA, diffuse idiopathic skeletal hyperostosis  -Not on chronic opioid or narcotic pain medication prior to admission  -Has had prior steroid injections in the past  -Concern for acute on chronic pain following surgery  -Recommend pain management consultation while IP to assist with acute and chronic pain management       Inflammatory Polyarthropathy   Immunosuppressed Status s/p Hydroxychloroquine   -PTA regimen: Hydroxychloroquine    -Hold while IP and up to 2-weeks post-op  -Will need to follow up with Rheumatology or PCP OP to determine optimal time to restart Hydroxychloroquine post-op      History Left Lower Extremity DVT  Factor 5 Leiden Mutation, Heterozygous  -Per pre-op office visit note from 2/17/23, post-operative DVT prophylaxis recommended for 6-weeks given history LLE DVT and Factor 5 Leiden mutation; defer to Ortho to start as clinically indicated  -Will need OP follow-up with PCP at discharge       Medical co-morbidities include, chronic pain, inflammatory polyarthropathy that is controlled and history DVT and Factor 5 Leiden mutation that are chronic and stable medical problems without need for optimization. Will need OP follow-up, as noted above. Pain service consult recommended. Hospitalist will sign off.  Happy to be reconsulted in the future if medical issues arise. The Hospitalist service appreciates this consult.    Ana Colorado NP  [facility]  Securely message with the Vocera Web Console (learn more here)  Text page via [employer] Paging/Directory

## 2023-02-28 NOTE — DISCHARGE INSTRUCTIONS
3/1/2023: Will need to follow-up outpatient with either Rheumatology or Primary Care Provider to determine when to restart your Hydroxychloroquine post-surgery.

## 2023-03-01 ENCOUNTER — APPOINTMENT (OUTPATIENT)
Dept: PHYSICAL THERAPY | Facility: CLINIC | Age: 63
End: 2023-03-01
Attending: ORTHOPAEDIC SURGERY
Payer: COMMERCIAL

## 2023-03-01 ENCOUNTER — TRANSFERRED RECORDS (OUTPATIENT)
Dept: HEALTH INFORMATION MANAGEMENT | Facility: CLINIC | Age: 63
End: 2023-03-01

## 2023-03-01 VITALS
DIASTOLIC BLOOD PRESSURE: 78 MMHG | OXYGEN SATURATION: 92 % | WEIGHT: 181 LBS | TEMPERATURE: 98.1 F | SYSTOLIC BLOOD PRESSURE: 123 MMHG | RESPIRATION RATE: 16 BRPM | BODY MASS INDEX: 32.07 KG/M2 | HEIGHT: 63 IN | HEART RATE: 81 BPM

## 2023-03-01 LAB
FASTING STATUS PATIENT QL REPORTED: YES
GLUCOSE SERPL-MCNC: 142 MG/DL (ref 70–99)
HGB BLD-MCNC: 12.5 G/DL (ref 11.7–15.7)

## 2023-03-01 PROCEDURE — 36415 COLL VENOUS BLD VENIPUNCTURE: CPT | Performed by: ORTHOPAEDIC SURGERY

## 2023-03-01 PROCEDURE — 250N000011 HC RX IP 250 OP 636: Performed by: PHYSICIAN ASSISTANT

## 2023-03-01 PROCEDURE — 250N000013 HC RX MED GY IP 250 OP 250 PS 637: Performed by: NURSE PRACTITIONER

## 2023-03-01 PROCEDURE — 250N000013 HC RX MED GY IP 250 OP 250 PS 637: Performed by: PHYSICIAN ASSISTANT

## 2023-03-01 PROCEDURE — 97110 THERAPEUTIC EXERCISES: CPT | Mod: GP | Performed by: PHYSICAL THERAPY ASSISTANT

## 2023-03-01 PROCEDURE — 82947 ASSAY GLUCOSE BLOOD QUANT: CPT | Performed by: ORTHOPAEDIC SURGERY

## 2023-03-01 PROCEDURE — 97116 GAIT TRAINING THERAPY: CPT | Mod: GP | Performed by: PHYSICAL THERAPY ASSISTANT

## 2023-03-01 PROCEDURE — 999N000111 HC STATISTIC OT IP EVAL DEFER: Performed by: OCCUPATIONAL THERAPIST

## 2023-03-01 PROCEDURE — 97530 THERAPEUTIC ACTIVITIES: CPT | Mod: GP | Performed by: PHYSICAL THERAPY ASSISTANT

## 2023-03-01 PROCEDURE — 85018 HEMOGLOBIN: CPT | Performed by: PHYSICIAN ASSISTANT

## 2023-03-01 RX ADMIN — ACETAMINOPHEN 975 MG: 325 TABLET, FILM COATED ORAL at 00:37

## 2023-03-01 RX ADMIN — CELECOXIB 100 MG: 100 CAPSULE ORAL at 09:07

## 2023-03-01 RX ADMIN — SENNOSIDES AND DOCUSATE SODIUM 1 TABLET: 50; 8.6 TABLET ORAL at 09:07

## 2023-03-01 RX ADMIN — OXYCODONE HYDROCHLORIDE 10 MG: 5 TABLET ORAL at 06:39

## 2023-03-01 RX ADMIN — CEFAZOLIN SODIUM 2 G: 2 INJECTION, SOLUTION INTRAVENOUS at 01:41

## 2023-03-01 RX ADMIN — OXYCODONE HYDROCHLORIDE 10 MG: 5 TABLET ORAL at 00:37

## 2023-03-01 RX ADMIN — ACETAMINOPHEN 975 MG: 325 TABLET, FILM COATED ORAL at 09:07

## 2023-03-01 RX ADMIN — RIVAROXABAN 10 MG: 10 TABLET, FILM COATED ORAL at 09:07

## 2023-03-01 RX ADMIN — POLYETHYLENE GLYCOL 3350 17 G: 17 POWDER, FOR SOLUTION ORAL at 09:07

## 2023-03-01 RX ADMIN — AMLODIPINE BESYLATE 5 MG: 5 TABLET ORAL at 09:07

## 2023-03-01 RX ADMIN — HYDROXYZINE HYDROCHLORIDE 25 MG: 25 TABLET, FILM COATED ORAL at 06:39

## 2023-03-01 ASSESSMENT — ACTIVITIES OF DAILY LIVING (ADL)
ADLS_ACUITY_SCORE: 18

## 2023-03-01 NOTE — PLAN OF CARE
OT: orders received and chart reviewed and met with pt to discuss role of OT. Pt states she has been having no issues with LB dressing since surgery, she was in the BR this AM with no difficulty completing toilet transfer or grooming at the sink. Discussed shower safety and pt with verbal understanding. No acute OT needs at this time. Will complete the orders

## 2023-03-01 NOTE — PLAN OF CARE
A/Ox4, VSS on RA, AVS & medications given to pt. Discharge instructions answered questions. PIV removed, dressing changed-CDI. Pt left with all belongings. Discharged home with spouse.

## 2023-03-01 NOTE — PROGRESS NOTES
"ORTHOPEDIC LOWER EXTREMITY PROGRESS NOTE    POD#1  Patient is a 62 year old female who underwent Procedure(s):  LEFT MEDIAL UNICOMPARTMENTAL KNEE ARTHROPLASTY on 2023. Pain is well controlled. Tolerating medication well, no nausea or vomiting.   is here with her, they are ready to discharge.    Vitals:   Blood pressure 123/78, pulse 81, temperature 98.1  F (36.7  C), temperature source Oral, resp. rate 16, height 1.6 m (5' 3\"), weight 82.1 kg (181 lb), SpO2 92 %.  Temp (24hrs), Av.3  F (36.8  C), Min:98  F (36.7  C), Max:98.6  F (37  C)      Drains: None    EXAM   The patient is awake and alert.  Calves are soft and non-tender.   Sensation is intact.  Dorsiflexion and plantar flexion is intact.  Foot warm with nl cap refill.  The incision is covered with aquacel dressing.     Labs:   Recent Labs   Lab Test 23  0702 23  1153 22  1159 22  1100 22  1121   HGB 12.5 13.8  --   --  13.4   INR  --  0.91 1.89* 2.2* 2.2*       ASSESSMENT  S/p L unicompartmental knee arthroplasty   PLAN  1. DVT prophylaxis: Xarelto  2. Weight Bearing: WBAT (Weight bearing as tolerated).  3. Anticipated discharge date today . Discharge to Home with Outpatient Treatment.  4. Cont Pain Control Oxycodone and Tylenol    Katherin Robertson PA-C  [facility]        "

## 2023-03-01 NOTE — PROGRESS NOTES
"Pt A&Ox4; VSS on RA. Pain managed with current regimen. Ambulated to BR this shift; voiding well. A of 1 GB/W. Acewrap dressing CDI. Reported some numbness in L leg and L lower arm \"feels like my arm went to sleep\". SL @ 0400. On intermittent IV abx. Pt refuses to put bed alarm on even after writer provided education regarding safety concerns. Possible discharge today.   "

## 2023-03-01 NOTE — PLAN OF CARE
Goal Outcome Evaluation:         Summary:    Patient vital signs are at baseline: Yes  Patient able to ambulate as they were prior to admission or with assist devices provided by therapies during their stay:  Yes  Patient MUST void prior to discharge:  Yes  Patient able to tolerate oral intake:  Yes  Pain has adequate pain control using Oral analgesics:  Yes  Does patient have an identified :  Yes  Has goal D/C date and time been discussed with patient:  Yes   Patient is alert and oriented X4. On RA with good sat. CAPNO in place with WNL. Up with assist of 1 with gait belt and walker .On regular diet with good appetite.  Patient refused bed alarm, use call light for assistant.  PIV saline locked. Patient reports pain, prn oxycodone was given with effective results. Dressing intact. CMS intact. Continent of B&B, voiding adequately in bathroom. Discharge plan for tomorrow.

## 2023-03-02 ENCOUNTER — TRANSFERRED RECORDS (OUTPATIENT)
Dept: HEALTH INFORMATION MANAGEMENT | Facility: CLINIC | Age: 63
End: 2023-03-02

## 2023-03-13 ENCOUNTER — TRANSFERRED RECORDS (OUTPATIENT)
Dept: HEALTH INFORMATION MANAGEMENT | Facility: CLINIC | Age: 63
End: 2023-03-13

## 2023-04-11 ENCOUNTER — OFFICE VISIT (OUTPATIENT)
Dept: RHEUMATOLOGY | Facility: CLINIC | Age: 63
End: 2023-04-11
Payer: COMMERCIAL

## 2023-04-11 VITALS
HEART RATE: 92 BPM | DIASTOLIC BLOOD PRESSURE: 81 MMHG | SYSTOLIC BLOOD PRESSURE: 149 MMHG | BODY MASS INDEX: 33.2 KG/M2 | OXYGEN SATURATION: 97 % | WEIGHT: 187.4 LBS

## 2023-04-11 DIAGNOSIS — M17.11 PRIMARY OSTEOARTHRITIS OF RIGHT KNEE: ICD-10-CM

## 2023-04-11 DIAGNOSIS — M06.4 INFLAMMATORY POLYARTHROPATHY (H): Primary | ICD-10-CM

## 2023-04-11 DIAGNOSIS — M18.12 PRIMARY OSTEOARTHRITIS OF FIRST CARPOMETACARPAL JOINT OF LEFT HAND: ICD-10-CM

## 2023-04-11 DIAGNOSIS — Z79.899 HIGH RISK MEDICATION USE: ICD-10-CM

## 2023-04-11 PROCEDURE — 99214 OFFICE O/P EST MOD 30 MIN: CPT | Mod: 25 | Performed by: INTERNAL MEDICINE

## 2023-04-11 PROCEDURE — 20600 DRAIN/INJ JOINT/BURSA W/O US: CPT | Mod: LT | Performed by: INTERNAL MEDICINE

## 2023-04-11 PROCEDURE — 20610 DRAIN/INJ JOINT/BURSA W/O US: CPT | Mod: RT | Performed by: INTERNAL MEDICINE

## 2023-04-11 RX ORDER — METHYLPREDNISOLONE ACETATE 40 MG/ML
10 INJECTION, SUSPENSION INTRA-ARTICULAR; INTRALESIONAL; INTRAMUSCULAR; SOFT TISSUE ONCE
Status: COMPLETED | OUTPATIENT
Start: 2023-04-11 | End: 2023-04-11

## 2023-04-11 RX ORDER — HYDROXYCHLOROQUINE SULFATE 200 MG/1
400 TABLET, FILM COATED ORAL DAILY
Qty: 180 TABLET | Refills: 1 | Status: SHIPPED | OUTPATIENT
Start: 2023-04-11 | End: 2023-07-25

## 2023-04-11 RX ORDER — TRIAMCINOLONE ACETONIDE 40 MG/ML
40 INJECTION, SUSPENSION INTRA-ARTICULAR; INTRAMUSCULAR ONCE
Status: COMPLETED | OUTPATIENT
Start: 2023-04-11 | End: 2023-04-11

## 2023-04-11 RX ADMIN — METHYLPREDNISOLONE ACETATE 10 MG: 40 INJECTION, SUSPENSION INTRA-ARTICULAR; INTRALESIONAL; INTRAMUSCULAR; SOFT TISSUE at 14:20

## 2023-04-11 RX ADMIN — TRIAMCINOLONE ACETONIDE 40 MG: 40 INJECTION, SUSPENSION INTRA-ARTICULAR; INTRAMUSCULAR at 14:23

## 2023-04-11 NOTE — NURSING NOTE
RAPID3 (0-30) Cumulative Score  16.3          RAPID3 Weighted Score (divide #4 by 3 and that is the weighted score)  5.4

## 2023-04-11 NOTE — PROGRESS NOTES
Rheumatology Clinic Visit      Radha Rodriguez MRN# [id number]   YOB: 1960 Age: 63 year old      Date of visit: 4/11/23   PCP: Dr. Temo Fletcher    Chief Complaint   Patient presents with:  Inflammatory polyarthropathy : Left thumb hurt, right knee hurts. Left leg swells, and bruising in foot. Had nerve block in Feb on back.    Assessment and Plan     1. Inflammatory polyarthropathy: Previously followed by Rojas Vasques and [facility].  Established care with me on 4/24/2018.  Previously on Humira (ineffective), HCQ (used with MTX), MTX (25mg wkly was effective and dose reductions resulted in worsening joint symptoms, but then she stopped on her own during the COVID19 pandemic without any worsening inflammatory arthritis symptoms).  She did well for a while after stopping medications in 2020 without a DMARD but then with full body aches that and stiffness that was worse in the morning and improved with time and activity; fullness of the bilateral second and third MCPs with prolonged morning stiffness; hydroxychloroquine was started with near resolution of the inflammatory MCP symptoms, but then presented with severe arthritis at the MCPs and PIPs so methotrexate was started with significant improvement but mild synovitis still on exam previously so methotrexate dose was increased with LFT elevation so had to be held for a couple weeks before being restarted in late August, and then the patient associated skin lesions on her arms and abdomen with methotrexate but it was not clearly associated so she had stopped methotrexate.  She had also stopped hydroxychloroquine in June 2022.  Then with active arthritis that nearly resolved with a longer duration of hydroxychloroquine, but then hydroxychloroquine was stopped when she was hospitalized for knee replacement surgery and February 2023 with directions to restart in the outpatient setting for unclear reasons; okay to restart hydroxychloroquine now.  If needed in  the future may consider adding a biologic DMARD such as Enbrel or Orencia.  Per patient she was doing well on hydroxychloroquine monotherapy just before stopping it.  Chronic illness, progressive.    - Restart hydroxychloroquine 400mg daily (last eye exam on 2/11/2022 by Dr. Aly)  - Ophthalmology referral for hydroxychloroquine toxicity monitoring   - Labs in 3 months: CBC, Creatinine, Hepatic Panel, ESR, CRP    2.  Primary osteoarthritis of the left first carpometacarpal joint: improved with steroid injections in the past.  Repeat needed today per patient.  Risks and side effects were reviewed in detail, including the risk for bleeding; steroid injection as documented in the procedure section.    3.  Bilateral knee osteoarthritis; hx of left medial unicompartmental knee arthroplasty in February 2023: Right knee is more painful now, degenerative in nature, and she would like a steroid injection.  Steroid injection is reasonable and was performed as documented in the procedure section.    4. Chronic back pain: went to [facility] where steroids and gabapentin were Rx'd but she doesn't do well with steroids per patient and gabapentin has only been partially helpful. Was following at the [facility] pain clinic, but was referred by [facility] provider to [facility] per patient.  Now following with Dr. Xavi Guerrero at [facility].    5.  History of rash: previously on abdomen and now just on arms. Unclear etiology. Less likely MTX associated but cannot rule out.  She managed with her PCP.  Not an issue today.    6.  Vaccinations:   - Influenza: Advised yearly vaccination  - [id number]: up to date  - [id number]: up to date  - TDAP: up to date  - COVID-19: Advised receiving the updated COVID-19 vaccination    7. Elevated blood pressure:  Radha to follow up with Primary Care provider regarding elevated blood pressure.     Total minutes spent in evaluation with patient, documentation, , and review of pertinent  studies and chart notes: 22      Ms. Rodriguez verbalized agreement with and understanding of the rational for the diagnosis and treatment plan.  All questions were answered to best of my ability and the patient's satisfaction. Ms. Rodriguez was advised to contact the clinic with any questions that may arise after the clinic visit.      Thank you for involving me in the care of the patient    Return to clinic: 3-4 months    HPI   Radha Rodriguez is a 63 year old female with a past medical history significant for possible Crohn's disease requiring fistula surgery in the past, osteoarthritis, inflammatory arthritis who is seen for follow-up of arthritis.    Today, 4/11/2023: Stop taking hydroxychloroquine when she was hospitalized in February 2023 for the left partial knee arthroplasty.  Since she has been favoring her right knee her right knee is hurting more.  Right knee pain is worse with activity and improves with rest.  She would like a steroid injection of the right knee today.  Left first CMC joint osteoarthritis pain worse and she would like repeat steroid injection.  Felt like she was doing better with regard to inflammatory arthritis when she was taking hydroxychloroquine, but she was told no clear reason to stop hydroxychloroquine when she was hospitalized and she has not restarted it yet because she was waiting for this appointment to discuss.  She would like to restart hydroxychloroquine.    Tobacco: none  EtOH: occasional  Drugs: none    ROS   12 point review of system was completed and negative except as noted in the HPI     Active Problem List     Patient Active Problem List   Diagnosis     CARDIOVASCULAR SCREENING; LDL GOAL LESS THAN 160     Vitamin D deficiency     Inflammatory polyarthropathy (H)     High risk medications (not anticoagulants) long-term use     Osteoarthritis     Menopausal syndrome (hot flashes)     Right lateral epicondylitis     Immunosuppressed status (H)     Facet arthropathy, thoracic      Back muscle spasm     Upper back strain     Upper back pain, chronic     Chronic midline thoracic back pain     Thoracic degenerative disc disease     High risk medication use     Pseudophakia, Yag Caps, ou (Hx of refractive lens exchange, ou (Lobanoff)     Diffuse idiopathic skeletal hyperostosis of thoracolumbar spine     Acute deep vein thrombosis (DVT) of left lower extremity, unspecified vein (H)     Acute deep vein thrombosis (DVT) of tibial vein of left lower extremity (H)     Chronic pain syndrome     DISH (diffuse idiopathic skeletal hyperostosis) of the thoracic spine     Factor 5 Leiden mutation, heterozygous (H)     Past Medical History     Past Medical History:   Diagnosis Date     Arthritis      DVT (deep venous thrombosis) (H)      Factor 5 Leiden mutation, heterozygous (H)      Headache(784.0)      Irritable bowel syndrome      Other and unspecified noninfectious gastroenteritis and colitis(558.9)     chronic     Past Surgical History     Past Surgical History:   Procedure Laterality Date     ARTHROPLASTY KNEE UNICOMPARTMENT Left 2/28/2023    Procedure: LEFT MEDIAL UNICOMPARTMENTAL KNEE ARTHROPLASTY;  Surgeon: Micah Mena MD;  Location: [facility]     CATARACT IOL, RT/LT      Refractive lens exchange ou (Lobanoff)     COLONOSCOPY  10/14/2011    Procedure:COLONOSCOPY; Colonoscopy; Surgeon:SCOTTY LEDEZMA; Location:[facility]     ENHANCE LASER REFRACTIVE BILATERAL EXISTING PT IN PARAMETERS       HYSTERECTOMY, VAGINAL  01/01/2000    TVH, fibroids (still has ovaries)     SURGICAL HISTORY OF -       Tubal Ligation     SURGICAL HISTORY OF -       Appy     SURGICAL HISTORY OF -       Tonsils     SURGICAL HISTORY OF -   12/1994    Anal Fistulotomy     ZZC REPLACEMENT,URETER,BOWEL SEGMENT  10/01/2008    Part of her ureter was repaired.     Allergy     Allergies   Allergen Reactions     Sulfa Drugs Rash     Codeine Hives     Clindamycin Rash     Current Medication List     Current Outpatient  "Medications   Medication Sig     acetaminophen (TYLENOL) 325 MG tablet Take 2 tablets (650 mg) by mouth every 4 hours as needed for other (mild pain)     amLODIPine (NORVASC) 5 MG tablet TAKE 1 TABLET BY MOUTH TWICE A DAY     methocarbamol (ROBAXIN) 500 MG tablet Take 1 tablet (500 mg) by mouth 2 times daily     rivaroxaban ANTICOAGULANT (XARELTO) 10 MG TABS tablet Take 1 tablet (10 mg) by mouth daily     oxyCODONE (ROXICODONE) 5 MG tablet Take 1 tablet (5 mg) by mouth every 4 hours as needed for moderate to severe pain (Patient not taking: Reported on 4/11/2023)     senna-docusate (SENOKOT-S/PERICOLACE) 8.6-50 MG tablet Take 1-2 tablets by mouth 2 times daily Take while on oral narcotics to prevent or treat constipation. (Patient not taking: Reported on 4/11/2023)     No current facility-administered medications for this visit.     Social History   See HPI    Family History     Family History   Problem Relation Age of Onset     Heart Disease Father         Heart attack     C.A.D. Father         age 50     Hypertension Father      Cancer Maternal Grandfather      Alzheimer Disease Maternal Grandfather      Cancer Paternal Grandfather      Diabetes No family hx of      Cerebrovascular Disease No family hx of      Breast Cancer No family hx of      Cancer - colorectal No family hx of      Prostate Cancer No family hx of      Physical Exam     Temp Readings from Last 3 Encounters:   03/01/23 98.1  F (36.7  C) (Oral)   02/17/23 98.3  F (36.8  C) (Oral)   09/30/22 98.6  F (37  C) (Oral)     BP Readings from Last 5 Encounters:   04/11/23 (!) 149/81   03/01/23 123/78   02/17/23 122/82   10/24/22 129/74   09/30/22 (!) 145/80     Pulse Readings from Last 1 Encounters:   04/11/23 92     Resp Readings from Last 1 Encounters:   03/01/23 16     Estimated body mass index is 33.2 kg/m  as calculated from the following:    Height as of 2/28/23: 1.6 m (5' 3\").    Weight as of this encounter: 85 kg (187 lb 6.4 oz).    GEN: NAD. "   HEENT:  Anicteric, noninjected sclera. No obvious external lesions of the ear and nose. Hearing intact.  PULM: No increased work of breathing.    MSK: Synovial swelling and tenderness to palpation of the bilateral second-third MCPs and PIPs.  Heberden's nodes present.  Squaring and tenderness to palpation without effusion or increased warmth of the left first CMC joint.  Right first carpometacarpal joint without swelling or tenderness to palpation.  Right knee with medial joint line tenderness but no effusion or increased warmth.  Left knee with swelling and was tender to palpation; recently postoperative.   Ankles and MTPs without swelling or tenderness to palpation.  SKIN: No rash  PSYCH: Alert. Appropriate.       Labs / Imaging (select studies)     RF/CCP  Recent Labs   Lab Test 06/01/15  1139   CCPABY <20  Interpretation:  Negative     RHF <20     CBC  Recent Labs   Lab Test 03/01/23  0702 02/17/23  1153 09/20/22  1121 08/18/22  1004 10/11/21  1704 03/29/21  1431 01/04/21  1619 12/23/19  1620 09/25/19  1117   WBC  --  10.6 7.4 6.7   < > 6.9   < > 8.4 7.4   RBC  --  4.51 4.39 4.23   < > 4.47   < > 3.86 3.96   HGB 12.5 13.8 13.4 13.4   < > 13.6   < > 12.9 13.3   HCT  --  41.6 40.9 40.4   < > 41.3   < > 37.8 38.4   MCV  --  92 93 96   < > 92   < > 98 97   RDW  --  12.8 13.1 13.4   < > 12.5   < > 13.1 13.9   PLT  --  249 263 262   < > 226   < > 220 219   MCH  --  30.6 30.5 31.7   < > 30.4   < > 33.4* 33.6*   MCHC  --  33.2 32.8 33.2   < > 32.9   < > 34.1 34.6   NEUTROPHIL  --  75 69 72   < > 64.0  --  69.7 73.0   LYMPH  --  16 19 17   < > 26.5  --  20.6 17.8   MONOCYTE  --  7 9 7   < > 6.4  --  6.7 6.5   EOSINOPHIL  --  1 2 2   < > 2.8  --  2.6 2.3   BASOPHIL  --  0 1 0   < > 0.3  --  0.4 0.4   ANEU  --   --   --   --   --  4.4  --  5.8 5.4   ALYM  --   --   --   --   --  1.8  --  1.7 1.3   EDWARD  --   --   --   --   --  0.4  --  0.6 0.5   AEOS  --   --   --   --   --  0.2  --  0.2 0.2   ABAS  --   --   --   --    --  0.0  --  0.0 0.0   ANEUTAUTO  --  8.0 5.1 4.8   < >  --   --   --   --    ALYMPAUTO  --  1.7 1.4 1.2   < >  --   --   --   --    AMONOAUTO  --  0.7 0.7 0.5   < >  --   --   --   --    AEOSAUTO  --  0.1 0.2 0.2   < >  --   --   --   --    ABSBASO  --  0.0 0.0 0.0   < >  --   --   --   --     < > = values in this interval not displayed.     CMP  Recent Labs   Lab Test 03/01/23  0702 02/17/23  1153 09/20/22  1121 08/18/22  1004 10/11/21  1704 03/29/21  1431 01/04/21  1619 12/23/19  1620   NA  --  143  --   --   --  140 140  --    POTASSIUM  --  4.8  --   --   --  4.0 3.8  --    CHLORIDE  --  109  --   --   --  109 105  --    CO2  --  29  --   --   --  29 27  --    ANIONGAP  --  5  --   --   --  2* 8  --    * 101*  --   --   --  85 86  --    BUN  --  17  --   --   --  11 14  --    CR  --  0.80 0.76 0.71   < > 0.77 0.75 0.73   GFRESTIMATED  --  84 89 >90   < > 85 87 >90   GFRESTBLACK  --   --   --   --   --  >90 >90 >90   ENEIDA  --  9.2  --   --   --  9.0 9.1  --    BILITOTAL  --  0.4 0.5 0.4   < > 0.5 0.4 0.3   ALBUMIN  --  4.0 4.1 3.8   < > 3.9 3.9 3.8   PROTTOTAL  --  6.9 6.9 6.8   < > 6.6* 7.1 6.7*   ALKPHOS  --  63 60 58   < > 62 58 68   AST  --  11 21 25   < > 17 18 21   ALT  --  24 32 78*   < > 28 32 37    < > = values in this interval not displayed.     Calcium/VitaminD  Recent Labs   Lab Test 02/17/23  1153 06/11/21  1014 03/29/21  1431 01/04/21  1619   ENEIDA 9.2  --  9.0 9.1   VITDT  --  26  --   --      ESR/CRP  Recent Labs   Lab Test 08/08/22  1044 05/02/22  1252 10/11/21  1704   SED 8 7 7   CRP 5.5 <2.9 4.0     Hepatitis B  Recent Labs   Lab Test 04/24/18  1557   HBCAB Nonreactive   HEPBANG Nonreactive     Hepatitis C  Recent Labs   Lab Test 06/01/15  1139   HCVAB Nonreactive   Assay performance characteristics have not been established for newborns,   infants, and children       Lyme ab screening  Recent Labs   Lab Test 03/29/21  1431   LYMEGM 0.04     Tuberculosis Screening  Recent Labs   Lab Test  06/01/15  1139   TBRSLT Negative   TBAGN 0.00     HIV Screening  Recent Labs   Lab Test 08/18/22  1004   HIAGAB Nonreactive     Immunization History     Immunization History   Administered Date(s) Administered     COVID-19 Vaccine 12+ (Pfizer) 01/25/2021, 02/15/2021     HepB 01/24/1991, 02/28/1991, 09/05/1991     Influenza Vaccine 50-64 or 18-64 w/egg allergy (Flublok) 10/01/2019, 10/14/2021     Pneumo Conj 13-V (2010&after) 10/01/2019     Pneumococcal 23 valent 12/31/2019     TD,PF 7+ (Tenivac) 03/12/1990, 01/01/2000     TDAP (Adacel,Boostrix) 09/10/2008, 03/01/2011, 10/14/2021     TDAP Vaccine (Adacel) 09/10/2008     TDAP Vaccine (Boostrix) 09/10/2008     Procedure     Procedure: Steroid injection of the left 1st CMC joint  Indication: Pain, osteoarthritis     The procedure was explained in detail. Risks including infection, pain, structural damage such as cartilage damage and tendon rupture, fat atrophy, skin hyper-/hypo-pigmentation, and medication reaction was explained. The need for rest of the affected joint for one week after the procedure was explained.  The option of not doing the procedure was also provided. All questions were answered and the patient consented to the procedure.     A time-out was performed and the correct patient, procedure, and laterality were verified.    The left 1st carpometacarpal joint was examined and location for injection was identified. The area was cleaned with chlorhexidine, twice.  Ethyl chloride was then used for topical anaesthetic.  Then methylprednisolone 10mg was injected into the intra-articular space.     The patient tolerated the procedure well. No complications.     1% Lidocaine  : Hospira  Lot #: [id number]  EXPIRATION DATE: 1 SEPT 2023  NDC: 2657-7577-87    MEDICATION: Methylprednisolone  LOT #: [id number]  EXPIRATION DATE: 9/2024  NDC#: [address]-1      Procedure     Procedure: Steroid injection of the right knee  Indication: Pain, osteoarthritis of the  right knee    The procedure was explained in detail. Risks including infection, pain, structural damage such as cartilage damage and tendon rupture, fat atrophy, skin hyper-/hypo-pigmentation, and medication reaction was explained. The need for rest of the affected joint for one week after the procedure was explained.  The option of not doing the procedure was also provided. All questions were answered and the patient consented to the procedure.     A time-out was performed and the correct patient, procedure, and laterality were verified.    The right knee was examined and location for injection was identified - anterior medial. The area was cleaned with chlorhexidine, twice.  Ethyl chloride was then used for topical anaesthetic.  Then a mixture of lidocaine 1% 2 mL and Kenalog 40mg was injected into the intra-articular space.     The patient tolerated the procedure well. No complications.    1% Lidocaine  : Hospira  Lot #: [id number]  EXPIRATION DATE: 1 SEPT 2023  NDC: 0067-1645-96    MEDICATION: Triamcinolone 40 mg  LOT #: [id number]  EXPIRATION DATE:  08/2023  NDC#: [address]-9    MEDICATION: Triamcinolone 40 mg  LOT #: [id number]  EXPIRATION DATE: 10/2024  NDC#: [address]-0           Chart documentation done in part with Dragon Voice recognition Software. Although reviewed after completion, some word and grammatical error may remain.    Abdoul Eli MD

## 2023-04-11 NOTE — PATIENT INSTRUCTIONS
RHEUMATOLOGY    Dr. Abdoul Eli    [facility]  [address] [place] [address]  Phone number: [phone]  Fax number: [phone]      Thank you for choosing [facility]!

## 2023-04-24 ENCOUNTER — TRANSFERRED RECORDS (OUTPATIENT)
Dept: HEALTH INFORMATION MANAGEMENT | Facility: CLINIC | Age: 63
End: 2023-04-24
Payer: COMMERCIAL

## 2023-05-02 ENCOUNTER — OFFICE VISIT (OUTPATIENT)
Dept: FAMILY MEDICINE | Facility: CLINIC | Age: 63
End: 2023-05-02
Payer: COMMERCIAL

## 2023-05-02 VITALS
WEIGHT: 183.4 LBS | HEART RATE: 92 BPM | DIASTOLIC BLOOD PRESSURE: 85 MMHG | TEMPERATURE: 97.9 F | RESPIRATION RATE: 15 BRPM | SYSTOLIC BLOOD PRESSURE: 132 MMHG | BODY MASS INDEX: 31.31 KG/M2 | HEIGHT: 64 IN | OXYGEN SATURATION: 97 %

## 2023-05-02 DIAGNOSIS — Z12.31 ENCOUNTER FOR SCREENING MAMMOGRAM FOR BREAST CANCER: ICD-10-CM

## 2023-05-02 DIAGNOSIS — Z12.11 SCREEN FOR COLON CANCER: ICD-10-CM

## 2023-05-02 DIAGNOSIS — Z00.00 ROUTINE GENERAL MEDICAL EXAMINATION AT A HEALTH CARE FACILITY: Primary | ICD-10-CM

## 2023-05-02 PROCEDURE — 99396 PREV VISIT EST AGE 40-64: CPT | Performed by: INTERNAL MEDICINE

## 2023-05-02 ASSESSMENT — ENCOUNTER SYMPTOMS
FEVER: 0
MYALGIAS: 1
FREQUENCY: 0
COUGH: 0
HEARTBURN: 0
PALPITATIONS: 0
PARESTHESIAS: 0
HEMATOCHEZIA: 0
NERVOUS/ANXIOUS: 0
DIARRHEA: 0
DIZZINESS: 0
CHILLS: 0
CONSTIPATION: 0
HEMATURIA: 0
SORE THROAT: 0
HEADACHES: 0
JOINT SWELLING: 1
ABDOMINAL PAIN: 0
EYE PAIN: 0
SHORTNESS OF BREATH: 0
ARTHRALGIAS: 1
WEAKNESS: 0
NAUSEA: 0
DYSURIA: 0

## 2023-05-02 ASSESSMENT — PAIN SCALES - GENERAL: PAINLEVEL: NO PAIN (0)

## 2023-05-02 NOTE — PROGRESS NOTES
SUBJECTIVE:   CC: Radha is an 63 year old who presents for preventive health visit.   Patient has been advised of split billing requirements and indicates understanding: Yes  Healthy Habits:     Getting at least 3 servings of Calcium per day:  Yes    Bi-annual eye exam:  Yes    Dental care twice a year:  Yes    Sleep apnea or symptoms of sleep apnea:  None    Diet:  Regular (no restrictions)    Frequency of exercise:  1 day/week    Duration of exercise:  Less than 15 minutes    Taking medications regularly:  Yes    Medication side effects:  None    PHQ-2 Total Score: 0    Additional concerns today:  No      Today's PHQ-2 Score:       5/2/2023    12:25 PM   PHQ-2 ( 1999 [employer])   Q1: Little interest or pleasure in doing things 0   Q2: Feeling down, depressed or hopeless 0   PHQ-2 Score 0   Q1: Little interest or pleasure in doing things Not at all   Q2: Feeling down, depressed or hopeless Not at all   PHQ-2 Score 0       Have you ever done Advance Care Planning? (For example, a Health Directive, POLST, or a discussion with a medical provider or your loved ones about your wishes): No, advance care planning information given to patient to review.  Patient declined advance care planning discussion at this time.    Social History     Tobacco Use     Smoking status: Never     Smokeless tobacco: Never   Vaping Use     Vaping status: Never Used   Substance Use Topics     Alcohol use: Yes     Comment: Occasional         5/2/2023    12:25 PM   Alcohol Use   Prescreen: >3 drinks/day or >7 drinks/week? No          View : No data to display.              Reviewed orders with patient.  Reviewed health maintenance and updated orders accordingly - Yes  Labs reviewed in EPIC  BP Readings from Last 3 Encounters:   05/02/23 132/85   04/11/23 (!) 149/81   03/01/23 123/78    Wt Readings from Last 3 Encounters:   05/02/23 83.2 kg (183 lb 6.4 oz)   04/11/23 85 kg (187 lb 6.4 oz)   02/28/23 82.1 kg (181 lb)                  Patient  Active Problem List   Diagnosis     CARDIOVASCULAR SCREENING; LDL GOAL LESS THAN 160     Vitamin D deficiency     Inflammatory polyarthropathy (H)     High risk medications (not anticoagulants) long-term use     Osteoarthritis     Menopausal syndrome (hot flashes)     Right lateral epicondylitis     Immunosuppressed status (H)     Facet arthropathy, thoracic     Back muscle spasm     Upper back strain     Upper back pain, chronic     Chronic midline thoracic back pain     Thoracic degenerative disc disease     High risk medication use     Pseudophakia, Yag Caps, ou (Hx of refractive lens exchange, ou (Lobanoff)     Diffuse idiopathic skeletal hyperostosis of thoracolumbar spine     Acute deep vein thrombosis (DVT) of left lower extremity, unspecified vein (H)     Acute deep vein thrombosis (DVT) of tibial vein of left lower extremity (H)     Chronic pain syndrome     DISH (diffuse idiopathic skeletal hyperostosis) of the thoracic spine     Factor 5 Leiden mutation, heterozygous (H)     Past Surgical History:   Procedure Laterality Date     ARTHROPLASTY KNEE UNICOMPARTMENT Left 2/28/2023    Procedure: LEFT MEDIAL UNICOMPARTMENTAL KNEE ARTHROPLASTY;  Surgeon: Micah Mena MD;  Location: [facility]     CATARACT IOL, RT/LT      Refractive lens exchange ou (Lobanoff)     COLONOSCOPY  10/14/2011    Procedure:COLONOSCOPY; Colonoscopy; Surgeon:SCOTTY LEDEZMA; Location:[facility]     ENHANCE LASER REFRACTIVE BILATERAL EXISTING PT IN PARAMETERS       HYSTERECTOMY, VAGINAL  01/01/2000    TVH, fibroids (still has ovaries)     SURGICAL HISTORY OF -       Tubal Ligation     SURGICAL HISTORY OF -       Appy     SURGICAL HISTORY OF -       Tonsils     SURGICAL HISTORY OF -   12/1994    Anal Fistulotomy     ZZC REPLACEMENT,URETER,BOWEL SEGMENT  10/01/2008    Part of her ureter was repaired.       Social History     Tobacco Use     Smoking status: Never     Smokeless tobacco: Never   Vaping Use     Vaping status: Never Used    Substance Use Topics     Alcohol use: Yes     Comment: Occasional     Family History   Problem Relation Age of Onset     Heart Disease Father         Heart attack     C.A.D. Father         age 50     Hypertension Father      Cancer Maternal Grandfather      Alzheimer Disease Maternal Grandfather      Cancer Paternal Grandfather      Diabetes No family hx of      Cerebrovascular Disease No family hx of      Breast Cancer No family hx of      Cancer - colorectal No family hx of      Prostate Cancer No family hx of          Current Outpatient Medications   Medication Sig Dispense Refill     acetaminophen (TYLENOL) 325 MG tablet Take 2 tablets (650 mg) by mouth every 4 hours as needed for other (mild pain) 100 tablet 0     amLODIPine (NORVASC) 5 MG tablet TAKE 1 TABLET BY MOUTH TWICE A  tablet 3     hydroxychloroquine (PLAQUENIL) 200 MG tablet Take 2 tablets (400 mg) by mouth daily 180 tablet 1     methocarbamol (ROBAXIN) 500 MG tablet Take 1 tablet (500 mg) by mouth 2 times daily 56 tablet 5     Allergies   Allergen Reactions     Sulfa Antibiotics Rash     Morphine And Related Hives     Clindamycin Rash     Recent Labs   Lab Test 23  1153 22  1121 22  1004 10/11/21  1704 21  1431 21  1619 19  1117 19  1053 18  1557 18  1030   LDL  --   --  182*  --   --   --   --   --   --   --    HDL  --   --  48*  --   --   --   --   --   --   --    TRIG  --   --  102  --   --   --   --   --   --   --    ALT 24 32 78*   < > 28 32   < > 36   < >  --    CR 0.80 0.76 0.71   < > 0.77 0.75   < > 0.64   < >  --    GFRESTIMATED 84 89 >90   < > 85 87   < > >90   < >  --    GFRESTBLACK  --   --   --   --  >90 >90   < > >90   < >  --    POTASSIUM 4.8  --   --   --  4.0 3.8  --   --   --   --    TSH  --   --   --   --   --   --   --  0.80  --  1.23    < > = values in this interval not displayed.        Breast Cancer Screenin/17/2023    10:22 AM   Breast CA Risk Assessment  "(FHS-7)   Do you have a family history of breast, colon, or ovarian cancer? No / Unknown     Mammogram Screening: Recommended mammography every 1-2 years with patient discussion and risk factor consideration  Pertinent mammograms are reviewed under the imaging tab.    History of abnormal Pap smear: NO - age 30-65 PAP every 5 years with negative HPV co-testing recommended     Reviewed and updated as needed this visit by clinical staff   Tobacco  Allergies  Meds              Reviewed and updated as needed this visit by Provider                   Review of Systems   Constitutional: Negative for chills and fever.   HENT: Negative for congestion, ear pain, hearing loss and sore throat.    Eyes: Negative for pain and visual disturbance.   Respiratory: Negative for cough and shortness of breath.    Cardiovascular: Positive for peripheral edema. Negative for chest pain and palpitations.   Gastrointestinal: Negative for abdominal pain, constipation, diarrhea, heartburn, hematochezia and nausea.   Genitourinary: Negative for dysuria, frequency, genital sores, hematuria and urgency.   Musculoskeletal: Positive for arthralgias, joint swelling and myalgias.   Skin: Negative for rash.   Neurological: Negative for dizziness, weakness, headaches and paresthesias.   Psychiatric/Behavioral: Negative for mood changes. The patient is not nervous/anxious.         OBJECTIVE:   /85 (BP Location: Left arm, Patient Position: Sitting, Cuff Size: Adult Large)   Pulse 92   Temp 97.9  F (36.6  C) (Oral)   Resp 15   Ht 1.62 m (5' 3.78\")   Wt 83.2 kg (183 lb 6.4 oz)   SpO2 97%   BMI 31.70 kg/m    Physical Exam  GENERAL: healthy, alert and no distress  EYES: Eyes grossly normal to inspection and conjunctivae and sclerae normal  HENT: normal cephalic/atraumatic and oral mucous membranes moist  NECK: no adenopathy and thyroid normal to palpation  RESP: lungs clear to auscultation - no rales, rhonchi or wheezes  CV: regular rates and " "rhythm, normal S1 S2, no S3 or S4 and no peripheral edema  ABDOMEN: soft, nontender, without hepatosplenomegaly or masses and bowel sounds normal  MS: no gross musculoskeletal defects noted, no edema  NEURO: Normal strength and tone, mentation intact and speech normal  PSYCH: mentation appears normal, affect normal/bright    Diagnostic Test Results:  Labs reviewed in Epic    ASSESSMENT/PLAN:     Routine general medical examination at a health care facility  - REVIEW OF HEALTH MAINTENANCE PROTOCOL ORDERS    Screen for colon cancer  - Colonoscopy Screening  Referral; Future  - REVIEW OF HEALTH MAINTENANCE PROTOCOL ORDERS    Encounter for screening mammogram for breast cancer  - MA SCREENING DIGITAL BILAT - Future  (s+30); Future  - REVIEW OF HEALTH MAINTENANCE PROTOCOL ORDERS      Patient has been advised of split billing requirements and indicates understanding: Yes      COUNSELING:  Special attention given to:        Regular exercise       Healthy diet/nutrition       The 10-year ASCVD risk score (Kelly MICHEL, et al., 2019) is: 6.7%    Values used to calculate the score:      Age: 63 years      Sex: Female      Is Non- : No      Diabetic: No      Tobacco smoker: No      Systolic Blood Pressure: 132 mmHg      Is BP treated: Yes      HDL Cholesterol: 48 mg/dL      Total Cholesterol: 250 mg/dL      BMI:   Estimated body mass index is 31.7 kg/m  as calculated from the following:    Height as of this encounter: 1.62 m (5' 3.78\").    Weight as of this encounter: 83.2 kg (183 lb 6.4 oz).   Weight management plan: diet and exercise.      She reports that she has never smoked. She has never used smokeless tobacco.      Temo Fletcher MD  [facility]  "

## 2023-05-23 ENCOUNTER — ANCILLARY PROCEDURE (OUTPATIENT)
Dept: MAMMOGRAPHY | Facility: CLINIC | Age: 63
End: 2023-05-23
Attending: INTERNAL MEDICINE
Payer: COMMERCIAL

## 2023-05-23 DIAGNOSIS — Z12.31 ENCOUNTER FOR SCREENING MAMMOGRAM FOR BREAST CANCER: ICD-10-CM

## 2023-05-23 PROCEDURE — 77063 BREAST TOMOSYNTHESIS BI: CPT | Mod: TC | Performed by: RADIOLOGY

## 2023-05-23 PROCEDURE — 77067 SCR MAMMO BI INCL CAD: CPT | Mod: TC | Performed by: RADIOLOGY

## 2023-05-31 ENCOUNTER — TELEPHONE (OUTPATIENT)
Dept: GASTROENTEROLOGY | Facility: CLINIC | Age: 63
End: 2023-05-31
Payer: COMMERCIAL

## 2023-05-31 NOTE — TELEPHONE ENCOUNTER
Screening Questions  BLUE  KIND OF PREP RED  LOCATION [review exclusion criteria] GREEN  SEDATION TYPE        Y Are you active on mychart?       Temo Fletcher MD Ordering/Referring Provider?        [employer] What type of coverage do you have?      N Have you had a positive covid test in the last 14 days?     31.7 1. BMI  [BMI 40+ - review exclusion criteria& smart-phrase document]    Y  2. Are you able to give consent for your medical care? [IF NO,RN REVIEW]          N  3. Are you taking any prescription pain medications on a routine schedule   (ex narcotics: oxycodone, roxicodone, oxycontin,  and percocet)? [RN Review]        N  3a. EXTENDED PREP What kind of prescription?     N 4. Do you have any chemical dependencies such as alcohol, street drugs, or methadone?        **If yes 3- 5 , please schedule with MAC sedation.**          IF YES TO ANY 6 - 10 - HOSPITAL SETTING ONLY.     N 6.   Do you need assistance transferring?     N 7.   Have you had a heart or lung transplant?    N 8.   Are you currently on dialysis?   N 9.   Do you use daily home oxygen?   N 10. Do you take nitroglycerin?   10a. NA If yes, how often?     NA 11. Are you currently pregnant?    11a. NA If yes, how many weeks? [ Greater than 12 weeks, OR NEEDED]    N 12. Do you have Pulmonary Hypertension? *NEED PAC APPT AT [facility] w/ [facility]*     N 13. [review exclusion criteria]  Do you have any implantable devices in your body (pacemaker, defib, LVAD)?    N 14. In the past 6 months, have you had any heart related issues including cardiomyopathy or heart attack?     14a. N If yes, did it require cardiac stenting if so when?     N 15. Have you had a stroke or Transient ischemic attack (TIA - aka  mini stroke ) within 6 months?      N 16. Do you have mod to severe Obstructive Sleep Apnea?  [Hospital only]    N 17. Do you have SEVERE AND UNCONTROLLED asthma? *NEED PAC APPT AT [facility] w/[facility]*     18.Do you take blood thinners?  No    N 19. Do you take any of  "the following medications?    Phentermine    Ozempic    Wegovy (Semaglutide)      19a. If yes, \"Hold for 7 days before procedure.  Please consult your prescribing provider if you have questions about holding this medication.\"     N  20. Do you have chronic kidney disease?      N  21. Do you have a diagnosis of diabetes?     N  22. On a regular basis do you go 3-5 days between bowel movements?      23. Preferred LOCAL Pharmacy for Pre Prescription         WRITTEN PRESCRIPTION REQUESTED  [facility]/PHARMACY #[id number] - [place], [place] - [address]          - CLOSING REMINDERS -    You will receive a call from a Nurse to review instructions and health history.  This assessment must be completed prior to your procedure.  Failure to complete the Nurse assessment may result in the procedure being cancelled.      On the day of your procedure, please designatean adult(s) who can drive you home stay with you for the next 24 hours. The medicines used in the exam will make you sleepy. You will not be able to drive.      You cannot take public transportation, ride share services, or non-medical taxi service without a responsible caregiver.  Medical transport services are allowed with the requirement that a responsible caregiver will receive you at your destination.  We require that drivers and caregivers are confirmed prior to your procedure.      - SCHEDULING DETAILS -  N & N Hospital Setting Required & If yes, what is the exclusion?   ELIGIO  Surgeon    07/27/2023  Date of Procedure  Lower Endoscopy [Colonoscopy]  [facility]ALAX GATORADE WITHOUT MAGNEISUM CITRATE Which Colonoscopy Prep was Sent?     MODERATE Sedation Type     N PAC / Pre-op Required               "

## 2023-06-07 ENCOUNTER — MYC MEDICAL ADVICE (OUTPATIENT)
Dept: FAMILY MEDICINE | Facility: CLINIC | Age: 63
End: 2023-06-07
Payer: COMMERCIAL

## 2023-06-07 DIAGNOSIS — R23.3 EASY BRUISABILITY: Primary | ICD-10-CM

## 2023-06-12 ENCOUNTER — OFFICE VISIT (OUTPATIENT)
Dept: OPHTHALMOLOGY | Facility: CLINIC | Age: 63
End: 2023-06-12
Attending: INTERNAL MEDICINE
Payer: COMMERCIAL

## 2023-06-12 DIAGNOSIS — Z79.899 HIGH RISK MEDICATION USE: ICD-10-CM

## 2023-06-12 PROCEDURE — 92012 INTRM OPH EXAM EST PATIENT: CPT | Performed by: OPHTHALMOLOGY

## 2023-06-12 PROCEDURE — 92134 CPTRZ OPH DX IMG PST SGM RTA: CPT | Performed by: OPHTHALMOLOGY

## 2023-06-12 PROCEDURE — 92083 EXTENDED VISUAL FIELD XM: CPT | Performed by: OPHTHALMOLOGY

## 2023-06-12 ASSESSMENT — VISUAL ACUITY
OS_SC: 20/150
OD_PH_SC: 20/25
OD_SC+: +2
OD_SC: 20/40
OS_PH_SC+: -2
OD_PH_SC+: +2
OS_SC: J1+
METHOD: SNELLEN - LINEAR
OS_PH_SC: 20/20

## 2023-06-12 ASSESSMENT — SLIT LAMP EXAM - LIDS
COMMENTS: MILD EDEMA
COMMENTS: MILD EDEMA

## 2023-06-12 ASSESSMENT — EXTERNAL EXAM - RIGHT EYE: OD_EXAM: NORMAL

## 2023-06-12 ASSESSMENT — EXTERNAL EXAM - LEFT EYE: OS_EXAM: NORMAL

## 2023-06-12 NOTE — PROGRESS NOTES
Current Eye Medications:  none     Subjective:  High-risk medication use referred by Dr. Eli - getting used to the monovision, has driving glasses if needed.    Hydroxychloroquine 200mg - 2 tabs/qd for inflammatory polyarthropathy (restarted 2/2022).      Objective:  See Ophthalmology Exam.       Assessment:  Retinal OCT and central Bass Visual Field remain within normal limits both eyes in patient on chronic Plaquenil therapy for inflammatory polyarthropathy.  Cleared for continued usage.      Plan:  Will update Dr. Eli with today's visit.   Return visit in 1 year for Plaquenil OCT and 10-2 Bass Visual Field.  Return visit in 6 months for a complete exam.   Woody Aly M.D.  [phone]

## 2023-06-12 NOTE — PATIENT INSTRUCTIONS
Will update Dr. Eli with today's visit.   Return visit in 1 year for Plaquenil OCT and 10-2 Bass Visual Field.  Return visit in 6 months for a complete exam.   Woody Aly M.D.  [phone]

## 2023-06-12 NOTE — LETTER
6/12/2023         RE: Radha Rodriguez  [address]  [place] [address]        Dear Colleague,    Thank you for referring your patient, Radha Rodriguez, to the [facility]. Please see a copy of my visit note below.     Current Eye Medications:  none     Subjective:  High-risk medication use referred by Dr. Eli - getting used to the monovision, has driving glasses if needed.    Hydroxychloroquine 200mg - 2 tabs/qd for inflammatory polyarthropathy (restarted 2/2022).      Objective:  See Ophthalmology Exam.       Assessment:  Retinal OCT and central Bass Visual Field remain within normal limits both eyes in patient on chronic Plaquenil therapy for inflammatory polyarthropathy.  Cleared for continued usage.      Plan:  Will update Dr. Eli with today's visit.   Return visit in 1 year for Plaquenil OCT and 10-2 Bass Visual Field.  Return visit in 6 months for a complete exam.   Woody Aly M.D.  [phone]             Again, thank you for allowing me to participate in the care of your patient.        Sincerely,        Woody Aly MD

## 2023-06-14 NOTE — TELEPHONE ENCOUNTER
I spoke with patient.     Future lab orders sent.    Future lab appointment made.    Patient agrees with plan.

## 2023-06-15 DIAGNOSIS — G89.29 CHRONIC RIGHT-SIDED THORACIC BACK PAIN: ICD-10-CM

## 2023-06-15 DIAGNOSIS — M54.6 CHRONIC RIGHT-SIDED THORACIC BACK PAIN: ICD-10-CM

## 2023-06-15 RX ORDER — METHOCARBAMOL 500 MG/1
TABLET, FILM COATED ORAL
Qty: 56 TABLET | Refills: 5 | Status: SHIPPED | OUTPATIENT
Start: 2023-06-15 | End: 2024-01-05

## 2023-06-19 ENCOUNTER — LAB (OUTPATIENT)
Dept: LAB | Facility: CLINIC | Age: 63
End: 2023-06-19
Payer: COMMERCIAL

## 2023-06-19 DIAGNOSIS — R23.3 EASY BRUISABILITY: ICD-10-CM

## 2023-06-19 LAB
APTT PPP: 23 SECONDS (ref 22–38)
BASOPHILS # BLD AUTO: 0 10E3/UL (ref 0–0.2)
BASOPHILS NFR BLD AUTO: 1 %
EOSINOPHIL # BLD AUTO: 0.2 10E3/UL (ref 0–0.7)
EOSINOPHIL NFR BLD AUTO: 3 %
ERYTHROCYTE [DISTWIDTH] IN BLOOD BY AUTOMATED COUNT: 13 % (ref 10–15)
HCT VFR BLD AUTO: 40.9 % (ref 35–47)
HGB BLD-MCNC: 13.6 G/DL (ref 11.7–15.7)
IMM GRANULOCYTES # BLD: 0 10E3/UL
IMM GRANULOCYTES NFR BLD: 0 %
INR PPP: 0.91 (ref 0.85–1.15)
LYMPHOCYTES # BLD AUTO: 1.1 10E3/UL (ref 0.8–5.3)
LYMPHOCYTES NFR BLD AUTO: 19 %
MCH RBC QN AUTO: 30.7 PG (ref 26.5–33)
MCHC RBC AUTO-ENTMCNC: 33.3 G/DL (ref 31.5–36.5)
MCV RBC AUTO: 92 FL (ref 78–100)
MONOCYTES # BLD AUTO: 0.4 10E3/UL (ref 0–1.3)
MONOCYTES NFR BLD AUTO: 6 %
NEUTROPHILS # BLD AUTO: 4.2 10E3/UL (ref 1.6–8.3)
NEUTROPHILS NFR BLD AUTO: 71 %
PLATELET # BLD AUTO: 216 10E3/UL (ref 150–450)
RBC # BLD AUTO: 4.43 10E6/UL (ref 3.8–5.2)
WBC # BLD AUTO: 5.9 10E3/UL (ref 4–11)

## 2023-06-19 PROCEDURE — 36415 COLL VENOUS BLD VENIPUNCTURE: CPT

## 2023-06-19 PROCEDURE — 85730 THROMBOPLASTIN TIME PARTIAL: CPT

## 2023-06-19 PROCEDURE — 85260 CLOT FACTOR X STUART-POWER: CPT

## 2023-06-19 PROCEDURE — 85025 COMPLETE CBC W/AUTO DIFF WBC: CPT

## 2023-06-19 PROCEDURE — 85610 PROTHROMBIN TIME: CPT

## 2023-06-20 LAB — FACT X ACT/NOR PPP: 127 % (ref 60–140)

## 2023-07-10 ENCOUNTER — TELEPHONE (OUTPATIENT)
Dept: FAMILY MEDICINE | Facility: CLINIC | Age: 63
End: 2023-07-10
Payer: COMMERCIAL

## 2023-07-10 ENCOUNTER — TELEPHONE (OUTPATIENT)
Dept: GASTROENTEROLOGY | Facility: CLINIC | Age: 63
End: 2023-07-10
Payer: COMMERCIAL

## 2023-07-10 NOTE — TELEPHONE ENCOUNTER
Patient Quality Outreach    Patient is due for the following:   Colon Cancer Screening      Topic Date Due     Zoster (Shingles) Vaccine (1 of 2) Never done     COVID-19 Vaccine (3 - Pfizer series) 04/12/2021       Next Steps:   Schedule a nurse only visit for immunization update and will need an appt for colono cancer screening.     Type of outreach:    Sent [employer] message.      Questions for provider review:    None           Xiomara Yuan MA

## 2023-07-10 NOTE — TELEPHONE ENCOUNTER
.Caller: Radha Rodriguez  Reason for Reschedule/Cancellation (please be detailed, any staff messages or encounters to note?): PT UNAVAILABLE       Prior to reschedule please review:    Ordering Provider: VOCAL    Sedation per order: MODERATE    Does patient have any ASC Exclusions, please identify?: N      Notes on Cancelled Procedure:    Procedure: Lower Endoscopy [Colonoscopy]     Date: 07/27/2023    Location: [facility]; [address], [place] [address]    Surgeon: ELIGIO      Rescheduled: Yes    Procedure: Lower Endoscopy [Colonoscopy]     Date: 09/14/2023    Location: [facility]; [address], [place] [address]    Surgeon: ELIGIO    Sedation Level Scheduled  MODERATE,  Reason for Sedation Level PER ORDER    Prep/Instructions updated and sent: VIA [employer]     Send In - basket message to [employer] - Antony Pool if EUS  procedure is canceled or rescheduled: [ N/A, YES or NO] NA

## 2023-07-13 ENCOUNTER — LAB (OUTPATIENT)
Dept: LAB | Facility: CLINIC | Age: 63
End: 2023-07-13
Payer: COMMERCIAL

## 2023-07-13 ENCOUNTER — TELEPHONE (OUTPATIENT)
Dept: FAMILY MEDICINE | Facility: CLINIC | Age: 63
End: 2023-07-13

## 2023-07-13 DIAGNOSIS — M06.4 INFLAMMATORY POLYARTHROPATHY (H): ICD-10-CM

## 2023-07-13 LAB
ALBUMIN SERPL BCG-MCNC: 4.5 G/DL (ref 3.5–5.2)
ALP SERPL-CCNC: 60 U/L (ref 35–104)
ALT SERPL W P-5'-P-CCNC: 22 U/L (ref 0–50)
AST SERPL W P-5'-P-CCNC: 26 U/L (ref 0–45)
BASOPHILS # BLD AUTO: 0 10E3/UL (ref 0–0.2)
BASOPHILS NFR BLD AUTO: 1 %
BILIRUB DIRECT SERPL-MCNC: <0.2 MG/DL (ref 0–0.3)
BILIRUB SERPL-MCNC: 0.5 MG/DL
CREAT SERPL-MCNC: 0.89 MG/DL (ref 0.51–0.95)
CRP SERPL-MCNC: <3 MG/L
EOSINOPHIL # BLD AUTO: 0.2 10E3/UL (ref 0–0.7)
EOSINOPHIL NFR BLD AUTO: 3 %
ERYTHROCYTE [DISTWIDTH] IN BLOOD BY AUTOMATED COUNT: 12.5 % (ref 10–15)
ERYTHROCYTE [SEDIMENTATION RATE] IN BLOOD BY WESTERGREN METHOD: 6 MM/HR (ref 0–30)
GFR SERPL CREATININE-BSD FRML MDRD: 73 ML/MIN/1.73M2
HCT VFR BLD AUTO: 40.7 % (ref 35–47)
HGB BLD-MCNC: 13.4 G/DL (ref 11.7–15.7)
IMM GRANULOCYTES # BLD: 0 10E3/UL
IMM GRANULOCYTES NFR BLD: 0 %
LYMPHOCYTES # BLD AUTO: 1.3 10E3/UL (ref 0.8–5.3)
LYMPHOCYTES NFR BLD AUTO: 23 %
MCH RBC QN AUTO: 30.2 PG (ref 26.5–33)
MCHC RBC AUTO-ENTMCNC: 32.9 G/DL (ref 31.5–36.5)
MCV RBC AUTO: 92 FL (ref 78–100)
MONOCYTES # BLD AUTO: 0.5 10E3/UL (ref 0–1.3)
MONOCYTES NFR BLD AUTO: 8 %
NEUTROPHILS # BLD AUTO: 3.6 10E3/UL (ref 1.6–8.3)
NEUTROPHILS NFR BLD AUTO: 65 %
PLATELET # BLD AUTO: 234 10E3/UL (ref 150–450)
PROT SERPL-MCNC: 6.7 G/DL (ref 6.4–8.3)
RBC # BLD AUTO: 4.43 10E6/UL (ref 3.8–5.2)
WBC # BLD AUTO: 5.6 10E3/UL (ref 4–11)

## 2023-07-13 PROCEDURE — 85025 COMPLETE CBC W/AUTO DIFF WBC: CPT

## 2023-07-13 PROCEDURE — 82565 ASSAY OF CREATININE: CPT

## 2023-07-13 PROCEDURE — 85652 RBC SED RATE AUTOMATED: CPT

## 2023-07-13 PROCEDURE — 36415 COLL VENOUS BLD VENIPUNCTURE: CPT

## 2023-07-13 PROCEDURE — 80076 HEPATIC FUNCTION PANEL: CPT

## 2023-07-13 PROCEDURE — 86140 C-REACTIVE PROTEIN: CPT

## 2023-07-25 ENCOUNTER — OFFICE VISIT (OUTPATIENT)
Dept: RHEUMATOLOGY | Facility: CLINIC | Age: 63
End: 2023-07-25
Payer: COMMERCIAL

## 2023-07-25 ENCOUNTER — VIRTUAL VISIT (OUTPATIENT)
Dept: FAMILY MEDICINE | Facility: CLINIC | Age: 63
End: 2023-07-25
Payer: COMMERCIAL

## 2023-07-25 ENCOUNTER — ANCILLARY PROCEDURE (OUTPATIENT)
Dept: GENERAL RADIOLOGY | Facility: CLINIC | Age: 63
End: 2023-07-25
Attending: INTERNAL MEDICINE
Payer: COMMERCIAL

## 2023-07-25 VITALS
SYSTOLIC BLOOD PRESSURE: 104 MMHG | BODY MASS INDEX: 32.49 KG/M2 | OXYGEN SATURATION: 98 % | HEART RATE: 77 BPM | WEIGHT: 188 LBS | DIASTOLIC BLOOD PRESSURE: 71 MMHG

## 2023-07-25 DIAGNOSIS — L98.9 SKIN LESION: ICD-10-CM

## 2023-07-25 DIAGNOSIS — M06.00 SERONEGATIVE RHEUMATOID ARTHRITIS (H): Primary | ICD-10-CM

## 2023-07-25 DIAGNOSIS — M47.814 FACET ARTHROPATHY, THORACIC: ICD-10-CM

## 2023-07-25 DIAGNOSIS — M06.00 SERONEGATIVE RHEUMATOID ARTHRITIS (H): ICD-10-CM

## 2023-07-25 DIAGNOSIS — M48.10 DISH (DISSEMINATED IDIOPATHIC SKELETAL HYPEROSTOSIS): Primary | ICD-10-CM

## 2023-07-25 DIAGNOSIS — M18.12 PRIMARY OSTEOARTHRITIS OF FIRST CARPOMETACARPAL JOINT OF LEFT HAND: ICD-10-CM

## 2023-07-25 DIAGNOSIS — M06.4 INFLAMMATORY POLYARTHROPATHY (H): ICD-10-CM

## 2023-07-25 DIAGNOSIS — I10 HYPERTENSION GOAL BP (BLOOD PRESSURE) < 140/90: ICD-10-CM

## 2023-07-25 DIAGNOSIS — M51.34 DEGENERATIVE DISC DISEASE, THORACIC: ICD-10-CM

## 2023-07-25 PROCEDURE — 87340 HEPATITIS B SURFACE AG IA: CPT | Performed by: INTERNAL MEDICINE

## 2023-07-25 PROCEDURE — 86481 TB AG RESPONSE T-CELL SUSP: CPT | Performed by: INTERNAL MEDICINE

## 2023-07-25 PROCEDURE — 86803 HEPATITIS C AB TEST: CPT | Performed by: INTERNAL MEDICINE

## 2023-07-25 PROCEDURE — 36415 COLL VENOUS BLD VENIPUNCTURE: CPT | Performed by: INTERNAL MEDICINE

## 2023-07-25 PROCEDURE — 99214 OFFICE O/P EST MOD 30 MIN: CPT | Mod: 25 | Performed by: INTERNAL MEDICINE

## 2023-07-25 PROCEDURE — 86704 HEP B CORE ANTIBODY TOTAL: CPT | Performed by: INTERNAL MEDICINE

## 2023-07-25 PROCEDURE — 71046 X-RAY EXAM CHEST 2 VIEWS: CPT | Performed by: INTERNAL MEDICINE

## 2023-07-25 PROCEDURE — 99213 OFFICE O/P EST LOW 20 MIN: CPT | Mod: 93 | Performed by: INTERNAL MEDICINE

## 2023-07-25 PROCEDURE — 20600 DRAIN/INJ JOINT/BURSA W/O US: CPT | Mod: LT | Performed by: INTERNAL MEDICINE

## 2023-07-25 RX ORDER — METHYLPREDNISOLONE ACETATE 40 MG/ML
10 INJECTION, SUSPENSION INTRA-ARTICULAR; INTRALESIONAL; INTRAMUSCULAR; SOFT TISSUE ONCE
Status: COMPLETED | OUTPATIENT
Start: 2023-07-25 | End: 2023-07-25

## 2023-07-25 RX ORDER — HYDROXYCHLOROQUINE SULFATE 200 MG/1
400 TABLET, FILM COATED ORAL DAILY
Qty: 180 TABLET | Refills: 2 | Status: SHIPPED | OUTPATIENT
Start: 2023-07-25 | End: 2024-05-28

## 2023-07-25 RX ORDER — AMLODIPINE BESYLATE 5 MG/1
5 TABLET ORAL 2 TIMES DAILY
Qty: 180 TABLET | Refills: 3 | Status: SHIPPED | OUTPATIENT
Start: 2023-07-25 | End: 2024-06-03

## 2023-07-25 RX ADMIN — METHYLPREDNISOLONE ACETATE 10 MG: 40 INJECTION, SUSPENSION INTRA-ARTICULAR; INTRALESIONAL; INTRAMUSCULAR; SOFT TISSUE at 13:38

## 2023-07-25 NOTE — NURSING NOTE
RAPID3 (0-30) Cumulative Score  16.7          RAPID3 Weighted Score (divide #4 by 3 and that is the weighted score)  5.5

## 2023-07-25 NOTE — PATIENT INSTRUCTIONS
RHEUMATOLOGY    [facility]  [address] [place] [address]  [place] [address]    Phone number: [phone]  Fax number: [phone]    If you need a medication refill, please contact us as you may need lab work and/or a follow up visit prior to your refill.      Thank you for choosing [facility]!    Mi Irwin CMA Rheumatology

## 2023-07-25 NOTE — PROGRESS NOTES
Radha is a 63 year old who is being evaluated via a billable telephone visit.      What phone number would you like to be contacted at? [phone]   How would you like to obtain your AVS? [place]     [facility] Internal Medicine Progress Note           Assessment and Plan:     DISH (disseminated idiopathic skeletal hyperostosis) of the thoracic spine  Risk factors include thoracic spondylosis. S/P intercostal nerve block T5-T7 last Feb 2023 at [facility] performed by Dmitriy Corrigan MD. Upcoming procedures include right scapular nerve block if pain persists.    Skin lesion  - Adult Dermatology Referral; Future    Hypertension goal BP (blood pressure) < 140/90  - amLODIPine (NORVASC) 5 MG tablet; Take 1 tablet (5 mg) by mouth 2 times daily        Interval History:     Reason for visit: Back pain  Onset: 2021  Description: Pain at right upper back.  Course: Same  Intensity: Mild-moderate  Associated symptoms: Limited range of motion at thoracic spine.  History: None prior to 2021  Evaluation: Yes  Precipitating factor: Facet arthropathy of the thoracic spine.  Alleviating factor: Intercostal nerve block T5-T7 Feb 2023.  Complications: None   Therapies tried and outcome: Gabapentin, NSAIDs and Duloxetine aren't effective. Currently takes Methocarbamol and Acetaminophen.    -constant nagging of back  -tried Melatonin for sleep but she wakes up feeling disoriented. Back hurts when she twists it  -inflamed knee, appointment with Rheumatology today  -rash persists, triggered by contact       Significant Problems:     Patient Active Problem List   Diagnosis    CARDIOVASCULAR SCREENING; LDL GOAL LESS THAN 160    Vitamin D deficiency    Inflammatory polyarthropathy (H)    High risk medications (not anticoagulants) long-term use    Osteoarthritis    Menopausal syndrome (hot flashes)    Right lateral epicondylitis    Immunosuppressed status (H)    Facet arthropathy, thoracic    Back muscle spasm    Upper back strain     Upper back pain, chronic    Chronic midline thoracic back pain    Thoracic degenerative disc disease    High risk medication use    Pseudophakia, Yag Caps, ou (Hx of refractive lens exchange, ou (Lobanoff)    Diffuse idiopathic skeletal hyperostosis of thoracolumbar spine    Acute deep vein thrombosis (DVT) of left lower extremity, unspecified vein (H)    Acute deep vein thrombosis (DVT) of tibial vein of left lower extremity (H)    Chronic pain syndrome    DISH (diffuse idiopathic skeletal hyperostosis) of the thoracic spine    Factor 5 Leiden mutation, heterozygous (H)              Review of Systems:     CONSTITUTIONAL: NEGATIVE for fever, chills, change in weight  EYES: NEGATIVE for vision changes or irritation  ENT/MOUTH: NEGATIVE for ear, mouth and throat problems  RESP: NEGATIVE for significant cough or SOB  BREAST: NEGATIVE for masses, tenderness or discharge  CV: NEGATIVE for chest pain, palpitations or peripheral edema  GI: NEGATIVE for nausea, abdominal pain, heartburn, or change in bowel habits  : NEGATIVE for frequency, dysuria, or hematuria  NEURO: NEGATIVE for weakness, dizziness or paresthesias  ENDOCRINE: NEGATIVE for temperature intolerance, skin/hair changes  HEME: NEGATIVE for bleeding problems  PSYCHIATRIC: NEGATIVE for changes in mood or affect            Medications:     Current Outpatient Medications   Medication Sig    acetaminophen (TYLENOL) 325 MG tablet Take 2 tablets (650 mg) by mouth every 4 hours as needed for other (mild pain)    amLODIPine (NORVASC) 5 MG tablet Take 1 tablet (5 mg) by mouth 2 times daily    hydroxychloroquine (PLAQUENIL) 200 MG tablet Take 2 tablets (400 mg) by mouth daily    methocarbamol (ROBAXIN) 500 MG tablet TAKE 1 TABLET BY MOUTH TWICE A DAY     No current facility-administered medications for this visit.             Physical Exam:   Vital signs not obtained due to nature of visit.    Remainder of exam not performed due to nature of visit.          Data:        MRI THORACIC SPINE WITHOUT CONTRAST  7/8/2021 6:33 PM      HISTORY: Mid-back pain; Pain in thoracic spine.     TECHNIQUE: Multiplanar multisequence MRI of the thoracic spine without  contrast.     COMPARISON: CT of the thoracic spine 6/28/2021.     FINDINGS:  Sagittal alignment is normal. Normal vertebral body heights. 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 No definite aggressive appearing osseous lesion.     There is Modic type I degenerative endplate signal change along the  right anterolateral aspect of the T8-T9 disc space. Multilevel  prominent predominantly right anterolateral marginal/appositional  endplate osteophytes are present, as seen on prior CT. The  intervertebral discs appear largely maintained in height, although  there may be some multilevel disc desiccation/signal loss. There is  mild multilevel degenerative facet arthropathy primarily in the upper  to mid thoracic spine. No significant disc herniation is seen. There  is no spinal canal stenosis. There is no high-grade neural foraminal  stenosis. The morphology and signal intensity of the spinal cord is  normal. The visualized paraspinous soft tissues are grossly  unremarkable.                                                                      IMPRESSION:  Multilevel degenerative changes, as described, including prominent  multilevel right anterolateral marginal/appositional endplate  osteophytes, Modic type I degenerative endplate signal change at  T8-T9, and mild upper to mid thoracic facet arthropathy. No  significant disc herniation seen. No spinal canal or neural foraminal  stenosis.     ROXANNE TRUJILLO MD     XRAY SPINE THORACIC WITH SWIMMERS  Order: [id number]  Impression    IMPRESSION:    Degenerative changes. No fracture or  listhesis.      REPORT SIGNED BY DR. Gene Joel  Narrative    EXAM: X-RAY THORACIC SPINE    DATE: 6/8/2021 6:34 PM    COMPARISON: None    CLINICAL DATA: Pain.    ADDITIONAL CLINICAL DATA:    TECHNIQUE: Frontal and lateral views of the thoracic spine were obtained.    FINDINGS:    Anterior bridging osteophytes identified at greater than 4 consecutive levels compatible with diffuse idiopathic skeletal hyperostosis. There is multilevel disc degenerative change. Posterior elements appear intact and normally aligned.    Thoracic vertebral body height is within normal limits.  Exam End: 06/08/21  6:41 PM    Specimen Collected: 06/08/21  6:43 PM Last Resulted: 06/08/21  6:44 PM   Received From: [facility]  Result Received: 05/23/23 11:00 A       Disposition:  Follow-up in 24 weeks.      Temo Fletcher MD  Internal Medicine  [facility] Team

## 2023-07-26 LAB
HBV CORE AB SERPL QL IA: NONREACTIVE
HBV SURFACE AG SERPL QL IA: NONREACTIVE
HCV AB SERPL QL IA: NONREACTIVE

## 2023-07-27 LAB
GAMMA INTERFERON BACKGROUND BLD IA-ACNC: 0.02 IU/ML
M TB IFN-G BLD-IMP: NEGATIVE
M TB IFN-G CD4+ BCKGRND COR BLD-ACNC: 9.98 IU/ML
MITOGEN IGNF BCKGRD COR BLD-ACNC: -0.01 IU/ML
MITOGEN IGNF BCKGRD COR BLD-ACNC: 0 IU/ML
QUANTIFERON MITOGEN: 10 IU/ML
QUANTIFERON NIL TUBE: 0.02 IU/ML
QUANTIFERON TB1 TUBE: 0.01 IU/ML
QUANTIFERON TB2 TUBE: 0.02

## 2023-08-01 PROBLEM — I82.402 ACUTE DEEP VEIN THROMBOSIS (DVT) OF LEFT LOWER EXTREMITY, UNSPECIFIED VEIN (H): Status: RESOLVED | Noted: 2022-08-02 | Resolved: 2023-08-01

## 2023-08-01 PROBLEM — M48.10 DISH (DISSEMINATED IDIOPATHIC SKELETAL HYPEROSTOSIS): Status: RESOLVED | Noted: 2023-02-05 | Resolved: 2023-08-01

## 2023-08-01 PROBLEM — I82.442 ACUTE DEEP VEIN THROMBOSIS (DVT) OF TIBIAL VEIN OF LEFT LOWER EXTREMITY (H): Status: RESOLVED | Noted: 2022-10-12 | Resolved: 2023-08-01

## 2023-08-02 ENCOUNTER — TELEPHONE (OUTPATIENT)
Dept: RHEUMATOLOGY | Facility: CLINIC | Age: 63
End: 2023-08-02
Payer: COMMERCIAL

## 2023-08-02 DIAGNOSIS — M06.00 SERONEGATIVE RHEUMATOID ARTHRITIS (H): Primary | ICD-10-CM

## 2023-08-02 RX ORDER — MEDROXYPROGESTERONE ACETATE 150 MG/ML
50 INJECTION, SUSPENSION INTRAMUSCULAR WEEKLY
Qty: 4 ML | Refills: 4 | Status: SHIPPED | OUTPATIENT
Start: 2023-08-02 | End: 2023-11-07

## 2023-08-02 NOTE — TELEPHONE ENCOUNTER
I have personally seen and examined the patient.  I reviewed and agree with physician assistant / nurse practitioner’s assessment and plan of care, with the following exceptions: None  My examination, assessment, and plan of care of Ritu Ramirez is as follows:     PT was fasting yesterday til 6pm.  Afterwards, had friends over and did have one drink, although  said there was a second.  At night, she had fallen, was also walking around and doesn't remember anything. This am, co headache and lump to back of head.  She also has had trouble sleeping and her doctor prescribed ativan, which she has taken for the last 4 nights.   Exam: awake, alert. Hematoma to posterior scalp. CN intact. RRR, CTA, abd soft, nt.       Mark Moser MD  09/17/21 4348     Unable to complete on CMM, medication might be excluded. Called [employer] and spoke with PA rep Gregory. After 15 minutes, was told the PA's are handled by the plan. [place]? Was transferred to [phone], spoke with rep Hart to complete request over the phone.     Case ID [id number]    Decision should be made within 2 business days and will be faxed.

## 2023-08-02 NOTE — TELEPHONE ENCOUNTER
Copies placed in abstract and tc bin. Original mailed to pt's home. Called pt and lvm so she is aware this has been completed.   
Forms completed. Placed in TC basket.   
Forms received . Pt has appt scheduled for 7/25/23. Forms placed in TC hold bin to be given to provider then.   
Pt Is calling about these forms. Please address  
Retrieved forms and placed on Dr Fletcher's richard board for the appointment today, 7/25/2023.  Tania Ramirez MA  [facility]   Primary Care    
What type of form? medical  What day did you drop off your forms? 7/13/2023  Is there a due date? 7/31/2023 (7-10 business day to compete forms)   How would you like to receive these forms? Please mail to [address] [place] [address]  Which clinic was the form dropped off at? [facility] [address]  What is the best number to contact you? Cell [phone]  What time works best to contact you with in 4 hrs? ANY   Is it okay to leave a message? Yes    Alen Ng        
No

## 2023-08-04 NOTE — TELEPHONE ENCOUNTER
Prior Authorization Approval    Medication: ENBREL SURECLICK 50 MG/ML SC SOAJ  Authorization Effective Date: 6/4/2023  Authorization Expiration Date: 8/3/2024  Approved Dose/Quantity: 4 pens per 28 days  Reference #:     Insurance Company: [employer] - Phone [phone] Fax [phone]  Expected CoPay:       CoPay Card Available:      Financial Assistance Needed: Co-pay card offered  Which Pharmacy is filling the prescription: [facility] - [address] [place] [address]  Pharmacy Notified: Yes  Patient Notified: Yes

## 2023-08-04 NOTE — TELEPHONE ENCOUNTER
Called clinical services and spoke with Rufino, he stated the due date is today for a decision and they will fax any information once available.

## 2023-08-08 ENCOUNTER — APPOINTMENT (OUTPATIENT)
Dept: URBAN - METROPOLITAN AREA CLINIC 252 | Age: 63
Setting detail: DERMATOLOGY
End: 2023-08-08

## 2023-08-08 VITALS — RESPIRATION RATE: 16 BRPM | HEIGHT: 63 IN | WEIGHT: 182 LBS

## 2023-08-08 DIAGNOSIS — Z71.89 OTHER SPECIFIED COUNSELING: ICD-10-CM

## 2023-08-08 DIAGNOSIS — D18.0 HEMANGIOMA: ICD-10-CM

## 2023-08-08 DIAGNOSIS — L82.1 OTHER SEBORRHEIC KERATOSIS: ICD-10-CM

## 2023-08-08 DIAGNOSIS — L72.0 EPIDERMAL CYST: ICD-10-CM

## 2023-08-08 DIAGNOSIS — D22 MELANOCYTIC NEVI: ICD-10-CM

## 2023-08-08 DIAGNOSIS — L81.4 OTHER MELANIN HYPERPIGMENTATION: ICD-10-CM

## 2023-08-08 DIAGNOSIS — L73.8 OTHER SPECIFIED FOLLICULAR DISORDERS: ICD-10-CM

## 2023-08-08 DIAGNOSIS — D69.2 OTHER NONTHROMBOCYTOPENIC PURPURA: ICD-10-CM

## 2023-08-08 DIAGNOSIS — D49.2 NEOPLASM OF UNSPECIFIED BEHAVIOR OF BONE, SOFT TISSUE, AND SKIN: ICD-10-CM

## 2023-08-08 DIAGNOSIS — L57.0 ACTINIC KERATOSIS: ICD-10-CM

## 2023-08-08 PROBLEM — L57.8 OTHER SKIN CHANGES DUE TO CHRONIC EXPOSURE TO NONIONIZING RADIATION: Status: ACTIVE | Noted: 2023-08-08

## 2023-08-08 PROBLEM — D18.01 HEMANGIOMA OF SKIN AND SUBCUTANEOUS TISSUE: Status: ACTIVE | Noted: 2023-08-08

## 2023-08-08 PROBLEM — D22.5 MELANOCYTIC NEVI OF TRUNK: Status: ACTIVE | Noted: 2023-08-08

## 2023-08-08 PROBLEM — D23.71 OTHER BENIGN NEOPLASM OF SKIN OF RIGHT LOWER LIMB, INCLUDING HIP: Status: ACTIVE | Noted: 2023-08-08

## 2023-08-08 PROCEDURE — 99203 OFFICE O/P NEW LOW 30 MIN: CPT

## 2023-08-08 PROCEDURE — OTHER COUNSELING: OTHER

## 2023-08-08 ASSESSMENT — LOCATION SIMPLE DESCRIPTION DERM
LOCATION SIMPLE: RIGHT CHEEK
LOCATION SIMPLE: LEFT FOREARM
LOCATION SIMPLE: LEFT CHEEK
LOCATION SIMPLE: RIGHT FOREARM
LOCATION SIMPLE: ABDOMEN
LOCATION SIMPLE: RIGHT UPPER BACK
LOCATION SIMPLE: LEFT UPPER BACK
LOCATION SIMPLE: LEFT NOSE
LOCATION SIMPLE: LEFT TEMPLE
LOCATION SIMPLE: CHEST

## 2023-08-08 ASSESSMENT — LOCATION ZONE DERM
LOCATION ZONE: ARM
LOCATION ZONE: FACE
LOCATION ZONE: TRUNK
LOCATION ZONE: NOSE

## 2023-08-08 ASSESSMENT — LOCATION DETAILED DESCRIPTION DERM
LOCATION DETAILED: LEFT DISTAL DORSAL FOREARM
LOCATION DETAILED: LEFT PROXIMAL DORSAL FOREARM
LOCATION DETAILED: RIGHT DISTAL DORSAL FOREARM
LOCATION DETAILED: LEFT NASAL SIDEWALL
LOCATION DETAILED: RIGHT INFERIOR UPPER BACK
LOCATION DETAILED: RIGHT SUPERIOR MEDIAL BUCCAL CHEEK
LOCATION DETAILED: LEFT CENTRAL BUCCAL CHEEK
LOCATION DETAILED: LEFT LATERAL ABDOMEN
LOCATION DETAILED: LEFT CENTRAL TEMPLE
LOCATION DETAILED: LEFT MID-UPPER BACK
LOCATION DETAILED: RIGHT INFERIOR CENTRAL MALAR CHEEK
LOCATION DETAILED: RIGHT PROXIMAL DORSAL FOREARM
LOCATION DETAILED: UPPER STERNUM

## 2023-08-08 NOTE — HPI: FULL BODY SKIN EXAMINATION
How Severe Are Your Spot(S)?: moderate
What Type Of Note Output Would You Prefer (Optional)?: Bullet Format
What Is The Reason For Today's Visit?: Full Body Skin Examination
What Is The Reason For Today's Visit? (Being Monitored For X): concerning skin lesions on a periodic basis
Additional History: Advil every other day to every day \\nArthritis and back pain \\nHx of nerve block injections

## 2023-08-08 NOTE — PROCEDURE: COUNSELING
Detail Level: Generalized
Detail Level: Zone
Detail Level: Simple
Detail Level: Detailed
Patient Specific Counseling (Will Not Stick From Patient To Patient): ** Pt will return for biopsy. Pt leaving for a cruise next week
Patient Specific Counseling (Will Not Stick From Patient To Patient): ** will plan to treat with ln2 at pt follow up appointment.\\nWill consider efudex in the fall. Will discuss further at next OV.
Patient Specific Counseling (Will Not Stick From Patient To Patient): ** recommended to keep skin hydrated. Dermaleve samples provided to pt.

## 2023-08-14 ENCOUNTER — TELEPHONE (OUTPATIENT)
Dept: FAMILY MEDICINE | Facility: CLINIC | Age: 63
End: 2023-08-14
Payer: COMMERCIAL

## 2023-08-14 NOTE — TELEPHONE ENCOUNTER
Forms/Letter Request    Type of form/letter: Short-term disability    Have you been seen for this request: NA   Do we have the form/letter: Yes:   Who is the form from? Patient    Where did/will the form come from? Patient or family brought in       When is form/letter needed by: Asap    How would you like the form/letter returned: Mail  Is this the correct address?: Yes  [address]  [facility] [address]    Patient Notified form requests are processed in 3-5 business days:Yes    Could we send this information to you in [employer] or would you prefer to receive a phone call?:   Patient would prefer a phone call   Okay to leave a detailed message?: Yes at Cell number on file:    Telephone Information:   Mobile [phone]      Form in dumb

## 2023-08-14 NOTE — TELEPHONE ENCOUNTER
Form placed in provider's bin to address on 8/21/23 when he is back from vacation. Form placed in # 3 bin in red folder.

## 2023-08-25 NOTE — TELEPHONE ENCOUNTER
Pt called regarding form below needs it filled out and signed by 9/5 sending to provider as Pt requested.         Emilie TOWNSEND Visit Facilitator

## 2023-08-28 ENCOUNTER — TRANSFERRED RECORDS (OUTPATIENT)
Dept: HEALTH INFORMATION MANAGEMENT | Facility: CLINIC | Age: 63
End: 2023-08-28
Payer: COMMERCIAL

## 2023-08-29 ENCOUNTER — TELEPHONE (OUTPATIENT)
Dept: GASTROENTEROLOGY | Facility: CLINIC | Age: 63
End: 2023-08-29
Payer: COMMERCIAL

## 2023-08-29 NOTE — TELEPHONE ENCOUNTER
Pre visit planning completed.      Procedure details:    Patient scheduled for Colonoscopy  on 09.14.2023.     Arrival time: 0835. Procedure time 0920    Pre op exam needed? N/A    Facility location: [facility]; [address], [place] [address]    Sedation type: Conscious sedation     Indication for procedure:  Screen for colon cancer       Chart review:     Electronic implanted devices? No    Diabetic? No    Diabetic medication HOLDING recommendations: (if applicable)  Oral diabetic medications: N/A  Diabetic injectables: N/A  Insulin: N/A      Medication review:    Anticoagulants? No    NSAIDS? No NSAID medications per patient's medication list.  RN will verify with pre-assessment call.    Other medication HOLDING recommendations:  N/A      Prep for procedure:     Bowel prep recommendation: Miralax prep without magnesium citrate   Due to:  standard bowel prep.    Prep instructions sent via [employer]       Kait Morataya RN  Endoscopy Procedure Pre Assessment RN  [phone] option 4

## 2023-08-30 NOTE — TELEPHONE ENCOUNTER
Pre assessment completed for upcoming procedure.   (Please see previous telephone encounter notes for complete details)    Patient  returned call.       Procedure details:    Arrival time and facility location reviewed.    Pre op exam needed? N/A    Designated  policy reviewed. Instructed to have someone stay 6 hours post procedure.     COVID policy reviewed.      Medication review:    Medications reviewed. Please see supporting documentation below. Holding recommendations discussed (if applicable).       Prep for procedure:     Procedure prep instructions reviewed.        Additional information needed?  N/A      Patient  verbalized understanding and had no questions or concerns at this time.      Kait Muhammad RN  Endoscopy Procedure Pre Assessment RN  [phone] option 4

## 2023-08-30 NOTE — TELEPHONE ENCOUNTER
Patient calling regarding status of form.  Patient states this is needed by 08/31 or 09/01 at the latest.      Please advise    Kaelyn Curtis

## 2023-08-30 NOTE — TELEPHONE ENCOUNTER
Attempted to contact patient in order to complete pre assessment questions.     No answer. Left message to return call to [phone] option 4      Kait Bearden RN  Endoscopy Procedure Pre Assessment RN

## 2023-09-05 NOTE — TELEPHONE ENCOUNTER
Recommend E-visit and patient should take pictures and attach to E-visit.   1 wk [Follow-Up Visit] : a follow-up visit [FreeTextEntry1] : "I am ok, little under the weather, will see PMD." [Home] : at home, [unfilled] , at the time of the visit. [Medical Office: ([facility])___] : at the medical office located in  [Verbal consent obtained from patient] : the patient, [unfilled]

## 2023-09-07 NOTE — TELEPHONE ENCOUNTER
Huddled with Dr Fletcher. He states that he will do this 1st thing tomorrow morning.  Tania Ramirez MA  [facility]   Primary Care

## 2023-09-12 ENCOUNTER — APPOINTMENT (OUTPATIENT)
Dept: URBAN - METROPOLITAN AREA CLINIC 252 | Age: 63
Setting detail: DERMATOLOGY
End: 2023-09-12

## 2023-09-12 VITALS — RESPIRATION RATE: 16 BRPM | HEIGHT: 63 IN | WEIGHT: 182 LBS

## 2023-09-12 DIAGNOSIS — D69.2 OTHER NONTHROMBOCYTOPENIC PURPURA: ICD-10-CM

## 2023-09-12 DIAGNOSIS — L57.0 ACTINIC KERATOSIS: ICD-10-CM

## 2023-09-12 DIAGNOSIS — D49.2 NEOPLASM OF UNSPECIFIED BEHAVIOR OF BONE, SOFT TISSUE, AND SKIN: ICD-10-CM

## 2023-09-12 PROCEDURE — OTHER COUNSELING: OTHER

## 2023-09-12 PROCEDURE — OTHER PRESCRIPTION: OTHER

## 2023-09-12 PROCEDURE — 99213 OFFICE O/P EST LOW 20 MIN: CPT

## 2023-09-12 RX ORDER — FLUOROURACIL 2 G/40G
CREAM TOPICAL BID
Qty: 40 | Refills: 1 | Status: ERX | COMMUNITY
Start: 2023-09-12

## 2023-09-12 ASSESSMENT — LOCATION DETAILED DESCRIPTION DERM
LOCATION DETAILED: LEFT DISTAL DORSAL FOREARM
LOCATION DETAILED: RIGHT RADIAL DORSAL HAND
LOCATION DETAILED: LEFT PROXIMAL DORSAL FOREARM
LOCATION DETAILED: RIGHT DISTAL RADIAL DORSAL FOREARM
LOCATION DETAILED: LEFT RADIAL DORSAL HAND
LOCATION DETAILED: RIGHT PROXIMAL DORSAL FOREARM
LOCATION DETAILED: LEFT INFERIOR FOREHEAD

## 2023-09-12 ASSESSMENT — LOCATION ZONE DERM
LOCATION ZONE: FACE
LOCATION ZONE: HAND
LOCATION ZONE: ARM

## 2023-09-12 ASSESSMENT — LOCATION SIMPLE DESCRIPTION DERM
LOCATION SIMPLE: LEFT FOREARM
LOCATION SIMPLE: RIGHT FOREARM
LOCATION SIMPLE: LEFT FOREHEAD
LOCATION SIMPLE: LEFT HAND
LOCATION SIMPLE: RIGHT HAND

## 2023-09-12 NOTE — HPI: RASH
What Type Of Note Output Would You Prefer (Optional)?: Bullet Format
How Severe Is Your Rash?: moderate
Is This A New Presentation, Or A Follow-Up?: Rash
Additional History: Takes ibuprofen not as often \\nHx of blood thinners \\nCurrently on plaquinol

## 2023-09-12 NOTE — PROCEDURE: COUNSELING
Patient Specific Counseling (Will Not Stick From Patient To Patient): ** Pt presents in clinic today for a biopsy to rule out BCC. Pt defers biopsy today and states she just wants to address problem on arms. Lesion appears to be resolved but will recheck at follow up visit.
Detail Level: Detailed
Patient Specific Counseling (Will Not Stick From Patient To Patient): ** Discussed forearms are the most common area to get purpura. Discussed that arnica can be beneficial. Discussed expectation with arnica gel.
Detail Level: Zone
Patient Specific Counseling (Will Not Stick From Patient To Patient): ** Discussed that a biopsy can be done for to confirm diagnosis. Recommended to treat as AK with topical chemo cream and will consider biopsy at next OV if no improvement after using cream. Recommended 8 week follow up once starting cream. \\nDiscussed that purpura can be a result of scattered AKs getting irritated. Discussed that once AKs are resolved purpura should improve.

## 2023-09-13 ENCOUNTER — TRANSFERRED RECORDS (OUTPATIENT)
Dept: HEALTH INFORMATION MANAGEMENT | Facility: CLINIC | Age: 63
End: 2023-09-13
Payer: COMMERCIAL

## 2023-09-14 ENCOUNTER — HOSPITAL ENCOUNTER (OUTPATIENT)
Facility: AMBULATORY SURGERY CENTER | Age: 63
Discharge: HOME | End: 2023-09-14
Attending: INTERNAL MEDICINE | Admitting: INTERNAL MEDICINE
Payer: COMMERCIAL

## 2023-09-14 VITALS
TEMPERATURE: 97.4 F | SYSTOLIC BLOOD PRESSURE: 124 MMHG | DIASTOLIC BLOOD PRESSURE: 88 MMHG | HEART RATE: 70 BPM | OXYGEN SATURATION: 100 % | RESPIRATION RATE: 16 BRPM

## 2023-09-14 LAB — COLONOSCOPY: NORMAL

## 2023-09-14 PROCEDURE — G8907 PT DOC NO EVENTS ON DISCHARG: HCPCS

## 2023-09-14 PROCEDURE — G8918 PT W/O PREOP ORDER IV AB PRO: HCPCS

## 2023-09-14 PROCEDURE — 45380 COLONOSCOPY AND BIOPSY: CPT | Mod: XS

## 2023-09-14 PROCEDURE — 45385 COLONOSCOPY W/LESION REMOVAL: CPT

## 2023-09-14 PROCEDURE — 88305 TISSUE EXAM BY PATHOLOGIST: CPT | Performed by: PATHOLOGY

## 2023-09-14 RX ORDER — NALOXONE HYDROCHLORIDE 0.4 MG/ML
0.2 INJECTION, SOLUTION INTRAMUSCULAR; INTRAVENOUS; SUBCUTANEOUS
Status: DISCONTINUED | OUTPATIENT
Start: 2023-09-14 | End: 2023-09-15 | Stop reason: HOSPADM

## 2023-09-14 RX ORDER — LIDOCAINE 40 MG/G
CREAM TOPICAL
Status: DISCONTINUED | OUTPATIENT
Start: 2023-09-14 | End: 2023-09-15 | Stop reason: HOSPADM

## 2023-09-14 RX ORDER — ONDANSETRON 2 MG/ML
4 INJECTION INTRAMUSCULAR; INTRAVENOUS
Status: DISCONTINUED | OUTPATIENT
Start: 2023-09-14 | End: 2023-09-15 | Stop reason: HOSPADM

## 2023-09-14 RX ORDER — NALOXONE HYDROCHLORIDE 0.4 MG/ML
0.4 INJECTION, SOLUTION INTRAMUSCULAR; INTRAVENOUS; SUBCUTANEOUS
Status: DISCONTINUED | OUTPATIENT
Start: 2023-09-14 | End: 2023-09-15 | Stop reason: HOSPADM

## 2023-09-14 RX ORDER — PROCHLORPERAZINE MALEATE 10 MG
10 TABLET ORAL EVERY 6 HOURS PRN
Status: DISCONTINUED | OUTPATIENT
Start: 2023-09-14 | End: 2023-09-15 | Stop reason: HOSPADM

## 2023-09-14 RX ORDER — ONDANSETRON 2 MG/ML
4 INJECTION INTRAMUSCULAR; INTRAVENOUS EVERY 6 HOURS PRN
Status: DISCONTINUED | OUTPATIENT
Start: 2023-09-14 | End: 2023-09-15 | Stop reason: HOSPADM

## 2023-09-14 RX ORDER — FLUMAZENIL 0.1 MG/ML
0.2 INJECTION, SOLUTION INTRAVENOUS
Status: ACTIVE | OUTPATIENT
Start: 2023-09-14 | End: 2023-09-14

## 2023-09-14 RX ORDER — FENTANYL CITRATE 50 UG/ML
INJECTION, SOLUTION INTRAMUSCULAR; INTRAVENOUS PRN
Status: DISCONTINUED | OUTPATIENT
Start: 2023-09-14 | End: 2023-09-14 | Stop reason: HOSPADM

## 2023-09-14 RX ORDER — ONDANSETRON 4 MG/1
4 TABLET, ORALLY DISINTEGRATING ORAL EVERY 6 HOURS PRN
Status: DISCONTINUED | OUTPATIENT
Start: 2023-09-14 | End: 2023-09-15 | Stop reason: HOSPADM

## 2023-09-14 NOTE — H&P
[facility] Anesthesia Pre-op History and Physical    Radha Rodriguez MRN# [id number]   Age: 63 year old YOB: 1960     Date of Exam 9/14/2023         Primary care provider: Temo Fletcher         Chief Complaint and/or Reason for Procedure:     CRC screening         Active problem list:     Patient Active Problem List    Diagnosis Date Noted    Hypertension goal BP (blood pressure) < 140/90 07/25/2023     Priority: Medium    Factor 5 Leiden mutation, heterozygous (H) 02/17/2023     Priority: Medium    Chronic pain syndrome 10/12/2022     Priority: Medium    Diffuse idiopathic skeletal hyperostosis of thoracolumbar spine 05/23/2022     Priority: Medium    High risk medication use 02/12/2022     Priority: Medium    Pseudophakia, Yag Caps, ou (Hx of refractive lens exchange, ou (Lobanoff) 02/12/2022     Priority: Medium    Chronic midline thoracic back pain 02/08/2022     Priority: Medium    Degenerative disc disease, thoracic 02/08/2022     Priority: Medium    Upper back pain, chronic 10/11/2021     Priority: Medium     initially acute back pain due to work related injury and overuse. Now progressed into chronic back pain not responding to medical treatment.       Upper back strain 06/27/2021     Priority: Medium    Facet arthropathy, thoracic 06/11/2021     Priority: Medium    Back muscle spasm 06/08/2021     Priority: Medium    Immunosuppressed status (H) 01/05/2021     Priority: Medium    Right lateral epicondylitis 08/07/2018     Priority: Medium    Menopausal syndrome (hot flashes) 01/02/2017     Priority: Medium    Osteoarthritis 03/03/2015     Priority: Medium    High risk medications (not anticoagulants) long-term use 09/19/2014     Priority: Medium    Inflammatory polyarthropathy (H) 01/31/2014     Priority: Medium    Vitamin D deficiency 11/25/2013     Priority: Medium     Problem list name updated by automated process. Provider to review      CARDIOVASCULAR SCREENING; LDL GOAL LESS  THAN 160 10/31/2010     Priority: Medium            Medications (include herbals and vitamins):   Any Plavix use in the last 7 days? No     Current Outpatient Medications   Medication Sig    acetaminophen (TYLENOL) 325 MG tablet Take 2 tablets (650 mg) by mouth every 4 hours as needed for other (mild pain)    amLODIPine (NORVASC) 5 MG tablet Take 1 tablet (5 mg) by mouth 2 times daily    etanercept (ENBREL SURECLICK) 50 MG/ML autoinjector Inject 50 mg Subcutaneous once a week . Hold for signs of infection, and seek medical attention.    hydroxychloroquine (PLAQUENIL) 200 MG tablet Take 2 tablets (400 mg) by mouth daily    methocarbamol (ROBAXIN) 500 MG tablet TAKE 1 TABLET BY MOUTH TWICE A DAY     Current Facility-Administered Medications   Medication    lidocaine (LMX4) kit    lidocaine 1 % 0.1-1 mL    ondansetron (ZOFRAN) injection 4 mg    sodium chloride (PF) 0.9% PF flush 3 mL    sodium chloride (PF) 0.9% PF flush 3 mL             Allergies:      Allergies   Allergen Reactions    Sulfa Antibiotics Rash    Morphine And Related Hives    Clindamycin Rash     Allergy to Latex? No  Allergy to tape?   No  Intolerances:             Physical Exam:   All vitals have been reviewed  Patient Vitals for the past 8 hrs:   BP Temp Temp src Pulse Resp SpO2   09/14/23 0844 (!) 144/87 97.8  F (36.6  C) Temporal 68 16 100 %     No intake/output data recorded.  Lungs:   No increased work of breathing, good air exchange, clear to auscultation bilaterally, no crackles or wheezing     Cardiovascular:   normal S1 and S2             Lab / Radiology Results:            Anesthetic risk and/or ASA classification:   2    Nimo Landry DO

## 2023-09-18 LAB
PATH REPORT.COMMENTS IMP SPEC: NORMAL
PATH REPORT.COMMENTS IMP SPEC: NORMAL
PATH REPORT.FINAL DX SPEC: NORMAL
PATH REPORT.GROSS SPEC: NORMAL
PATH REPORT.MICROSCOPIC SPEC OTHER STN: NORMAL
PATH REPORT.RELEVANT HX SPEC: NORMAL
PHOTO IMAGE: NORMAL

## 2023-11-02 ENCOUNTER — APPOINTMENT (OUTPATIENT)
Dept: URBAN - METROPOLITAN AREA CLINIC 252 | Age: 63
Setting detail: DERMATOLOGY
End: 2023-11-02

## 2023-11-02 VITALS — RESPIRATION RATE: 16 BRPM | WEIGHT: 180 LBS | HEIGHT: 63 IN

## 2023-11-02 DIAGNOSIS — L57.0 ACTINIC KERATOSIS: ICD-10-CM

## 2023-11-02 DIAGNOSIS — L82.1 OTHER SEBORRHEIC KERATOSIS: ICD-10-CM

## 2023-11-02 PROCEDURE — OTHER COUNSELING: OTHER

## 2023-11-02 PROCEDURE — OTHER PRESCRIPTION: OTHER

## 2023-11-02 PROCEDURE — 99213 OFFICE O/P EST LOW 20 MIN: CPT

## 2023-11-02 RX ORDER — FLUOROURACIL 5 MG/G
CREAM TOPICAL TWICE DAILY
Qty: 40 | Refills: 0 | Status: ERX | COMMUNITY
Start: 2023-11-02

## 2023-11-02 RX ORDER — CALCIPOTRIENE 0.05 MG/G
CREAM TOPICAL BID
Qty: 60 | Refills: 0 | Status: ERX | COMMUNITY
Start: 2023-11-02

## 2023-11-02 ASSESSMENT — LOCATION DETAILED DESCRIPTION DERM
LOCATION DETAILED: LEFT PROXIMAL DORSAL FOREARM
LOCATION DETAILED: LEFT SUPERIOR LATERAL MALAR CHEEK
LOCATION DETAILED: RIGHT PROXIMAL DORSAL FOREARM

## 2023-11-02 ASSESSMENT — LOCATION SIMPLE DESCRIPTION DERM
LOCATION SIMPLE: LEFT CHEEK
LOCATION SIMPLE: LEFT FOREARM
LOCATION SIMPLE: RIGHT FOREARM

## 2023-11-02 ASSESSMENT — LOCATION ZONE DERM
LOCATION ZONE: ARM
LOCATION ZONE: FACE

## 2023-11-02 NOTE — PROCEDURE: COUNSELING
Detail Level: Detailed
Patient Specific Counseling (Will Not Stick From Patient To Patient): ** Plan to treat at next OV. Advised to come early for numbing cream. Plan to use cautery
Detail Level: Simple

## 2023-11-02 NOTE — HPI: SKIN LESION (ACTINIC KERATOSES)
How Severe Are They?: mild
Is This A New Presentation, Or A Follow-Up?: Follow Up Actinic Keratoses
Additional History: Efudex cream no reaction

## 2023-11-07 ENCOUNTER — OFFICE VISIT (OUTPATIENT)
Dept: RHEUMATOLOGY | Facility: CLINIC | Age: 63
End: 2023-11-07
Payer: COMMERCIAL

## 2023-11-07 VITALS
WEIGHT: 190 LBS | SYSTOLIC BLOOD PRESSURE: 137 MMHG | DIASTOLIC BLOOD PRESSURE: 82 MMHG | OXYGEN SATURATION: 98 % | BODY MASS INDEX: 32.84 KG/M2 | HEART RATE: 81 BPM | RESPIRATION RATE: 16 BRPM

## 2023-11-07 DIAGNOSIS — Z13.820 OSTEOPOROSIS SCREENING: ICD-10-CM

## 2023-11-07 DIAGNOSIS — M18.12 PRIMARY OSTEOARTHRITIS OF FIRST CARPOMETACARPAL JOINT OF LEFT HAND: ICD-10-CM

## 2023-11-07 DIAGNOSIS — M17.11 PRIMARY OSTEOARTHRITIS OF RIGHT KNEE: ICD-10-CM

## 2023-11-07 DIAGNOSIS — Z78.0 POST-MENOPAUSAL: ICD-10-CM

## 2023-11-07 DIAGNOSIS — M06.00 SERONEGATIVE RHEUMATOID ARTHRITIS (H): Primary | ICD-10-CM

## 2023-11-07 LAB
ALBUMIN SERPL BCG-MCNC: 4.3 G/DL (ref 3.5–5.2)
ALP SERPL-CCNC: 61 U/L (ref 35–104)
ALT SERPL W P-5'-P-CCNC: 20 U/L (ref 0–50)
AST SERPL W P-5'-P-CCNC: 27 U/L (ref 0–45)
BASOPHILS # BLD AUTO: 0.1 10E3/UL (ref 0–0.2)
BASOPHILS NFR BLD AUTO: 1 %
BILIRUB DIRECT SERPL-MCNC: <0.2 MG/DL (ref 0–0.3)
BILIRUB SERPL-MCNC: 0.4 MG/DL
CREAT SERPL-MCNC: 0.82 MG/DL (ref 0.51–0.95)
CRP SERPL-MCNC: <3 MG/L
EGFRCR SERPLBLD CKD-EPI 2021: 81 ML/MIN/1.73M2
EOSINOPHIL # BLD AUTO: 0.3 10E3/UL (ref 0–0.7)
EOSINOPHIL NFR BLD AUTO: 4 %
ERYTHROCYTE [DISTWIDTH] IN BLOOD BY AUTOMATED COUNT: 13.1 % (ref 10–15)
ERYTHROCYTE [SEDIMENTATION RATE] IN BLOOD BY WESTERGREN METHOD: 4 MM/HR (ref 0–30)
HCT VFR BLD AUTO: 41.1 % (ref 35–47)
HGB BLD-MCNC: 13.8 G/DL (ref 11.7–15.7)
IMM GRANULOCYTES # BLD: 0 10E3/UL
IMM GRANULOCYTES NFR BLD: 0 %
LYMPHOCYTES # BLD AUTO: 1.5 10E3/UL (ref 0.8–5.3)
LYMPHOCYTES NFR BLD AUTO: 22 %
MCH RBC QN AUTO: 30.7 PG (ref 26.5–33)
MCHC RBC AUTO-ENTMCNC: 33.6 G/DL (ref 31.5–36.5)
MCV RBC AUTO: 92 FL (ref 78–100)
MONOCYTES # BLD AUTO: 0.6 10E3/UL (ref 0–1.3)
MONOCYTES NFR BLD AUTO: 10 %
NEUTROPHILS # BLD AUTO: 4.3 10E3/UL (ref 1.6–8.3)
NEUTROPHILS NFR BLD AUTO: 64 %
PLATELET # BLD AUTO: 251 10E3/UL (ref 150–450)
PROT SERPL-MCNC: 6.5 G/DL (ref 6.4–8.3)
RBC # BLD AUTO: 4.49 10E6/UL (ref 3.8–5.2)
WBC # BLD AUTO: 6.7 10E3/UL (ref 4–11)

## 2023-11-07 PROCEDURE — 85025 COMPLETE CBC W/AUTO DIFF WBC: CPT | Performed by: INTERNAL MEDICINE

## 2023-11-07 PROCEDURE — 82565 ASSAY OF CREATININE: CPT | Performed by: INTERNAL MEDICINE

## 2023-11-07 PROCEDURE — 85652 RBC SED RATE AUTOMATED: CPT | Performed by: INTERNAL MEDICINE

## 2023-11-07 PROCEDURE — 99214 OFFICE O/P EST MOD 30 MIN: CPT | Mod: 25 | Performed by: INTERNAL MEDICINE

## 2023-11-07 PROCEDURE — 36415 COLL VENOUS BLD VENIPUNCTURE: CPT | Performed by: INTERNAL MEDICINE

## 2023-11-07 PROCEDURE — 86140 C-REACTIVE PROTEIN: CPT | Performed by: INTERNAL MEDICINE

## 2023-11-07 PROCEDURE — 80076 HEPATIC FUNCTION PANEL: CPT | Performed by: INTERNAL MEDICINE

## 2023-11-07 PROCEDURE — 20600 DRAIN/INJ JOINT/BURSA W/O US: CPT | Mod: LT | Performed by: INTERNAL MEDICINE

## 2023-11-07 PROCEDURE — 20610 DRAIN/INJ JOINT/BURSA W/O US: CPT | Mod: RT | Performed by: INTERNAL MEDICINE

## 2023-11-07 RX ORDER — MEDROXYPROGESTERONE ACETATE 150 MG/ML
50 INJECTION, SUSPENSION INTRAMUSCULAR WEEKLY
Qty: 4 ML | Refills: 9 | Status: SHIPPED | OUTPATIENT
Start: 2023-11-07 | End: 2024-01-15

## 2023-11-07 RX ORDER — TRIAMCINOLONE ACETONIDE 40 MG/ML
40 INJECTION, SUSPENSION INTRA-ARTICULAR; INTRAMUSCULAR ONCE
Status: COMPLETED | OUTPATIENT
Start: 2023-11-07 | End: 2023-11-07

## 2023-11-07 RX ORDER — METHYLPREDNISOLONE ACETATE 40 MG/ML
10 INJECTION, SUSPENSION INTRA-ARTICULAR; INTRALESIONAL; INTRAMUSCULAR; SOFT TISSUE ONCE
Status: COMPLETED | OUTPATIENT
Start: 2023-11-07 | End: 2023-11-07

## 2023-11-07 RX ADMIN — METHYLPREDNISOLONE ACETATE 10 MG: 40 INJECTION, SUSPENSION INTRA-ARTICULAR; INTRALESIONAL; INTRAMUSCULAR; SOFT TISSUE at 11:58

## 2023-11-07 RX ADMIN — TRIAMCINOLONE ACETONIDE 40 MG: 40 INJECTION, SUSPENSION INTRA-ARTICULAR; INTRAMUSCULAR at 11:57

## 2023-11-07 ASSESSMENT — PAIN SCALES - GENERAL: PAINLEVEL: NO PAIN (0)

## 2023-11-07 NOTE — PROGRESS NOTES
Rheumatology Clinic Visit      Radha Rodriguez MRN# [id number]   YOB: 1960 Age: 63 year old      Date of visit: 11/07/23   PCP: Dr. Temo Fletcher    Chief Complaint   Patient presents with:  Rheumatoid Arthritis    Assessment and Plan     1.  Rheumatoid arthritis: Previously followed by Rojas Vasques and [facility].  Established care with me on 4/24/2018.  Previously on Humira (ineffective), HCQ (used with MTX), MTX (25mg wkly was effective and dose reductions resulted in worsening joint symptoms, but then she stopped on her own during the COVID19 pandemic without any worsening inflammatory arthritis symptoms).  She did well for a while after stopping medications in 2020 without a DMARD but then with full body aches that and stiffness that was worse in the morning and improved with time and activity; fullness of the bilateral second and third MCPs with prolonged morning stiffness; hydroxychloroquine was started with near resolution of the inflammatory MCP symptoms, but then presented with severe arthritis at the MCPs and PIPs so methotrexate was started with significant improvement but mild synovitis still on exam previously so methotrexate dose was increased with LFT elevation so had to be held for a couple weeks before being restarted in late August, and then the patient associated skin lesions on her arms and abdomen with methotrexate but it was not clearly associated so she had stopped methotrexate.  She had also stopped hydroxychloroquine in June 2022.  Then with active arthritis that nearly resolved with a longer duration of hydroxychloroquine, but then hydroxychloroquine was stopped when she was hospitalized for knee replacement surgery and February 2023 with directions to restart in the outpatient setting for unclear reasons; hydroxychloroquine was later restarted.  Active synovitis at the MCPs and PIPs so Enbrel was prescribed on 8/2/2023 with resolution of the synovitis. Inflammatory arthritis  well-controlled at this point.   Chronic illness, stable.     - Continue hydroxychloroquine 400mg daily (last eye exam on 6/12/2023 by Dr. Aly)  - Continue  Enbrel 50 mg SQ every 7 days   - Labs today: CBC, Creatinine, Hepatic Panel, ESR, CRP    2.  Primary osteoarthritis of the left first carpometacarpal joint: improved with steroid injections in the past.  Again symptomatic today and patient would like repeat steroid injection.  Steroid injection repeated today as documented in the procedure section.  Discussed the option for hand surgery referral as well but she would like to continue doing the injections because they are effective and she would like to avoid surgery at this time.    3.  Bilateral knee osteoarthritis; hx of left medial unicompartmental knee arthroplasty in February 2023: Right knee symptomatic now, degenerative in nature, and she would like a steroid injection.  Steroid injection was performed today as documented in the procedure section.    4. Chronic back pain: went to [facility] where steroids and gabapentin were Rx'd but she doesn't do well with steroids per patient and gabapentin has only been partially helpful. Was following at the [facility] pain clinic, but was referred by [facility] provider to [facility] per patient.  Now following with Dr. Xavi Guerrero at [facility].    5.  History of rash: previously on abdomen and now just on arms. Unclear etiology. Less likely MTX associated but cannot rule out.  She managed with her PCP.  Not an issue today.    6.  Vaccinations:   - Influenza: Advised yearly vaccination  - [id number]: up to date  - [id number]: up to date  - COVID-19: Advised keeping updated  - Shingrix: Advised vaccination    7.  Osteoporosis screening, postmenopausal status, rheumatoid arthritis: Check DEXA  - DEXA ordered; phone number provided to the patient so that she may call to schedule    Total minutes spent in evaluation with patient, documentation, , and  review of pertinent studies and chart notes: 20      Ms. Rodriguez verbalized agreement with and understanding of the rational for the diagnosis and treatment plan.  All questions were answered to best of my ability and the patient's satisfaction. Ms. Rodriguez was advised to contact the clinic with any questions that may arise after the clinic visit.      Thank you for involving me in the care of the patient    Return to clinic: 3-4 months    HPI   Radha Rodriguez is a 63 year old female with a past medical history significant for possible Crohn's disease requiring fistula surgery in the past, osteoarthritis, inflammatory arthritis who is seen for follow-up of arthritis.    4/11/2023: Stop taking hydroxychloroquine when she was hospitalized in February 2023 for the left partial knee arthroplasty.  Since she has been favoring her right knee her right knee is hurting more.  Right knee pain is worse with activity and improves with rest.  She would like a steroid injection of the right knee today.  Left first CMC joint osteoarthritis pain worse and she would like repeat steroid injection.  Felt like she was doing better with regard to inflammatory arthritis when she was taking hydroxychloroquine, but she was told no clear reason to stop hydroxychloroquine when she was hospitalized and she has not restarted it yet because she was waiting for this appointment to discuss.  She would like to restart hydroxychloroquine.    7/25/2023: Pain, swelling, and stiffness at the MCPs and PIPs that is worse in the morning and appear to time activity, worse on the right hand.  Left first CMC joint painful with activity and improves with rest; typically the steroid injection wears off after about 2.5-3 months.  She would like repeat steroid injection of the left first CMC joint today.  Hydroxychloroquine is effective but does not completely control her symptoms.  Has some bruising on her arms and is following with a primary care provider for  this issue.    Today, 11/7/2023: No longer with pain at the MCPs or PIPs.  No morning stiffness.  Still with pain in the left first CMC joint and she would like a repeat steroid injection at this joint today because injections are effective and she does not want to go for surgery.  She would also like a repeat steroid injection of the right knee because it is bothering her more now and a steroid injection in the past was very effective.    Tobacco: none  EtOH: occasional  Drugs: none  Occupation: retired    ROS   12 point review of system was completed and negative except as noted in the HPI     Active Problem List     Patient Active Problem List   Diagnosis    CARDIOVASCULAR SCREENING; LDL GOAL LESS THAN 160    Vitamin D deficiency    Inflammatory polyarthropathy (H)    High risk medications (not anticoagulants) long-term use    Osteoarthritis    Menopausal syndrome (hot flashes)    Right lateral epicondylitis    Immunosuppressed status (H24)    Facet arthropathy, thoracic    Back muscle spasm    Upper back strain    Upper back pain, chronic    Chronic midline thoracic back pain    Degenerative disc disease, thoracic    High risk medication use    Pseudophakia, Yag Caps, ou (Hx of refractive lens exchange, ou (Lobanoff)    Diffuse idiopathic skeletal hyperostosis of thoracolumbar spine    Chronic pain syndrome    Factor 5 Leiden mutation, heterozygous (H24)    Hypertension goal BP (blood pressure) < 140/90     Past Medical History     Past Medical History:   Diagnosis Date    Arthritis     DVT (deep venous thrombosis) (H)     Factor 5 Leiden mutation, heterozygous (H24)     Headache(784.0)     Hypertension goal BP (blood pressure) < 140/90 7/25/2023    Irritable bowel syndrome     Other and unspecified noninfectious gastroenteritis and colitis(558.9)     chronic     Past Surgical History     Past Surgical History:   Procedure Laterality Date    ARTHROPLASTY KNEE UNICOMPARTMENT Left 2/28/2023    Procedure: LEFT  MEDIAL UNICOMPARTMENTAL KNEE ARTHROPLASTY;  Surgeon: Micah Mena MD;  Location: [facility]    CATARACT IOL, RT/LT      Refractive lens exchange ou (Lobanoff)    COLONOSCOPY  10/14/2011    Procedure:COLONOSCOPY; Colonoscopy; Surgeon:SCOTTY LEDEZMA; Location:[facility]    COLONOSCOPY N/A 9/14/2023    Procedure: COLONOSCOPY, WITH POLYPECTOMY AND BIOPSY;  Surgeon: Nimo Landry DO;  Location: [facility]    COLONOSCOPY N/A 9/14/2023    Procedure: COLONOSCOPY, FLEXIBLE, WITH LESION REMOVAL USING SNARE;  Surgeon: Nimo Landry DO;  Location: [facility]    COLONOSCOPY WITH CO2 INSUFFLATION N/A 9/14/2023    Procedure: Colonoscopy with CO2 insufflation;  Surgeon: Nimo Landry DO;  Location: [facility]    ENHANCE LASER REFRACTIVE BILATERAL EXISTING PT IN PARAMETERS      HYSTERECTOMY, VAGINAL  01/01/2000    TVH, fibroids (still has ovaries)    SURGICAL HISTORY OF -       Tubal Ligation    SURGICAL HISTORY OF -       Appy    SURGICAL HISTORY OF -       Tonsils    SURGICAL HISTORY OF -   12/1994    Anal Fistulotomy    ZZC REPLACEMENT,URETER,BOWEL SEGMENT  10/01/2008    Part of her ureter was repaired.     Allergy     Allergies   Allergen Reactions    Sulfa Antibiotics Rash    Morphine And Related Hives    Clindamycin Rash     Current Medication List     Current Outpatient Medications   Medication Sig    acetaminophen (TYLENOL) 325 MG tablet Take 2 tablets (650 mg) by mouth every 4 hours as needed for other (mild pain)    amLODIPine (NORVASC) 5 MG tablet Take 1 tablet (5 mg) by mouth 2 times daily    etanercept (ENBREL SURECLICK) 50 MG/ML autoinjector Inject 50 mg Subcutaneous once a week . Hold for signs of infection, and seek medical attention.    hydroxychloroquine (PLAQUENIL) 200 MG tablet Take 2 tablets (400 mg) by mouth daily    methocarbamol (ROBAXIN) 500 MG tablet TAKE 1 TABLET BY MOUTH TWICE A DAY     No current facility-administered medications for this visit.     Social History   See HPI    Family History  "    Family History   Problem Relation Age of Onset    Heart Disease Father         Heart attack    C.A.D. Father         age 50    Hypertension Father     Cancer Maternal Grandfather     Alzheimer Disease Maternal Grandfather     Cancer Paternal Grandfather     Diabetes No family hx of     Cerebrovascular Disease No family hx of     Breast Cancer No family hx of     Cancer - colorectal No family hx of     Prostate Cancer No family hx of      Physical Exam     Temp Readings from Last 3 Encounters:   09/14/23 97.4  F (36.3  C) (Temporal)   05/02/23 97.9  F (36.6  C) (Oral)   03/01/23 98.1  F (36.7  C) (Oral)     BP Readings from Last 5 Encounters:   11/07/23 137/82   09/14/23 124/88   07/25/23 104/71   05/02/23 132/85   04/11/23 (!) 149/81     Pulse Readings from Last 1 Encounters:   11/07/23 81     Resp Readings from Last 1 Encounters:   11/07/23 16     Estimated body mass index is 32.84 kg/m  as calculated from the following:    Height as of 5/2/23: 1.62 m (5' 3.78\").    Weight as of this encounter: 86.2 kg (190 lb).    GEN: NAD.   HEENT:  Anicteric, noninjected sclera. No obvious external lesions of the ear and nose. Hearing intact.  PULM: No increased work of breathing.    MSK: MCPs and PIPs without synovial swelling or tenderness to palpation.  Heberden's nodes present.  Squaring and tenderness to palpation without effusion or increased warmth of the left first CMC joint.  Right first carpometacarpal joint without swelling or tenderness to palpation.  Right knee with medial joint line tenderness but no effusion or increased warmth.  Left knee with swelling and was tender to palpation; recently postoperative.   Ankles and MTPs without swelling or tenderness to palpation.  SKIN: No rash  PSYCH: Alert. Appropriate.       Labs / Imaging (select studies)       CBC  Recent Labs   Lab Test 07/13/23  1043 06/19/23  1030 03/01/23  0702 02/17/23  1153 10/11/21  1704 03/29/21  1431 01/04/21  1619 12/23/19  1620 " 09/25/19  1117   WBC 5.6 5.9  --  10.6   < > 6.9   < > 8.4 7.4   RBC 4.43 4.43  --  4.51   < > 4.47   < > 3.86 3.96   HGB 13.4 13.6 12.5 13.8   < > 13.6   < > 12.9 13.3   HCT 40.7 40.9  --  41.6   < > 41.3   < > 37.8 38.4   MCV 92 92  --  92   < > 92   < > 98 97   RDW 12.5 13.0  --  12.8   < > 12.5   < > 13.1 13.9    216  --  249   < > 226   < > 220 219   MCH 30.2 30.7  --  30.6   < > 30.4   < > 33.4* 33.6*   MCHC 32.9 33.3  --  33.2   < > 32.9   < > 34.1 34.6   NEUTROPHIL 65 71  --  75   < > 64.0  --  69.7 73.0   LYMPH 23 19  --  16   < > 26.5  --  20.6 17.8   MONOCYTE 8 6  --  7   < > 6.4  --  6.7 6.5   EOSINOPHIL 3 3  --  1   < > 2.8  --  2.6 2.3   BASOPHIL 1 1  --  0   < > 0.3  --  0.4 0.4   ANEU  --   --   --   --   --  4.4  --  5.8 5.4   ALYM  --   --   --   --   --  1.8  --  1.7 1.3   EDWARD  --   --   --   --   --  0.4  --  0.6 0.5   AEOS  --   --   --   --   --  0.2  --  0.2 0.2   ABAS  --   --   --   --   --  0.0  --  0.0 0.0   ANEUTAUTO 3.6 4.2  --  8.0   < >  --   --   --   --    ALYMPAUTO 1.3 1.1  --  1.7   < >  --   --   --   --    AMONOAUTO 0.5 0.4  --  0.7   < >  --   --   --   --    AEOSAUTO 0.2 0.2  --  0.1   < >  --   --   --   --    ABSBASO 0.0 0.0  --  0.0   < >  --   --   --   --     < > = values in this interval not displayed.     CMP  Recent Labs   Lab Test 07/13/23  1043 03/01/23  0702 02/17/23  1153 09/20/22  1121 10/11/21  1704 03/29/21  1431 01/04/21  1619 12/23/19  1620   NA  --   --  143  --   --  140 140  --    POTASSIUM  --   --  4.8  --   --  4.0 3.8  --    CHLORIDE  --   --  109  --   --  109 105  --    CO2  --   --  29  --   --  29 27  --    ANIONGAP  --   --  5  --   --  2* 8  --    GLC  --  142* 101*  --   --  85 86  --    BUN  --   --  17  --   --  11 14  --    CR 0.89  --  0.80 0.76   < > 0.77 0.75 0.73   GFRESTIMATED 73  --  84 89   < > 85 87 >90   GFRESTBLACK  --   --   --   --   --  >90 >90 >90   ENEIDA  --   --  9.2  --   --  9.0 9.1  --    BILITOTAL 0.5  --  0.4 0.5   <  > 0.5 0.4 0.3   ALBUMIN 4.5  --  4.0 4.1   < > 3.9 3.9 3.8   PROTTOTAL 6.7  --  6.9 6.9   < > 6.6* 7.1 6.7*   ALKPHOS 60  --  63 60   < > 62 58 68   AST 26  --  11 21   < > 17 18 21   ALT 22  --  24 32   < > 28 32 37    < > = values in this interval not displayed.     Calcium/VitaminD  Recent Labs   Lab Test 02/17/23  1153 06/11/21  1014 03/29/21  1431 01/04/21  1619   ENEIDA 9.2  --  9.0 9.1   VITDT  --  26  --   --      ESR/CRP  Recent Labs   Lab Test 07/13/23  1043 08/08/22  1044 05/02/22  1252 10/11/21  1704   SED 6 8 7 7   CRP  --  5.5 <2.9 4.0   CRPI <3.00  --   --   --        Hepatitis B  Recent Labs   Lab Test 07/25/23  1341 04/24/18  1557   HBCAB Nonreactive Nonreactive   HEPBANG Nonreactive Nonreactive     Hepatitis C  Recent Labs   Lab Test 07/25/23  1341   HCVAB Nonreactive     Lyme ab screening  Recent Labs   Lab Test 03/29/21  1431   LYMEGM 0.04     Tuberculosis Screening  Recent Labs   Lab Test 07/25/23  1341   TBRES Negative     HIV Screening  Recent Labs   Lab Test 08/18/22  1004   HIAGAB Nonreactive     Immunization History     Immunization History   Administered Date(s) Administered    COVID-19 MONOVALENT 12+ (Pfizer) 01/25/2021, 02/15/2021    HepB 01/24/1991, 02/28/1991, 09/05/1991    Influenza Vaccine 18-64 (Flublok) 10/01/2019, 10/14/2021    Pneumo Conj 13-V (2010&after) 10/01/2019    Pneumococcal 23 valent 12/31/2019    TD,PF 7+ (Tenivac) 03/12/1990, 01/01/2000    TDAP (Adacel,Boostrix) 09/10/2008, 03/01/2011, 10/14/2021    TDAP Vaccine (Adacel) 09/10/2008    TDAP Vaccine (Boostrix) 09/10/2008     Procedure     Procedure: Steroid injection of the left 1st CMC joint  Indication: Pain, osteoarthritis of the first carpometacarpal joint of the left hand    The procedure was explained in detail. Risks including infection, pain, structural damage such as cartilage damage and tendon rupture, fat atrophy, skin hyper-/hypo-pigmentation, and medication reaction was explained. The need for rest of the  affected joint for one week after the procedure was explained.  The option of not doing the procedure was also provided. All questions were answered and the patient consented to the procedure.     A time-out was performed and the correct patient, procedure, and laterality were verified.    The left 1st carpometacarpal joint was examined and location for injection was identified. The area was cleaned with chlorhexidine, twice.  Ethyl chloride was then used for topical anaesthetic.  Then a mixture of lidocaine 1% 0.1 mL and methylprednisolone 10mg was injected into the intra-articular space.     The patient tolerated the procedure well. No complications.    1% Lidocaine  : Hospira  Lot #: [id number]  EXPIRATION DATE: 01 MAR 2025  NDC: 0609-1860-72    MEDICATION: Methylprednisolone  LOT #: [id number]   EXPIRATION DATE: 03/2025  NDC#: [address]-2      Procedure     Procedure: Steroid injection of the right knee  Indication: Pain, right knee osteoarthritis    The procedure was explained in detail. Risks including infection, pain, structural damage such as cartilage damage and tendon rupture, fat atrophy, skin hyper-/hypo-pigmentation, and medication reaction was explained. The need for rest of the affected joint for one week after the procedure was explained.  The option of not doing the procedure was also provided. All questions were answered and the patient consented to the procedure.     A time-out was performed and the correct patient, procedure, and laterality were verified.    The right knee was examined and location for injection was identified - anterior medial. The area was cleaned with chlorhexidine, twice.  Ethyl chloride was then used for topical anaesthetic.  Then a mixture of lidocaine 1% 2 mL and Kenalog 40mg was injected into the intra-articular space.     The left knee was examined and location for injection was identified - anterior medial. The area was cleaned with chlorhexidine, twice.  Ethyl  chloride was then used for topical anaesthetic.  Then a mixture of lidocaine 1% 2 mL and Kenalog 40mg was injected into the intra-articular space.     The patient tolerated the procedure well. No complications.    1% Lidocaine  : Hospira  Lot #: [id number]  EXPIRATION DATE: 01 MAR 2025  NDC: 3969-0398-87    MEDICATION: Triamcinolone 40 mg  LOT #: [id number]   EXPIRATION DATE:  04/2025  NDC#: [address]-2           Chart documentation done in part with Dragon Voice recognition Software. Although reviewed after completion, some word and grammatical error may remain.    Abdoul Eli MD

## 2023-11-07 NOTE — NURSING NOTE
RAPID3 (0-30) Cumulative Score  14.5          RAPID3 Weighted Score (divide #4 by 3 and that is the weighted score)  4.83

## 2023-11-15 ENCOUNTER — ANCILLARY PROCEDURE (OUTPATIENT)
Dept: BONE DENSITY | Facility: CLINIC | Age: 63
End: 2023-11-15
Attending: INTERNAL MEDICINE
Payer: COMMERCIAL

## 2023-11-15 DIAGNOSIS — Z78.0 POST-MENOPAUSAL: ICD-10-CM

## 2023-11-15 DIAGNOSIS — Z13.820 OSTEOPOROSIS SCREENING: ICD-10-CM

## 2023-11-15 DIAGNOSIS — M06.00 SERONEGATIVE RHEUMATOID ARTHRITIS (H): ICD-10-CM

## 2023-11-15 PROCEDURE — 77080 DXA BONE DENSITY AXIAL: CPT | Mod: TC | Performed by: PHYSICIAN ASSISTANT

## 2023-11-21 ENCOUNTER — OFFICE VISIT (OUTPATIENT)
Dept: FAMILY MEDICINE | Facility: CLINIC | Age: 63
End: 2023-11-21
Payer: COMMERCIAL

## 2023-11-21 VITALS
RESPIRATION RATE: 20 BRPM | HEIGHT: 64 IN | WEIGHT: 190 LBS | TEMPERATURE: 98.1 F | SYSTOLIC BLOOD PRESSURE: 135 MMHG | DIASTOLIC BLOOD PRESSURE: 78 MMHG | BODY MASS INDEX: 32.44 KG/M2 | OXYGEN SATURATION: 98 % | HEART RATE: 83 BPM

## 2023-11-21 DIAGNOSIS — M06.00 SERONEGATIVE RHEUMATOID ARTHRITIS (H): ICD-10-CM

## 2023-11-21 DIAGNOSIS — M54.42 ACUTE LEFT-SIDED LOW BACK PAIN WITH LEFT-SIDED SCIATICA: Primary | ICD-10-CM

## 2023-11-21 PROCEDURE — 99214 OFFICE O/P EST MOD 30 MIN: CPT | Performed by: FAMILY MEDICINE

## 2023-11-21 ASSESSMENT — PAIN SCALES - GENERAL: PAINLEVEL: EXTREME PAIN (9)

## 2023-11-21 NOTE — PROGRESS NOTES
"  Assessment & Plan     Acute left-sided low back pain with left-sided sciatica  -Radha with history of rheumatoid arthritis presented to the clinic with severe low back pain and left sciatica started since yesterday night.  -She was unable to walk more than 2 steps due to sharp shooting pain and weakness in her left thigh with high risk of falls needing to use wheel chair from clinic to ambulate safely.  -Denied obvious trauma.    PLAN:  -Due to acuity of symptoms, recommended following up in ER to consider imaging.I checked with [facility] , but they don't have option to accommodate today if she needs additional imaging like MRI.  -If imaging is stable, can do trial of gabapentin and increase Robaxin dose.    Seronegative rheumatoid arthritis (H)  -Following with rheumatology.  On etanercept and hydroxychloroquine.  -Labs and liver function done earlier this month normal.  -Normal eye exam June 2023.      30 minutes spent by me on the date of the encounter doing chart review, interpretation of tests, patient visit, and documentation        BMI:   Estimated body mass index is 32.84 kg/m  as calculated from the following:    Height as of this encounter: 1.62 m (5' 3.78\").    Weight as of this encounter: 86.2 kg (190 lb).           Candido John MD  [facility]   Radha is a 63 year old, presenting for the following health issues:  No chief complaint on file.        11/21/2023     1:33 PM   Additional Questions   Roomed by Kavitha   Accompanied by Self       History of Present Illness       Reason for visit:  Severe muscle spasm  Symptom onset:  1-3 days ago  Symptoms include:  Severe Spasms  Symptom intensity:  Severe  Symptom progression:  Staying the same  Had these symptoms before:  No  What makes it worse:  Movement  What makes it better:  Not Moving    She eats 2-3 servings of fruits and vegetables daily.She consumes 0 sweetened beverage(s) daily. She " "exercises with enough effort to increase her heart rate 3 or less days per week.   She is taking medications regularly.     Left knee replaced    Back nerve blocks      Pain History:  When did you first notice your pain? 1 day ago   Have you seen anyone else for your pain? No  How has your pain affected your ability to work? N/A   What type of work do you or did you do? N/A  Where in your body do you have pain? Musculoskeletal problem/pain  Onset/Duration: 1 Day ago  Description  Location: Hip - left  & Right Knee  Joint Swelling: YES  Redness: No  Pain: YES  Warmth: No  Intensity:  9/10  Progression of Symptoms:  worsening  Accompanying signs and symptoms:   Fevers: No  Numbness/tingling/weakness: YES  History  Trauma to the area: No  Recent illness:  No  Previous similar problem: No  Previous evaluation:  No  Precipitating or alleviating factors:  Aggravating factors include: standing, walking, and climbing stairs  Therapies tried and outcome: rest/inactivity and IBU          Review of Systems   Constitutional, HEENT, cardiovascular, pulmonary, gi and gu systems are negative, except as otherwise noted.      Objective    /78 (BP Location: Left arm, Patient Position: Chair, Cuff Size: Adult Large)   Pulse 83   Temp 98.1  F (36.7  C) (Oral)   Resp 20   Ht 1.62 m (5' 3.78\")   Wt 86.2 kg (190 lb)   SpO2 98%   BMI 32.84 kg/m    Body mass index is 32.84 kg/m .  Physical Exam   GENERAL: In moderate distress  NECK: no adenopathy, no asymmetry, masses, or scars and thyroid normal to palpation  RESP: lungs clear to auscultation - no rales, rhonchi or wheezes  CV: regular rate and rhythm, normal S1 S2, no S3 or S4, no murmur, click or rub, no peripheral edema and peripheral pulses strong  ABDOMEN: soft, nontender, no hepatosplenomegaly, no masses and bowel sounds normal  MS: no gross musculoskeletal defects noted, no edema                      "

## 2023-11-21 NOTE — PATIENT INSTRUCTIONS
At [facility], we strive to deliver an exceptional experience to you, every time we see you. If you receive a survey, please complete it as we do value your feedback.  If you have MyChart, you can expect to receive results automatically within 24 hours of their completion.  Your provider will send a note interpreting your results as well.   If you do not have MyChart, you should receive your results in about a week by mail.    Your care team:                            Family Medicine Internal Medicine   MD Temo Lott MD Shantel Branch-Fleming, MD Srinivasa Vaka, MD Katya Belousova, PAAL Lewis, MD Pediatrics   Jeremy Rodriguez, PAMD Marzena Gore MD Amelia Massimini APRN CNP   PHONG Iglesias CNP, MD Charanya Pasupathi, MD Kathleen Widmer, NP coming October 2023 Same-Day (No follow up visit)    SURESH Rust PA coming Oct 2023     Clinic hours: Monday - Thursday 7 am-6 pm; Fridays 7 am-5 pm.   Urgent care: Monday - Friday 10 am- 8 pm; Saturday and Sunday 9 am-5 pm.    Clinic: [phone]       [facility]: Monday - Thursday 8 am - 7 pm; Friday 8 am - 6 pm  [facility]: [phone]

## 2023-11-28 ENCOUNTER — TRANSFERRED RECORDS (OUTPATIENT)
Dept: HEALTH INFORMATION MANAGEMENT | Facility: CLINIC | Age: 63
End: 2023-11-28
Payer: COMMERCIAL

## 2023-12-04 ENCOUNTER — APPOINTMENT (OUTPATIENT)
Dept: URBAN - METROPOLITAN AREA CLINIC 252 | Age: 63
Setting detail: DERMATOLOGY
End: 2023-12-04

## 2023-12-04 VITALS — WEIGHT: 190 LBS | HEIGHT: 63 IN

## 2023-12-04 DIAGNOSIS — L73.8 OTHER SPECIFIED FOLLICULAR DISORDERS: ICD-10-CM

## 2023-12-04 DIAGNOSIS — L57.8 OTHER SKIN CHANGES DUE TO CHRONIC EXPOSURE TO NONIONIZING RADIATION: ICD-10-CM

## 2023-12-04 DIAGNOSIS — L82.1 OTHER SEBORRHEIC KERATOSIS: ICD-10-CM

## 2023-12-04 PROCEDURE — 99212 OFFICE O/P EST SF 10 MIN: CPT

## 2023-12-04 PROCEDURE — OTHER BENIGN DESTRUCTION COSMETIC: OTHER

## 2023-12-04 PROCEDURE — OTHER MIPS QUALITY: OTHER

## 2023-12-04 PROCEDURE — OTHER ADDITIONAL NOTES: OTHER

## 2023-12-04 PROCEDURE — OTHER LIQUID NITROGEN (COSMETIC): OTHER

## 2023-12-04 PROCEDURE — OTHER COUNSELING: OTHER

## 2023-12-04 ASSESSMENT — LOCATION SIMPLE DESCRIPTION DERM
LOCATION SIMPLE: LEFT CHEEK
LOCATION SIMPLE: RIGHT HAND
LOCATION SIMPLE: LEFT FOREARM
LOCATION SIMPLE: LEFT HAND
LOCATION SIMPLE: RIGHT FOREARM

## 2023-12-04 ASSESSMENT — LOCATION DETAILED DESCRIPTION DERM
LOCATION DETAILED: RIGHT PROXIMAL RADIAL DORSAL FOREARM
LOCATION DETAILED: RIGHT ULNAR DORSAL HAND
LOCATION DETAILED: LEFT SUPERIOR LATERAL BUCCAL CHEEK
LOCATION DETAILED: LEFT SUPERIOR MEDIAL MALAR CHEEK
LOCATION DETAILED: RIGHT DISTAL DORSAL FOREARM
LOCATION DETAILED: LEFT LATERAL BUCCAL CHEEK
LOCATION DETAILED: LEFT DISTAL DORSAL FOREARM
LOCATION DETAILED: LEFT ULNAR DORSAL HAND
LOCATION DETAILED: LEFT PROXIMAL DORSAL FOREARM

## 2023-12-04 ASSESSMENT — LOCATION ZONE DERM
LOCATION ZONE: FACE
LOCATION ZONE: HAND
LOCATION ZONE: ARM

## 2023-12-04 NOTE — PROCEDURE: LIQUID NITROGEN (COSMETIC)
show
Spray Paint Technique: No
Post-Care Instructions: - Patient was instructed to avoid picking at any of the treated lesions.\\n- Discussed the expectation that after the lesions falls off, there will be a pink spot that will fade/resolve over several weeks.
Detail Level: Detailed
Show Spray Paint Technique Variable?: Yes
Consent: - Consent was obtained and risks were reviewed prior to procedure today. All questions were answered prior to procedure today.\\n- Risks discussed include but are not limited to pain, crusting, scabbing, blistering, scarring, temporary or permanent darker or lighter pigmentary change, recurrence, incomplete resolution, and infection. \\n- The patient understands that the procedure is cosmetic in nature and is not covered by or billable to insurance.
Price (Use Numbers Only, No Special Characters Or $): 150
Spray Paint Text: The liquid nitrogen was applied to the skin utilizing a spray paint frosting technique.
Billing Information: Bill by Static [employer]

## 2023-12-04 NOTE — PROCEDURE: ADDITIONAL NOTES
Additional Notes: - Reviewed 5FU would bring out precancerous lesions and smooth out the skin. \\n- Reviewed the redness will fade over time.\\n- Recommended patient continue moisturizer daily and nicotinamide supplements.
Detail Level: Simple
Render Risk Assessment In Note?: no

## 2023-12-04 NOTE — PROCEDURE: COUNSELING
Detail Level: Zone
Patient Specific Counseling (Will Not Stick From Patient To Patient): - Seborrheic Keratoses (SKs) are benign growths related to age and genetics typically and may grow slowly over time.\\n- The most common method for treatment is liquid nitrogen cryosurgery.\\n- More will likely develop over time and treated lesions may recur.\\n- Patient requested treatment today.\\n- Advised that treatment is available but not medically necessary.\\n- Recommended treatment with cryosurgery and discussed cost of treatment.\\n- Resolution and non-recurrence cannot be guaranteed.\\n- Patient expressed understanding.
Detail Level: Detailed

## 2023-12-18 ENCOUNTER — MYC MEDICAL ADVICE (OUTPATIENT)
Dept: FAMILY MEDICINE | Facility: CLINIC | Age: 63
End: 2023-12-18

## 2023-12-18 ENCOUNTER — VIRTUAL VISIT (OUTPATIENT)
Dept: FAMILY MEDICINE | Facility: CLINIC | Age: 63
End: 2023-12-18
Payer: COMMERCIAL

## 2023-12-18 ENCOUNTER — TELEPHONE (OUTPATIENT)
Dept: FAMILY MEDICINE | Facility: CLINIC | Age: 63
End: 2023-12-18

## 2023-12-18 DIAGNOSIS — M54.16 LEFT LUMBAR RADICULOPATHY: Primary | ICD-10-CM

## 2023-12-18 DIAGNOSIS — M25.561 ACUTE PAIN OF RIGHT KNEE: ICD-10-CM

## 2023-12-18 PROCEDURE — 99213 OFFICE O/P EST LOW 20 MIN: CPT | Mod: 95 | Performed by: INTERNAL MEDICINE

## 2023-12-18 RX ORDER — TRAMADOL HYDROCHLORIDE 50 MG/1
50 TABLET ORAL EVERY 8 HOURS PRN
Qty: 12 TABLET | Refills: 0 | Status: SHIPPED | OUTPATIENT
Start: 2023-12-18 | End: 2023-12-21

## 2023-12-18 RX ORDER — GABAPENTIN 100 MG/1
100 CAPSULE ORAL 3 TIMES DAILY
Qty: 90 CAPSULE | Refills: 1 | Status: SHIPPED | OUTPATIENT
Start: 2023-12-18 | End: 2024-04-16

## 2023-12-18 RX ORDER — METHYLPREDNISOLONE 4 MG
TABLET, DOSE PACK ORAL
Qty: 21 TABLET | Refills: 3 | Status: SHIPPED | OUTPATIENT
Start: 2023-12-18 | End: 2024-01-04

## 2023-12-18 NOTE — TELEPHONE ENCOUNTER
Pt is scheduled for MRI tomorrow at 7:15 am. However, she spoke with her insurance ([employer]) and they need to speak with provider prior to have imaging done to determine if it will be covered by insurance. (Pt states she was seen in hospital earlier this year and had MRI done which was not covered by her insurance).     Routing to provider. Pt requesting provider contact her insurance to discuss need for MRI/determine coverage. Number to call [phone].     Patience Nelson RN

## 2023-12-18 NOTE — PROGRESS NOTES
Radha is a 63 year old who is being evaluated via a billable video visit.      How would you like to obtain your AVS? MyChart  If the video visit is dropped, the invitation should be resent by: Text to cell phone: [phone]  Will anyone else be joining your video visit? No    [facility] Internal Medicine Progress Note           Assessment and Plan:     Left lumbar radiculopathy, L3-L4  - MR Lumbar Spine w/o Contrast; Future  - methylPREDNISolone (MEDROL DOSEPAK) 4 MG tablet therapy pack; Follow Package Directions  - gabapentin (NEURONTIN) 100 MG capsule; Take 1 capsule (100 mg) by mouth 3 times daily  - traMADol (ULTRAM) 50 MG tablet; Take 1 tablet (50 mg) by mouth every 8 hours as needed for severe pain    Acute pain of right knee  - XR Knee Right 3 Views; Future         Interval History:     ED/UC Followup:    Facility:  [facility]   Date of visit: 11/21  Reason for visit: Hip Pain  Current Status: Still in so much pain in left Hip,  can't walk or stand. Went to chiropractor and they said that there might be something wrong in the L5 area and right knee. It also affects both sides, worse on the left, with severe throbbing pain.              Significant Problems:     Patient Active Problem List   Diagnosis    CARDIOVASCULAR SCREENING; LDL GOAL LESS THAN 160    Vitamin D deficiency    Inflammatory polyarthropathy (H)    High risk medications (not anticoagulants) long-term use    Osteoarthritis    Menopausal syndrome (hot flashes)    Right lateral epicondylitis    Immunosuppressed status (H24)    Facet arthropathy, thoracic    Back muscle spasm    Upper back strain    Upper back pain, chronic    Chronic midline thoracic back pain    Degenerative disc disease, thoracic    High risk medication use    Pseudophakia, Yag Caps, ou (Hx of refractive lens exchange, ou (Lobanoff)    Diffuse idiopathic skeletal hyperostosis of thoracolumbar spine    Chronic pain syndrome     Factor 5 Leiden mutation, heterozygous (H24)    Hypertension goal BP (blood pressure) < 140/90    Bilateral lumbar radiculopathy              Review of Systems:     CONSTITUTIONAL: NEGATIVE for fever, chills, change in weight  INTEGUMENTARY/SKIN: NEGATIVE for worrisome rashes, moles or lesions  EYES: NEGATIVE for vision changes or irritation  ENT/MOUTH: NEGATIVE for ear, mouth and throat problems  RESP: NEGATIVE for significant cough or SOB  BREAST: NEGATIVE for masses, tenderness or discharge  CV: NEGATIVE for chest pain, palpitations or peripheral edema  GI: NEGATIVE for nausea, abdominal pain, heartburn, or change in bowel habits  : NEGATIVE for frequency, dysuria, or hematuria  MUSCULOSKELETAL:As above.  NEURO: NEGATIVE for HX CVA and HX TIA  ENDOCRINE: NEGATIVE for temperature intolerance, skin/hair changes  HEME: NEGATIVE for bleeding problems  PSYCHIATRIC: NEGATIVE for changes in mood or affect               Physical Exam:   Vital signs not obtained due to nature of visit.  Constitutional:   fatigued, alert, cooperative, mild distress, appears stated age, and mildly obese     Eyes:   extra-ocular muscles intact and sclera clear     ENT:   normocepalic, without obvious abnormality     Lungs:   no retractions     Neuropsychiatric:   General: normal and normal eye contact  Affect: normal  Orientation: oriented to self, place, time and situation  Memory and insight: normal, memory for past and recent events intact, and thought process normal             Data:   MRI LUMBAR SPINE WITH AND WITHOUT INTRAVENOUS CONTRAST   10/5/2018  2:39 PM      HISTORY: Two days of bilateral leg pain. Acute transverse myelitis.     COMPARISON: None.     TECHNIQUE: Multiplanar MR imaging was performed. 8.5 mL Gadavist  contrast was injected intravenously without complication.     FINDINGS: Numbering of the levels is based on what appear to be five  lumbar type vertebral bodies. Vertebral body alignment is normal. No  fracture is  seen. No pars interarticularis defect is demonstrated. No  osseous lesion is seen. There are mild-to-moderate Modic type I signal  changes in the endplates adjacent to the L5-S1 disc. No other abnormal  marrow signal intensity is seen. The conus medullaris terminates at  the level of the L1 vertebral body. No intrathecal abnormality is  seen. Specifically, no abnormal signal or enhancement is seen within  the visualized distal spinal cord or within the nerves. The adjacent  soft tissues are unremarkable.     Findings by specific level:     T12-L1: The disc and facet joints are normal. No stenosis is seen.     L1-L2: The disc and facet joints are normal. No stenosis is seen.     L2-L3: The disc and facet joints are normal. No stenosis is seen.     L3-L4: The disc height is well-preserved. There is a mild disc bulge  with no herniation seen. There is mild narrowing of the neural  foramina bilaterally. No central canal stenosis is present. The facet  joints are unremarkable.     L4-L5: The disc height is well-preserved. There is a moderate disc  bulge with no focal herniation seen. There are mild degenerative  changes in the facet joints. There is a mild degree of central canal  stenosis and bilateral neural foraminal stenosis.     L5-S1: There is mild disc height loss. There is a moderate disc bulge  with no focal herniation demonstrated. There are mild degenerative  changes in the facet joints. There is mild central canal stenosis with  moderate bilateral neural foraminal stenosis.                                                                      IMPRESSION:   1. Degenerative changes of the lower lumbar spine with no disc  herniation demonstrated. There is moderate foraminal stenosis  bilaterally at L5-S1 with mild central canal and neural foraminal  stenosis elsewhere as described above.  2. No abnormality is seen of the visualized distal spinal cord or  nerve root structures. There is no MR evidence of  transverse myelitis.     EDUARDO TAN MD       Disposition:  Follow-up in 4 weeks.      Temo Fletcher MD  Internal Medicine  [facility] Team         Video-Visit Details    Type of service:  Video Visit   Joined the call at 12/18/2023, 11:18:14 am.  Left the call at 12/18/2023, 11:38:27 am.  You were on the call for 20 minutes 12 seconds .  Originating Location (pt. Location): Home  Distant Location (provider location):  On-site  Platform used for Video Visit: Jyothi

## 2023-12-19 ENCOUNTER — ANCILLARY PROCEDURE (OUTPATIENT)
Dept: GENERAL RADIOLOGY | Facility: CLINIC | Age: 63
End: 2023-12-19
Attending: INTERNAL MEDICINE
Payer: COMMERCIAL

## 2023-12-19 DIAGNOSIS — M25.561 ACUTE PAIN OF RIGHT KNEE: ICD-10-CM

## 2023-12-19 PROCEDURE — 73562 X-RAY EXAM OF KNEE 3: CPT | Mod: TC | Performed by: RADIOLOGY

## 2023-12-26 ENCOUNTER — MYC MEDICAL ADVICE (OUTPATIENT)
Dept: FAMILY MEDICINE | Facility: CLINIC | Age: 63
End: 2023-12-26
Payer: MEDICARE

## 2023-12-26 DIAGNOSIS — M54.17 LUMBOSACRAL RADICULOPATHY: Primary | ICD-10-CM

## 2023-12-26 RX ORDER — METHYLPREDNISOLONE 4 MG
TABLET, DOSE PACK ORAL
Qty: 21 TABLET | Refills: 1 | Status: SHIPPED | OUTPATIENT
Start: 2023-12-26 | End: 2024-01-04

## 2023-12-26 RX ORDER — DULOXETIN HYDROCHLORIDE 20 MG/1
20 CAPSULE, DELAYED RELEASE ORAL EVERY MORNING
Qty: 30 CAPSULE | Refills: 2 | Status: SHIPPED | OUTPATIENT
Start: 2023-12-26 | End: 2024-04-16

## 2024-01-01 DIAGNOSIS — G89.29 CHRONIC RIGHT-SIDED THORACIC BACK PAIN: ICD-10-CM

## 2024-01-01 DIAGNOSIS — M54.6 CHRONIC RIGHT-SIDED THORACIC BACK PAIN: ICD-10-CM

## 2024-01-03 ENCOUNTER — MYC MEDICAL ADVICE (OUTPATIENT)
Dept: FAMILY MEDICINE | Facility: CLINIC | Age: 64
End: 2024-01-03
Payer: MEDICARE

## 2024-01-03 DIAGNOSIS — M54.17 LUMBOSACRAL RADICULOPATHY: ICD-10-CM

## 2024-01-04 RX ORDER — METHYLPREDNISOLONE 4 MG
TABLET, DOSE PACK ORAL
Qty: 21 TABLET | Refills: 1 | Status: SHIPPED | OUTPATIENT
Start: 2024-01-04 | End: 2024-04-16

## 2024-01-05 RX ORDER — METHOCARBAMOL 500 MG/1
TABLET, FILM COATED ORAL
Qty: 56 TABLET | Refills: 5 | Status: SHIPPED | OUTPATIENT
Start: 2024-01-05 | End: 2024-06-03

## 2024-01-11 ENCOUNTER — ANCILLARY PROCEDURE (OUTPATIENT)
Dept: MRI IMAGING | Facility: CLINIC | Age: 64
End: 2024-01-11
Attending: INTERNAL MEDICINE
Payer: MEDICARE

## 2024-01-11 DIAGNOSIS — M54.16 LEFT LUMBAR RADICULOPATHY: ICD-10-CM

## 2024-01-11 PROCEDURE — 72148 MRI LUMBAR SPINE W/O DYE: CPT | Mod: TC | Performed by: RADIOLOGY

## 2024-01-11 PROCEDURE — G1010 CDSM STANSON: HCPCS | Performed by: RADIOLOGY

## 2024-01-15 ENCOUNTER — TRANSFERRED RECORDS (OUTPATIENT)
Dept: HEALTH INFORMATION MANAGEMENT | Facility: CLINIC | Age: 64
End: 2024-01-15
Payer: MEDICARE

## 2024-01-18 DIAGNOSIS — M54.17 LUMBOSACRAL RADICULOPATHY: ICD-10-CM

## 2024-01-19 RX ORDER — DULOXETIN HYDROCHLORIDE 20 MG/1
20 CAPSULE, DELAYED RELEASE ORAL EVERY MORNING
Qty: 30 CAPSULE | Refills: 2 | OUTPATIENT
Start: 2024-01-19

## 2024-02-26 ENCOUNTER — E-VISIT (OUTPATIENT)
Dept: URGENT CARE | Facility: CLINIC | Age: 64
End: 2024-02-26
Payer: MEDICARE

## 2024-02-26 DIAGNOSIS — R35.0 URINARY FREQUENCY: Primary | ICD-10-CM

## 2024-02-26 PROCEDURE — 99207 PR NON-BILLABLE SERV PER CHARTING: CPT | Performed by: PREVENTIVE MEDICINE

## 2024-02-26 NOTE — PATIENT INSTRUCTIONS
Dear Radha Rodriguez,     After reviewing your responses, I would like you to come in for a urine test to make sure we treat you correctly. This urine test is to evaluate you for a possible urinary tract infection, and should be scheduled for today or tomorrow. Schedule a Lab Only appointment here.     Lab appointments are not available at most locations on the weekends. If no Lab Only appointment is available, you should be seen in any of our convenient Walk-in or Urgent Care Centers, which can be found on our website here.     You will receive instructions with your results in [facility] once they are available.     If your symptoms worsen, you develop pain in your back or stomach, develop fevers, or are not improving in 5 days, please contact your primary care provider for an appointment or visit a Walk-in or Urgent Care Center to be seen.     Thanks again for choosing us as your health care partner,     Troy Irwin MD, MD  Urinary Tract Infection (UTI) in Women: Care Instructions  Overview     A urinary tract infection, or UTI, is a general term for an infection anywhere between the kidneys and the urethra (where urine comes out). Most UTIs are bladder infections. They often cause pain or burning when you urinate.  UTIs are caused by bacteria and can be cured with antibiotics. Be sure to complete your treatment so that the infection does not get worse.  Follow-up care is a key part of your treatment and safety. Be sure to make and go to all appointments, and call your doctor if you are having problems. It's also a good idea to know your test results and keep a list of the medicines you take.  How can you care for yourself at home?  Take your antibiotics as directed. Do not stop taking them just because you feel better. You need to take the full course of antibiotics.  Drink extra water and other fluids for the next day or two. This will help make the urine less concentrated and help wash out the  "bacteria that are causing the infection. (If you have kidney, heart, or liver disease and have to limit fluids, talk with your doctor before you increase the amount of fluids you drink.)  Avoid drinks that are carbonated or have caffeine. They can irritate the bladder.  Urinate often. Try to empty your bladder each time.  To relieve pain, take a hot bath or lay a heating pad set on low over your lower belly or genital area. Never go to sleep with a heating pad in place.  To prevent UTIs  Drink plenty of water each day. This helps you urinate often, which clears bacteria from your system. (If you have kidney, heart, or liver disease and have to limit fluids, talk with your doctor before you increase the amount of fluids you drink.)  Urinate when you need to.  If you are sexually active, urinate right after you have sex.  Change sanitary pads often.  Avoid douches, bubble baths, feminine hygiene sprays, and other feminine hygiene products that have deodorants.  After going to the bathroom, wipe from front to back.  When should you call for help?   Call your doctor now or seek immediate medical care if:    Symptoms such as fever, chills, nausea, or vomiting get worse or appear for the first time.     You have new pain in your back just below your rib cage. This is called flank pain.     There is new blood or pus in your urine.     You have any problems with your antibiotic medicine.   Watch closely for changes in your health, and be sure to contact your doctor if:    You are not getting better after taking an antibiotic for 2 days.     Your symptoms go away but then come back.   Where can you learn more?  Go to https://www.[employer]net/patiented  Enter K848 in the search box to learn more about \"Urinary Tract Infection (UTI) in Women: Care Instructions.\"  Current as of: February 28, 2023               Content Version: 13.8    6985-2444 [employer].   Care instructions adapted under license by your " healthcare professional. If you have questions about a medical condition or this instruction, always ask your healthcare professional. [employer] disclaims any warranty or liability for your use of this information.

## 2024-02-29 ENCOUNTER — APPOINTMENT (OUTPATIENT)
Dept: URBAN - METROPOLITAN AREA CLINIC 252 | Age: 64
Setting detail: DERMATOLOGY
End: 2024-02-29

## 2024-02-29 VITALS — HEIGHT: 63 IN | WEIGHT: 190 LBS

## 2024-02-29 DIAGNOSIS — L73.8 OTHER SPECIFIED FOLLICULAR DISORDERS: ICD-10-CM

## 2024-02-29 DIAGNOSIS — L82.1 OTHER SEBORRHEIC KERATOSIS: ICD-10-CM

## 2024-02-29 DIAGNOSIS — L56.5 DISSEMINATED SUPERFICIAL ACTINIC POROKERATOSIS (DSAP): ICD-10-CM

## 2024-02-29 PROCEDURE — OTHER PRESCRIPTION: OTHER

## 2024-02-29 PROCEDURE — OTHER COUNSELING: OTHER

## 2024-02-29 PROCEDURE — 99213 OFFICE O/P EST LOW 20 MIN: CPT

## 2024-02-29 RX ORDER — DICLOFENAC SODIUM 30 MG/G
GEL TOPICAL
Qty: 100 | Refills: 3 | Status: ERX | COMMUNITY
Start: 2024-02-29

## 2024-02-29 ASSESSMENT — LOCATION SIMPLE DESCRIPTION DERM
LOCATION SIMPLE: RIGHT FOREARM
LOCATION SIMPLE: LEFT FOREARM
LOCATION SIMPLE: LEFT CHEEK
LOCATION SIMPLE: LEFT SHOULDER
LOCATION SIMPLE: RIGHT SHOULDER

## 2024-02-29 ASSESSMENT — LOCATION ZONE DERM
LOCATION ZONE: FACE
LOCATION ZONE: ARM

## 2024-02-29 ASSESSMENT — LOCATION DETAILED DESCRIPTION DERM
LOCATION DETAILED: RIGHT PROXIMAL DORSAL FOREARM
LOCATION DETAILED: RIGHT ANTERIOR SHOULDER
LOCATION DETAILED: LEFT DISTAL DORSAL FOREARM
LOCATION DETAILED: LEFT PROXIMAL DORSAL FOREARM
LOCATION DETAILED: LEFT ANTERIOR SHOULDER
LOCATION DETAILED: LEFT MEDIAL MALAR CHEEK
LOCATION DETAILED: RIGHT DISTAL DORSAL FOREARM

## 2024-02-29 NOTE — PROCEDURE: COUNSELING
Detail Level: Zone
Patient Specific Counseling (Will Not Stick From Patient To Patient): - Patient had adverse reaction of hair thinning to 5FU cream, recommend trying diclofenac BID for 90 days. \\n- Recommend patient follow up in 4 months if the scaly spots do not resolve for further evaluation. \\n- Patient would like Rx sent to [facility], but will re-route Rx to [facility] in [place] if Rx is too expensive.
Detail Level: Simple
Detail Level: Detailed

## 2024-02-29 NOTE — HPI: RASH
Is This A New Presentation, Or A Follow-Up?: Rash
Additional History: Used fluorouracil bid for 3 months and stopped at last office early December. Was using with calcipotriene for the first month.

## 2024-03-04 ENCOUNTER — RX ONLY (RX ONLY)
Age: 64
End: 2024-03-04

## 2024-03-04 RX ORDER — DICLOFENAC SODIUM 30 MG/G
GEL TOPICAL
Qty: 100 | Refills: 3 | Status: ERX

## 2024-03-26 ENCOUNTER — MYC MEDICAL ADVICE (OUTPATIENT)
Dept: FAMILY MEDICINE | Facility: CLINIC | Age: 64
End: 2024-03-26
Payer: MEDICARE

## 2024-03-26 DIAGNOSIS — Z13.29 SCREENING FOR THYROID DISORDER: Primary | ICD-10-CM

## 2024-04-02 ENCOUNTER — PATIENT OUTREACH (OUTPATIENT)
Dept: CARE COORDINATION | Facility: CLINIC | Age: 64
End: 2024-04-02
Payer: MEDICARE

## 2024-04-03 ENCOUNTER — LAB (OUTPATIENT)
Dept: LAB | Facility: CLINIC | Age: 64
End: 2024-04-03
Payer: MEDICARE

## 2024-04-03 DIAGNOSIS — Z13.29 SCREENING FOR THYROID DISORDER: ICD-10-CM

## 2024-04-03 LAB
T4 FREE SERPL-MCNC: 1.47 NG/DL (ref 0.9–1.7)
TSH SERPL DL<=0.005 MIU/L-ACNC: 1.75 UIU/ML (ref 0.3–4.2)

## 2024-04-03 PROCEDURE — 84439 ASSAY OF FREE THYROXINE: CPT

## 2024-04-03 PROCEDURE — 36415 COLL VENOUS BLD VENIPUNCTURE: CPT

## 2024-04-03 PROCEDURE — 86376 MICROSOMAL ANTIBODY EACH: CPT

## 2024-04-03 PROCEDURE — 84443 ASSAY THYROID STIM HORMONE: CPT

## 2024-04-04 LAB — THYROPEROXIDASE AB SERPL-ACNC: <10 IU/ML

## 2024-04-16 ENCOUNTER — MYC MEDICAL ADVICE (OUTPATIENT)
Dept: FAMILY MEDICINE | Facility: CLINIC | Age: 64
End: 2024-04-16

## 2024-04-16 ENCOUNTER — VIRTUAL VISIT (OUTPATIENT)
Dept: FAMILY MEDICINE | Facility: CLINIC | Age: 64
End: 2024-04-16
Payer: MEDICARE

## 2024-04-16 DIAGNOSIS — R23.3 EASY BRUISING: Primary | ICD-10-CM

## 2024-04-16 DIAGNOSIS — R63.5 WEIGHT GAIN: ICD-10-CM

## 2024-04-16 DIAGNOSIS — G47.00 INSOMNIA, UNSPECIFIED TYPE: ICD-10-CM

## 2024-04-16 PROCEDURE — 99214 OFFICE O/P EST MOD 30 MIN: CPT | Mod: 95 | Performed by: INTERNAL MEDICINE

## 2024-04-16 RX ORDER — AMITRIPTYLINE HYDROCHLORIDE 10 MG/1
10-20 TABLET ORAL EVERY EVENING
Qty: 60 TABLET | Refills: 11 | Status: SHIPPED | OUTPATIENT
Start: 2024-04-16 | End: 2024-06-03

## 2024-04-16 NOTE — TELEPHONE ENCOUNTER
Routing to provider.    Pt had a virtual visit today.      URBANO CervantesN, RN  [facility]

## 2024-04-16 NOTE — PROGRESS NOTES
Radha is a 64 year old who is being evaluated via a billable video visit.    How would you like to obtain your AVS? MyChart  If the video visit is dropped, the invitation should be resent by: Text to cell phone: [phone]  Will anyone else be joining your video visit? No     [facility] Internal Medicine Progress Note           Assessment and Plan:     Easy bruising  - T3, Free; Standing  - TSH; Standing  - CBC with platelets and differential; Standing  - INR; Standing  - Partial thromboplastin time; Standing  - Platelet function closure with reflex; Standing    Insomnia, unspecified type  - amitriptyline (ELAVIL) 10 MG tablet; Take 1-2 tablets (10-20 mg) by mouth every evening Take one hour before bedtime.    Weight gain  - T3, Free; Standing  - TSH; Standing          Interval History:     Easy bruising  Onset: two and a half months.  Description: bruises on multiple areas with delayed healing. gets red marks on body that takes a long time to heal  Intensity: moderate  Progression of Symptoms:  worsening  Accompanying Signs & Symptoms: None  Previous history of similar problem: none  Precipitating factors:        Worsened by: none  Alleviating factors:        Improved by: none  Therapies tried and outcome: none           Significant Problems:     Patient Active Problem List   Diagnosis    CARDIOVASCULAR SCREENING; LDL GOAL LESS THAN 160    Vitamin D deficiency    Inflammatory polyarthropathy (H)    High risk medications (not anticoagulants) long-term use    Osteoarthritis    Menopausal syndrome (hot flashes)    Right lateral epicondylitis    Immunosuppressed status (H24)    Facet arthropathy, thoracic    Back muscle spasm    Upper back strain    Upper back pain, chronic    Chronic midline thoracic back pain    Degenerative disc disease, thoracic    High risk medication use    Pseudophakia, Yag Caps, ou (Hx of refractive lens exchange, ou (Lobanoff)    Diffuse idiopathic skeletal hyperostosis of  thoracolumbar spine    Chronic pain syndrome    Factor 5 Leiden mutation, heterozygous (H24)    Hypertension goal BP (blood pressure) < 140/90    Bilateral lumbar radiculopathy              Review of Systems:     CONSTITUTIONAL:POSITIVE  for weight gain.  INTEGUMENTARY/SKIN: NEGATIVE for worrisome rashes, moles or lesions  EYES: NEGATIVE for vision changes or irritation  ENT/MOUTH: NEGATIVE for ear, mouth and throat problems  RESP: NEGATIVE for significant cough or SOB  BREAST: NEGATIVE for masses, tenderness or discharge  CV: NEGATIVE for chest pain, palpitations or peripheral edema  GI: NEGATIVE for nausea, abdominal pain, heartburn, or change in bowel habits  : NEGATIVE for frequency, dysuria, or hematuria  MUSCULOSKELETAL: NEGATIVE for significant arthralgias or myalgia  NEURO: NEGATIVE for weakness, dizziness or paresthesias  ENDOCRINE: NEGATIVE for temperature intolerance, skin/hair changes  HEME/ALLERGY/IMMUNE: POSITIVE  for easy bruising.  PSYCHIATRIC: POSITIVE for insomnia.             Physical Exam:   Vital signs and physical exam not performed due to nature of visit.          Data:   Epic reviewed.     Disposition:  Follow-up in 4 weeks or as needed.    Temo Fletcher MD  Internal Medicine  [facility] Team        Video-Visit Details     Type of service:  Video Visit  Video Start Time: 8:30 AM  Video End Time: 8:47 AM  Originating Location (pt. Location): Home.  Distant Location (provider location):  [facility]   Platform used for Video Visit: Wickr

## 2024-04-25 ENCOUNTER — TRANSFERRED RECORDS (OUTPATIENT)
Dept: HEALTH INFORMATION MANAGEMENT | Facility: CLINIC | Age: 64
End: 2024-04-25
Payer: MEDICARE

## 2024-05-02 ENCOUNTER — LAB (OUTPATIENT)
Dept: LAB | Facility: CLINIC | Age: 64
End: 2024-05-02
Payer: MEDICARE

## 2024-05-02 DIAGNOSIS — R35.0 URINARY FREQUENCY: Primary | ICD-10-CM

## 2024-05-02 DIAGNOSIS — R23.3 EASY BRUISING: ICD-10-CM

## 2024-05-02 DIAGNOSIS — R63.5 WEIGHT GAIN: ICD-10-CM

## 2024-05-02 LAB
APTT PPP: 24 SECONDS (ref 22–38)
BASOPHILS # BLD AUTO: 0 10E3/UL (ref 0–0.2)
BASOPHILS NFR BLD AUTO: 1 %
CLOSURE TME COLL+ADP BLD: 108 SECONDS
CLOSURE TME COLL+EPINEP BLD: >300 SECONDS
EOSINOPHIL # BLD AUTO: 0.2 10E3/UL (ref 0–0.7)
EOSINOPHIL NFR BLD AUTO: 3 %
ERYTHROCYTE [DISTWIDTH] IN BLOOD BY AUTOMATED COUNT: 12 % (ref 10–15)
HCT VFR BLD AUTO: 42.3 % (ref 35–47)
HGB BLD-MCNC: 13.7 G/DL (ref 11.7–15.7)
IMM GRANULOCYTES # BLD: 0 10E3/UL
IMM GRANULOCYTES NFR BLD: 0 %
INR PPP: 0.92 (ref 0.85–1.15)
LYMPHOCYTES # BLD AUTO: 1.3 10E3/UL (ref 0.8–5.3)
LYMPHOCYTES NFR BLD AUTO: 21 %
MCH RBC QN AUTO: 31 PG (ref 26.5–33)
MCHC RBC AUTO-ENTMCNC: 32.4 G/DL (ref 31.5–36.5)
MCV RBC AUTO: 96 FL (ref 78–100)
MONOCYTES # BLD AUTO: 0.4 10E3/UL (ref 0–1.3)
MONOCYTES NFR BLD AUTO: 7 %
NEUTROPHILS # BLD AUTO: 4.2 10E3/UL (ref 1.6–8.3)
NEUTROPHILS NFR BLD AUTO: 69 %
PLATELET # BLD AUTO: 248 10E3/UL (ref 150–450)
RBC # BLD AUTO: 4.42 10E6/UL (ref 3.8–5.2)
WBC # BLD AUTO: 6.1 10E3/UL (ref 4–11)

## 2024-05-02 PROCEDURE — 85025 COMPLETE CBC W/AUTO DIFF WBC: CPT

## 2024-05-02 PROCEDURE — 84481 FREE ASSAY (FT-3): CPT

## 2024-05-02 PROCEDURE — 85610 PROTHROMBIN TIME: CPT

## 2024-05-02 PROCEDURE — 85576 BLOOD PLATELET AGGREGATION: CPT

## 2024-05-02 PROCEDURE — 36415 COLL VENOUS BLD VENIPUNCTURE: CPT

## 2024-05-02 PROCEDURE — 84443 ASSAY THYROID STIM HORMONE: CPT

## 2024-05-02 PROCEDURE — 85730 THROMBOPLASTIN TIME PARTIAL: CPT

## 2024-05-03 LAB
T3FREE SERPL-MCNC: 2.7 PG/ML (ref 2–4.4)
TSH SERPL DL<=0.005 MIU/L-ACNC: 1.4 UIU/ML (ref 0.3–4.2)

## 2024-05-06 ENCOUNTER — MYC MEDICAL ADVICE (OUTPATIENT)
Dept: FAMILY MEDICINE | Facility: CLINIC | Age: 64
End: 2024-05-06
Payer: MEDICARE

## 2024-05-06 DIAGNOSIS — R23.3 EASY BRUISABILITY: Primary | ICD-10-CM

## 2024-05-28 ENCOUNTER — OFFICE VISIT (OUTPATIENT)
Dept: RHEUMATOLOGY | Facility: CLINIC | Age: 64
End: 2024-05-28
Payer: MEDICARE

## 2024-05-28 VITALS
DIASTOLIC BLOOD PRESSURE: 90 MMHG | SYSTOLIC BLOOD PRESSURE: 150 MMHG | HEART RATE: 83 BPM | WEIGHT: 190.6 LBS | OXYGEN SATURATION: 98 % | RESPIRATION RATE: 20 BRPM | BODY MASS INDEX: 32.94 KG/M2

## 2024-05-28 DIAGNOSIS — M18.12 PRIMARY OSTEOARTHRITIS OF FIRST CARPOMETACARPAL JOINT OF LEFT HAND: ICD-10-CM

## 2024-05-28 DIAGNOSIS — M06.4 INFLAMMATORY POLYARTHROPATHY (H): Primary | ICD-10-CM

## 2024-05-28 PROCEDURE — 99214 OFFICE O/P EST MOD 30 MIN: CPT | Mod: 25 | Performed by: INTERNAL MEDICINE

## 2024-05-28 PROCEDURE — 20600 DRAIN/INJ JOINT/BURSA W/O US: CPT | Mod: LT | Performed by: INTERNAL MEDICINE

## 2024-05-28 RX ORDER — METHYLPREDNISOLONE ACETATE 40 MG/ML
10 INJECTION, SUSPENSION INTRA-ARTICULAR; INTRALESIONAL; INTRAMUSCULAR; SOFT TISSUE ONCE
Status: COMPLETED | OUTPATIENT
Start: 2024-05-28 | End: 2024-05-28

## 2024-05-28 RX ORDER — HYDROXYCHLOROQUINE SULFATE 200 MG/1
400 TABLET, FILM COATED ORAL DAILY
Qty: 180 TABLET | Refills: 2 | Status: SHIPPED | OUTPATIENT
Start: 2024-05-28 | End: 2024-09-10

## 2024-05-28 RX ADMIN — METHYLPREDNISOLONE ACETATE 10 MG: 40 INJECTION, SUSPENSION INTRA-ARTICULAR; INTRALESIONAL; INTRAMUSCULAR; SOFT TISSUE at 13:53

## 2024-05-28 NOTE — PATIENT INSTRUCTIONS
RHEUMATOLOGY    [facility] [place]  [address] [place] [address]  [place] [address]    Phone number: [phone]  Fax number: [phone]    If you need a medication refill, please contact us as you may need lab work and/or a follow up visit prior to your refill.      Thank you for choosing [facility]!    Mi Irwin CMA Rheumatology

## 2024-05-28 NOTE — NURSING NOTE
RAPID3 (0-30) Cumulative Score  13.5          RAPID3 Weighted Score (divide #4 by 3 and that is the weighted score)  4.5

## 2024-05-28 NOTE — PROGRESS NOTES
Rheumatology Clinic Visit      Radha Rodriguez MRN# [id number]   YOB: 1960 Age: 64 year old      Date of visit: 5/28/24   PCP: Dr. Temo Fletcher    Chief Complaint   Patient presents with:  Rheumatoid Arthritis: Left thumb hurt and right knee pain (had injection by [facility] on 04/25/2024). Having clotting disorder which they have been looking into.    Assessment and Plan     1.  Rheumatoid arthritis: Previously followed by [facility] and [facility].  Established care with me on 4/24/2018.  Previously on Humira (ineffective), HCQ (used with MTX), MTX (25mg wkly was effective and dose reductions resulted in worsening joint symptoms, but then she stopped on her own during the COVID19 pandemic without any worsening inflammatory arthritis symptoms).  She did well for a while after stopping medications in 2020 without a DMARD but then with full body aches that and stiffness that was worse in the morning and improved with time and activity; fullness of the bilateral second and third MCPs with prolonged morning stiffness; hydroxychloroquine was started with near resolution of the inflammatory MCP symptoms, but then presented with severe arthritis at the MCPs and PIPs so methotrexate was started with significant improvement but mild synovitis still on exam previously so methotrexate dose was increased with LFT elevation so had to be held for a couple weeks before being restarted in late August, and then the patient associated skin lesions on her arms and abdomen with methotrexate but it was not clearly associated so she had stopped methotrexate.  She had also stopped hydroxychloroquine in June 2022.  Then with active arthritis that nearly resolved with a longer duration of hydroxychloroquine, but then hydroxychloroquine was stopped when she was hospitalized for knee replacement surgery and February 2023 with directions to restart in the outpatient setting for unclear reasons; hydroxychloroquine was later restarted.   Active synovitis at the MCPs and PIPs so Enbrel was prescribed on 8/2/2023 with resolution of the synovitis.  Enbrel was cost prohibitive and therefore discontinued.  Currently on hydroxychloroquine monotherapy, but there was an interruption of hydroxychloroquine due to cost but she has restarted hydroxychloroquine recently.  No synovitis on exam today but has inflammatory joint symptoms at the MCPs that I anticipate will improve with having restarted hydroxychloroquine.  Chronic illness, progressive.      - Continue hydroxychloroquine 400mg daily (last eye exam on 6/12/2023 by Dr. Aly; yearly exam required)    2.  Primary osteoarthritis of the left first carpometacarpal joint: improved with steroid injections in the past.  Again symptomatic today and patient would like repeat steroid injection.  Steroid injection repeated today as documented in the procedure section.  Discussed the option for hand surgery referral as well but she would like to continue doing the injections because they are effective and she would like to avoid surgery at this time.    3.  Bilateral knee osteoarthritis; hx of left medial unicompartmental knee arthroplasty in February 2023: Steroid injections have been effective.  In April 2024 had steroid injection of the pes anserine bursa at [facility] with no improvement.  Topical Voltaren gel only partially effective.  She is going to try physical therapy exercises and encourage pool exercises.  She plans to follow-up at [facility].    4. Chronic back pain: went to [facility] where steroids and gabapentin were Rx'd but she doesn't do well with steroids per patient and gabapentin has only been partially helpful. Was following at the [facility] pain clinic, but was referred by [facility] provider to [facility] per patient.  Now following with Dr. Xavi Guerrero at [facility].    5.  History of rash: previously on abdomen and now just on arms. Unclear etiology. Less  likely MTX associated but cannot rule out.  She managed with her PCP.  Not an issue today.    6.  Vaccinations:   - Influenza: Advised yearly vaccination  - [id number]: up to date  - [id number]: up to date  - COVID-19: Advised keeping updated  - Shingrix: Advised vaccination    7.  Osteopenia: Based on 11/15/2023 DEXA    Total minutes spent in evaluation with patient, documentation, , and review of pertinent studies and chart notes: 22      Ms. Rodriguez verbalized agreement with and understanding of the rational for the diagnosis and treatment plan.  All questions were answered to best of my ability and the patient's satisfaction. Ms. Rodriguez was advised to contact the clinic with any questions that may arise after the clinic visit.      Thank you for involving me in the care of the patient    Return to clinic: 3-4 months    HPI   Radha Rodriguez is a 64 year old female with a past medical history significant for possible Crohn's disease requiring fistula surgery in the past, osteoarthritis, inflammatory arthritis who is seen for follow-up of arthritis.    4/11/2023: Stop taking hydroxychloroquine when she was hospitalized in February 2023 for the left partial knee arthroplasty.  Since she has been favoring her right knee her right knee is hurting more.  Right knee pain is worse with activity and improves with rest.  She would like a steroid injection of the right knee today.  Left first CMC joint osteoarthritis pain worse and she would like repeat steroid injection.  Felt like she was doing better with regard to inflammatory arthritis when she was taking hydroxychloroquine, but she was told no clear reason to stop hydroxychloroquine when she was hospitalized and she has not restarted it yet because she was waiting for this appointment to discuss.  She would like to restart hydroxychloroquine.    7/25/2023: Pain, swelling, and stiffness at the MCPs and PIPs that is worse in the morning and appear to time  activity, worse on the right hand.  Left first CMC joint painful with activity and improves with rest; typically the steroid injection wears off after about 2.5-3 months.  She would like repeat steroid injection of the left first CMC joint today.  Hydroxychloroquine is effective but does not completely control her symptoms.  Has some bruising on her arms and is following with a primary care provider for this issue.    11/7/2023: No longer with pain at the MCPs or PIPs.  No morning stiffness.  Still with pain in the left first CMC joint and she would like a repeat steroid injection at this joint today because injections are effective and she does not want to go for surgery.  She would also like a repeat steroid injection of the right knee because it is bothering her more now and a steroid injection in the past was very effective.    Today, 5/28/2024: Went to see an orthopedic surgeon at [facility] in April 2023 where she had a steroid injection of the pes anserine bursa with no improvement.  She is planning to increase physical therapy exercises, walking, and consider pool exercises.  Would like repeat steroid injection of the left first CMC joint today as it has worsened.  Left first CMC joint pain is worse with activity and improves with rest; no swelling, increased warmth, or overlying erythema.  Not on Enbrel due to cost.  Had to hold hydroxychloroquine for about period of time because of cost but she has recently restarted because she had worsening symptoms after stopping hydroxychloroquine.    Tobacco: none  EtOH: occasional  Drugs: none  Occupation: retired    ROS   12 point review of system was completed and negative except as noted in the HPI     Active Problem List     Patient Active Problem List   Diagnosis    CARDIOVASCULAR SCREENING; LDL GOAL LESS THAN 160    Vitamin D deficiency    Inflammatory polyarthropathy (H)    High risk medications (not anticoagulants) long-term use    Osteoarthritis     Menopausal syndrome (hot flashes)    Right lateral epicondylitis    Immunosuppressed status (H24)    Facet arthropathy, thoracic    Back muscle spasm    Upper back strain    Upper back pain, chronic    Chronic midline thoracic back pain    Degenerative disc disease, thoracic    High risk medication use    Pseudophakia, Yag Caps, ou (Hx of refractive lens exchange, ou (Lobanoff)    Diffuse idiopathic skeletal hyperostosis of thoracolumbar spine    Chronic pain syndrome    Factor 5 Leiden mutation, heterozygous (H24)    Hypertension goal BP (blood pressure) < 140/90    Bilateral lumbar radiculopathy     Past Medical History     Past Medical History:   Diagnosis Date    Arthritis     DVT (deep venous thrombosis) (H)     Factor 5 Leiden mutation, heterozygous (H24)     Headache(784.0)     Hypertension goal BP (blood pressure) < 140/90 7/25/2023    Irritable bowel syndrome     Other and unspecified noninfectious gastroenteritis and colitis(558.9)     chronic     Past Surgical History     Past Surgical History:   Procedure Laterality Date    ARTHROPLASTY KNEE UNICOMPARTMENT Left 2/28/2023    Procedure: LEFT MEDIAL UNICOMPARTMENTAL KNEE ARTHROPLASTY;  Surgeon: Micah Mena MD;  Location: [facility]    CATARACT IOL, RT/LT      Refractive lens exchange ou (Lobanoff)    COLONOSCOPY  10/14/2011    Procedure:COLONOSCOPY; Colonoscopy; Surgeon:SCOTTY LEDEZMA; Location:[facility]    COLONOSCOPY N/A 9/14/2023    Procedure: COLONOSCOPY, WITH POLYPECTOMY AND BIOPSY;  Surgeon: Nimo Landry DO;  Location: [facility]    COLONOSCOPY N/A 9/14/2023    Procedure: COLONOSCOPY, FLEXIBLE, WITH LESION REMOVAL USING SNARE;  Surgeon: Nimo Landry DO;  Location: [facility]    COLONOSCOPY WITH CO2 INSUFFLATION N/A 9/14/2023    Procedure: Colonoscopy with CO2 insufflation;  Surgeon: Nimo Landry DO;  Location: [facility]    ENHANCE LASER REFRACTIVE BILATERAL EXISTING PT IN PARAMETERS      HYSTERECTOMY, VAGINAL  01/01/2000    TVH, fibroids  (still has ovaries)    SURGICAL HISTORY OF -       Tubal Ligation    SURGICAL HISTORY OF -       Appy    SURGICAL HISTORY OF -       Tonsils    SURGICAL HISTORY OF -   12/1994    Anal Fistulotomy    ZZC REPLACEMENT,URETER,BOWEL SEGMENT  10/01/2008    Part of her ureter was repaired.     Allergy     Allergies   Allergen Reactions    Sulfa Antibiotics Rash    Morphine And Codeine Hives    Clindamycin Rash     Current Medication List     Current Outpatient Medications   Medication Sig Dispense Refill    hydroxychloroquine (PLAQUENIL) 200 MG tablet Take 2 tablets (400 mg) by mouth daily 180 tablet 2    acetaminophen (TYLENOL) 325 MG tablet Take 2 tablets (650 mg) by mouth every 4 hours as needed for other (mild pain) (Patient not taking: Reported on 5/28/2024) 100 tablet 0    amitriptyline (ELAVIL) 10 MG tablet Take 1-2 tablets (10-20 mg) by mouth every evening Take one hour before bedtime. (Patient not taking: Reported on 5/28/2024) 60 tablet 11    amLODIPine (NORVASC) 5 MG tablet Take 1 tablet (5 mg) by mouth 2 times daily (Patient not taking: Reported on 5/28/2024) 180 tablet 3    methocarbamol (ROBAXIN) 500 MG tablet TAKE 1 TABLET BY MOUTH TWICE A DAY (Patient not taking: Reported on 5/28/2024) 56 tablet 5     No current facility-administered medications for this visit.     Social History   See HPI    Family History     Family History   Problem Relation Age of Onset    Heart Disease Father         Heart attack    C.A.D. Father         age 50    Hypertension Father     Cancer Maternal Grandfather     Alzheimer Disease Maternal Grandfather     Cancer Paternal Grandfather     Diabetes No family hx of     Cerebrovascular Disease No family hx of     Breast Cancer No family hx of     Cancer - colorectal No family hx of     Prostate Cancer No family hx of      Physical Exam     Temp Readings from Last 3 Encounters:   11/21/23 98.1  F (36.7  C) (Oral)   09/14/23 97.4  F (36.3  C) (Temporal)   05/02/23 97.9  F (36.6  C)  "(Oral)     BP Readings from Last 5 Encounters:   05/28/24 (!) 150/90   11/21/23 135/78   11/07/23 137/82   09/14/23 124/88   07/25/23 104/71     Pulse Readings from Last 1 Encounters:   05/28/24 83     Resp Readings from Last 1 Encounters:   05/28/24 20     Estimated body mass index is 32.94 kg/m  as calculated from the following:    Height as of 11/21/23: 1.62 m (5' 3.78\").    Weight as of this encounter: 86.5 kg (190 lb 9.6 oz).    GEN: NAD.   HEENT:  Anicteric, noninjected sclera. No obvious external lesions of the ear and nose. Hearing intact.  PULM: No increased work of breathing.    MSK: MCPs and PIPs without synovial swelling or tenderness to palpation.  Heberden's nodes present.  Squaring and tenderness to palpation without effusion or increased warmth of the left first CMC joint.  Right first carpometacarpal joint without swelling or tenderness to palpation.  Right knee with medial joint line tenderness but no effusion or increased warmth; tender to palpation over the pes anserine bursa.  Left knee without swelling or tenderness to palpation.   Ankles and MTPs without swelling or tenderness to palpation.  SKIN: No rash  PSYCH: Alert. Appropriate.       Labs / Imaging (select studies)     CBC  Recent Labs   Lab Test 05/02/24  1142 11/07/23  1132 07/13/23  1043 10/11/21  1704 03/29/21  1431 01/04/21  1619 12/23/19  1620 09/25/19  1117   WBC 6.1 6.7 5.6   < > 6.9   < > 8.4 7.4   RBC 4.42 4.49 4.43   < > 4.47   < > 3.86 3.96   HGB 13.7 13.8 13.4   < > 13.6   < > 12.9 13.3   HCT 42.3 41.1 40.7   < > 41.3   < > 37.8 38.4   MCV 96 92 92   < > 92   < > 98 97   RDW 12.0 13.1 12.5   < > 12.5   < > 13.1 13.9    251 234   < > 226   < > 220 219   MCH 31.0 30.7 30.2   < > 30.4   < > 33.4* 33.6*   MCHC 32.4 33.6 32.9   < > 32.9   < > 34.1 34.6   NEUTROPHIL 69 64 65   < > 64.0  --  69.7 73.0   LYMPH 21 22 23   < > 26.5  --  20.6 17.8   MONOCYTE 7 10 8   < > 6.4  --  6.7 6.5   EOSINOPHIL 3 4 3   < > 2.8  --  2.6 2.3 "   BASOPHIL 1 1 1   < > 0.3  --  0.4 0.4   ANEU  --   --   --   --  4.4  --  5.8 5.4   ALYM  --   --   --   --  1.8  --  1.7 1.3   EDWARD  --   --   --   --  0.4  --  0.6 0.5   AEOS  --   --   --   --  0.2  --  0.2 0.2   ABAS  --   --   --   --  0.0  --  0.0 0.0   ANEUTAUTO 4.2 4.3 3.6   < >  --   --   --   --    ALYMPAUTO 1.3 1.5 1.3   < >  --   --   --   --    AMONOAUTO 0.4 0.6 0.5   < >  --   --   --   --    AEOSAUTO 0.2 0.3 0.2   < >  --   --   --   --    ABSBASO 0.0 0.1 0.0   < >  --   --   --   --     < > = values in this interval not displayed.     CMP  Recent Labs   Lab Test 11/07/23  1132 07/13/23  1043 03/01/23  0702 02/17/23  1153 10/11/21  1704 03/29/21  1431 01/04/21  1619 12/23/19  1620   NA  --   --   --  143  --  140 140  --    POTASSIUM  --   --   --  4.8  --  4.0 3.8  --    CHLORIDE  --   --   --  109  --  109 105  --    CO2  --   --   --  29  --  29 27  --    ANIONGAP  --   --   --  5  --  2* 8  --    GLC  --   --  142* 101*  --  85 86  --    BUN  --   --   --  17  --  11 14  --    CR 0.82 0.89  --  0.80   < > 0.77 0.75 0.73   GFRESTIMATED 81 73  --  84   < > 85 87 >90   GFRESTBLACK  --   --   --   --   --  >90 >90 >90   ENEIDA  --   --   --  9.2  --  9.0 9.1  --    BILITOTAL 0.4 0.5  --  0.4   < > 0.5 0.4 0.3   ALBUMIN 4.3 4.5  --  4.0   < > 3.9 3.9 3.8   PROTTOTAL 6.5 6.7  --  6.9   < > 6.6* 7.1 6.7*   ALKPHOS 61 60  --  63   < > 62 58 68   AST 27 26  --  11   < > 17 18 21   ALT 20 22  --  24   < > 28 32 37    < > = values in this interval not displayed.     Calcium/VitaminD  Recent Labs   Lab Test 02/17/23  1153 06/11/21  1014 03/29/21  1431 01/04/21  1619   ENEIDA 9.2  --  9.0 9.1   VITDT  --  26  --   --      ESR/CRP  Recent Labs   Lab Test 11/07/23  1132 07/13/23  1043 08/08/22  1044 05/02/22  1252 10/11/21  1704   SED 4 6 8 7 7   CRP  --   --  5.5 <2.9 4.0   CRPI <3.00 <3.00  --   --   --      Lipid Panel  Recent Labs   Lab Test 08/18/22  1004   CHOL 250*   TRIG 102   HDL 48*   *   NHDL 202*      Hepatitis B  Recent Labs   Lab Test 07/25/23  1341 04/24/18  1557   HBCAB Nonreactive Nonreactive   HEPBANG Nonreactive Nonreactive     Hepatitis C  Recent Labs   Lab Test 07/25/23  1341   HCVAB Nonreactive     Lyme ab screening  Recent Labs   Lab Test 03/29/21  1431   LYMEGM 0.04     Tuberculosis Screening  Recent Labs   Lab Test 07/25/23  1341   TBRES Negative     HIV Screening  Recent Labs   Lab Test 08/18/22  1004   HIAGAB Nonreactive     Immunization History     Immunization History   Administered Date(s) Administered    COVID-19 MONOVALENT 12+ (Pfizer) 01/25/2021, 02/15/2021    HepB 01/24/1991, 02/28/1991, 09/05/1991    Influenza Vaccine 18-64 (Flublok) 10/01/2019, 10/14/2021    Pneumo Conj 13-V (2010&after) 10/01/2019    Pneumococcal 23 valent 12/31/2019    TD,PF 7+ (Tenivac) 03/12/1990, 01/01/2000    TDAP (Adacel,Boostrix) 09/10/2008, 03/01/2011, 10/14/2021    TDAP Vaccine (Adacel) 09/10/2008    TDAP Vaccine (Boostrix) 09/10/2008     Procedure     Procedure: Steroid injection of the left 1st CMC joint  Indication: Pain, osteoarthritis of the first carpometacarpal joint of the left hand    The procedure was explained in detail. Risks including infection, pain, structural damage such as cartilage damage and tendon rupture, fat atrophy, skin hyper-/hypo-pigmentation, and medication reaction was explained. The need for rest of the affected joint for one week after the procedure was explained.  The option of not doing the procedure was also provided. All questions were answered and the patient consented to the procedure.     A time-out was performed and the correct patient, procedure, and laterality were verified.    The left 1st carpometacarpal joint was examined and location for injection was identified. The area was cleaned with chlorhexidine, twice.  Ethyl chloride was then used for topical anaesthetic.  Then a mixture of lidocaine 1% 0.1 mL and methylprednisolone 10mg was injected into the  intra-articular space.     The patient tolerated the procedure well. No complications.    1% Lidocaine  : Hospira  Lot #: [id number]  EXPIRATION DATE: 01 MAR 2025  NDC: 8126-0358-00    MEDICATION: Methylprednisolone  LOT #: [id number]  EXPIRATION DATE:  2025-05  NDC#: [address]-5   10 mg used for injection; disposed of 30 mg           Chart documentation done in part with Dragon Voice recognition Software. Although reviewed after completion, some word and grammatical error may remain.    Abdoul Eli MD

## 2024-05-31 ENCOUNTER — MYC MEDICAL ADVICE (OUTPATIENT)
Dept: FAMILY MEDICINE | Facility: CLINIC | Age: 64
End: 2024-05-31
Payer: MEDICARE

## 2024-05-31 NOTE — TELEPHONE ENCOUNTER
See MyChart encounter below. Routing to provider to review and advise.    VV 4/16 discussed easy bruising, platelet fxn closure w/ reflex drawn >300 (abnormal). Patient did sent MyC in 5/6 as well asking for how to improve clotting, and per documentation, provider called and talked with patient 5/8. Standing order for platelet fxn closure w/ reflux in chart but patient concerned about how lab is coded (routine?). Please review if able, thank you      Ruthann Malave, RN, BSN  [facility]

## 2024-05-31 NOTE — TELEPHONE ENCOUNTER
TIANA BOSS received information from Ace Irwin that the disputed billing encounter date is 4/3/2024. This issue was already discussed with Ace.    Last 4/3/2204, the patient underwent lab tests to screen for thyroid disorders. These lab orders were sent as a reply to the patient's MyChart encounter last 3/26/2204. She does not have history of any thyroid disorders. Tests were requested due to family history of hypothyroidism but her symptoms were not specific for any thyroid disorders.  As expected, her 4/3/2024 labs results were normal.     The patient's clinic encounter for easy bruisability was on 4/16/2024.

## 2024-06-03 NOTE — TELEPHONE ENCOUNTER
Routing to provider to review and advise.  Should the patient go in for further lab work due to continued bruising that is coded appropriately?      Kristina Kjellberg, MSN, RN  [facility]

## 2024-06-03 NOTE — TELEPHONE ENCOUNTER
Yes.  Repeat platelet function closure test (standing lab order ready). Please note that this test can only be drawn at  [facility] labs.

## 2024-06-06 ENCOUNTER — TELEPHONE (OUTPATIENT)
Dept: FAMILY MEDICINE | Facility: CLINIC | Age: 64
End: 2024-06-06
Payer: MEDICARE

## 2024-06-06 NOTE — TELEPHONE ENCOUNTER
Patient Quality Outreach    Patient is due for the following:   Hypertension -  BP check  Breast Cancer Screening - Mammogram  Physical Annual Wellness Visit      Topic Date Due    Zoster (Shingles) Vaccine (1 of 2) Never done    COVID-19 Vaccine (3 - Pfizer risk series) 03/15/2021       Next Steps:   Schedule a Annual Wellness Visit    Type of outreach:    Sent [employer] message.      Questions for provider review:    None           Xiomara Yuan MA

## 2024-06-16 ENCOUNTER — HEALTH MAINTENANCE LETTER (OUTPATIENT)
Age: 64
End: 2024-06-16

## 2024-07-01 ENCOUNTER — TRANSFERRED RECORDS (OUTPATIENT)
Dept: HEALTH INFORMATION MANAGEMENT | Facility: CLINIC | Age: 64
End: 2024-07-01
Payer: MEDICARE

## 2024-07-30 ENCOUNTER — LAB (OUTPATIENT)
Dept: LAB | Facility: CLINIC | Age: 64
End: 2024-07-30
Payer: MEDICARE

## 2024-07-30 DIAGNOSIS — R23.3 EASY BRUISABILITY: ICD-10-CM

## 2024-07-30 DIAGNOSIS — R79.1 PROLONGED BLEEDING TIME: Primary | ICD-10-CM

## 2024-07-30 LAB
CLOSURE TME COLL+ADP BLD: 103 SECONDS
CLOSURE TME COLL+EPINEP BLD: >300 SECONDS

## 2024-07-30 PROCEDURE — 85576 BLOOD PLATELET AGGREGATION: CPT

## 2024-07-30 PROCEDURE — 36415 COLL VENOUS BLD VENIPUNCTURE: CPT | Performed by: PATHOLOGY

## 2024-08-05 NOTE — TELEPHONE ENCOUNTER
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 above medications was discussed with patient.   - Patient agreed to the plan as stated above and expressed understanding of the above instruction. Risk of noncompliance was reviewed with patient.   - Call for update in 2 weeks or sooner for concern  - RTC in 4 weeks       Office information:    Patient was instructed to call our office for recurrent seizure or any adverse effects of AEDs at [phone]. If seizure lasts more than 5 minutes; call 911.   I discussed with patient antiepileptic drugs adverse effects. Also discussed common side effects of his medications including but not limited to cognitive and psychiatric comorbidities including suicidal ideation.        Visit the following web sites for education regarding epilepsy   www.epilepsy.com    www.epilepsyfoundation.org Epilepsy Foundation    www.ilae-epilepsy.org International League Against Epilepsy    Seizure First Aid          Epilepsy Education and precautions were discussed with patient.   The patient was instructed to keep a log with the frequency and descriptions of their seizures.    No driving or operating heavy machines for 6 months from the date of last seizure.    Seizure first aid and indications to call 911 or go to the emergency room were discussed.    Get immediate treatment for sickness, such as UTI or fever as it lowers seizure threshold  Avoid sleep deprivation & Avoid Alcohol  No  supervising children alone  The patient has been counseled to avoid swimming or bathing alone, climbing ladders or roofs or above own height due to risk of serious injury should a seizure occur.   Avoid missing medications, Helmet during biking  Risk of SUDEP discussed with patient and recommended sleeping on back  Medical compliance with plan discussed and risks of non-compliance reviewed.    Patient expresses understanding of the plan.    Proper usage and side effects of medications reviewed & discussed.      Total time spent during the encounter  41 minutes More than 50% of the time spent on patient education and preventive care.     Dileep Hansen MD  Neurology- Epilepsy

## 2024-08-20 NOTE — TELEPHONE ENCOUNTER
Plan:  Continue same meds, same doses for now   2. Next ANNUAL EXAM after Aug 21, 2025  3.  The following vaccines are recommended for you. Please check with your insurance about coverage.  Some insurances cover better if you have these vaccines at the pharmacy:  -- RSV, Flu / Covid   -- Pneumonia 20   -- Tetanus vaccine with whooping cough  -- Tdap       Forms received via fax from [facility]. disability form placed in provider's Friday bin to address.

## 2024-08-24 ENCOUNTER — HEALTH MAINTENANCE LETTER (OUTPATIENT)
Age: 64
End: 2024-08-24

## 2024-09-10 ENCOUNTER — OFFICE VISIT (OUTPATIENT)
Dept: RHEUMATOLOGY | Facility: CLINIC | Age: 64
End: 2024-09-10
Payer: MEDICARE

## 2024-09-10 VITALS
SYSTOLIC BLOOD PRESSURE: 142 MMHG | RESPIRATION RATE: 20 BRPM | OXYGEN SATURATION: 97 % | DIASTOLIC BLOOD PRESSURE: 86 MMHG | HEART RATE: 87 BPM | WEIGHT: 192.8 LBS | BODY MASS INDEX: 33.32 KG/M2

## 2024-09-10 DIAGNOSIS — Z79.899 LONG-TERM USE OF HYDROXYCHLOROQUINE: ICD-10-CM

## 2024-09-10 DIAGNOSIS — M06.4 INFLAMMATORY POLYARTHROPATHY (H): Primary | ICD-10-CM

## 2024-09-10 DIAGNOSIS — M18.12 PRIMARY OSTEOARTHRITIS OF FIRST CARPOMETACARPAL JOINT OF LEFT HAND: ICD-10-CM

## 2024-09-10 DIAGNOSIS — M70.50 PES ANSERINE BURSITIS: ICD-10-CM

## 2024-09-10 LAB
BASOPHILS # BLD AUTO: 0.1 10E3/UL (ref 0–0.2)
BASOPHILS NFR BLD AUTO: 1 %
EOSINOPHIL # BLD AUTO: 0.2 10E3/UL (ref 0–0.7)
EOSINOPHIL NFR BLD AUTO: 3 %
ERYTHROCYTE [DISTWIDTH] IN BLOOD BY AUTOMATED COUNT: 12.5 % (ref 10–15)
HCT VFR BLD AUTO: 39.6 % (ref 35–47)
HGB BLD-MCNC: 13.1 G/DL (ref 11.7–15.7)
IMM GRANULOCYTES # BLD: 0 10E3/UL
IMM GRANULOCYTES NFR BLD: 0 %
LYMPHOCYTES # BLD AUTO: 1.4 10E3/UL (ref 0.8–5.3)
LYMPHOCYTES NFR BLD AUTO: 21 %
MCH RBC QN AUTO: 30.2 PG (ref 26.5–33)
MCHC RBC AUTO-ENTMCNC: 33.1 G/DL (ref 31.5–36.5)
MCV RBC AUTO: 91 FL (ref 78–100)
MONOCYTES # BLD AUTO: 0.6 10E3/UL (ref 0–1.3)
MONOCYTES NFR BLD AUTO: 9 %
NEUTROPHILS # BLD AUTO: 4.4 10E3/UL (ref 1.6–8.3)
NEUTROPHILS NFR BLD AUTO: 66 %
PLATELET # BLD AUTO: 218 10E3/UL (ref 150–450)
RBC # BLD AUTO: 4.34 10E6/UL (ref 3.8–5.2)
WBC # BLD AUTO: 6.6 10E3/UL (ref 4–11)

## 2024-09-10 PROCEDURE — 99214 OFFICE O/P EST MOD 30 MIN: CPT | Mod: 25 | Performed by: INTERNAL MEDICINE

## 2024-09-10 PROCEDURE — 82565 ASSAY OF CREATININE: CPT | Performed by: INTERNAL MEDICINE

## 2024-09-10 PROCEDURE — 80076 HEPATIC FUNCTION PANEL: CPT | Performed by: INTERNAL MEDICINE

## 2024-09-10 PROCEDURE — 85025 COMPLETE CBC W/AUTO DIFF WBC: CPT | Performed by: INTERNAL MEDICINE

## 2024-09-10 PROCEDURE — 36415 COLL VENOUS BLD VENIPUNCTURE: CPT | Performed by: INTERNAL MEDICINE

## 2024-09-10 PROCEDURE — 20600 DRAIN/INJ JOINT/BURSA W/O US: CPT | Mod: FA | Performed by: INTERNAL MEDICINE

## 2024-09-10 RX ORDER — HYDROXYCHLOROQUINE SULFATE 200 MG/1
400 TABLET, FILM COATED ORAL DAILY
Qty: 180 TABLET | Refills: 2 | Status: SHIPPED | OUTPATIENT
Start: 2024-09-10

## 2024-09-10 RX ORDER — METHYLPREDNISOLONE ACETATE 40 MG/ML
10 INJECTION, SUSPENSION INTRA-ARTICULAR; INTRALESIONAL; INTRAMUSCULAR; SOFT TISSUE ONCE
Status: COMPLETED | OUTPATIENT
Start: 2024-09-10 | End: 2024-09-10

## 2024-09-10 RX ADMIN — METHYLPREDNISOLONE ACETATE 10 MG: 40 INJECTION, SUSPENSION INTRA-ARTICULAR; INTRALESIONAL; INTRAMUSCULAR; SOFT TISSUE at 13:13

## 2024-09-10 NOTE — PATIENT INSTRUCTIONS
RHEUMATOLOGY    [facility]  [address] [place] [address]  [place] [address]    Phone number: [phone]  Fax number: [phone]    If you need a medication refill, please contact us as you may need lab work and/or a follow up visit prior to your refill.      Thank you for choosing [facility]!    Mi Irwin CMA Rheumatology

## 2024-09-10 NOTE — PROGRESS NOTES
Rheumatology Clinic Visit      Radha Rodriguez MRN# [id number]   YOB: 1960 Age: 64 year old      Date of visit: 9/10/24   PCP: Dr. Temo Fletcher    Chief Complaint   Patient presents with:  Rheumatoid Arthritis    Assessment and Plan     1.  Rheumatoid arthritis: Previously followed by Rojas Vasques and [facility].  Established care with me on 4/24/2018.  Previously on Humira (ineffective), HCQ (used with MTX), MTX (25mg wkly was effective and dose reductions resulted in worsening joint symptoms, but then she stopped on her own during the COVID19 pandemic without any worsening inflammatory arthritis symptoms).  She did well for a while after stopping medications in 2020 without a DMARD but then with full body aches that and stiffness that was worse in the morning and improved with time and activity; fullness of the bilateral second and third MCPs with prolonged morning stiffness; hydroxychloroquine was started with near resolution of the inflammatory MCP symptoms, but then presented with severe arthritis at the MCPs and PIPs so methotrexate was started with significant improvement but mild synovitis still on exam previously so methotrexate dose was increased with LFT elevation so had to be held for a couple weeks before being restarted in late August, and then the patient associated skin lesions on her arms and abdomen with methotrexate but it was not clearly associated so she had stopped methotrexate.  She had also stopped hydroxychloroquine in June 2022.  Then with active arthritis that nearly resolved with a longer duration of hydroxychloroquine, but then hydroxychloroquine was stopped when she was hospitalized for knee replacement surgery and February 2023 with directions to restart in the outpatient setting for unclear reasons; hydroxychloroquine was later restarted.  Active synovitis at the MCPs and PIPs so Enbrel was prescribed on 8/2/2023 with resolution of the synovitis.  Enbrel was cost  prohibitive and therefore discontinued.  Currently on hydroxychloroquine monotherapy, but there was an interruption of hydroxychloroquine due to cost but she has restarted hydroxychloroquine recently.  No synovitis on exam today but has inflammatory joint symptoms at the MCPs that I anticipate will improve with having restarted hydroxychloroquine.  Chronic illness, progressive.      - Continue hydroxychloroquine 400mg daily (last eye exam on 6/12/2023 by Dr. Aly; yearly exam required)  - Ophthalmology referral for hydroxychloroquine toxicity monitoring   - Labs today: CBC, Creatinine, Hepatic Panel    2.  Primary osteoarthritis of the left first carpometacarpal joint: improved with steroid injections in the past.  Again symptomatic today and patient would like repeat steroid injection.  Steroid injection repeated today as documented in the procedure section.  Discussed the option for hand surgery referral as well but she would like to continue doing the injections because they are effective and she would like to avoid surgery at this time.    3.  Bilateral knee osteoarthritis; hx of left medial unicompartmental knee arthroplasty in February 2023; right pes anserine bursitis: Right pes anserine bursitis injection in November 2023 effective for no more than 1 month; in April 2024 had steroid injection of the pes anserine bursa at [facility] with no improvement per patient.  Topical Voltaren gel partially effective.  Encouraged physical therapy exercises and pool exercises.  Avoid aggravating activities.  Has not improved and is very symptomatic over the pes anserine bursa on the right but no swelling, increased warmth, or overlying erythema.  Considering the severity of this and no response to steroid injection, I advised that she follow-up with her surgeon at [facility] for management.    4. Chronic back pain: went to [facility] where steroids and gabapentin were Rx'd but she doesn't do well  with steroids per patient and gabapentin has only been partially helpful. Was following at the [facility] pain clinic, but was referred by [facility] provider to [facility] per patient.  Now following with Dr. Xavi Guerrero at [facility].    5.  History of rash: previously on abdomen and now just on arms. Unclear etiology. Less likely MTX associated but cannot rule out.  She managed with her PCP.  Not an issue today.    6.  Vaccinations:   - Influenza: Advised yearly vaccination  - [id number]: up to date  - [id number]: up to date  - COVID-19: Advised keeping updated  - Shingrix: Advised vaccination    7.  Osteopenia: Based on 11/15/2023 DEXA    8. Elevated blood pressure:  Radha to follow up with Primary Care provider regarding elevated blood pressure.     Total minutes spent in evaluation with patient, documentation, , and review of pertinent studies and chart notes: 20      Ms. Rodriguez verbalized agreement with and understanding of the rational for the diagnosis and treatment plan.  All questions were answered to best of my ability and the patient's satisfaction. Ms. Rodriguez was advised to contact the clinic with any questions that may arise after the clinic visit.      Thank you for involving me in the care of the patient    Return to clinic: 3-4 months    HPI   Radha Rodriguez is a 64 year old female with a past medical history significant for possible Crohn's disease requiring fistula surgery in the past, osteoarthritis, inflammatory arthritis who is seen for follow-up of arthritis.    4/11/2023: Stop taking hydroxychloroquine when she was hospitalized in February 2023 for the left partial knee arthroplasty.  Since she has been favoring her right knee her right knee is hurting more.  Right knee pain is worse with activity and improves with rest.  She would like a steroid injection of the right knee today.  Left first CMC joint osteoarthritis pain worse and she would like repeat steroid injection.   Felt like she was doing better with regard to inflammatory arthritis when she was taking hydroxychloroquine, but she was told no clear reason to stop hydroxychloroquine when she was hospitalized and she has not restarted it yet because she was waiting for this appointment to discuss.  She would like to restart hydroxychloroquine.    7/25/2023: Pain, swelling, and stiffness at the MCPs and PIPs that is worse in the morning and appear to time activity, worse on the right hand.  Left first CMC joint painful with activity and improves with rest; typically the steroid injection wears off after about 2.5-3 months.  She would like repeat steroid injection of the left first CMC joint today.  Hydroxychloroquine is effective but does not completely control her symptoms.  Has some bruising on her arms and is following with a primary care provider for this issue.    11/7/2023: No longer with pain at the MCPs or PIPs.  No morning stiffness.  Still with pain in the left first CMC joint and she would like a repeat steroid injection at this joint today because injections are effective and she does not want to go for surgery.  She would also like a repeat steroid injection of the right knee because it is bothering her more now and a steroid injection in the past was very effective.    5/28/2024: Went to see an orthopedic surgeon at [facility] in April 2023 where she had a steroid injection of the pes anserine bursa with no improvement.  She is planning to increase physical therapy exercises, walking, and consider pool exercises.  Would like repeat steroid injection of the left first CMC joint today as it has worsened.  Left first CMC joint pain is worse with activity and improves with rest; no swelling, increased warmth, or overlying erythema.  Not on Enbrel due to cost.  Had to hold hydroxychloroquine for about period of time because of cost but she has recently restarted because she had worsening symptoms after  stopping hydroxychloroquine.    Today, 9/10/2024: Mild swelling across the MCPs but no increased warmth, overlying erythema, or pain at the MCPs.  Only pain she has is of her right pes anserine bursa that improved for only 1 month with injection from myself, and possibly only 1 month of improvement when an injection was given in April 2024 at [facility].  She says that she has been to her orthopedic surgeon who advised possible knee replacement surgery but she did not want to proceed with that because she felt like it was only the bursa.  Topical Voltaren gel ineffective for bursitis per patient.  Icing has helped some.  Rest has helped some.  Increased activity bothers the bursitis.  Planning to be seen in the hematology department at the [facility] for a blood clotting disorder with specifics unknown at this point.    Tobacco: none  EtOH: occasional  Drugs: none  Occupation: retired    ROS   12 point review of system was completed and negative except as noted in the HPI     Active Problem List     Patient Active Problem List   Diagnosis    CARDIOVASCULAR SCREENING; LDL GOAL LESS THAN 160    Vitamin D deficiency    Inflammatory polyarthropathy (H)    High risk medications (not anticoagulants) long-term use    Osteoarthritis    Menopausal syndrome (hot flashes)    Right lateral epicondylitis    Immunosuppressed status (H24)    Facet arthropathy, thoracic    Back muscle spasm    Upper back strain    Upper back pain, chronic    Chronic midline thoracic back pain    Degenerative disc disease, thoracic    High risk medication use    Pseudophakia, Yag Caps, ou (Hx of refractive lens exchange, ou (Lobanoff)    Diffuse idiopathic skeletal hyperostosis of thoracolumbar spine    Chronic pain syndrome    Factor 5 Leiden mutation, heterozygous (H24)    Hypertension goal BP (blood pressure) < 140/90    Bilateral lumbar radiculopathy     Past Medical History     Past Medical History:   Diagnosis Date     Arthritis     DVT (deep venous thrombosis) (H)     Factor 5 Leiden mutation, heterozygous (H24)     Headache(784.0)     Hypertension goal BP (blood pressure) < 140/90 7/25/2023    Irritable bowel syndrome     Other and unspecified noninfectious gastroenteritis and colitis(558.9)     chronic     Past Surgical History     Past Surgical History:   Procedure Laterality Date    ARTHROPLASTY KNEE UNICOMPARTMENT Left 2/28/2023    Procedure: LEFT MEDIAL UNICOMPARTMENTAL KNEE ARTHROPLASTY;  Surgeon: Micah Mena MD;  Location: [facility]    CATARACT IOL, RT/LT      Refractive lens exchange ou (Lobanoff)    COLONOSCOPY  10/14/2011    Procedure:COLONOSCOPY; Colonoscopy; Surgeon:SCOTTY LEDEZMA; Location:[facility]    COLONOSCOPY N/A 9/14/2023    Procedure: COLONOSCOPY, WITH POLYPECTOMY AND BIOPSY;  Surgeon: Nimo Landry DO;  Location: [facility]    COLONOSCOPY N/A 9/14/2023    Procedure: COLONOSCOPY, FLEXIBLE, WITH LESION REMOVAL USING SNARE;  Surgeon: Nimo Landry DO;  Location: [facility]    COLONOSCOPY WITH CO2 INSUFFLATION N/A 9/14/2023    Procedure: Colonoscopy with CO2 insufflation;  Surgeon: Nimo Landry DO;  Location: [facility] OR    ENHANCE LASER REFRACTIVE BILATERAL EXISTING PT IN PARAMETERS      HYSTERECTOMY, VAGINAL  01/01/2000    TVH, fibroids (still has ovaries)    SURGICAL HISTORY OF -       Tubal Ligation    SURGICAL HISTORY OF -       Appy    SURGICAL HISTORY OF -       Tonsils    SURGICAL HISTORY OF -   12/1994    Anal Fistulotomy    ZZC REPLACEMENT,URETER,BOWEL SEGMENT  10/01/2008    Part of her ureter was repaired.     Allergy     Allergies   Allergen Reactions    Sulfa Antibiotics Rash    Morphine And Codeine Hives    Clindamycin Rash     Current Medication List     Current Outpatient Medications   Medication Sig Dispense Refill    hydroxychloroquine (PLAQUENIL) 200 MG tablet Take 2 tablets (400 mg) by mouth daily 180 tablet 2     No current facility-administered medications for this visit.     Social  "History   See HPI    Family History     Family History   Problem Relation Age of Onset    Heart Disease Father         Heart attack    C.A.D. Father         age 50    Hypertension Father     Cancer Maternal Grandfather     Alzheimer Disease Maternal Grandfather     Cancer Paternal Grandfather     Diabetes No family hx of     Cerebrovascular Disease No family hx of     Breast Cancer No family hx of     Cancer - colorectal No family hx of     Prostate Cancer No family hx of      Physical Exam     Temp Readings from Last 3 Encounters:   11/21/23 98.1  F (36.7  C) (Oral)   09/14/23 97.4  F (36.3  C) (Temporal)   05/02/23 97.9  F (36.6  C) (Oral)     BP Readings from Last 5 Encounters:   05/28/24 (!) 150/90   11/21/23 135/78   11/07/23 137/82   09/14/23 124/88   07/25/23 104/71     Pulse Readings from Last 1 Encounters:   05/28/24 83     Resp Readings from Last 1 Encounters:   05/28/24 20     Estimated body mass index is 32.94 kg/m  as calculated from the following:    Height as of 11/21/23: 1.62 m (5' 3.78\").    Weight as of 5/28/24: 86.5 kg (190 lb 9.6 oz).    GEN: NAD.   HEENT:  Anicteric, noninjected sclera. No obvious external lesions of the ear and nose. Hearing intact.  PULM: No increased work of breathing.    MSK: Synovial hypertrophy without tenderness to palpation, increased warmth, or overlying erythema at the bilateral 2nd-4th MCPs.  PIPs without synovial swelling or tenderness to palpation.  Heberden's and Maye's nodes present.  Squaring and tenderness to palpation without effusion or increased warmth of the left first CMC joint.  Right first carpometacarpal joint without swelling or tenderness to palpation.  Right knee with medial joint line tenderness but no effusion or increased warmth; tender to palpation over the pes anserine bursa to very light palpation.  Left knee without swelling or tenderness to palpation.   Ankles and MTPs without swelling or tenderness to palpation.  SKIN: No rash  PSYCH: " Alert. Appropriate.       Labs / Imaging (select studies)     CBC  Recent Labs   Lab Test 05/02/24  1142 11/07/23  1132 07/13/23  1043 10/11/21  1704 03/29/21  1431 01/04/21  1619 12/23/19  1620 09/25/19  1117   WBC 6.1 6.7 5.6   < > 6.9   < > 8.4 7.4   RBC 4.42 4.49 4.43   < > 4.47   < > 3.86 3.96   HGB 13.7 13.8 13.4   < > 13.6   < > 12.9 13.3   HCT 42.3 41.1 40.7   < > 41.3   < > 37.8 38.4   MCV 96 92 92   < > 92   < > 98 97   RDW 12.0 13.1 12.5   < > 12.5   < > 13.1 13.9    251 234   < > 226   < > 220 219   MCH 31.0 30.7 30.2   < > 30.4   < > 33.4* 33.6*   MCHC 32.4 33.6 32.9   < > 32.9   < > 34.1 34.6   NEUTROPHIL 69 64 65   < > 64.0  --  69.7 73.0   LYMPH 21 22 23   < > 26.5  --  20.6 17.8   MONOCYTE 7 10 8   < > 6.4  --  6.7 6.5   EOSINOPHIL 3 4 3   < > 2.8  --  2.6 2.3   BASOPHIL 1 1 1   < > 0.3  --  0.4 0.4   ANEU  --   --   --   --  4.4  --  5.8 5.4   ALYM  --   --   --   --  1.8  --  1.7 1.3   EDWARD  --   --   --   --  0.4  --  0.6 0.5   AEOS  --   --   --   --  0.2  --  0.2 0.2   ABAS  --   --   --   --  0.0  --  0.0 0.0   ANEUTAUTO 4.2 4.3 3.6   < >  --   --   --   --    ALYMPAUTO 1.3 1.5 1.3   < >  --   --   --   --    AMONOAUTO 0.4 0.6 0.5   < >  --   --   --   --    AEOSAUTO 0.2 0.3 0.2   < >  --   --   --   --    ABSBASO 0.0 0.1 0.0   < >  --   --   --   --     < > = values in this interval not displayed.     CMP  Recent Labs   Lab Test 11/07/23  1132 07/13/23  1043 03/01/23  0702 02/17/23  1153 10/11/21  1704 03/29/21  1431 01/04/21  1619 12/23/19  1620   NA  --   --   --  143  --  140 140  --    POTASSIUM  --   --   --  4.8  --  4.0 3.8  --    CHLORIDE  --   --   --  109  --  109 105  --    CO2  --   --   --  29 --  29 27  --    ANIONGAP  --   --   --  5  --  2* 8  --    GLC  --   --  142* 101*  --  85 86  --    BUN  --   --   --  17 --  11 14  --    CR 0.82 0.89  --  0.80   < > 0.77 0.75 0.73   GFRESTIMATED 81 73  --  84   < > 85 87 >90   GFRESTBLACK  --   --   --   --   --  >90 >90 >90    ENEIDA  --   --   --  9.2  --  9.0 9.1  --    BILITOTAL 0.4 0.5  --  0.4   < > 0.5 0.4 0.3   ALBUMIN 4.3 4.5  --  4.0   < > 3.9 3.9 3.8   PROTTOTAL 6.5 6.7  --  6.9   < > 6.6* 7.1 6.7*   ALKPHOS 61 60  --  63   < > 62 58 68   AST 27 26  --  11   < > 17 18 21   ALT 20 22  --  24   < > 28 32 37    < > = values in this interval not displayed.     Calcium/VitaminD  Recent Labs   Lab Test 02/17/23  1153 06/11/21  1014 03/29/21  1431 01/04/21  1619   ENEIDA 9.2  --  9.0 9.1   VITDT  --  26  --   --      ESR/CRP  Recent Labs   Lab Test 11/07/23  1132 07/13/23  1043 08/08/22  1044 05/02/22  1252 10/11/21  1704   SED 4 6 8 7 7   CRP  --   --  5.5 <2.9 4.0   CRPI <3.00 <3.00  --   --   --      Hepatitis B  Recent Labs   Lab Test 07/25/23  1341 04/24/18  1557   HBCAB Nonreactive Nonreactive   HEPBANG Nonreactive Nonreactive     Hepatitis C  Recent Labs   Lab Test 07/25/23  1341   HCVAB Nonreactive     Lyme ab screening  Recent Labs   Lab Test 03/29/21  1431   LYMEGM 0.04     Tuberculosis Screening  Recent Labs   Lab Test 07/25/23  1341   TBRES Negative     HIV Screening  Recent Labs   Lab Test 08/18/22  1004   HIAGAB Nonreactive     Immunization History     Immunization History   Administered Date(s) Administered    COVID-19 MONOVALENT 12+ (Pfizer) 01/25/2021, 02/15/2021    HepB 01/24/1991, 02/28/1991, 09/05/1991    Influenza Vaccine 18-64 (Flublok) 10/01/2019, 10/14/2021    Pneumo Conj 13-V (2010&after) 10/01/2019    Pneumococcal 23 valent 12/31/2019    TD,PF 7+ (Tenivac) 03/12/1990, 01/01/2000    TDAP (Adacel,Boostrix) 09/10/2008, 03/01/2011, 10/14/2021    TDAP Vaccine (Adacel) 09/10/2008    TDAP Vaccine (Boostrix) 09/10/2008     Procedure     Procedure: Steroid injection of the left 1st CMC joint  Indication: Pain, osteoarthritis of the first carpometacarpal joint of the left hand    The procedure was explained in detail. Risks including infection, pain, structural damage such as cartilage damage and tendon rupture, fat  atrophy, skin hyper-/hypo-pigmentation, and medication reaction was explained. The need for rest of the affected joint for one week after the procedure was explained.  The option of not doing the procedure was also provided. All questions were answered and the patient consented to the procedure.     A time-out was performed and the correct patient, procedure, and laterality were verified.    The left 1st carpometacarpal joint was examined and location for injection was identified. The area was cleaned with chlorhexidine, twice.  Ethyl chloride was then used for topical anaesthetic.  Then a mixture of lidocaine 1% 0.1 mL and methylprednisolone 10mg was injected into the intra-articular space.     The patient tolerated the procedure well. No complications.    1% Lidocaine  : Hospira  Lot #: [id number]  EXPIRATION DATE: 2025-SEP  NDC: 5221-4892-24    MEDICATION: Methylprednisolone  LOT #: [id number]  EXPIRATION DATE:  2025-06-30  NDC#: [address]-9  10 mg used for injection; 30 mg disposed           Chart documentation done in part with Dragon Voice recognition Software. Although reviewed after completion, some word and grammatical error may remain.    Abdoul Eli MD

## 2024-09-10 NOTE — NURSING NOTE
Blood pressure rechecked after visit    142/86  Mi Irwin CMA Rheumatology  9/10/2024                                 RAPID3 (0-30) Cumulative Score  16.2          RAPID3 Weighted Score (divide #4 by 3 and that is the weighted score)  5.4

## 2024-09-11 LAB
ALBUMIN SERPL BCG-MCNC: 4.3 G/DL (ref 3.5–5.2)
ALP SERPL-CCNC: 65 U/L (ref 40–150)
ALT SERPL W P-5'-P-CCNC: 32 U/L (ref 0–50)
AST SERPL W P-5'-P-CCNC: 33 U/L (ref 0–45)
BILIRUB DIRECT SERPL-MCNC: <0.2 MG/DL (ref 0–0.3)
BILIRUB SERPL-MCNC: 0.3 MG/DL
CREAT SERPL-MCNC: 1.02 MG/DL (ref 0.51–0.95)
EGFRCR SERPLBLD CKD-EPI 2021: 62 ML/MIN/1.73M2
PROT SERPL-MCNC: 6.4 G/DL (ref 6.4–8.3)

## 2024-09-23 ENCOUNTER — OFFICE VISIT (OUTPATIENT)
Dept: HEMATOLOGY | Facility: CLINIC | Age: 64
End: 2024-09-23
Attending: INTERNAL MEDICINE
Payer: MEDICARE

## 2024-09-23 VITALS
DIASTOLIC BLOOD PRESSURE: 86 MMHG | HEIGHT: 63 IN | BODY MASS INDEX: 33.89 KG/M2 | SYSTOLIC BLOOD PRESSURE: 149 MMHG | WEIGHT: 191.3 LBS | TEMPERATURE: 98.1 F | OXYGEN SATURATION: 99 % | HEART RATE: 81 BPM

## 2024-09-23 DIAGNOSIS — R23.3 EASY BRUISABILITY: ICD-10-CM

## 2024-09-23 DIAGNOSIS — M04.9 AUTOINFLAMMATORY SYNDROME, UNSPECIFIED (H): ICD-10-CM

## 2024-09-23 DIAGNOSIS — R79.1 PROLONGED BLEEDING TIME: ICD-10-CM

## 2024-09-23 LAB
ALBUMIN SERPL BCG-MCNC: 4.6 G/DL (ref 3.5–5.2)
ALP SERPL-CCNC: 72 U/L (ref 40–150)
ALT SERPL W P-5'-P-CCNC: 21 U/L (ref 0–50)
ANION GAP SERPL CALCULATED.3IONS-SCNC: 10 MMOL/L (ref 7–15)
APTT PPP: 23 SECONDS (ref 22–38)
AST SERPL W P-5'-P-CCNC: 22 U/L (ref 0–45)
BASOPHILS # BLD AUTO: 0 10E3/UL (ref 0–0.2)
BASOPHILS NFR BLD AUTO: 1 %
BILIRUB SERPL-MCNC: 0.4 MG/DL
BUN SERPL-MCNC: 21.5 MG/DL (ref 8–23)
CALCIUM SERPL-MCNC: 10 MG/DL (ref 8.8–10.4)
CHLORIDE SERPL-SCNC: 104 MMOL/L (ref 98–107)
CREAT SERPL-MCNC: 0.77 MG/DL (ref 0.51–0.95)
EGFRCR SERPLBLD CKD-EPI 2021: 87 ML/MIN/1.73M2
EOSINOPHIL # BLD AUTO: 0.2 10E3/UL (ref 0–0.7)
EOSINOPHIL NFR BLD AUTO: 4 %
ERYTHROCYTE [DISTWIDTH] IN BLOOD BY AUTOMATED COUNT: 12.5 % (ref 10–15)
GLUCOSE SERPL-MCNC: 91 MG/DL (ref 70–99)
HCO3 SERPL-SCNC: 26 MMOL/L (ref 22–29)
HCT VFR BLD AUTO: 41.3 % (ref 35–47)
HGB BLD-MCNC: 14.1 G/DL (ref 11.7–15.7)
IMM GRANULOCYTES # BLD: 0 10E3/UL
IMM GRANULOCYTES NFR BLD: 0 %
INR PPP: 0.91 (ref 0.85–1.15)
LYMPHOCYTES # BLD AUTO: 1.5 10E3/UL (ref 0.8–5.3)
LYMPHOCYTES NFR BLD AUTO: 24 %
MCH RBC QN AUTO: 30.7 PG (ref 26.5–33)
MCHC RBC AUTO-ENTMCNC: 34.1 G/DL (ref 31.5–36.5)
MCV RBC AUTO: 90 FL (ref 78–100)
MONOCYTES # BLD AUTO: 0.6 10E3/UL (ref 0–1.3)
MONOCYTES NFR BLD AUTO: 9 %
NEUTROPHILS # BLD AUTO: 3.9 10E3/UL (ref 1.6–8.3)
NEUTROPHILS NFR BLD AUTO: 63 %
NRBC # BLD AUTO: 0 10E3/UL
NRBC BLD AUTO-RTO: 0 /100
PLATELET # BLD AUTO: 219 10E3/UL (ref 150–450)
POTASSIUM SERPL-SCNC: 4.5 MMOL/L (ref 3.4–5.3)
PROT SERPL-MCNC: 6.6 G/DL (ref 6.4–8.3)
RBC # BLD AUTO: 4.6 10E6/UL (ref 3.8–5.2)
SODIUM SERPL-SCNC: 140 MMOL/L (ref 135–145)
TSH SERPL DL<=0.005 MIU/L-ACNC: 1.71 UIU/ML (ref 0.3–4.2)
WBC # BLD AUTO: 6.3 10E3/UL (ref 4–11)

## 2024-09-23 PROCEDURE — 85240 CLOT FACTOR VIII AHG 1 STAGE: CPT | Performed by: PHYSICIAN ASSISTANT

## 2024-09-23 PROCEDURE — 84443 ASSAY THYROID STIM HORMONE: CPT | Performed by: PHYSICIAN ASSISTANT

## 2024-09-23 PROCEDURE — 36415 COLL VENOUS BLD VENIPUNCTURE: CPT | Performed by: PHYSICIAN ASSISTANT

## 2024-09-23 PROCEDURE — 85610 PROTHROMBIN TIME: CPT | Performed by: PHYSICIAN ASSISTANT

## 2024-09-23 PROCEDURE — G0463 HOSPITAL OUTPT CLINIC VISIT: HCPCS | Performed by: PHYSICIAN ASSISTANT

## 2024-09-23 PROCEDURE — 85246 CLOT FACTOR VIII VW ANTIGEN: CPT | Performed by: PHYSICIAN ASSISTANT

## 2024-09-23 PROCEDURE — 85245 CLOT FACTOR VIII VW RISTOCTN: CPT | Performed by: PHYSICIAN ASSISTANT

## 2024-09-23 PROCEDURE — 85025 COMPLETE CBC W/AUTO DIFF WBC: CPT | Performed by: PHYSICIAN ASSISTANT

## 2024-09-23 PROCEDURE — 99215 OFFICE O/P EST HI 40 MIN: CPT | Performed by: PHYSICIAN ASSISTANT

## 2024-09-23 PROCEDURE — 85730 THROMBOPLASTIN TIME PARTIAL: CPT | Performed by: PHYSICIAN ASSISTANT

## 2024-09-23 PROCEDURE — 85247 CLOT FACTOR VIII MULTIMETRIC: CPT | Performed by: PHYSICIAN ASSISTANT

## 2024-09-23 PROCEDURE — 80053 COMPREHEN METABOLIC PANEL: CPT | Performed by: PHYSICIAN ASSISTANT

## 2024-09-23 NOTE — PROGRESS NOTES
[facility]  [facility]  [facility] [address], [address], [place] [address]  Main: [phone], Fax: [phone]      Outpatient Visit Note:    Patient: Radha Rodriguez  MRN: [id number]  : 1960  JUAN: Sep 23, 2024    Reason for Consultation:  Radha Rodriguez is a 64 year old female with a history of factor V leiden heterozygosity, orthopedic injury/immobilization provoked DVT in 2022, hypertension, and inflammatory polyarthropathy (follows with rheum) that presents today for evaluation of bruising/purpura.    History of Present Illness:  I first met Radha 2 years ago in 2022 surrounding an episode of provoked left lower extremity DVT. This was provoked in the setting of an insufficiency fracture of the left medial femoral condyle + a torn left meniscus 2/2 a fall, for which she was put on non-weight bearing status x2 weeks. Please refer to my consultation note dated 2022 for additional details. She was on anticoagulation for ~4 months (came off daily anticoagulation in ~2023). She had left knee surgery on 2023 and was put back on anticoagulation for a brief period post-operatively. She is no longer on anticoagulation. She has had no recurrent venous thromboembolic events.    She was re-referred to our center for evaluation of bruising/purpura, for which she presents today. She reports that over the past couple of years, she has noted an increased tendency towards bruising, development of purpura with minor skin abrasions (julián on the extremities) and prolonged bleeding from cuts/scrapes. She also notes that bruises tend to last a long time. Work-up done by the referring provider demonstrated: normal aPTT, normal INR, prolonged platelet function closure with epi x2, but normal with ADP both times. This is a typical of aspirin effect but Radha denies aspirin use. She does take ibuprofen several times per week for MSK pain.  "She has used ibuprofen on and off for years without these issues. Her only Rx medication is plaquenil. She is not on any OTC supplements/herbals other than vitamin D and biotin.  She denies other bleeding diatheses such as epistaxis, hematuria, hematochezia, melena, and perioperative bleeding (surgical history included below). She occasionally has gingival bleeding 2/2 brushing.    Family History:  No known family history of bleeding disorder or tendency.  +Family history of Hashimoto's.    Exam:  BP (!) 149/86   Pulse 81   Temp 98.1  F (36.7  C) (Oral)   Ht 1.6 m (5' 3\")   Wt 86.8 kg (191 lb 4.8 oz)   SpO2 99%   BMI 33.89 kg/m    Constitutional: Appears well. Not in distress.   Respiratory: Breathing comfortably on room air.  Neuro: Aox3.  Skin: Senile-type purpura on the forearms bilaterally, mild. Moderate forearm purpura noted in a photograph on her phone today.    Labs:  Component      Latest Ref Rn 5/2/2024  11:42 AM 7/30/2024  12:38 PM 9/10/2024  1:22 PM   WBC      4.0 - 11.0 10e3/uL   6.6    RBC Count      3.80 - 5.20 10e6/uL   4.34    Hemoglobin      11.7 - 15.7 g/dL   13.1    Hematocrit      35.0 - 47.0 %   39.6    MCV      78 - 100 fL   91    MCH      26.5 - 33.0 pg   30.2    MCHC      31.5 - 36.5 g/dL   33.1    RDW      10.0 - 15.0 %   12.5    Platelet Count      150 - 450 10e3/uL   218    % Neutrophils      %   66    % Lymphocytes      %   21    % Monocytes      %   9    % Eosinophils      %   3    % Basophils      %   1    % Immature Granulocytes      %   0    Absolute Neutrophils      1.6 - 8.3 10e3/uL   4.4    Absolute Lymphocytes      0.8 - 5.3 10e3/uL   1.4    Absolute Monocytes      0.0 - 1.3 10e3/uL   0.6    Absolute Eosinophils      0.0 - 0.7 10e3/uL   0.2    Absolute Basophils      0.0 - 0.2 10e3/uL   0.1    Absolute Immature Granulocytes      <=0.4 10e3/uL   0.0    INR      0.85 - 1.15  0.92      PTT      22 - 38 Seconds 24      PLT Funct COL/EPI      <170 Seconds >300 (H)  >300 (H)   "   Platelet Function Closure Time Col/ADP      <120 Seconds 108  103        Component      Latest Ref Rng 4/3/2024  10:40 AM 5/2/2024  11:42 AM   TSH      0.30 - 4.20 uIU/mL 1.75  1.40    T4 Free      0.90 - 1.70 ng/dL 1.47     Free T3      2.0 - 4.4 pg/mL  2.7      Assessment/Plan:  #Heterozygous factor V leiden.  #Provoked left lower extremity DVT in July 2022 s/p ~4 months of therapeutic anticoagulation.  Occurred in the setting of an insufficiency fracture of the left medial femoral condyle + a torn left meniscus 2/2 a fall, for which she was put on non-weight bearing status x2 weeks. She does not have any indication for daily anticoagulation, but we will continue to treat her with anticoagulation prophylactically during high risk times (ie hospitalizations, surgeries, prolonged travel). To contact us for specific recommendations on a case by case basis.  #Increased tendency towards bruising/purpura of the upper and lower extremities.  This started ~2 years ago. Was recently found to have prolonged PFA with epi x2. It was normal with ADP, which is a typical aspirin effect. She does take ibuprofen several times per week, so this may have impacted her results. We would typically have individuals hold all NSAIDs for 7-10 days prior to the this testing. I reassured her that being on anticoagulation for 4 months surrounding her DVT would not lead to a permanent defect in her hemostasis. As this is a relatively new issue, a congential disorder of hemostasis is unlikely, but an acquired hemostatic defect is a possibility. Will assess her BL von Willebrand panel and thyroid function today, as well as repeat coag times. If all of this is normal, we could cosnider repeating the PFA-100 when she is off NSAIDs and/or pursue platelet aggregation studies. There is a also potential that we are seeing senile purpura with that is being exacerbated by the anti-platelet effect of her regular ibuprofen use.     I will reach out re:  results/recommendations/next steps when I see results.      45 minutes spent on the date of the encounter doing chart review, history and exam, documentation and further activities per the note.           Deanna Galindo PA-C, [facility]  [address] [place] [address], [place] [id number]  Main: [phone], Fax: [phone]      Addendum   October 1, 2024    Negative work-up.  Recommend trial of going off all platelet function altering meds/supplements.   After 2 weeks off, can repeat PFA-100.   Can prescribe Celebrex as an alternative to ibuprofen for PRN use.      Deanna Galindo PA-C, [facility]  [address] [place], [place] [address]  Main: [phone], Fax: [phone]    Component      Latest Ref [facility] 9/23/2024  2:37 PM   INR      0.85 - 1.15  0.91    PTT      22 - 38 Seconds 23      Component      Latest Ref [facility] 9/23/2024  2:37 PM   von Willebrand Factor Antigen      50 - 200 % 118    von Willebrand Factor Multimers Normal distribution   von Willebrand Factor Activity      50 - 180 % 127      Component      Latest Ref [facility] 9/23/2024  2:37 PM   Factor 8 Assay      55 - 200 % 121      Component      Latest Ref [facility] 9/23/2024  2:37 PM   WBC      4.0 - 11.0 10e3/uL 6.3    RBC Count      3.80 - 5.20 10e6/uL 4.60    Hemoglobin      11.7 - 15.7 g/dL 14.1    Hematocrit      35.0 - 47.0 % 41.3    MCV      78 - 100 fL 90    MCH      26.5 - 33.0 pg 30.7    MCHC      31.5 - 36.5 g/dL 34.1    RDW      10.0 - 15.0 % 12.5    Platelet Count      150 - 450 10e3/uL 219    % Neutrophils      % 63    % Lymphocytes      % 24    % Monocytes      % 9    % Eosinophils      % 4    % Basophils      % 1    % Immature Granulocytes      % 0    NRBCs per 100 WBC      <1 /100 0    Absolute Neutrophils      1.6 - 8.3 10e3/uL 3.9    Absolute Lymphocytes       0.8 - 5.3 10e3/uL 1.5    Absolute Monocytes      0.0 - 1.3 10e3/uL 0.6    Absolute Eosinophils      0.0 - 0.7 10e3/uL 0.2    Absolute Basophils      0.0 - 0.2 10e3/uL 0.0    Absolute Immature Granulocytes      <=0.4 10e3/uL 0.0    Absolute NRBCs      10e3/uL 0.0    Sodium      135 - 145 mmol/L 140    Potassium      3.4 - 5.3 mmol/L 4.5    Carbon Dioxide (CO2)      22 - 29 mmol/L 26    Anion Gap      7 - 15 mmol/L 10    Urea Nitrogen      8.0 - 23.0 mg/dL 21.5    Creatinine      0.51 - 0.95 mg/dL 0.77    GFR Estimate      >60 mL/min/1.73m2 87    Calcium      8.8 - 10.4 mg/dL 10.0    Chloride      98 - 107 mmol/L 104    Glucose      70 - 99 mg/dL 91    Alkaline Phosphatase      40 - 150 U/L 72    AST      0 - 45 U/L 22    ALT      0 - 50 U/L 21    Protein Total      6.4 - 8.3 g/dL 6.6    Albumin      3.5 - 5.2 g/dL 4.6    Bilirubin Total      <=1.2 mg/dL 0.4      Component      Latest Ref Rng 9/23/2024  2:37 PM   TSH      0.30 - 4.20 uIU/mL 1.71

## 2024-09-24 LAB
FACT VIII ACT/NOR PPP: 121 % (ref 55–200)
VWF AG ACT/NOR PPP IA: 118 % (ref 50–200)
VWF:AC ACT/NOR PPP IA: 127 % (ref 50–180)

## 2024-09-26 LAB — VWF MULTIMERS PPP IB: NORMAL

## 2024-09-30 DIAGNOSIS — Z29.89 NEED FOR SBE (SUBACUTE BACTERIAL ENDOCARDITIS) PROPHYLAXIS: Primary | ICD-10-CM

## 2024-09-30 LAB — VWF MULTIMERS PPP QL: NORMAL

## 2024-10-01 DIAGNOSIS — R79.1 PROLONGED BLEEDING TIME: Primary | ICD-10-CM

## 2024-10-01 DIAGNOSIS — R23.3 EASY BRUISABILITY: ICD-10-CM

## 2024-10-01 RX ORDER — AMOXICILLIN 500 MG/1
CAPSULE ORAL
Qty: 4 CAPSULE | Refills: 0 | Status: SHIPPED | OUTPATIENT
Start: 2024-10-01

## 2024-10-07 DIAGNOSIS — R79.1 PROLONGED BLEEDING TIME: Primary | ICD-10-CM

## 2024-10-07 DIAGNOSIS — R23.3 EASY BRUISABILITY: ICD-10-CM

## 2024-10-07 RX ORDER — CELECOXIB 200 MG/1
200 CAPSULE ORAL DAILY
Qty: 90 CAPSULE | Refills: 1 | Status: SHIPPED | OUTPATIENT
Start: 2024-10-07

## 2024-10-17 ENCOUNTER — LAB (OUTPATIENT)
Dept: LAB | Facility: CLINIC | Age: 64
End: 2024-10-17
Payer: MEDICARE

## 2024-10-17 ENCOUNTER — TELEPHONE (OUTPATIENT)
Dept: FAMILY MEDICINE | Facility: CLINIC | Age: 64
End: 2024-10-17

## 2024-10-17 ENCOUNTER — OFFICE VISIT (OUTPATIENT)
Dept: FAMILY MEDICINE | Facility: CLINIC | Age: 64
End: 2024-10-17
Payer: MEDICARE

## 2024-10-17 DIAGNOSIS — N30.00 ACUTE CYSTITIS WITHOUT HEMATURIA: Primary | ICD-10-CM

## 2024-10-17 DIAGNOSIS — R79.89 ELEVATED SERUM CREATININE: ICD-10-CM

## 2024-10-17 DIAGNOSIS — D84.9 IMMUNOSUPPRESSED STATUS (H): ICD-10-CM

## 2024-10-17 DIAGNOSIS — R35.0 URINARY FREQUENCY: ICD-10-CM

## 2024-10-17 DIAGNOSIS — R23.3 EASY BRUISABILITY: ICD-10-CM

## 2024-10-17 LAB
ALBUMIN UR-MCNC: 20 MG/DL
APPEARANCE UR: ABNORMAL
BILIRUB UR QL STRIP: NEGATIVE
CLOSURE TME COLL+EPINEP BLD: 123 SECONDS
COLOR UR AUTO: ABNORMAL
CREAT SERPL-MCNC: 0.85 MG/DL (ref 0.51–0.95)
EGFRCR SERPLBLD CKD-EPI 2021: 77 ML/MIN/1.73M2
GLUCOSE UR STRIP-MCNC: NEGATIVE MG/DL
HGB UR QL STRIP: ABNORMAL
KETONES UR STRIP-MCNC: NEGATIVE MG/DL
LEUKOCYTE ESTERASE UR QL STRIP: ABNORMAL
MUCOUS THREADS #/AREA URNS LPF: PRESENT /LPF
NITRATE UR QL: NEGATIVE
PH UR STRIP: 5.5 [PH] (ref 5–7)
RBC URINE: >182 /HPF
SP GR UR STRIP: 1.02 (ref 1–1.03)
SQUAMOUS EPITHELIAL: <1 /HPF
UROBILINOGEN UR STRIP-MCNC: NORMAL MG/DL
WBC URINE: >182 /HPF

## 2024-10-17 PROCEDURE — 81001 URINALYSIS AUTO W/SCOPE: CPT | Performed by: PATHOLOGY

## 2024-10-17 PROCEDURE — 36415 COLL VENOUS BLD VENIPUNCTURE: CPT | Performed by: PATHOLOGY

## 2024-10-17 PROCEDURE — 87186 SC STD MICRODIL/AGAR DIL: CPT | Performed by: PREVENTIVE MEDICINE

## 2024-10-17 PROCEDURE — 85576 BLOOD PLATELET AGGREGATION: CPT

## 2024-10-17 PROCEDURE — 82565 ASSAY OF CREATININE: CPT | Performed by: PATHOLOGY

## 2024-10-17 PROCEDURE — 99000 SPECIMEN HANDLING OFFICE-LAB: CPT | Performed by: PATHOLOGY

## 2024-10-17 PROCEDURE — 99442 PR PHYSICIAN TELEPHONE EVALUATION 11-20 MIN: CPT | Performed by: INTERNAL MEDICINE

## 2024-10-17 RX ORDER — CIPROFLOXACIN 500 MG/1
500 TABLET, FILM COATED ORAL 2 TIMES DAILY
Qty: 14 TABLET | Refills: 2 | Status: SHIPPED | OUTPATIENT
Start: 2024-10-17

## 2024-10-17 NOTE — TELEPHONE ENCOUNTER
Pt calling because She had labs done today. Was told to reach out to her PCP to get a prescription for the abnormal urine results.    Routing to provider.    Karen Jones, URBANON, RN  [facility]

## 2024-10-18 LAB — BACTERIA UR CULT: ABNORMAL

## 2024-10-21 ENCOUNTER — OFFICE VISIT (OUTPATIENT)
Dept: OPHTHALMOLOGY | Facility: CLINIC | Age: 64
End: 2024-10-21
Attending: INTERNAL MEDICINE
Payer: MEDICARE

## 2024-10-21 DIAGNOSIS — Z79.899 LONG-TERM USE OF HYDROXYCHLOROQUINE: ICD-10-CM

## 2024-10-21 PROCEDURE — 92012 INTRM OPH EXAM EST PATIENT: CPT | Performed by: OPHTHALMOLOGY

## 2024-10-21 PROCEDURE — 92083 EXTENDED VISUAL FIELD XM: CPT | Performed by: OPHTHALMOLOGY

## 2024-10-21 PROCEDURE — 92134 CPTRZ OPH DX IMG PST SGM RTA: CPT | Performed by: OPHTHALMOLOGY

## 2024-10-21 ASSESSMENT — VISUAL ACUITY
OD_PH_SC: 20/25
OS_PH_SC+: -1
OD_SC: 20/50
OS_PH_SC: 20/25
OS_SC: 20/70
OD_SC+: -2
METHOD: SNELLEN - LINEAR

## 2024-10-21 ASSESSMENT — SLIT LAMP EXAM - LIDS
COMMENTS: MILD EDEMA
COMMENTS: MILD EDEMA

## 2024-10-21 ASSESSMENT — EXTERNAL EXAM - LEFT EYE: OS_EXAM: NORMAL

## 2024-10-21 ASSESSMENT — EXTERNAL EXAM - RIGHT EYE: OD_EXAM: NORMAL

## 2024-10-21 NOTE — LETTER
10/21/2024      Radha Rodriguez  [address]  [place] [address]      Dear Colleague,    Thank you for referring your patient, Radha Rodriguez, to the [facility]. Please see a copy of my visit note below.     Current Eye Medications:  none.       Subjective:  Dr. Eli recommended a Plaquenil visual field and OCT with Dr. Aly.  She feels her distance vision may be getting slightly worse, without correction.  Near and intermediate vision continues to be good without correction.       Objective:  See Ophthalmology Exam.       Assessment:  Central Bass Visual Field and retinal OCT remain within normal limits both eyes in patient on chronic Plaquenil for inflammatory polyarthropathy.  Cleared for continued usage.      Plan:  Will update Dr. Eil with today's visit.     Return visit for a complete exam at your convenience.     Return visit in one year for Plaquenil OCT and 10-2 Bass Visual Field.    Woody Aly M.D.  [phone]         Again, thank you for allowing me to participate in the care of your patient.        Sincerely,        Woody Aly MD

## 2024-10-21 NOTE — PROGRESS NOTES
Current Eye Medications:  none.       Subjective:  Dr. Eli recommended a Plaquenil visual field and OCT with Dr. Aly.  She feels her distance vision may be getting slightly worse, without correction.  Near and intermediate vision continues to be good without correction.       Objective:  See Ophthalmology Exam.       Assessment:  Central Bass Visual Field and retinal OCT remain within normal limits both eyes in patient on chronic Plaquenil for inflammatory polyarthropathy.  Cleared for continued usage.      Plan:  Will update Dr. Eli with today's visit.     Return visit for a complete exam at your convenience.     Return visit in one year for Plaquenil OCT and 10-2 Bass Visual Field.    Woody Aly M.D.  [phone]

## 2024-10-21 NOTE — PATIENT INSTRUCTIONS
Will update Dr. Eli with today's visit.     Return visit for a complete exam at your convenience.     Return visit in one year for Plaquenil OCT and 10-2 Bass Visual Field.    Woody Aly M.D.  [phone]

## 2024-10-28 ENCOUNTER — LAB (OUTPATIENT)
Dept: LAB | Facility: CLINIC | Age: 64
End: 2024-10-28
Payer: MEDICARE

## 2024-10-28 DIAGNOSIS — N30.00 ACUTE CYSTITIS WITHOUT HEMATURIA: ICD-10-CM

## 2024-10-28 DIAGNOSIS — D84.9 IMMUNOSUPPRESSED STATUS (H): ICD-10-CM

## 2024-10-28 LAB
ALBUMIN UR-MCNC: NEGATIVE MG/DL
APPEARANCE UR: CLEAR
BILIRUB UR QL STRIP: NEGATIVE
COLOR UR AUTO: YELLOW
GLUCOSE UR STRIP-MCNC: NEGATIVE MG/DL
HGB UR QL STRIP: NEGATIVE
KETONES UR STRIP-MCNC: NEGATIVE MG/DL
LEUKOCYTE ESTERASE UR QL STRIP: NEGATIVE
NITRATE UR QL: NEGATIVE
PH UR STRIP: 7 [PH] (ref 5–7)
SP GR UR STRIP: >=1.03 (ref 1–1.03)
UROBILINOGEN UR STRIP-ACNC: 0.2 E.U./DL

## 2024-10-28 PROCEDURE — 81003 URINALYSIS AUTO W/O SCOPE: CPT

## 2024-10-28 NOTE — PROGRESS NOTES
This is a 64 year old female who is being evaluated via a billable telephone visit.      How would you like to obtain your AVS? MyChart  If the telephone visit is dropped, the invitation should be resent by: text  Will anyone else be joining your telephone visit? No   Patient location: Home  Provider location: On-site.    [facility] Internal Medicine Progress Note           Assessment and Plan:     Acute cystitis without hematuria  - ciprofloxacin (CIPRO) 500 MG tablet; Take 1 tablet (500 mg) by mouth 2 times daily.  - UA without Microscopic - lab collect; Standing    Immunosuppressed status (H)  - UA without Microscopic - lab collect; Standing          Interval History:     UTI - Female  Duration of complaint: intermittent   Description:   Painful urination (Dysuria): YES           Frequency: YES  Blood in urine (Hematuria): no   Delay in urine (Hesitency): no   Intensity: mild  Progression of Symptoms:  worsening  Accompanying Signs & Symptoms:  Fever/chills: no   Flank pain no   Nausea and vomiting: no   Any vaginal symptoms: none  Abdominal/Pelvic Pain: no   History:   History of frequent UTI's: YES  History of kidney stones: no   Sexually Active: YES  Possibility of pregnancy: No  Precipitating factors: immunosuppression  Therapies Tried and outcome: None.  Had E-visit last 2/26/2024 for UTI and standing for urinalysis was sent. The patient submitted a urine specimen today and patient was told to call her PCP for antibiotics.               Significant Problems:     Patient Active Problem List   Diagnosis    CARDIOVASCULAR SCREENING; LDL GOAL LESS THAN 160    Vitamin D deficiency    Inflammatory polyarthropathy (H)    High risk medications (not anticoagulants) long-term use    Osteoarthritis    Menopausal syndrome (hot flashes)    Right lateral epicondylitis    Immunosuppressed status (H)    Facet arthropathy, thoracic    Back muscle spasm    Upper back strain    Upper back pain, chronic    Chronic  midline thoracic back pain    Degenerative disc disease, thoracic    High risk medication use    Pseudophakia, Yag Caps, ou (Hx of refractive lens exchange, ou (Lobanoff)    Diffuse idiopathic skeletal hyperostosis of thoracolumbar spine    Chronic pain syndrome    Factor 5 Leiden mutation, heterozygous (H)    Hypertension goal BP (blood pressure) < 140/90    Bilateral lumbar radiculopathy              Review of Systems:     CONSTITUTIONAL: NEGATIVE for fever, chills, change in weight  INTEGUMENTARY/SKIN: NEGATIVE for worrisome rashes, moles or lesions  EYES: NEGATIVE for vision changes or irritation  ENT/MOUTH: NEGATIVE for ear, mouth and throat problems  RESP: NEGATIVE for significant cough or SOB  BREAST: NEGATIVE for masses, tenderness or discharge  CV: NEGATIVE for chest pain, palpitations or peripheral edema  GI: NEGATIVE for nausea, abdominal pain, heartburn, or change in bowel habits   female: As above.  MUSCULOSKELETAL: NEGATIVE for significant arthralgias or myalgia  NEURO: NEGATIVE for weakness, dizziness or paresthesias  ENDOCRINE: NEGATIVE for temperature intolerance, skin/hair changes  HEME: NEGATIVE for bleeding problems  PSYCHIATRIC: NEGATIVE for changes in mood or affect             Physical Exam:   Vital signs and physical exam not obtained due to nature of visit.          Data:     Culture 50,000-100,000 CFU/mL Escherichia coli Abnormal         Resulting Agency: [facility]     Susceptibility     Escherichia coli     CONNER     Ampicillin 4 ug/mL Susceptible     Ampicillin/ Sulbactam <=2 ug/mL Susceptible     Cefazolin <=4 ug/mL Susceptible 1     Cefepime <=1 ug/mL Susceptible     Cefoxitin <=4 ug/mL Susceptible     Ceftazidime <=1 ug/mL Susceptible     Ceftriaxone <=1 ug/mL Susceptible     Ciprofloxacin <=0.25 ug/mL Susceptible     Gentamicin <=1 ug/mL Susceptible     Levofloxacin <=0.12 ug/mL Susceptible     Nitrofurantoin 32 ug/mL Susceptible     Piperacillin/Tazobactam <=4 ug/mL Susceptible      Tobramycin <=1 ug/mL Susceptible     Trimethoprim/Sulfamethoxazole <=1/19 ug/mL Susceptible              1 Cefazolin CONNER breakpoints are for the treatment of uncomplicated urinary tract infections. For the treatment of systemic infections, please contact the laboratory for additional testing.           Disposition:  Follow-up in 4 weeks or as needed.    Temo Fletcher MD  Internal Medicine  [facility] Team     Phone call duration: 15 minutes  Start: 11:00 AM  End: 11:15 AM

## 2024-11-07 ENCOUNTER — TRANSFERRED RECORDS (OUTPATIENT)
Dept: HEALTH INFORMATION MANAGEMENT | Facility: CLINIC | Age: 64
End: 2024-11-07
Payer: MEDICARE

## 2024-12-10 ENCOUNTER — OFFICE VISIT (OUTPATIENT)
Dept: RHEUMATOLOGY | Facility: CLINIC | Age: 64
End: 2024-12-10
Payer: MEDICARE

## 2024-12-10 VITALS
BODY MASS INDEX: 34.01 KG/M2 | HEART RATE: 84 BPM | DIASTOLIC BLOOD PRESSURE: 83 MMHG | SYSTOLIC BLOOD PRESSURE: 130 MMHG | OXYGEN SATURATION: 99 % | WEIGHT: 192 LBS

## 2024-12-10 DIAGNOSIS — I10 HTN, GOAL BELOW 140/90: Primary | ICD-10-CM

## 2024-12-10 DIAGNOSIS — M18.12 PRIMARY OSTEOARTHRITIS OF FIRST CARPOMETACARPAL JOINT OF LEFT HAND: ICD-10-CM

## 2024-12-10 DIAGNOSIS — Z79.899 HIGH RISK MEDICATION USE: ICD-10-CM

## 2024-12-10 DIAGNOSIS — M06.09 SERONEGATIVE RHEUMATOID ARTHRITIS OF MULTIPLE SITES (H): Primary | ICD-10-CM

## 2024-12-10 PROCEDURE — 99214 OFFICE O/P EST MOD 30 MIN: CPT | Mod: 25 | Performed by: INTERNAL MEDICINE

## 2024-12-10 PROCEDURE — 20600 DRAIN/INJ JOINT/BURSA W/O US: CPT | Mod: FA | Performed by: INTERNAL MEDICINE

## 2024-12-10 RX ORDER — METHOTREXATE 2.5 MG/1
TABLET ORAL
Qty: 32 TABLET | Refills: 3 | Status: SHIPPED | OUTPATIENT
Start: 2024-12-10

## 2024-12-10 RX ORDER — FOLIC ACID 1 MG/1
1 TABLET ORAL DAILY
Qty: 100 TABLET | Refills: 3 | Status: SHIPPED | OUTPATIENT
Start: 2024-12-10

## 2024-12-10 RX ORDER — METHYLPREDNISOLONE ACETATE 40 MG/ML
10 INJECTION, SUSPENSION INTRA-ARTICULAR; INTRALESIONAL; INTRAMUSCULAR; SOFT TISSUE ONCE
Status: COMPLETED | OUTPATIENT
Start: 2024-12-10 | End: 2024-12-10

## 2024-12-10 RX ORDER — METHOCARBAMOL 500 MG/1
500 TABLET, FILM COATED ORAL 4 TIMES DAILY PRN
COMMUNITY

## 2024-12-10 RX ADMIN — METHYLPREDNISOLONE ACETATE 10 MG: 40 INJECTION, SUSPENSION INTRA-ARTICULAR; INTRALESIONAL; INTRAMUSCULAR; SOFT TISSUE at 13:46

## 2024-12-10 NOTE — TELEPHONE ENCOUNTER
Pt returned clinic call. Pt reports that she has continued to take amlodipine 5 mg twice a day. Requesting new prescription be sent.     Routing to provider.     Patience Nelson RN

## 2024-12-10 NOTE — NURSING NOTE
RAPID3 (0-30) Cumulative Score  15.0          RAPID3 Weighted Score (divide #4 by 3 and that is the weighted score)  5

## 2024-12-10 NOTE — PROGRESS NOTES
Rheumatology Clinic Visit      Radha Rodriguez MRN# [id number]   YOB: 1960 Age: 64 year old      Date of visit: 12/10/24   PCP: Dr. Temo Fletcher    Chief Complaint   Patient presents with:  RECHECK    Assessment and Plan     1.  Rheumatoid arthritis: Previously followed by Rojas Vasques and [facility].  Established care with me on 4/24/2018.  Previously on Humira (ineffective), HCQ (used with MTX), MTX (25mg wkly was effective and dose reductions resulted in worsening joint symptoms, but then she stopped on her own during the COVID19 pandemic without any worsening inflammatory arthritis symptoms; later used and possibly associated with skin lesions on arms and trunk but these didn't change with stopping methotrexate so it can be used again), Enbrel (effective, she did not like the injections).  Currently on hydroxychloroquine 400 mg daily.  More active synovitis on exam today in a symmetric distribution consistent with rheumatoid arthritis.  Discussed treatment options and she would like to restart methotrexate.  Discussed the concern about the potential skin lesions associated with methotrexate but she did not feel like the lesions were actually associated with methotrexate as they continued despite being off of methotrexate.  Therefore, restart methotrexate as noted below.  Labs monthly for the next 3 months.  Chronic illness, progressive.    - Continue hydroxychloroquine 400 mg daily (last eye exam was on 10/21/2024 by Dr. Aly)  - Start Methotrexate 10mg once weekly for 1 week, then increase by 2.5mg each week until taking 20mg once weekly; then continue 20mg once weekly thereafter.  - Start folic acid 1mg daily  - Labs monthly ×2 months: CBC, creatinine, hepatic panel  - Labs in 3 months: CBC, Creatinine, Hepatic Panel, ESR, CRP    High risk medication requiring intensive toxicity monitoring at least quarterly.    # Methotrexate Risks and Benefits: The risks and benefits of methotrexate were  discussed in detail and the patient verbalized understanding.  The risks discussed include, but are not limited to, the risk for hypersensitivity, anaphylaxis, anaphylactoid reactions, infections, bone marrow suppression, renal toxicity, hepatotoxicity, pulmonary toxicity, malignancy, impaired fertility, GI upset, alopecia, and oral and nasal sores.  Folic acid supplementation is recommended during methotrexate therapy to help prevent some of the side effects. Pregnancy prevention and planning was discussed; it is recommended that women of childbearing potential use reliable contraception during therapy.  The risks of taking both methotrexate and alcohol were reviewed; complete alcohol avoidance was discussed.  Routine laboratory monitoring is required during methotrexate therapy. Taking MTX once weekly, all within a 24 hour period was stressed and the patient verbalized this instruction back to me.  I encouraged reviewing the package insert and asking any questions about the medication.    2.  Primary osteoarthritis of the left first carpometacarpal joint: improved with steroid injections in the past.  Again symptomatic today and patient would like repeat steroid injection.  Steroid injection repeated today as documented in the procedure section.  Discussed the option for hand surgery referral as well but she would like to continue doing the injections because they are effective and she would like to avoid surgery at this time.    3.  Bilateral knee osteoarthritis; hx of left medial unicompartmental knee arthroplasty in February 2023; right pes anserine bursitis; plans for right partial knee replacement in April 2025: Right pes anserine bursitis injection in November 2023 effective for no more than 1 month; in April 2024 had steroid injection of the pes anserine bursa at [facility] with no improvement per patient.  Topical Voltaren gel partially effective.  Encouraged physical therapy exercises and  pool exercises.  Avoid aggravating activities.  Has not improved and is very symptomatic over the pes anserine bursa on the right but no swelling, increased warmth, or overlying erythema.  Considering the severity of this and no response to steroid injection, I advised that she follow-up with her surgeon at [facility]; she subsequently had a steroid injection of the right knee with only short duration of improvement so is anticipating right partial knee replacement in April 2025.  She will continue following with orthopedics.    Perioperative DMARD management in anticipation of knee surgery: Continue methotrexate and hydroxychloroquine perioperatively    4. Chronic back pain: went to [facility] where steroids and gabapentin were Rx'd but she doesn't do well with steroids per patient and gabapentin has only been partially helpful. Was following at the [facility] pain clinic, but was referred by [facility] provider to [facility] per patient.  Now following with Dr. Xavi Guerrero at [facility].    5.  History of rash: previously on abdomen and now just on arms. Unclear etiology. Less likely MTX associated but cannot rule out.  She managed with her PCP.  Not an issue today.    6.  Vaccinations:   - Influenza: Advised yearly vaccination  - Qurstzc23: up to date  - [id number]: up to date  - COVID-19: Advised keeping updated  - Shingrix: Advised vaccination    7.  Osteopenia: Based on 11/15/2023 DEXA    Total minutes spent in evaluation with patient, documentation, , and review of pertinent studies and chart notes: 22      Ms. Rodriguez verbalized agreement with and understanding of the rational for the diagnosis and treatment plan.  All questions were answered to best of my ability and the patient's satisfaction. Ms. Rodriguez was advised to contact the clinic with any questions that may arise after the clinic visit.      Thank you for involving me in the care of the patient    Return to clinic: 3-4  months    HPI   Radha Rodriguez is a 64 year old female with a past medical history significant for possible Crohn's disease requiring fistula surgery in the past, osteoarthritis, inflammatory arthritis who is seen for follow-up of arthritis.    4/11/2023: Stop taking hydroxychloroquine when she was hospitalized in February 2023 for the left partial knee arthroplasty.  Since she has been favoring her right knee her right knee is hurting more.  Right knee pain is worse with activity and improves with rest.  She would like a steroid injection of the right knee today.  Left first CMC joint osteoarthritis pain worse and she would like repeat steroid injection.  Felt like she was doing better with regard to inflammatory arthritis when she was taking hydroxychloroquine, but she was told no clear reason to stop hydroxychloroquine when she was hospitalized and she has not restarted it yet because she was waiting for this appointment to discuss.  She would like to restart hydroxychloroquine.    7/25/2023: Pain, swelling, and stiffness at the MCPs and PIPs that is worse in the morning and appear to time activity, worse on the right hand.  Left first CMC joint painful with activity and improves with rest; typically the steroid injection wears off after about 2.5-3 months.  She would like repeat steroid injection of the left first CMC joint today.  Hydroxychloroquine is effective but does not completely control her symptoms.  Has some bruising on her arms and is following with a primary care provider for this issue.    11/7/2023: No longer with pain at the MCPs or PIPs.  No morning stiffness.  Still with pain in the left first CMC joint and she would like a repeat steroid injection at this joint today because injections are effective and she does not want to go for surgery.  She would also like a repeat steroid injection of the right knee because it is bothering her more now and a steroid injection in the past was very  effective.    5/28/2024: Went to see an orthopedic surgeon at [facility] in April 2023 where she had a steroid injection of the pes anserine bursa with no improvement.  She is planning to increase physical therapy exercises, walking, and consider pool exercises.  Would like repeat steroid injection of the left first CMC joint today as it has worsened.  Left first CMC joint pain is worse with activity and improves with rest; no swelling, increased warmth, or overlying erythema.  Not on Enbrel due to cost.  Had to hold hydroxychloroquine for about period of time because of cost but she has recently restarted because she had worsening symptoms after stopping hydroxychloroquine.    9/10/2024: Mild swelling across the MCPs but no increased warmth, overlying erythema, or pain at the MCPs.  Only pain she has is of her right pes anserine bursa that improved for only 1 month with injection from myself, and possibly only 1 month of improvement when an injection was given in April 2024 at [facility].  She says that she has been to her orthopedic surgeon who advised possible knee replacement surgery but she did not want to proceed with that because she felt like it was only the bursa.  Topical Voltaren gel ineffective for bursitis per patient.  Icing has helped some.  Rest has helped some.  Increased activity bothers the bursitis.  Planning to be seen in the hematology department at the [facility] for a blood clotting disorder with specifics unknown at this point.    Today, 12/10/2024: Plan for right partial knee replacement surgery because the steroid injection from orthopedics was again only beneficial for a very short duration.  Recurrence of left first CMC joint osteoarthritis symptoms and she would like a steroid injection today as they have been effective previously.  Also with more pain and swelling across the MCPs and PIPs that is worse in the morning and improves with time and  activity.  She feels like the rheumatoid arthritis has become more active.    Tobacco: none  EtOH: occasional  Drugs: none  Occupation: retired    ROS   12 point review of system was completed and negative except as noted in the HPI     Active Problem List     Patient Active Problem List   Diagnosis    CARDIOVASCULAR SCREENING; LDL GOAL LESS THAN 160    Vitamin D deficiency    Inflammatory polyarthropathy (H)    High risk medications (not anticoagulants) long-term use    Osteoarthritis    Menopausal syndrome (hot flashes)    Right lateral epicondylitis    Immunosuppressed status (H)    Facet arthropathy, thoracic    Back muscle spasm    Upper back strain    Upper back pain, chronic    Chronic midline thoracic back pain    Degenerative disc disease, thoracic    High risk medication use    Pseudophakia, Yag Caps, ou (Hx of refractive lens exchange, ou (Lobanoff)    Diffuse idiopathic skeletal hyperostosis of thoracolumbar spine    Chronic pain syndrome    Factor 5 Leiden mutation, heterozygous (H)    Hypertension goal BP (blood pressure) < 140/90    Bilateral lumbar radiculopathy     Past Medical History     Past Medical History:   Diagnosis Date    Arthritis     DVT (deep venous thrombosis) (H)     Factor 5 Leiden mutation, heterozygous (H)     Headache(784.0)     Hypertension goal BP (blood pressure) < 140/90 7/25/2023    Irritable bowel syndrome     Other and unspecified noninfectious gastroenteritis and colitis(558.9)     chronic     Past Surgical History     Past Surgical History:   Procedure Laterality Date    ARTHROPLASTY KNEE UNICOMPARTMENT Left 2/28/2023    Procedure: LEFT MEDIAL UNICOMPARTMENTAL KNEE ARTHROPLASTY;  Surgeon: Micah Mena MD;  Location: [facility]    CATARACT IOL, RT/LT      Refractive lens exchange ou (Lobanoff)    COLONOSCOPY  10/14/2011    Procedure:COLONOSCOPY; Colonoscopy; Surgeon:SCOTTY LEDEZMA; Location:[facility]    COLONOSCOPY N/A 9/14/2023    Procedure: COLONOSCOPY, WITH  POLYPECTOMY AND BIOPSY;  Surgeon: Nimo Landry DO;  Location: [facility]    COLONOSCOPY N/A 9/14/2023    Procedure: COLONOSCOPY, FLEXIBLE, WITH LESION REMOVAL USING SNARE;  Surgeon: Nimo Landry DO;  Location: [facility]    COLONOSCOPY WITH CO2 INSUFFLATION N/A 9/14/2023    Procedure: Colonoscopy with CO2 insufflation;  Surgeon: Nimo Landry DO;  Location: [facility]    ENHANCE LASER REFRACTIVE BILATERAL EXISTING PT IN PARAMETERS      HYSTERECTOMY, VAGINAL  01/01/2000    TVH, fibroids (still has ovaries)    SURGICAL HISTORY OF -       Tubal Ligation    SURGICAL HISTORY OF -       Appy    SURGICAL HISTORY OF -       Tonsils    SURGICAL HISTORY OF -   12/1994    Anal Fistulotomy    ZZC REPLACEMENT,URETER,BOWEL SEGMENT  10/01/2008    Part of her ureter was repaired.     Allergy     Allergies   Allergen Reactions    Sulfa Antibiotics Rash    Morphine And Codeine Hives    Clindamycin Rash     Current Medication List     Current Outpatient Medications   Medication Sig Dispense Refill    hydroxychloroquine (PLAQUENIL) 200 MG tablet Take 2 tablets (400 mg) by mouth daily. 180 tablet 2    methocarbamol (ROBAXIN) 500 MG tablet Take 500 mg by mouth 4 times daily as needed for muscle spasms.      VITAMIN D PO Take by mouth.      amoxicillin (AMOXIL) 500 MG capsule TAKE 4 CAPSULES BY MOUTH ONCE FOR 1 DOSE. TAKE 1 HOUR BEFORE DENTAL PROCEDURE (Patient not taking: Reported on 12/10/2024) 4 capsule 0    celecoxib (CELEBREX) 200 MG capsule Take 1 capsule (200 mg) by mouth daily. (Patient not taking: Reported on 12/10/2024) 90 capsule 1    ciprofloxacin (CIPRO) 500 MG tablet Take 1 tablet (500 mg) by mouth 2 times daily. (Patient not taking: Reported on 12/10/2024) 14 tablet 2     No current facility-administered medications for this visit.     Social History   See HPI    Family History     Family History   Problem Relation Age of Onset    Heart Disease Father         Heart attack    C.A.D. Father         age 50    Hypertension  "Father     Cancer Maternal Grandfather     Alzheimer Disease Maternal Grandfather     Cancer Paternal Grandfather     Diabetes No family hx of     Cerebrovascular Disease No family hx of     Breast Cancer No family hx of     Cancer - colorectal No family hx of     Prostate Cancer No family hx of      Physical Exam     Temp Readings from Last 3 Encounters:   09/23/24 98.1  F (36.7  C) (Oral)   11/21/23 98.1  F (36.7  C) (Oral)   09/14/23 97.4  F (36.3  C) (Temporal)     BP Readings from Last 5 Encounters:   12/10/24 130/83   09/23/24 (!) 149/86   09/10/24 (!) 142/86   05/28/24 (!) 150/90   11/21/23 135/78     Pulse Readings from Last 1 Encounters:   12/10/24 84     Resp Readings from Last 1 Encounters:   09/10/24 20     Estimated body mass index is 34.01 kg/m  as calculated from the following:    Height as of 9/23/24: 1.6 m (5' 3\").    Weight as of this encounter: 87.1 kg (192 lb).    GEN: NAD.   HEENT:  Anicteric, noninjected sclera. No obvious external lesions of the ear and nose. Hearing intact.  PULM: No increased work of breathing.    MSK: Synovial swelling and tenderness to palpation of the bilateral 2nd-4th MCPs and 2nd-3rd PIPs.   Heberden's and Maye's nodes present.  Squaring and tenderness to palpation without effusion or increased warmth of the left first CMC joint.  Right first carpometacarpal joint without swelling or tenderness to palpation.  Right knee with medial joint line tenderness but no effusion or increased warmth; tender to palpation over the pes anserine bursa to very light palpation.  Left knee without swelling or tenderness to palpation.   Ankles and MTPs without swelling or tenderness to palpation.  SKIN: No rash  PSYCH: Alert. Appropriate.       Labs / Imaging (select studies)     CBC  Recent Labs   Lab Test 09/23/24  1437 09/10/24  1322 05/02/24  1142 10/11/21  1704 03/29/21  1431 01/04/21  1619 12/23/19  1620 09/25/19  1117   WBC 6.3 6.6 6.1   < > 6.9   < > 8.4 7.4   RBC 4.60 4.34 " 4.42   < > 4.47   < > 3.86 3.96   HGB 14.1 13.1 13.7   < > 13.6   < > 12.9 13.3   HCT 41.3 39.6 42.3   < > 41.3   < > 37.8 38.4   MCV 90 91 96   < > 92   < > 98 97   RDW 12.5 12.5 12.0   < > 12.5   < > 13.1 13.9    218 248   < > 226   < > 220 219   MCH 30.7 30.2 31.0   < > 30.4   < > 33.4* 33.6*   MCHC 34.1 33.1 32.4   < > 32.9   < > 34.1 34.6   NEUTROPHIL 63 66 69   < > 64.0  --  69.7 73.0   LYMPH 24 21 21   < > 26.5  --  20.6 17.8   MONOCYTE 9 9 7   < > 6.4  --  6.7 6.5   EOSINOPHIL 4 3 3   < > 2.8  --  2.6 2.3   BASOPHIL 1 1 1   < > 0.3  --  0.4 0.4   ANEU  --   --   --   --  4.4  --  5.8 5.4   ALYM  --   --   --   --  1.8  --  1.7 1.3   EDWARD  --   --   --   --  0.4  --  0.6 0.5   AEOS  --   --   --   --  0.2  --  0.2 0.2   ABAS  --   --   --   --  0.0  --  0.0 0.0   ANEUTAUTO 3.9 4.4 4.2   < >  --   --   --   --    ALYMPAUTO 1.5 1.4 1.3   < >  --   --   --   --    AMONOAUTO 0.6 0.6 0.4   < >  --   --   --   --    AEOSAUTO 0.2 0.2 0.2   < >  --   --   --   --    ABSBASO 0.0 0.1 0.0   < >  --   --   --   --     < > = values in this interval not displayed.     CMP  Recent Labs   Lab Test 10/17/24  0854 09/23/24  1437 09/10/24  1322 11/07/23  1132 07/13/23  1043 03/01/23  0702 02/17/23  1153 10/11/21  1704 03/29/21  1431 01/04/21  1619 12/23/19  1620   NA  --  140  --   --   --   --  143  --  140 140  --    POTASSIUM  --  4.5  --   --   --   --  4.8  --  4.0 3.8  --    CHLORIDE  --  104  --   --   --   --  109  --  109 105  --    CO2  --  26  --   --   --   --  29  --  29 27  --    ANIONGAP  --  10  --   --   --   --  5  --  2* 8  --    GLC  --  91  --   --   --  142* 101*  --  85 86  --    BUN  --  21.5  --   --   --   --  17  --  11 14  --    CR 0.85 0.77 1.02* 0.82   < >  --  0.80   < > 0.77 0.75 0.73   GFRESTIMATED 77 87 62 81   < >  --  84   < > 85 87 >90   GFRESTBLACK  --   --   --   --   --   --   --   --  >90 >90 >90   ENEIDA  --  10.0  --   --   --   --  9.2  --  9.0 9.1  --    BILITOTAL  --  0.4 0.3  0.4   < >  --  0.4   < > 0.5 0.4 0.3   ALBUMIN  --  4.6 4.3 4.3   < >  --  4.0   < > 3.9 3.9 3.8   PROTTOTAL  --  6.6 6.4 6.5   < >  --  6.9   < > 6.6* 7.1 6.7*   ALKPHOS  --  72 65 61   < >  --  63   < > 62 58 68   AST  --  22 33 27   < >  --  11   < > 17 18 21   ALT  --  21 32 20   < >  --  24   < > 28 32 37    < > = values in this interval not displayed.     Calcium/VitaminD  Recent Labs   Lab Test 09/23/24  1437 02/17/23  1153 06/11/21  1014 03/29/21  1431   ENEIDA 10.0 9.2  --  9.0   VITDT  --   --  26  --      ESR/CRP  Recent Labs   Lab Test 11/07/23  1132 07/13/23  1043 08/08/22  1044 05/02/22  1252 10/11/21  1704   SED 4 6 8 7 7   CRP  --   --  5.5 <2.9 4.0   CRPI <3.00 <3.00  --   --   --      Hepatitis B  Recent Labs   Lab Test 07/25/23  1341 04/24/18  1557   HBCAB Nonreactive Nonreactive   HEPBANG Nonreactive Nonreactive     Hepatitis C  Recent Labs   Lab Test 07/25/23  1341   HCVAB Nonreactive     Lyme ab screening  Recent Labs   Lab Test 03/29/21  1431   LYMEGM 0.04       Tuberculosis Screening  Recent Labs   Lab Test 07/25/23  1341   TBRES Negative     HIV Screening  Recent Labs   Lab Test 08/18/22  1004   HIAGAB Nonreactive     Immunization History     Immunization History   Administered Date(s) Administered    COVID-19 MONOVALENT 12+ (Pfizer) 01/25/2021, 02/15/2021    HepB 01/24/1991, 02/28/1991, 09/05/1991    Influenza Vaccine 18-64 (Flublok) 10/01/2019, 10/14/2021    Pneumo Conj 13-V (2010&after) 10/01/2019    Pneumococcal 23 valent 12/31/2019    TD,PF 7+ (Tenivac) 03/12/1990, 01/01/2000    TDAP (Adacel,Boostrix) 09/10/2008, 03/01/2011, 10/14/2021    TDAP Vaccine (Adacel) 09/10/2008    TDAP Vaccine (Boostrix) 09/10/2008     Procedure     Procedure: Steroid injection of the left 1st CMC joint  Indication: Pain, osteoarthritis of the first carpometacarpal joint of the left hand    The procedure was explained in detail. Risks including infection, pain, structural damage such as cartilage damage and  tendon rupture, fat atrophy, skin hyper-/hypo-pigmentation, and medication reaction was explained. The need for rest of the affected joint for one week after the procedure was explained.  The option of not doing the procedure was also provided. All questions were answered and the patient consented to the procedure.     A time-out was performed and the correct patient, procedure, and laterality were verified.    The left 1st carpometacarpal joint was examined and location for injection was identified. The area was cleaned with chlorhexidine, twice.  Ethyl chloride was then used for topical anaesthetic.  Then a mixture of lidocaine 1% 0.1 mL and methylprednisolone 10mg was injected into the intra-articular space.     The patient tolerated the procedure well. No complications.    1% Lidocaine  : Hospira  Lot #: [id number]  EXPIRATION DATE: 2025-SEP  NDC: 2473-9331-71    MEDICATION: Methylprednisolone  LOT #: [id number]  EXPIRATION DATE:  2025-06  NDC#: [address]-6  10 mg used for injection; 30 mg disposed           Chart documentation done in part with Dragon Voice recognition Software. Although reviewed after completion, some word and grammatical error may remain.    Abdoul Eli MD

## 2024-12-10 NOTE — TELEPHONE ENCOUNTER
This writer attempted to contact patient on 12/10/24      Reason for call refill request and left message.      If patient calls back:   Route to RN line. Please check with patient if she is taking amlodipine, as it's listed as discontinued by PCP on 6/3/24.       Ruthann Malave, RN, BSN  [facility]  [place], [place] and [place]

## 2024-12-11 RX ORDER — AMLODIPINE BESYLATE 5 MG/1
5 TABLET ORAL 2 TIMES DAILY
Qty: 180 TABLET | Refills: 0 | Status: SHIPPED | OUTPATIENT
Start: 2024-12-11

## 2025-02-13 ENCOUNTER — VIRTUAL VISIT (OUTPATIENT)
Dept: FAMILY MEDICINE | Facility: CLINIC | Age: 65
End: 2025-02-13
Payer: MEDICARE

## 2025-02-13 DIAGNOSIS — E66.01 MORBID OBESITY (H): Primary | ICD-10-CM

## 2025-02-13 DIAGNOSIS — M25.561 ARTHRALGIA OF RIGHT KNEE: ICD-10-CM

## 2025-02-13 DIAGNOSIS — M23.91 INTERNAL DERANGEMENT OF KNEE, RIGHT: ICD-10-CM

## 2025-02-13 DIAGNOSIS — G47.01 INSOMNIA DUE TO MEDICAL CONDITION: ICD-10-CM

## 2025-02-13 RX ORDER — AMITRIPTYLINE HYDROCHLORIDE 10 MG/1
10-30 TABLET ORAL
Qty: 90 TABLET | Refills: 5 | Status: SHIPPED | OUTPATIENT
Start: 2025-02-13

## 2025-02-13 NOTE — PROGRESS NOTES
This is a 64 year old female who is being evaluated via a billable video visit.      How would you like to obtain your AVS? MyChart  If the video visit is dropped, the invitation should be resent by: text  Will anyone else be joining your video visit? No    [facility] Internal Medicine Progress Note           Assessment and Plan:     Morbid obesity (H)  Recommend GLP-1 agonists.    Insomnia due to medical condition  - amitriptyline (ELAVIL) 10 MG tablet; Take 1-3 tablets (10-30 mg) by mouth nightly as needed for sleep.    Internal derangement of knee, right  - MR Knee Right w/o Contrast; Future    Arthralgia of right knee  - MR Knee Right w/o Contrast; Future        Interval History:     Reason for visit:  Joint pain and weight gain  Symptom onset:  More than a month  Symptoms include:  Mayra gain and joint pain  Symptom intensity:  Moderate  Symptom progression:  Staying the same  Had these symptoms before:  No   She is taking medications regularly.           Significant Problems:     Patient Active Problem List   Diagnosis    CARDIOVASCULAR SCREENING; LDL GOAL LESS THAN 160    Vitamin D deficiency    Inflammatory polyarthropathy (H)    High risk medications (not anticoagulants) long-term use    Osteoarthritis    Menopausal syndrome (hot flashes)    Right lateral epicondylitis    Immunosuppressed status    Facet arthropathy, thoracic    Back muscle spasm    Upper back strain    Upper back pain, chronic    Chronic midline thoracic back pain    Degenerative disc disease, thoracic    High risk medication use    Pseudophakia, Yag Caps, ou (Hx of refractive lens exchange, ou (Lobanoff)    Diffuse idiopathic skeletal hyperostosis of thoracolumbar spine    Chronic pain syndrome    Factor 5 Leiden mutation, heterozygous    Hypertension goal BP (blood pressure) < 140/90    Bilateral lumbar radiculopathy              Review of Systems:     CONSTITUTIONAL:POSITIVE  for weight gain.  INTEGUMENTARY/SKIN: NEGATIVE  for worrisome rashes, moles or lesions  EYES: NEGATIVE for vision changes or irritation  ENT/MOUTH: NEGATIVE for ear, mouth and throat problems  RESP: NEGATIVE for significant cough or SOB  BREAST: NEGATIVE for masses, tenderness or discharge  CV: NEGATIVE for chest pain, palpitations or peripheral edema  GI: NEGATIVE for nausea, abdominal pain, heartburn, or change in bowel habits  : NEGATIVE for frequency, dysuria, or hematuria  MUSCULOSKELETAL:POSITIVE  for right knee pain.  NEURO: NEGATIVE for weakness, dizziness or paresthesias  ENDOCRINE: NEGATIVE for temperature intolerance, skin/hair changes  HEME: NEGATIVE for bleeding problems  PSYCHIATRIC: POSITIVE for insomnia.             Physical Exam:   Vital signs and physical exam not obtained due to nature of visit.          Data:   Epic reviewed.     Disposition:  Follow-up in 4 weeks or as needed.    Temo Fletcher MD  Internal Medicine  [facility] Team        Video-Visit Details     Type of service:  Video Visit  Video Start Time: 6:00 PM  Video End Time: 6:20 PM  Originating Location (pt. Location): Home  Distant Location (provider location):  [facility]   Platform used for Video Visit: i.Meter

## 2025-02-19 ENCOUNTER — MYC MEDICAL ADVICE (OUTPATIENT)
Dept: FAMILY MEDICINE | Facility: CLINIC | Age: 65
End: 2025-02-19
Payer: MEDICARE

## 2025-02-19 ENCOUNTER — TELEPHONE (OUTPATIENT)
Dept: FAMILY MEDICINE | Facility: CLINIC | Age: 65
End: 2025-02-19
Payer: MEDICARE

## 2025-02-19 DIAGNOSIS — E66.01 MORBID OBESITY (H): Primary | ICD-10-CM

## 2025-02-19 DIAGNOSIS — M54.6 CHRONIC RIGHT-SIDED THORACIC BACK PAIN: ICD-10-CM

## 2025-02-19 DIAGNOSIS — G89.29 CHRONIC RIGHT-SIDED THORACIC BACK PAIN: ICD-10-CM

## 2025-02-19 RX ORDER — DESVENLAFAXINE 50 MG/1
50 TABLET, FILM COATED, EXTENDED RELEASE ORAL DAILY
Qty: 30 TABLET | Refills: 5 | Status: SHIPPED | OUTPATIENT
Start: 2025-02-19

## 2025-02-19 RX ORDER — TOPIRAMATE 25 MG/1
TABLET, FILM COATED ORAL
Qty: 60 TABLET | Refills: 2 | Status: SHIPPED | OUTPATIENT
Start: 2025-02-19

## 2025-02-19 NOTE — TELEPHONE ENCOUNTER
[facility] Prior Authorization Team   Phone: [phone]    PRIOR AUTHORIZATION DENIED    Medication: TOPIRAMATE 25 MG PO TABS  Insurance Company: [employer] Phone [phone] Fax [phone]  Denial Date: 2/19/2025  Denial Reason(s): DRUGS USED FOR WEIGHT LOSS ARE EXCLUDED FROM COVERAGE  Appeal Information: N/A  Patient Notified: NO

## 2025-02-26 ENCOUNTER — HOSPITAL ENCOUNTER (OUTPATIENT)
Dept: MRI IMAGING | Facility: CLINIC | Age: 65
Discharge: HOME | End: 2025-02-26
Attending: INTERNAL MEDICINE
Payer: MEDICARE

## 2025-02-26 DIAGNOSIS — M25.561 ARTHRALGIA OF RIGHT KNEE: ICD-10-CM

## 2025-02-26 DIAGNOSIS — M23.91 INTERNAL DERANGEMENT OF KNEE, RIGHT: ICD-10-CM

## 2025-02-26 DIAGNOSIS — M71.21 BAKER'S CYST OF KNEE, RIGHT: ICD-10-CM

## 2025-02-26 DIAGNOSIS — M23.321 DEGENERATIVE TEAR OF POSTERIOR HORN OF MEDIAL MENISCUS, RIGHT: Primary | ICD-10-CM

## 2025-02-26 PROCEDURE — 73721 MRI JNT OF LWR EXTRE W/O DYE: CPT | Mod: 26 | Performed by: RADIOLOGY

## 2025-02-26 PROCEDURE — 73721 MRI JNT OF LWR EXTRE W/O DYE: CPT | Mod: RT

## 2025-02-27 ENCOUNTER — PATIENT OUTREACH (OUTPATIENT)
Dept: CARE COORDINATION | Facility: CLINIC | Age: 65
End: 2025-02-27
Payer: MEDICARE

## 2025-03-03 ENCOUNTER — LAB (OUTPATIENT)
Dept: LAB | Facility: CLINIC | Age: 65
End: 2025-03-03
Payer: MEDICARE

## 2025-03-03 ENCOUNTER — PATIENT OUTREACH (OUTPATIENT)
Dept: CARE COORDINATION | Facility: CLINIC | Age: 65
End: 2025-03-03

## 2025-03-03 DIAGNOSIS — Z79.899 HIGH RISK MEDICATION USE: ICD-10-CM

## 2025-03-03 DIAGNOSIS — M06.09 SERONEGATIVE RHEUMATOID ARTHRITIS OF MULTIPLE SITES (H): ICD-10-CM

## 2025-03-03 LAB
BASOPHILS # BLD AUTO: 0 10E3/UL (ref 0–0.2)
BASOPHILS NFR BLD AUTO: 0 %
EOSINOPHIL # BLD AUTO: 0.2 10E3/UL (ref 0–0.7)
EOSINOPHIL NFR BLD AUTO: 3 %
ERYTHROCYTE [DISTWIDTH] IN BLOOD BY AUTOMATED COUNT: 13.8 % (ref 10–15)
ERYTHROCYTE [SEDIMENTATION RATE] IN BLOOD BY WESTERGREN METHOD: 5 MM/HR (ref 0–30)
HCT VFR BLD AUTO: 39.1 % (ref 35–47)
HGB BLD-MCNC: 13.1 G/DL (ref 11.7–15.7)
IMM GRANULOCYTES # BLD: 0 10E3/UL
IMM GRANULOCYTES NFR BLD: 0 %
LYMPHOCYTES # BLD AUTO: 1.1 10E3/UL (ref 0.8–5.3)
LYMPHOCYTES NFR BLD AUTO: 16 %
MCH RBC QN AUTO: 30.8 PG (ref 26.5–33)
MCHC RBC AUTO-ENTMCNC: 33.5 G/DL (ref 31.5–36.5)
MCV RBC AUTO: 92 FL (ref 78–100)
MONOCYTES # BLD AUTO: 0.5 10E3/UL (ref 0–1.3)
MONOCYTES NFR BLD AUTO: 7 %
NEUTROPHILS # BLD AUTO: 5.3 10E3/UL (ref 1.6–8.3)
NEUTROPHILS NFR BLD AUTO: 73 %
PLATELET # BLD AUTO: 234 10E3/UL (ref 150–450)
RBC # BLD AUTO: 4.26 10E6/UL (ref 3.8–5.2)
WBC # BLD AUTO: 7.3 10E3/UL (ref 4–11)

## 2025-03-03 PROCEDURE — 86140 C-REACTIVE PROTEIN: CPT

## 2025-03-03 PROCEDURE — 36415 COLL VENOUS BLD VENIPUNCTURE: CPT

## 2025-03-03 PROCEDURE — 80076 HEPATIC FUNCTION PANEL: CPT

## 2025-03-03 PROCEDURE — 85652 RBC SED RATE AUTOMATED: CPT

## 2025-03-03 PROCEDURE — 85025 COMPLETE CBC W/AUTO DIFF WBC: CPT

## 2025-03-03 PROCEDURE — 82565 ASSAY OF CREATININE: CPT

## 2025-03-04 LAB
ALBUMIN SERPL BCG-MCNC: 4.3 G/DL (ref 3.5–5.2)
ALP SERPL-CCNC: 78 U/L (ref 40–150)
ALT SERPL W P-5'-P-CCNC: 24 U/L (ref 0–50)
AST SERPL W P-5'-P-CCNC: 24 U/L (ref 0–45)
BILIRUB DIRECT SERPL-MCNC: 0.15 MG/DL (ref 0–0.3)
BILIRUB SERPL-MCNC: 0.4 MG/DL
CREAT SERPL-MCNC: 0.71 MG/DL (ref 0.51–0.95)
CRP SERPL-MCNC: <3 MG/L
EGFRCR SERPLBLD CKD-EPI 2021: >90 ML/MIN/1.73M2
PROT SERPL-MCNC: 6.6 G/DL (ref 6.4–8.3)

## 2025-03-06 ENCOUNTER — PATIENT OUTREACH (OUTPATIENT)
Dept: CARE COORDINATION | Facility: CLINIC | Age: 65
End: 2025-03-06

## 2025-03-11 ENCOUNTER — OFFICE VISIT (OUTPATIENT)
Dept: RHEUMATOLOGY | Facility: CLINIC | Age: 65
End: 2025-03-11
Payer: MEDICARE

## 2025-03-11 VITALS
DIASTOLIC BLOOD PRESSURE: 81 MMHG | HEART RATE: 76 BPM | HEIGHT: 63 IN | SYSTOLIC BLOOD PRESSURE: 134 MMHG | WEIGHT: 190 LBS | RESPIRATION RATE: 16 BRPM | BODY MASS INDEX: 33.66 KG/M2 | OXYGEN SATURATION: 97 %

## 2025-03-11 DIAGNOSIS — M06.09 SERONEGATIVE RHEUMATOID ARTHRITIS OF MULTIPLE SITES (H): Primary | ICD-10-CM

## 2025-03-11 DIAGNOSIS — Z79.899 HIGH RISK MEDICATION USE: ICD-10-CM

## 2025-03-11 DIAGNOSIS — M18.12 PRIMARY OSTEOARTHRITIS OF FIRST CARPOMETACARPAL JOINT OF LEFT HAND: ICD-10-CM

## 2025-03-11 PROCEDURE — 99214 OFFICE O/P EST MOD 30 MIN: CPT | Mod: FA | Performed by: INTERNAL MEDICINE

## 2025-03-11 PROCEDURE — 20600 DRAIN/INJ JOINT/BURSA W/O US: CPT | Performed by: INTERNAL MEDICINE

## 2025-03-11 PROCEDURE — 3079F DIAST BP 80-89 MM HG: CPT | Performed by: INTERNAL MEDICINE

## 2025-03-11 PROCEDURE — 3075F SYST BP GE 130 - 139MM HG: CPT | Performed by: INTERNAL MEDICINE

## 2025-03-11 PROCEDURE — 1125F AMNT PAIN NOTED PAIN PRSNT: CPT | Performed by: INTERNAL MEDICINE

## 2025-03-11 RX ORDER — METHYLPREDNISOLONE ACETATE 40 MG/ML
10 INJECTION, SUSPENSION INTRA-ARTICULAR; INTRALESIONAL; INTRAMUSCULAR; SOFT TISSUE ONCE
Status: COMPLETED | OUTPATIENT
Start: 2025-03-11 | End: 2025-03-11

## 2025-03-11 RX ORDER — METHOTREXATE 2.5 MG/1
20 TABLET ORAL
Qty: 96 TABLET | Refills: 2 | Status: SHIPPED | OUTPATIENT
Start: 2025-03-11

## 2025-03-11 RX ADMIN — METHYLPREDNISOLONE ACETATE 10 MG: 40 INJECTION, SUSPENSION INTRA-ARTICULAR; INTRALESIONAL; INTRAMUSCULAR; SOFT TISSUE at 11:43

## 2025-03-11 ASSESSMENT — PAIN SCALES - GENERAL: PAINLEVEL_OUTOF10: MODERATE PAIN (4)

## 2025-03-11 NOTE — NURSING NOTE
RAPID3 (0-30) Cumulative Score  15.8          RAPID3 Weighted Score (divide #4 by 3 and that is the weighted score)  5.26

## 2025-03-11 NOTE — PROGRESS NOTES
Rheumatology Clinic Visit      Radha Rodriguez MRN# [id number]   YOB: 1960 Age: 64 year old      Date of visit: 3/11/25   PCP: Dr. Temo Fletcher    Chief Complaint   Patient presents with:  RECHECK: RA - achy pain all over the body especially in the hands x one month    Assessment and Plan     1.  Rheumatoid arthritis: Previously followed by Rojas Vasques and [facility].  Established care with me on 4/24/2018.  Previously on Humira (ineffective), HCQ (effective; patient inadvertently stopped when methotrexate was started; can be used again in the future if needed), Enbrel (effective, she did not like the injections).  Currently on methotrexate 20 mg once weekly.  She inadvertently stopped hydroxychloroquine when methotrexate was started; if needed in the future consider adding hydroxychloroquine.  Doing well today.   Chronic illness.    - Continue  Methotrexate 20mg once weekly   - Continue folic acid 1mg daily  - Labs in 3 months: CBC, Creatinine, Hepatic Panel, ESR, CRP    High risk medication requiring intensive toxicity monitoring at least quarterly.    2.  Primary osteoarthritis of the left first carpometacarpal joint: improved with steroid injections in the past.  Again symptomatic today and patient would like repeat steroid injection.  Steroid injection repeated today as documented in the procedure section.  Discussed the option for hand surgery referral as well but she would like to continue doing the injections because they are effective and she would like to avoid surgery at this time.    3.  Bilateral knee osteoarthritis; hx of left medial unicompartmental knee arthroplasty in February 2023; right pes anserine bursitis; plans for right partial knee replacement in April 2025: Right pes anserine bursitis injection in November 2023 effective for no more than 1 month; in April 2024 had steroid injection of the pes anserine bursa at [facility] with no improvement per patient.  Topical  Voltaren gel partially effective.  Encouraged physical therapy exercises and pool exercises.  Avoid aggravating activities.  Has not improved and is very symptomatic over the pes anserine bursa on the right but no swelling, increased warmth, or overlying erythema.  She has since followed up with her surgeon at [facility] where she plans to have right partial knee replacement surgery in April 2025.      Perioperative DMARD management in anticipation of knee surgery: Continue methotrexate perioperatively    4. Chronic back pain: went to [facility] where steroids and gabapentin were Rx'd but she doesn't do well with steroids per patient and gabapentin has only been partially helpful. Was following at the [facility] pain clinic, but was referred by [facility] provider to [facility] per patient.  Now following with Dr. Xavi Guerrero at [facility].    5.  History of rash: previously on abdomen and now just on arms. Unclear etiology. Less likely MTX associated but cannot rule out.  She managed with her PCP.  Not an issue today.    6.  Vaccinations:   - Influenza: Advised yearly vaccination  - [id number]: Advised vaccination  - COVID-19: Advised keeping updated  - Shingrix: Advised vaccination    7.  Osteopenia: Based on 11/15/2023 DEXA.  Plan to repeat in 2028    Total minutes spent in evaluation with patient, documentation, , and review of pertinent studies and chart notes: 20      Ms. Rodriguez verbalized agreement with and understanding of the rational for the diagnosis and treatment plan.  All questions were answered to best of my ability and the patient's satisfaction. Ms. Rodriguez was advised to contact the clinic with any questions that may arise after the clinic visit.      Thank you for involving me in the care of the patient    Return to clinic: 3-4 months    HPI   Radha Rodriguez is a 64 year old female with a past medical history significant for possible Crohn's disease requiring fistula surgery in  the past, osteoarthritis, inflammatory arthritis who is seen for follow-up of arthritis.    4/11/2023: Stop taking hydroxychloroquine when she was hospitalized in February 2023 for the left partial knee arthroplasty.  Since she has been favoring her right knee her right knee is hurting more.  Right knee pain is worse with activity and improves with rest.  She would like a steroid injection of the right knee today.  Left first CMC joint osteoarthritis pain worse and she would like repeat steroid injection.  Felt like she was doing better with regard to inflammatory arthritis when she was taking hydroxychloroquine, but she was told no clear reason to stop hydroxychloroquine when she was hospitalized and she has not restarted it yet because she was waiting for this appointment to discuss.  She would like to restart hydroxychloroquine.    7/25/2023: Pain, swelling, and stiffness at the MCPs and PIPs that is worse in the morning and appear to time activity, worse on the right hand.  Left first CMC joint painful with activity and improves with rest; typically the steroid injection wears off after about 2.5-3 months.  She would like repeat steroid injection of the left first CMC joint today.  Hydroxychloroquine is effective but does not completely control her symptoms.  Has some bruising on her arms and is following with a primary care provider for this issue.    11/7/2023: No longer with pain at the MCPs or PIPs.  No morning stiffness.  Still with pain in the left first CMC joint and she would like a repeat steroid injection at this joint today because injections are effective and she does not want to go for surgery.  She would also like a repeat steroid injection of the right knee because it is bothering her more now and a steroid injection in the past was very effective.    5/28/2024: Went to see an orthopedic surgeon at [facility] in April 2023 where she had a steroid injection of the pes anserine bursa  with no improvement.  She is planning to increase physical therapy exercises, walking, and consider pool exercises.  Would like repeat steroid injection of the left first CMC joint today as it has worsened.  Left first CMC joint pain is worse with activity and improves with rest; no swelling, increased warmth, or overlying erythema.  Not on Enbrel due to cost.  Had to hold hydroxychloroquine for about period of time because of cost but she has recently restarted because she had worsening symptoms after stopping hydroxychloroquine.    9/10/2024: Mild swelling across the MCPs but no increased warmth, overlying erythema, or pain at the MCPs.  Only pain she has is of her right pes anserine bursa that improved for only 1 month with injection from myself, and possibly only 1 month of improvement when an injection was given in April 2024 at [facility].  She says that she has been to her orthopedic surgeon who advised possible knee replacement surgery but she did not want to proceed with that because she felt like it was only the bursa.  Topical Voltaren gel ineffective for bursitis per patient.  Icing has helped some.  Rest has helped some.  Increased activity bothers the bursitis.  Planning to be seen [facility] hematology department at [facility] for a blood clotting disorder with specifics unknown at this point.    12/10/2024: Plan for right partial knee replacement surgery because the steroid injection from orthopedics was again only beneficial for a very short duration.  Recurrence of left first CMC joint osteoarthritis symptoms and she would like a steroid injection today as they have been effective previously.  Also with more pain and swelling across the MCPs and PIPs that is worse in the morning and improves with time and activity.  She feels like the rheumatoid arthritis has become more active.    Today, 3/11/2025: Planning for partial right knee replacement surgery.  Recurrence of left for  CMC joint osteoarthritis and she would like a steroid injection today as they have been effective previously.  Methotrexate has been effective for MCP and PIP pain.  Morning stiffness now for about 30-60 minutes.    Tobacco: none  EtOH: occasional  Drugs: none  Occupation: retired    ROS   12 point review of system was completed and negative except as noted in the HPI     Active Problem List     Patient Active Problem List   Diagnosis    CARDIOVASCULAR SCREENING; LDL GOAL LESS THAN 160    Vitamin D deficiency    Inflammatory polyarthropathy (H)    High risk medications (not anticoagulants) long-term use    Osteoarthritis    Menopausal syndrome (hot flashes)    Right lateral epicondylitis    Immunosuppressed status    Facet arthropathy, thoracic    Back muscle spasm    Upper back strain    Upper back pain, chronic    Chronic midline thoracic back pain    Degenerative disc disease, thoracic    High risk medication use    Pseudophakia, Yag Caps, ou (Hx of refractive lens exchange, ou (Lobanoff)    Diffuse idiopathic skeletal hyperostosis of thoracolumbar spine    Chronic pain syndrome    Factor 5 Leiden mutation, heterozygous    Hypertension goal BP (blood pressure) < 140/90    Bilateral lumbar radiculopathy     Past Medical History     Past Medical History:   Diagnosis Date    Arthritis     DVT (deep venous thrombosis) (H)     Factor 5 Leiden mutation, heterozygous     Headache(784.0)     Hypertension goal BP (blood pressure) < 140/90 7/25/2023    Irritable bowel syndrome     Other and unspecified noninfectious gastroenteritis and colitis(558.9)     chronic     Past Surgical History     Past Surgical History:   Procedure Laterality Date    ARTHROPLASTY KNEE UNICOMPARTMENT Left 2/28/2023    Procedure: LEFT MEDIAL UNICOMPARTMENTAL KNEE ARTHROPLASTY;  Surgeon: Micah Mena MD;  Location: [facility]    CATARACT IOL, RT/LT      Refractive lens exchange ou (Lobanoff)    COLONOSCOPY  10/14/2011    Procedure:COLONOSCOPY;  Colonoscopy; Surgeon:SCOTTY LEDEZMA; Location:[facility]    COLONOSCOPY N/A 9/14/2023    Procedure: COLONOSCOPY, WITH POLYPECTOMY AND BIOPSY;  Surgeon: Nimo Landry DO;  Location: [facility]    COLONOSCOPY N/A 9/14/2023    Procedure: COLONOSCOPY, FLEXIBLE, WITH LESION REMOVAL USING SNARE;  Surgeon: Nimo Landry DO;  Location: [facility]    COLONOSCOPY WITH CO2 INSUFFLATION N/A 9/14/2023    Procedure: Colonoscopy with CO2 insufflation;  Surgeon: Nimo Landry DO;  Location: [facility] OR    ENHANCE LASER REFRACTIVE BILATERAL EXISTING PT IN PARAMETERS      HYSTERECTOMY, VAGINAL  01/01/2000    TVH, fibroids (still has ovaries)    SURGICAL HISTORY OF -       Tubal Ligation    SURGICAL HISTORY OF -       Appy    SURGICAL HISTORY OF -       Tonsils    SURGICAL HISTORY OF -   12/1994    Anal Fistulotomy    ZZC REPLACEMENT,URETER,BOWEL SEGMENT  10/01/2008    Part of her ureter was repaired.     Allergy     Allergies   Allergen Reactions    Sulfa Antibiotics Rash    Morphine And Codeine Hives    Clindamycin Rash     Current Medication List     Current Outpatient Medications   Medication Sig Dispense Refill    amLODIPine (NORVASC) 5 MG tablet TAKE 1 TABLET BY MOUTH TWICE DAILY 180 tablet 0    folic acid (FOLVITE) 1 MG tablet Take 1 tablet (1 mg) by mouth daily. 100 tablet 3    methocarbamol (ROBAXIN) 500 MG tablet Take 500 mg by mouth 4 times daily as needed for muscle spasms.      methotrexate 2.5 MG tablet 10mg (4 tabs) once weekly x1 week, then increase by 2.5mg (1 tab) each week until taking the goal weekly dose of 20mg (8 tabs) once weekly 32 tablet 3    VITAMIN D PO Take by mouth.      amitriptyline (ELAVIL) 10 MG tablet Take 1-3 tablets (10-30 mg) by mouth nightly as needed for sleep. (Patient not taking: Reported on 3/11/2025) 90 tablet 5    desvenlafaxine (PRISTIQ) 50 MG 24 hr tablet Take 1 tablet (50 mg) by mouth daily. (Patient not taking: Reported on 3/11/2025) 30 tablet 5     No current facility-administered  "medications for this visit.     Social History   See HPI    Family History     Family History   Problem Relation Age of Onset    Heart Disease Father         Heart attack    C.A.D. Father         age 50    Hypertension Father     Cancer Maternal Grandfather     Alzheimer Disease Maternal Grandfather     Cancer Paternal Grandfather     Diabetes No family hx of     Cerebrovascular Disease No family hx of     Breast Cancer No family hx of     Cancer - colorectal No family hx of     Prostate Cancer No family hx of      Physical Exam     Temp Readings from Last 3 Encounters:   09/23/24 98.1  F (36.7  C) (Oral)   11/21/23 98.1  F (36.7  C) (Oral)   09/14/23 97.4  F (36.3  C) (Temporal)     BP Readings from Last 5 Encounters:   03/11/25 134/81   12/10/24 130/83   09/23/24 (!) 149/86   09/10/24 (!) 142/86   05/28/24 (!) 150/90     Pulse Readings from Last 1 Encounters:   03/11/25 76     Resp Readings from Last 1 Encounters:   03/11/25 16     Estimated body mass index is 33.66 kg/m  as calculated from the following:    Height as of this encounter: 1.6 m (5' 3\").    Weight as of this encounter: 86.2 kg (190 lb).    GEN: NAD.   HEENT:  Anicteric, noninjected sclera. No obvious external lesions of the ear and nose. Hearing intact.  PULM: No increased work of breathing.    MSK: MCPs without swelling or tenderness to palpation.  PIPs without synovial swelling.  Bilateral second PIPs tender to palpation.  PIPs without increased warmth or overlying erythema.  Heberden's and Maye's nodes present.  Squaring and tenderness to palpation without effusion or increased warmth of the left first CMC joint.  Right first carpometacarpal joint without swelling or tenderness to palpation.  Right knee with medial joint line tenderness but no effusion or increased warmth; tender to palpation over the pes anserine bursa to very light palpation.  Left knee without swelling or tenderness to palpation.   Ankles and MTPs without swelling or " tenderness to palpation.  Elbows and shoulders without swelling or tenderness to palpation.  Ankles without swelling or tenderness to palpation.  Negative MTP squeeze bilaterally.  SKIN: No rash  PSYCH: Alert. Appropriate.       Labs / Imaging (select studies)     CBC  Recent Labs   Lab Test 03/03/25  1126 09/23/24  1437 09/10/24  1322 10/11/21  1704 03/29/21  1431 01/04/21  1619 12/23/19  1620 09/25/19  1117   WBC 7.3 6.3 6.6   < > 6.9   < > 8.4 7.4   RBC 4.26 4.60 4.34   < > 4.47   < > 3.86 3.96   HGB 13.1 14.1 13.1   < > 13.6   < > 12.9 13.3   HCT 39.1 41.3 39.6   < > 41.3   < > 37.8 38.4   MCV 92 90 91   < > 92   < > 98 97   RDW 13.8 12.5 12.5   < > 12.5   < > 13.1 13.9    219 218   < > 226   < > 220 219   MCH 30.8 30.7 30.2   < > 30.4   < > 33.4* 33.6*   MCHC 33.5 34.1 33.1   < > 32.9   < > 34.1 34.6   NEUTROPHIL 73 63 66   < > 64.0  --  69.7 73.0   LYMPH 16 24 21   < > 26.5  --  20.6 17.8   MONOCYTE 7 9 9   < > 6.4  --  6.7 6.5   EOSINOPHIL 3 4 3   < > 2.8  --  2.6 2.3   BASOPHIL 0 1 1   < > 0.3  --  0.4 0.4   ANEU  --   --   --   --  4.4  --  5.8 5.4   ALYM  --   --   --   --  1.8  --  1.7 1.3   EDWARD  --   --   --   --  0.4  --  0.6 0.5   AEOS  --   --   --   --  0.2  --  0.2 0.2   ABAS  --   --   --   --  0.0  --  0.0 0.0   ANEUTAUTO 5.3 3.9 4.4   < >  --   --   --   --    ALYMPAUTO 1.1 1.5 1.4   < >  --   --   --   --    AMONOAUTO 0.5 0.6 0.6   < >  --   --   --   --    AEOSAUTO 0.2 0.2 0.2   < >  --   --   --   --    ABSBASO 0.0 0.0 0.1   < >  --   --   --   --     < > = values in this interval not displayed.     CMP  Recent Labs   Lab Test 03/03/25  1126 10/17/24  0854 09/23/24  1437 09/10/24  1322 07/13/23  1043 03/01/23  0702 02/17/23  1153 10/11/21  1704 03/29/21  1431 01/04/21  1619 12/23/19  1620   NA  --   --  140  --   --   --  143  --  140 140  --    POTASSIUM  --   --  4.5  --   --   --  4.8  --  4.0 3.8  --    CHLORIDE  --   --  104  --   --   --  109  --  109 105  --    CO2  --   --  26   --   --   --  29  --  29 27  --    ANIONGAP  --   --  10  --   --   --  5  --  2* 8  --    GLC  --   --  91  --   --  142* 101*  --  85 86  --    BUN  --   --  21.5  --   --   --  17  --  11 14  --    CR 0.71 0.85 0.77 1.02*   < >  --  0.80   < > 0.77 0.75 0.73   GFRESTIMATED >90 77 87 62   < >  --  84   < > 85 87 >90   GFRESTBLACK  --   --   --   --   --   --   --   --  >90 >90 >90   ENEIDA  --   --  10.0  --   --   --  9.2  --  9.0 9.1  --    BILITOTAL 0.4  --  0.4 0.3   < >  --  0.4   < > 0.5 0.4 0.3   ALBUMIN 4.3  --  4.6 4.3   < >  --  4.0   < > 3.9 3.9 3.8   PROTTOTAL 6.6  --  6.6 6.4   < >  --  6.9   < > 6.6* 7.1 6.7*   ALKPHOS 78  --  72 65   < >  --  63   < > 62 58 68   AST 24  --  22 33   < >  --  11   < > 17 18 21   ALT 24  --  21 32   < >  --  24   < > 28 32 37    < > = values in this interval not displayed.     Calcium/VitaminD  Recent Labs   Lab Test 09/23/24  1437 02/17/23  1153 06/11/21  1014 03/29/21  1431   ENEIDA 10.0 9.2  --  9.0   VITDT  --   --  26  --      ESR/CRP  Recent Labs   Lab Test 03/03/25  1126 11/07/23  1132 07/13/23  1043 08/08/22  1044 05/02/22  1252 10/11/21  1704   SED 5 4 6 8 7 7   CRP  --   --   --  5.5 <2.9 4.0   CRPI <3.00 <3.00 <3.00  --   --   --      Hepatitis B  Recent Labs   Lab Test 07/25/23  1341 04/24/18  1557   HBCAB Nonreactive Nonreactive   HEPBANG Nonreactive Nonreactive     Hepatitis C  Recent Labs   Lab Test 07/25/23  1341   HCVAB Nonreactive     Lyme ab screening  Recent Labs   Lab Test 03/29/21  1431   LYMEGM 0.04     Tuberculosis Screening  Recent Labs   Lab Test 07/25/23  1341   TBRES Negative     HIV Screening  Recent Labs   Lab Test 08/18/22  1004   HIAGAB Nonreactive     Immunization History     Immunization History   Administered Date(s) Administered    COVID-19 MONOVALENT 12+ (Pfizer) 01/25/2021, 02/15/2021    HepB 01/24/1991, 02/28/1991, 09/05/1991    Influenza Vaccine 18-64 (Flublok) 10/01/2019, 10/14/2021    Pneumo Conj 13-V (2010&after) 10/01/2019     Pneumococcal 23 valent 12/31/2019    TD,PF 7+ (Tenivac) 03/12/1990, 01/01/2000    TDAP (Adacel,Boostrix) 09/10/2008, 03/01/2011, 10/14/2021    TDAP Vaccine (Adacel) 09/10/2008    TDAP Vaccine (Boostrix) 09/10/2008     Procedure     Procedure: Steroid injection of the left 1st CMC joint  Indication: Pain, osteoarthritis of the first carpometacarpal joint of the left hand    The procedure was explained in detail. Risks including infection, pain, structural damage such as cartilage damage and tendon rupture, fat atrophy, skin hyper-/hypo-pigmentation, and medication reaction was explained. The need for rest of the affected joint for one week after the procedure was explained.  The option of not doing the procedure was also provided. All questions were answered and the patient consented to the procedure.     A time-out was performed and the correct patient, procedure, and laterality were verified.    The left 1st carpometacarpal joint was examined and location for injection was identified. The area was cleaned with chlorhexidine, twice.  Ethyl chloride was then used for topical anaesthetic.  Then a mixture of lidocaine 1% 0.1 mL and methylprednisolone 10mg was injected into the intra-articular space.     The patient tolerated the procedure well. No complications.    1% Lidocaine  : Hospira  Lot #: [id number]  EXPIRATION DATE: 2025-SEP  NDC: 2829-1365-49    MEDICATION: Methylprednisolone  LOT #: [id number]   EXPIRATION DATE:  2026-02  NDC#: [address]-1  10 mg used for injection; 30 mg disposed           Chart documentation done in part with Dragon Voice recognition Software. Although reviewed after completion, some word and grammatical error may remain.    Abdoul Eli MD

## 2025-03-25 ENCOUNTER — OFFICE VISIT (OUTPATIENT)
Dept: FAMILY MEDICINE | Facility: CLINIC | Age: 65
End: 2025-03-25
Payer: MEDICARE

## 2025-03-25 VITALS
WEIGHT: 191.2 LBS | SYSTOLIC BLOOD PRESSURE: 138 MMHG | HEIGHT: 63 IN | BODY MASS INDEX: 33.88 KG/M2 | TEMPERATURE: 97.1 F | RESPIRATION RATE: 16 BRPM | DIASTOLIC BLOOD PRESSURE: 81 MMHG | OXYGEN SATURATION: 100 % | HEART RATE: 84 BPM

## 2025-03-25 DIAGNOSIS — Z01.818 PREOP GENERAL PHYSICAL EXAM: Primary | ICD-10-CM

## 2025-03-25 LAB
ALBUMIN UR-MCNC: NEGATIVE MG/DL
APPEARANCE UR: CLEAR
BACTERIA #/AREA URNS HPF: ABNORMAL /HPF
BASOPHILS # BLD AUTO: 0 10E3/UL (ref 0–0.2)
BASOPHILS NFR BLD AUTO: 1 %
BILIRUB UR QL STRIP: NEGATIVE
COLOR UR AUTO: YELLOW
EOSINOPHIL # BLD AUTO: 0.2 10E3/UL (ref 0–0.7)
EOSINOPHIL NFR BLD AUTO: 3 %
ERYTHROCYTE [DISTWIDTH] IN BLOOD BY AUTOMATED COUNT: 13.8 % (ref 10–15)
GLUCOSE UR STRIP-MCNC: NEGATIVE MG/DL
HCT VFR BLD AUTO: 40.6 % (ref 35–47)
HGB BLD-MCNC: 13.6 G/DL (ref 11.7–15.7)
HGB UR QL STRIP: NEGATIVE
IMM GRANULOCYTES # BLD: 0 10E3/UL
IMM GRANULOCYTES NFR BLD: 0 %
KETONES UR STRIP-MCNC: NEGATIVE MG/DL
LEUKOCYTE ESTERASE UR QL STRIP: NEGATIVE
LYMPHOCYTES # BLD AUTO: 1.2 10E3/UL (ref 0.8–5.3)
LYMPHOCYTES NFR BLD AUTO: 17 %
MCH RBC QN AUTO: 31.1 PG (ref 26.5–33)
MCHC RBC AUTO-ENTMCNC: 33.5 G/DL (ref 31.5–36.5)
MCV RBC AUTO: 93 FL (ref 78–100)
MONOCYTES # BLD AUTO: 0.5 10E3/UL (ref 0–1.3)
MONOCYTES NFR BLD AUTO: 6 %
NEUTROPHILS # BLD AUTO: 5.3 10E3/UL (ref 1.6–8.3)
NEUTROPHILS NFR BLD AUTO: 73 %
NITRATE UR QL: NEGATIVE
PH UR STRIP: 5.5 [PH] (ref 5–7)
PLATELET # BLD AUTO: 231 10E3/UL (ref 150–450)
RBC # BLD AUTO: 4.37 10E6/UL (ref 3.8–5.2)
RBC #/AREA URNS AUTO: ABNORMAL /HPF
SP GR UR STRIP: 1.02 (ref 1–1.03)
SQUAMOUS #/AREA URNS AUTO: ABNORMAL /LPF
UROBILINOGEN UR STRIP-ACNC: 0.2 E.U./DL
WBC # BLD AUTO: 7.2 10E3/UL (ref 4–11)
WBC #/AREA URNS AUTO: ABNORMAL /HPF

## 2025-03-25 ASSESSMENT — PAIN SCALES - GENERAL: PAINLEVEL_OUTOF10: MODERATE PAIN (4)

## 2025-03-25 NOTE — PROGRESS NOTES
Preoperative Evaluation  [address] [address]  Phone: [phone]  Primary Provider: Temo Fletcher MD  Pre-op Performing Provider: Temo Fletcher MD  Mar 25, 2025         3/25/2025   Surgical Information   What procedure is being done? Partial right knee arthroplasty   Facility or Hospital where procedure/surgery will be performed: [facility]   Who is doing the procedure / surgery? Micah Mena MD   Date of surgery / procedure: April 8th,2025   Time of surgery / procedure: To be determined.   Where do you plan to recover after surgery? Home     Fax number for surgical facility: [phone]    History of Present Illness            This is a 65 year old female who comes in today for a preoperative evaluation. Mrs. Rodriguez has end-stage osteoarthritis of the right knee. She is scheduled for a partial right knee arthroplasty on April 8, 2025.                 3/25/2025   Pre-Op Questionnaire   Have you ever had a heart attack or stroke? No   Have you ever had surgery on your heart or blood vessels, such as a stent placement, a coronary artery bypass, or surgery on an artery in your head, neck, heart, or legs? No   Do you have chest pain with activity? No   Do you have a history of heart failure? No   Do you currently have a cold, bronchitis or symptoms of other infection? No   Do you have a cough, shortness of breath, or wheezing? No   Do you or anyone in your family have previous history of blood clots? (!) YES - Factor 5 Leiden mutation, heterozygous   Do you or does anyone in your family have a serious bleeding problem such as prolonged bleeding following surgeries or cuts? No   Have you ever had problems with anemia or been told to take iron pills? (!) YES    Have you had any abnormal blood loss such as black, tarry or bloody stools, or abnormal vaginal bleeding? No   Have you ever had a blood transfusion? No   Are you  willing to have a blood transfusion if it is medically needed before, during, or after your surgery? Yes   Have you or any of your relatives ever had problems with anesthesia? No   Do you have sleep apnea, excessive snoring or daytime drowsiness? No   Do you have any artifical heart valves or other implanted medical devices like a pacemaker, defibrillator, or continuous glucose monitor? No   Do you have artificial joints? (!) YES   Are you allergic to latex? No     Health Care Directive  Patient does not have a Health Care Directive: Patient wants FULL CODE.    Preoperative Review of    reviewed - no record of controlled substances prescribed.      Status of Chronic Conditions:  HYPERTENSION - Patient has longstanding history of HTN , currently denies any symptoms referable to elevated blood pressure. Specifically denies chest pain, palpitations, dyspnea, orthopnea, paroxysmal nocturnal dyspnea or peripheral edema. Blood pressure readings have been in normal range. Current medication includes Amlodipine.    Inflammatory Arthritis - Currently on Methotrexate on 20 mg/week with folic acid supplementation.    Patient Active Problem List    Diagnosis Date Noted    Bilateral lumbar radiculopathy 12/18/2023     Priority: Medium    Hypertension goal BP (blood pressure) < 140/90 07/25/2023     Priority: Medium    Factor 5 Leiden mutation, heterozygous 02/17/2023     Priority: Medium    Chronic pain syndrome 10/12/2022     Priority: Medium    Diffuse idiopathic skeletal hyperostosis of thoracolumbar spine 05/23/2022     Priority: Medium    High risk medication use 02/12/2022     Priority: Medium    Pseudophakia, Yag Caps, ou (Hx of refractive lens exchange, ou (Lobanoff) 02/12/2022     Priority: Medium    Chronic midline thoracic back pain 02/08/2022     Priority: Medium    Degenerative disc disease, thoracic 02/08/2022     Priority: Medium    Upper back pain, chronic 10/11/2021     Priority: Medium     initially acute  back pain due to work related injury and overuse. Now progressed into chronic back pain not responding to medical treatment.       Upper back strain 06/27/2021     Priority: Medium    Facet arthropathy, thoracic 06/11/2021     Priority: Medium    Back muscle spasm 06/08/2021     Priority: Medium    Immunosuppressed status 01/05/2021     Priority: Medium    Right lateral epicondylitis 08/07/2018     Priority: Medium    Menopausal syndrome (hot flashes) 01/02/2017     Priority: Medium    Osteoarthritis 03/03/2015     Priority: Medium    High risk medications (not anticoagulants) long-term use 09/19/2014     Priority: Medium    Inflammatory polyarthropathy (H) 01/31/2014     Priority: Medium    Vitamin D deficiency 11/25/2013     Priority: Medium     Problem list name updated by automated process. Provider to review      CARDIOVASCULAR SCREENING; LDL GOAL LESS THAN 160 10/31/2010     Priority: Medium      Past Medical History:   Diagnosis Date    Arthritis     DVT (deep venous thrombosis) (H)     Factor 5 Leiden mutation, heterozygous     Headache(784.0)     Hypertension goal BP (blood pressure) < 140/90 7/25/2023    Irritable bowel syndrome     Other and unspecified noninfectious gastroenteritis and colitis(558.9)     chronic     Past Surgical History:   Procedure Laterality Date    ARTHROPLASTY KNEE UNICOMPARTMENT Left 2/28/2023    Procedure: LEFT MEDIAL UNICOMPARTMENTAL KNEE ARTHROPLASTY;  Surgeon: Micah Mena MD;  Location: [facility]    CATARACT IOL, RT/LT      Refractive lens exchange ou (Lobanoff)    COLONOSCOPY  10/14/2011    Procedure:COLONOSCOPY; Colonoscopy; Surgeon:SCOTTY LEDEZMA; Location:[facility]    COLONOSCOPY N/A 9/14/2023    Procedure: COLONOSCOPY, WITH POLYPECTOMY AND BIOPSY;  Surgeon: Nimo Landry DO;  Location: [facility]    COLONOSCOPY N/A 9/14/2023    Procedure: COLONOSCOPY, FLEXIBLE, WITH LESION REMOVAL USING SNARE;  Surgeon: Nimo Landry DO;  Location: [facility]    COLONOSCOPY WITH CO2  INSUFFLATION N/A 9/14/2023    Procedure: Colonoscopy with CO2 insufflation;  Surgeon: Nimo Landry DO;  Location: [facility]    ENHANCE LASER REFRACTIVE BILATERAL EXISTING PT IN PARAMETERS      HYSTERECTOMY, VAGINAL  01/01/2000    TVH, fibroids (still has ovaries)    SURGICAL HISTORY OF -       Tubal Ligation    SURGICAL HISTORY OF -       Appy    SURGICAL HISTORY OF -       Tonsils    SURGICAL HISTORY OF -   12/1994    Anal Fistulotomy    ZZC REPLACEMENT,URETER,BOWEL SEGMENT  10/01/2008    Part of her ureter was repaired.     Current Outpatient Medications   Medication Sig Dispense Refill    amLODIPine (NORVASC) 5 MG tablet TAKE 1 TABLET BY MOUTH TWICE DAILY 180 tablet 0    folic acid (FOLVITE) 1 MG tablet Take 1 tablet (1 mg) by mouth daily. 100 tablet 3    methocarbamol (ROBAXIN) 500 MG tablet Take 500 mg by mouth 4 times daily as needed for muscle spasms.      methotrexate 2.5 MG tablet Take 8 tablets (20 mg) by mouth every 7 days. Methotrexate is a once weekly medication, taken on the same day of each week. 96 tablet 2    VITAMIN D PO Take by mouth.      amitriptyline (ELAVIL) 10 MG tablet Take 1-3 tablets (10-30 mg) by mouth nightly as needed for sleep. (Patient not taking: Reported on 3/25/2025) 90 tablet 5    desvenlafaxine (PRISTIQ) 50 MG 24 hr tablet Take 1 tablet (50 mg) by mouth daily. (Patient not taking: Reported on 3/25/2025) 30 tablet 5       Allergies   Allergen Reactions    Sulfa Antibiotics Rash    Morphine And Codeine Hives    Clindamycin Rash        Social History     Tobacco Use    Smoking status: Never    Smokeless tobacco: Never   Substance Use Topics    Alcohol use: Yes     Comment: Occasional         Review of Systems  CONSTITUTIONAL: NEGATIVE for fever, chills, change in weight  INTEGUMENTARY/SKIN: NEGATIVE for worrisome rashes, moles or lesions  EYES: NEGATIVE for vision changes or irritation  ENT/MOUTH: NEGATIVE for ear, mouth and throat problems  RESP: NEGATIVE for significant cough or  "SOB  BREAST: NEGATIVE for masses, tenderness or discharge  CV: NEGATIVE for chest pain, palpitations or peripheral edema  GI: NEGATIVE for nausea, abdominal pain, heartburn, or change in bowel habits  : NEGATIVE for frequency, dysuria, or hematuria  MUSCULOSKELETAL:As above.  NEURO: NEGATIVE for weakness, dizziness or paresthesias  ENDOCRINE: NEGATIVE for temperature intolerance, skin/hair changes  HEME: NEGATIVE for bleeding problems  PSYCHIATRIC: NEGATIVE for changes in mood or affect    Objective   /81   Pulse 84   Temp 97.1  F (36.2  C) (Temporal)   Resp 16   Ht 1.6 m (5' 3\")   Wt 86.7 kg (191 lb 3.2 oz)   SpO2 100%   BMI 33.87 kg/m     Estimated body mass index is 33.87 kg/m  as calculated from the following:    Height as of this encounter: 1.6 m (5' 3\").    Weight as of this encounter: 86.7 kg (191 lb 3.2 oz).  Physical Exam  GENERAL: alert and no distress  EYES: Eyes grossly normal to inspection  RESP: lungs clear to auscultation - no rales, rhonchi or wheezes  CV: regular rates and rhythm and no peripheral edema  NEURO: Normal strength and tone, mentation intact and speech normal  PSYCH: mentation appears normal, affect normal/bright    Diagnostics  Labs are pending.   No EKG required, no history of coronary heart disease, significant arrhythmia, peripheral arterial disease or other structural heart disease.  [facility]  Echocardiography Laboratory  [address] [place] [address]        Name: DOUG LANCE  MRN: [id number]  : 1960  Study Date: 2021 10:30 AM  Age: 60 yrs  Gender: Female  Patient Location: [facility]  Reason For Study: Abnormal electrocardiogram  Ordering Physician: AMANDA SAUCEDA  Referring Physician: AMANDA SAUCEDA  Performed By: Sudheer Shanks RDCS     BSA: 1.9 m2  Height: 65 in  Weight: 177 lb  BP: 136/102 mmHg  _____________________________________________________________________________  __        Procedure  Echocardiogram with " two-dimensional, color and spectral Doppler performed.  _____________________________________________________________________________  __        Interpretation Summary     Global and regional left ventricular function is normal with an EF of 60-65%.  Right ventricular function, chamber size, wall motion, and thickness are  normal.  Pulmonary artery systolic pressure cannot be assessed.  The inferior vena cava is normal.  No pericardial effusion is present.  There is no prior study for direct comparison.    ASSESSMENT/PLAN  Preop general physical exam  - CBC with platelets and differential  - Comprehensive metabolic panel  - UA with Microscopic reflex to Culture - lab collect   Revised Cardiac Risk Index (RCRI)  The patient has the following serious cardiovascular risks for perioperative complications:   - No serious cardiac risks = 0 points   RCRI Interpretation: 0 points: Class I (very low risk - 0.4% complication rate)  Patient is cleared for surgery.         Signed Electronically by: Temo Fletcher MD  A copy of this evaluation report is provided to the requesting physician.

## 2025-03-25 NOTE — PATIENT INSTRUCTIONS
At [facility], we strive to deliver an exceptional experience to you, every time we see you. If you receive a survey, please let us know what we are doing well and/or what we could improve upon, as we do value your feedback.  If you have MyChart, you can expect to receive results automatically within 24 hours of their completion.  Your provider will send a note interpreting your results as well.   If you do not have MyChart, you should receive your results in about a week by mail.    Your care team:                            Family Medicine Internal Medicine   Kristopher Solis, MD Temo Fletcher, MD Camelia Espinosa, MD Giles Ruiz, MD Eloisa Merrill, PARoyaC    Ivan Lewis, MD Pediatrics   Kesha Guevara, MD Marzena Gar, MD Tatiana Tejeda, APRN CNP Flory Kent APRN CNP   Candido John, MD Daija Vora, MD Jesika Pacheco, CNP     [place], CNP Same-Day Provider (No follow-up visits)   PHONG Mckenna, DNP Colleen Branham, PHONG Chen, FNP, BC JAYSON McclainC     Clinic hours: Monday - Thursday 7 am-6 pm; Fridays 7 am-5 pm.   Urgent care: Monday - Friday 10 am- 8 pm; Saturday and Sunday 9 am-5 pm.    Clinic: [phone]       [facility]: Monday - Thursday 8 am - 7 pm; Friday 8 am - 6 pm  [facility]: [phone]      How to Take Your Medication Before Surgery  Hold weekly Methotrexate (3/30/2025 and 4/6/2025) and restart two weeks after surgery (4/22/2025)     Patient Education   Preparing for Your Surgery  For Adults  Getting started  In most cases, a nurse will call to review your health history and instructions. They will give you an arrival time based on your scheduled surgery time. Please be ready to share:  Your doctor's clinic name and phone number  Your medical, surgical, and anesthesia history  A list of allergies and sensitivities  A list of medicines,  including herbal treatments and over-the-counter drugs  Whether the patient has a legal guardian (ask how to send us the papers in advance)  Note: You may not receive a call if you were seen at our PAC (Preoperative Assessment Center).  Please tell us if you're pregnant--or if there's any chance you might be pregnant. Some surgeries may injure a fetus (unborn baby), so they require a pregnancy test. Surgeries that are safe for a fetus don't always need a test, and you can choose whether to have one.   Preparing for surgery  Within 10 to 30 days of surgery: Have a pre-op exam (sometimes called an H&P, or History and Physical). This can be done at a clinic or pre-operative center.  If you're having a , you may not need this exam. Talk to your care team.  At your pre-op exam, talk to your care team about all medicines you take. (This includes CBD oil and any drugs, such as THC, marijuana, and other forms of cannabis.) If you need to stop any medicine before surgery, ask when to start taking it again.  This is for your safety. Many medicines and drugs can make you bleed too much during surgery. Some change how well surgery (anesthesia) drugs work.  Call your insurance company to let them know you're having surgery. (If you don't have insurance, call [phone].)  Call your clinic if there's any change in your health. This includes a scrape or scratch near the surgery site, or any signs of a cold (sore throat, runny nose, cough, rash, fever).  Eating and drinking guidelines  For your safety: Unless your surgeon tells you otherwise, follow the guidelines below.  Eat and drink as normal until 8 hours before you arrive for surgery. After that, no food or milk. You can spit out gum when you arrive.  Drink clear liquids until 2 hours before you arrive. These are liquids you can see through, like water, Gatorade, and Propel Water. They also include plain black coffee and tea (no cream or milk).  No alcohol for 24  hours before you arrive. The night before surgery, stop any drinks that contain THC.  If your care team tells you to take medicine on the morning of surgery, it's okay to take it with a sip of water. No other medicines or drugs are allowed (including CBD oil)--follow your care team's instructions.  If you have questions the day of surgery, call your hospital or surgery center.   Preventing infection  Shower or bathe the night before and the morning of surgery. Follow the instructions your clinic gave you. (If no instructions, use regular soap.)  Don't shave or clip hair near your surgery site. We'll remove the hair if needed.  Don't smoke or vape the morning of surgery. No chewing tobacco for 6 hours before you arrive. A nicotine patch is okay. You may spit out nicotine gum when you arrive.  For some surgeries, the surgeon will tell you to fully quit smoking and nicotine.  We will make every effort to keep you safe from infection. We will:  Clean our hands often with soap and water (or an alcohol-based hand rub).  Clean the skin at your surgery site with a special soap that kills germs.  Give you a special gown to keep you warm. (Cold raises the risk of infection.)  Wear hair covers, masks, gowns, and gloves during surgery.  Give antibiotic medicine, if prescribed. Not all surgeries need this medicine.  What to bring on the day of surgery  Photo ID and insurance card  Copy of your health care directive, if you have one  Glasses and hearing aids (bring cases)  You can't wear contacts during surgery  Inhaler and eye drops, if you use them (tell us about these when you arrive)  CPAP machine or breathing device, if you use them  A few personal items, if spending the night  If you have . . .  A pacemaker, ICD (cardiac defibrillator), or other implant: Bring the ID card.  An implanted stimulator: Bring the remote control.  A legal guardian: Bring a copy of the certified (court-stamped) guardianship papers.  Please remove  any jewelry, including body piercings. Leave jewelry and other valuables at home.  If you're going home the day of surgery  You must have a responsible adult drive you home. They should stay with you overnight as well.  If you don't have someone to stay with you, and you aren't safe to go home alone, we may keep you overnight. Insurance often won't pay for this.  After surgery  If it's hard to control your pain or you need more pain medicine, please call your surgeon's office.  Questions?   If you have any questions for your care team, list them here:   ____________________________________________________________________________________________________________________________________________________________________________________________________________________________________________________________  For informational purposes only. Not to replace the advice of your health care provider. Copyright   2003, 2019 [facility]. All rights reserved. Clinically reviewed by Dimitri Goldman MD. [employer] [id number] - REV 08/24.

## 2025-03-26 LAB
ALBUMIN SERPL BCG-MCNC: 4.5 G/DL (ref 3.5–5.2)
ALP SERPL-CCNC: 84 U/L (ref 40–150)
ALT SERPL W P-5'-P-CCNC: 49 U/L (ref 0–50)
ANION GAP SERPL CALCULATED.3IONS-SCNC: 11 MMOL/L (ref 7–15)
AST SERPL W P-5'-P-CCNC: 43 U/L (ref 0–45)
BILIRUB SERPL-MCNC: 0.4 MG/DL
BUN SERPL-MCNC: 17.3 MG/DL (ref 8–23)
CALCIUM SERPL-MCNC: 9.7 MG/DL (ref 8.8–10.4)
CHLORIDE SERPL-SCNC: 105 MMOL/L (ref 98–107)
CREAT SERPL-MCNC: 0.63 MG/DL (ref 0.51–0.95)
EGFRCR SERPLBLD CKD-EPI 2021: >90 ML/MIN/1.73M2
GLUCOSE SERPL-MCNC: 101 MG/DL (ref 70–99)
HCO3 SERPL-SCNC: 25 MMOL/L (ref 22–29)
POTASSIUM SERPL-SCNC: 4.8 MMOL/L (ref 3.4–5.3)
PROT SERPL-MCNC: 6.7 G/DL (ref 6.4–8.3)
SODIUM SERPL-SCNC: 141 MMOL/L (ref 135–145)

## 2025-03-27 ENCOUNTER — TRANSFERRED RECORDS (OUTPATIENT)
Dept: HEALTH INFORMATION MANAGEMENT | Facility: CLINIC | Age: 65
End: 2025-03-27
Payer: MEDICARE

## 2025-04-21 ENCOUNTER — TRANSFERRED RECORDS (OUTPATIENT)
Dept: HEALTH INFORMATION MANAGEMENT | Facility: CLINIC | Age: 65
End: 2025-04-21
Payer: MEDICARE

## 2025-05-29 ENCOUNTER — TRANSFERRED RECORDS (OUTPATIENT)
Dept: HEALTH INFORMATION MANAGEMENT | Facility: CLINIC | Age: 65
End: 2025-05-29
Payer: MEDICARE

## 2025-06-21 ENCOUNTER — HEALTH MAINTENANCE LETTER (OUTPATIENT)
Age: 65
End: 2025-06-21

## 2025-07-03 ENCOUNTER — OFFICE VISIT (OUTPATIENT)
Dept: RHEUMATOLOGY | Facility: CLINIC | Age: 65
End: 2025-07-03
Payer: MEDICARE

## 2025-07-03 VITALS
RESPIRATION RATE: 16 BRPM | HEART RATE: 105 BPM | OXYGEN SATURATION: 95 % | SYSTOLIC BLOOD PRESSURE: 118 MMHG | DIASTOLIC BLOOD PRESSURE: 80 MMHG

## 2025-07-03 DIAGNOSIS — M18.12 PRIMARY OSTEOARTHRITIS OF FIRST CARPOMETACARPAL JOINT OF LEFT HAND: ICD-10-CM

## 2025-07-03 DIAGNOSIS — Z79.899 HIGH RISK MEDICATION USE: ICD-10-CM

## 2025-07-03 DIAGNOSIS — M06.09 SERONEGATIVE RHEUMATOID ARTHRITIS OF MULTIPLE SITES (H): Primary | ICD-10-CM

## 2025-07-03 RX ORDER — METHYLPREDNISOLONE ACETATE 40 MG/ML
10 INJECTION, SUSPENSION INTRA-ARTICULAR; INTRALESIONAL; INTRAMUSCULAR; SOFT TISSUE ONCE
Status: COMPLETED | OUTPATIENT
Start: 2025-07-03 | End: 2025-07-03

## 2025-07-03 RX ADMIN — METHYLPREDNISOLONE ACETATE 10 MG: 40 INJECTION, SUSPENSION INTRA-ARTICULAR; INTRALESIONAL; INTRAMUSCULAR; SOFT TISSUE at 15:06

## 2025-07-03 NOTE — PROGRESS NOTES
Rheumatology Clinic Visit      Radha Rodriguez MRN# [id number]   YOB: 1960 Age: 65 year old      Date of visit: 7/03/25   PCP: Dr. Temo Fletcher    Chief Complaint   Patient presents with:  Rheumatoid Arthritis: Left thumb injection    Assessment and Plan     1.  Rheumatoid arthritis: Previously followed by Rojas Vasques and [facility].  Established care with me on 4/24/2018.  Previously on Humira (ineffective), HCQ (effective; patient inadvertently stopped when methotrexate was started; can be used again in the future if needed), Enbrel (effective, she did not like the injections).  Currently on methotrexate 20 mg once weekly.  She inadvertently stopped hydroxychloroquine when methotrexate was started; if needed in the future consider adding hydroxychloroquine.  Doing well today.   Chronic illness.    - Continue  Methotrexate 20mg once weekly   - Continue folic acid 1mg daily  - Labs in 3 months: CBC, Creatinine, Hepatic Panel, ESR, CRP    High risk medication requiring intensive toxicity monitoring at least quarterly.    2.  Primary osteoarthritis of the left first carpometacarpal joint: improved with steroid injections in the past.  Again symptomatic today and patient would like repeat steroid injection.  Steroid injection repeated today as documented in the procedure section.  Discussed the option for hand surgery referral as well but she would like to continue doing the injections because they are effective and she would like to avoid surgery at this time.    3.  Bilateral knee osteoarthritis; hx of left medial unicompartmental knee arthroplasty in February 2023; right pes anserine bursitis; plans for right partial knee replacement in April 2025: Right pes anserine bursitis injection in November 2023 effective for no more than 1 month; in April 2024 had steroid injection of the pes anserine bursa at [facility] with no improvement per patient.  Topical Voltaren gel partially effective.   Encouraged physical therapy exercises and pool exercises.  Avoid aggravating activities.  Has not improved and is very symptomatic over the pes anserine bursa on the right but no swelling, increased warmth, or overlying erythema.  She has since followed up with her surgeon at [facility] orth[facility] where she plans to have right partial knee replacement surgery in April 2025.      Perioperative DMARD management in anticipation of knee surgery: Continue methotrexate perioperatively    4. Chronic back pain: went to [facility] where steroids and gabapentin were Rx'd but she doesn't do well with steroids per patient and gabapentin has only been partially helpful. Was following at the [facility] pain clinic, but was referred by [facility] provider to [facility] per patient.  Now following with Dr. Xavi Guerrero at [facility].    5.  History of rash: previously on abdomen and now just on arms. Unclear etiology. Less likely MTX associated but cannot rule out.  She managed with her PCP.  Not an issue today.    6.  Vaccinations:   - Influenza: Advised yearly vaccination  - [id number]: Advised vaccination  - COVID-19: Advised keeping updated  - Shingrix: Advised vaccination    7.  Osteopenia: Based on 11/15/2023 DEXA.  Plan to repeat in 2028    Total minutes spent in evaluation with patient, documentation, , and review of pertinent studies and chart notes: 16      Ms. Rodriguez verbalized agreement with and understanding of the rational for the diagnosis and treatment plan.  All questions were answered to best of my ability and the patient's satisfaction. Ms. Rodriguez was advised to contact the clinic with any questions that may arise after the clinic visit.      Thank you for involving me in the care of the patient    Return to clinic: 3 months    HPI   Radha Rodriguez is a 65 year old female with a past medical history significant for possible Crohn's disease requiring fistula surgery in the past, osteoarthritis,  inflammatory arthritis who is seen for follow-up of arthritis.    4/11/2023: Stop taking hydroxychloroquine when she was hospitalized in February 2023 for the left partial knee arthroplasty.  Since she has been favoring her right knee her right knee is hurting more.  Right knee pain is worse with activity and improves with rest.  She would like a steroid injection of the right knee today.  Left first CMC joint osteoarthritis pain worse and she would like repeat steroid injection.  Felt like she was doing better with regard to inflammatory arthritis when she was taking hydroxychloroquine, but she was told no clear reason to stop hydroxychloroquine when she was hospitalized and she has not restarted it yet because she was waiting for this appointment to discuss.  She would like to restart hydroxychloroquine.    7/25/2023: Pain, swelling, and stiffness at the MCPs and PIPs that is worse in the morning and appear to time activity, worse on the right hand.  Left first CMC joint painful with activity and improves with rest; typically the steroid injection wears off after about 2.5-3 months.  She would like repeat steroid injection of the left first CMC joint today.  Hydroxychloroquine is effective but does not completely control her symptoms.  Has some bruising on her arms and is following with a primary care provider for this issue.    11/7/2023: No longer with pain at the MCPs or PIPs.  No morning stiffness.  Still with pain in the left first CMC joint and she would like a repeat steroid injection at this joint today because injections are effective and she does not want to go for surgery.  She would also like a repeat steroid injection of the right knee because it is bothering her more now and a steroid injection in the past was very effective.    5/28/2024: Went to see an orthopedic surgeon at [facility] in April 2023 where she had a steroid injection of the pes anserine bursa with no improvement.  She is  planning to increase physical therapy exercises, walking, and consider pool exercises.  Would like repeat steroid injection of the left first CMC joint today as it has worsened.  Left first CMC joint pain is worse with activity and improves with rest; no swelling, increased warmth, or overlying erythema.  Not on Enbrel due to cost.  Had to hold hydroxychloroquine for about period of time because of cost but she has recently restarted because she had worsening symptoms after stopping hydroxychloroquine.    9/10/2024: Mild swelling across the MCPs but no increased warmth, overlying erythema, or pain at the MCPs.  Only pain she has is of her right pes anserine bursa that improved for only 1 month with injection from myself, and possibly only 1 month of improvement when an injection was given in April 2024 at [facility].  She says that she has been to her orthopedic surgeon who advised possible knee replacement surgery but she did not want to proceed with that because she felt like it was only the bursa.  Topical Voltaren gel ineffective for bursitis per patient.  Icing has helped some.  Rest has helped some.  Increased activity bothers the bursitis.  Planning to be seen in the hematology department at the [facility] for a blood clotting disorder with specifics unknown at this point.    12/10/2024: Plan for right partial knee replacement surgery because the steroid injection from orthopedics was again only beneficial for a very short duration.  Recurrence of left first CMC joint osteoarthritis symptoms and she would like a steroid injection today as they have been effective previously.  Also with more pain and swelling across the MCPs and PIPs that is worse in the morning and improves with time and activity.  She feels like the rheumatoid arthritis has become more active.    3/11/2025: Planning for partial right knee replacement surgery.  Recurrence of left for CMC joint osteoarthritis and she  would like a steroid injection today as they have been effective previously.  Methotrexate has been effective for MCP and PIP pain.  Morning stiffness now for about 30-60 minutes.    Today, 7/3/2025: RA controlled.  Left wrist CMC joint pain that is worse with activity and improves with rest; would like a steroid injection today as they have been effective previously.  Morning stiffness for about 30 minutes.    Tobacco: none  EtOH: occasional  Drugs: none  Occupation: retired    ROS   12 point review of system was completed and negative except as noted in the HPI     Active Problem List     Patient Active Problem List   Diagnosis    CARDIOVASCULAR SCREENING; LDL GOAL LESS THAN 160    Vitamin D deficiency    Inflammatory polyarthropathy (H)    High risk medications (not anticoagulants) long-term use    Osteoarthritis    Menopausal syndrome (hot flashes)    Right lateral epicondylitis    Immunosuppressed status    Facet arthropathy, thoracic    Back muscle spasm    Upper back strain    Upper back pain, chronic    Chronic midline thoracic back pain    Degenerative disc disease, thoracic    High risk medication use    Pseudophakia, Yag Caps, ou (Hx of refractive lens exchange, ou (Lobanoff)    Diffuse idiopathic skeletal hyperostosis of thoracolumbar spine    Chronic pain syndrome    Factor 5 Leiden mutation, heterozygous    Hypertension goal BP (blood pressure) < 140/90    Bilateral lumbar radiculopathy     Past Medical History     Past Medical History:   Diagnosis Date    Arthritis     DVT (deep venous thrombosis) (H)     Factor 5 Leiden mutation, heterozygous     Headache(784.0)     Hypertension goal BP (blood pressure) < 140/90 7/25/2023    Irritable bowel syndrome     Other and unspecified noninfectious gastroenteritis and colitis(558.9)     chronic     Past Surgical History     Past Surgical History:   Procedure Laterality Date    ARTHROPLASTY KNEE UNICOMPARTMENT Left 2/28/2023    Procedure: LEFT MEDIAL  UNICOMPARTMENTAL KNEE ARTHROPLASTY;  Surgeon: Micah Mena MD;  Location: [facility]    CATARACT IOL, RT/LT      Refractive lens exchange ou (Lobanoff)    COLONOSCOPY  10/14/2011    Procedure:COLONOSCOPY; Colonoscopy; Surgeon:SCOTTY LEDEZMA; Location:[facility]    COLONOSCOPY N/A 9/14/2023    Procedure: COLONOSCOPY, WITH POLYPECTOMY AND BIOPSY;  Surgeon: Nimo Landry DO;  Location: [facility]    COLONOSCOPY N/A 9/14/2023    Procedure: COLONOSCOPY, FLEXIBLE, WITH LESION REMOVAL USING SNARE;  Surgeon: Nimo Landry DO;  Location: [facility]    COLONOSCOPY WITH CO2 INSUFFLATION N/A 9/14/2023    Procedure: Colonoscopy with CO2 insufflation;  Surgeon: Nimo Landry DO;  Location: [facility]    ENHANCE LASER REFRACTIVE BILATERAL EXISTING PT IN PARAMETERS      HYSTERECTOMY, VAGINAL  01/01/2000    TVH, fibroids (still has ovaries)    SURGICAL HISTORY OF -       Tubal Ligation    SURGICAL HISTORY OF -       Appy    SURGICAL HISTORY OF -       Tonsils    SURGICAL HISTORY OF -   12/1994    Anal Fistulotomy    ZZC REPLACEMENT,URETER,BOWEL SEGMENT  10/01/2008    Part of her ureter was repaired.     Allergy     Allergies   Allergen Reactions    Sulfa Antibiotics Rash    Morphine And Codeine Hives    Clindamycin Rash     Current Medication List     Current Outpatient Medications   Medication Sig Dispense Refill    amLODIPine (NORVASC) 5 MG tablet TAKE 1 TABLET BY MOUTH TWICE DAILY 180 tablet 0    folic acid (FOLVITE) 1 MG tablet Take 1 tablet (1 mg) by mouth daily. 100 tablet 3    methocarbamol (ROBAXIN) 500 MG tablet Take 500 mg by mouth 4 times daily as needed for muscle spasms.      methotrexate 2.5 MG tablet Take 8 tablets (20 mg) by mouth every 7 days. Methotrexate is a once weekly medication, taken on the same day of each week. 96 tablet 2    VITAMIN D PO Take by mouth.       No current facility-administered medications for this visit.     Social History   See HPI    Family History     Family History   Problem Relation  "Age of Onset    Heart Disease Father         Heart attack    C.A.D. Father         age 50    Hypertension Father     Cancer Maternal Grandfather     Alzheimer Disease Maternal Grandfather     Cancer Paternal Grandfather     Diabetes No family hx of     Cerebrovascular Disease No family hx of     Breast Cancer No family hx of     Cancer - colorectal No family hx of     Prostate Cancer No family hx of      Physical Exam     Temp Readings from Last 3 Encounters:   03/25/25 97.1  F (36.2  C) (Temporal)   09/23/24 98.1  F (36.7  C) (Oral)   11/21/23 98.1  F (36.7  C) (Oral)     BP Readings from Last 5 Encounters:   07/03/25 118/80   03/25/25 138/81   03/11/25 134/81   12/10/24 130/83   09/23/24 (!) 149/86     Pulse Readings from Last 1 Encounters:   07/03/25 105     Resp Readings from Last 1 Encounters:   07/03/25 16     Estimated body mass index is 33.87 kg/m  as calculated from the following:    Height as of 3/25/25: 1.6 m (5' 3\").    Weight as of 3/25/25: 86.7 kg (191 lb 3.2 oz).    GEN: NAD.   HEENT:  Anicteric, noninjected sclera. No obvious external lesions of the ear and nose. Hearing intact.  PULM: No increased work of breathing.    MSK: MCPs without swelling or tenderness to palpation.  PIPs without synovial swelling.  Bilateral second PIPs tender to palpation.  PIPs without increased warmth or overlying erythema.  Heberden's and Maye's nodes present.  Squaring and tenderness to palpation without effusion or increased warmth of the left first CMC joint.  Right first carpometacarpal joint without swelling or tenderness to palpation.  Knees without swelling or tenderness to palpation.   Ankles and MTPs without swelling or tenderness to palpation.  Elbows and shoulders without swelling or tenderness to palpation.  Ankles without swelling or tenderness to palpation.  Negative MTP squeeze bilaterally.  SKIN: No rash  PSYCH: Alert. Appropriate.       Labs / Imaging (select studies)     CBC  Recent Labs   Lab Test " 06/13/25  1114 03/25/25  1132 03/03/25  1126   WBC 6.2 7.2 7.3   RBC 4.16 4.37 4.26   HGB 12.8 13.6 13.1   HCT 38.2 40.6 39.1   MCV 92 93 92   RDW 13.2 13.8 13.8    231 234   MCH 30.8 31.1 30.8   MCHC 33.5 33.5 33.5   NEUTROPHIL 63 73 73   LYMPH 24 17 16   MONOCYTE 8 6 7   EOSINOPHIL 4 3 3   BASOPHIL 1 1 0   ANEU 3.9 5.3 5.3   ALYM 1.5 1.2 1.1   EDWARD 0.5 0.5 0.5   AEOS 0.3 0.2 0.2   ABAS 0.1 0.0 0.0     CMP  Recent Labs   Lab Test 06/13/25  1114 03/25/25  1132 03/03/25  1126 10/17/24  0854 09/23/24  1437 07/13/23  1043 03/01/23  0702 02/17/23  1153 10/11/21  1704 03/29/21  1431 01/04/21  1619 12/23/19  1620   NA  --  141  --   --  140  --   --  143  --  140 140  --    POTASSIUM  --  4.8  --   --  4.5  --   --  4.8  --  4.0 3.8  --    CHLORIDE  --  105  --   --  104  --   --  109  --  109 105  --    CO2  --  25  --   --  26  --   --  29  --  29 27  --    ANIONGAP  --  11  --   --  10  --   --  5  --  2* 8  --    GLC  --  101*  --   --  91  --  142* 101*  --  85 86  --    BUN  --  17.3  --   --  21.5  --   --  17  --  11 14  --    CR 0.74 0.63 0.71   < > 0.77   < >  --  0.80   < > 0.77 0.75 0.73   GFRESTIMATED 90 >90 >90   < > 87   < >  --  84   < > 85 87 >90   GFRESTBLACK  --   --   --   --   --   --   --   --   --  >90 >90 >90   ENEIDA  --  9.7  --   --  10.0  --   --  9.2  --  9.0 9.1  --    BILITOTAL 0.4 0.4 0.4  --  0.4   < >  --  0.4   < > 0.5 0.4 0.3   ALBUMIN 4.3 4.5 4.3  --  4.6   < >  --  4.0   < > 3.9 3.9 3.8   PROTTOTAL 6.3* 6.7 6.6  --  6.6   < >  --  6.9   < > 6.6* 7.1 6.7*   ALKPHOS 86 84 78  --  72   < >  --  63   < > 62 58 68   AST 23 43 24  --  22   < >  --  11   < > 17 18 21   ALT 25 49 24  --  21   < >  --  24   < > 28 32 37    < > = values in this interval not displayed.     Calcium/VitaminD  Recent Labs   Lab Test 03/25/25  1132 09/23/24  1437 02/17/23  1153 06/11/21  1014   ENEIDA 9.7 10.0 9.2  --    VITDT  --   --   --  26     ESR/CRP  Recent Labs   Lab Test 06/13/25  1114 03/03/25  1126  11/07/23  1132 07/13/23  1043 08/08/22  1044 05/02/22  1252 10/11/21  1704   SED 7 5 4   < > 8 7 7   CRP  --   --   --   --  5.5 <2.9 4.0   CRPI <3.00 <3.00 <3.00   < >  --   --   --     < > = values in this interval not displayed.     Hepatitis B  Recent Labs   Lab Test 07/25/23  1341 04/24/18  1557   HBCAB Nonreactive Nonreactive   HEPBANG Nonreactive Nonreactive     Hepatitis C  Recent Labs   Lab Test 07/25/23  1341   HCVAB Nonreactive     Lyme ab screening  Recent Labs   Lab Test 03/29/21  1431   LYMEGM 0.04       Tuberculosis Screening  Recent Labs   Lab Test 07/25/23  1341   TBRES Negative     HIV Screening  Recent Labs   Lab Test 08/18/22  1004   HIAGAB Nonreactive     Immunization History     Immunization History   Administered Date(s) Administered    COVID-19 MONOVALENT 12+ (Pfizer) 01/25/2021, 02/15/2021    HepB 01/24/1991, 02/28/1991, 09/05/1991    Influenza Vaccine 18-64 (Flublok) 10/01/2019, 10/14/2021    Pneumo Conj 13-V (2010&after) 10/01/2019    Pneumococcal 23 valent 12/31/2019    TD,PF 7+ (Tenivac) 03/12/1990, 01/01/2000    TDAP (Adacel,Boostrix) 09/10/2008, 03/01/2011, 10/14/2021    TDAP Vaccine (Adacel) 09/10/2008    TDAP Vaccine (Boostrix) 09/10/2008     Procedure     Procedure: Steroid injection of the left 1st CMC joint  Indication: Pain, osteoarthritis of the first carpometacarpal joint of the left hand    The procedure was explained in detail. Risks including infection, pain, structural damage such as cartilage damage and tendon rupture, fat atrophy, skin hyper-/hypo-pigmentation, and medication reaction was explained. The need for rest of the affected joint for one week after the procedure was explained.  The option of not doing the procedure was also provided. All questions were answered and the patient consented to the procedure.     A time-out was performed and the correct patient, procedure, and laterality were verified.    The left 1st carpometacarpal joint was examined and location  for injection was identified. The area was cleaned with chlorhexidine, twice.  Ethyl chloride was then used for topical anaesthetic.  Then a mixture of lidocaine 1% 0.1 mL and methylprednisolone 10mg was injected into the intra-articular space.     The patient tolerated the procedure well. No complications.    1% Lidocaine  : Hospira  Lot #: [id number]  EXPIRATION DATE: 2025-SEP-01  NDC: 2195-6713-98    MEDICATION: Methylprednisolone  LOT #: [id number]  EXPIRATION DATE:  2026-02  NDC#: [address]-1   10 mg used for injection; 30 mg disposed           Chart documentation done in part with Dragon Voice recognition Software. Although reviewed after completion, some word and grammatical error may remain.    Abdoul Eli MD

## 2025-07-07 NOTE — NURSING NOTE
RAPID3 (0-30) Cumulative Score  4.8          RAPID3 Weighted Score (divide #4 by 3 and that is the weighted score)  1.6           
88

## 2025-08-19 ENCOUNTER — TELEPHONE (OUTPATIENT)
Dept: FAMILY MEDICINE | Facility: CLINIC | Age: 65
End: 2025-08-19

## 2025-08-19 ENCOUNTER — VIRTUAL VISIT (OUTPATIENT)
Dept: FAMILY MEDICINE | Facility: CLINIC | Age: 65
End: 2025-08-19
Payer: MEDICARE

## 2025-08-19 DIAGNOSIS — E66.09 CLASS 1 OBESITY DUE TO EXCESS CALORIES WITH SERIOUS COMORBIDITY AND BODY MASS INDEX (BMI) OF 33.0 TO 33.9 IN ADULT: ICD-10-CM

## 2025-08-19 DIAGNOSIS — E66.09 CLASS 1 OBESITY DUE TO EXCESS CALORIES WITH SERIOUS COMORBIDITY AND BODY MASS INDEX (BMI) OF 30.0 TO 30.9 IN ADULT: Primary | ICD-10-CM

## 2025-08-19 DIAGNOSIS — E66.811 CLASS 1 OBESITY DUE TO EXCESS CALORIES WITH SERIOUS COMORBIDITY AND BODY MASS INDEX (BMI) OF 33.0 TO 33.9 IN ADULT: ICD-10-CM

## 2025-08-19 DIAGNOSIS — N32.81 OVERACTIVE BLADDER: ICD-10-CM

## 2025-08-19 DIAGNOSIS — E66.811 CLASS 1 OBESITY DUE TO EXCESS CALORIES WITH SERIOUS COMORBIDITY AND BODY MASS INDEX (BMI) OF 30.0 TO 30.9 IN ADULT: Primary | ICD-10-CM

## 2025-08-19 DIAGNOSIS — Z13.1 SCREENING FOR DIABETES MELLITUS: ICD-10-CM

## 2025-08-19 DIAGNOSIS — M54.16 LUMBAR RADICULOPATHY: Primary | ICD-10-CM

## 2025-08-19 PROCEDURE — 98014 SYNCH AUDIO-ONLY EST MOD 30: CPT | Performed by: INTERNAL MEDICINE

## 2025-08-20 ENCOUNTER — PATIENT OUTREACH (OUTPATIENT)
Dept: CARE COORDINATION | Facility: CLINIC | Age: 65
End: 2025-08-20
Payer: MEDICARE

## 2025-08-22 ENCOUNTER — ANCILLARY PROCEDURE (OUTPATIENT)
Dept: GENERAL RADIOLOGY | Facility: CLINIC | Age: 65
End: 2025-08-22
Attending: INTERNAL MEDICINE
Payer: MEDICARE

## 2025-08-22 DIAGNOSIS — M54.16 LUMBAR RADICULOPATHY: ICD-10-CM

## 2025-08-22 PROCEDURE — 72100 X-RAY EXAM L-S SPINE 2/3 VWS: CPT | Mod: TC | Performed by: RADIOLOGY

## 2025-08-26 ENCOUNTER — MYC MEDICAL ADVICE (OUTPATIENT)
Dept: FAMILY MEDICINE | Facility: CLINIC | Age: 65
End: 2025-08-26
Payer: MEDICARE

## 2025-09-02 ENCOUNTER — ANCILLARY PROCEDURE (OUTPATIENT)
Dept: MRI IMAGING | Facility: CLINIC | Age: 65
End: 2025-09-02
Attending: INTERNAL MEDICINE
Payer: MEDICARE

## 2025-09-02 DIAGNOSIS — M54.16 LUMBAR RADICULOPATHY: ICD-10-CM

## 2025-09-02 PROCEDURE — 72148 MRI LUMBAR SPINE W/O DYE: CPT | Mod: TC | Performed by: INTERNAL MEDICINE

## (undated) DEVICE — WRAP EZY KNEE

## (undated) DEVICE — DRSG STERI STRIP 1/2X4" R1547

## (undated) DEVICE — CAST PADDING 4" UNSTERILE 9044

## (undated) DEVICE — MANIFOLD NEPTUNE 4 PORT 700-20

## (undated) DEVICE — CAST PADDING 6" STERILE 9046S

## (undated) DEVICE — SOL NACL 0.9% IRRIG 3000ML BAG 2B7477

## (undated) DEVICE — GLOVE BIOGEL PI MICRO INDICATOR UNDERGLOVE SZ 8.0 48980

## (undated) DEVICE — SU PDO 1 STRATAFIX 36X36CM CTX TAPERPOINT SXPD2B405

## (undated) DEVICE — PACK TOTAL KNEE SOP15TKFSD

## (undated) DEVICE — BONE CEMENT MIXEVAC III HI VAC KIT  0206-015-000

## (undated) DEVICE — DRAPE IOBAN INCISE 23X17" 6650EZ

## (undated) DEVICE — BLADE OXFORD PARTIAL KNEE STAB

## (undated) DEVICE — NDL SPINAL 18GA 3.5" 405184

## (undated) DEVICE — ESU GROUND PAD UNIVERSAL W/O CORD

## (undated) DEVICE — SUCTION IRR SYSTEM W/O TIP INTERPULSE HANDPIECE 0210-100-000

## (undated) DEVICE — BLADE SAW SAGITTAL STRK 18X90X1.27MM HD SYS 6 6118-127-090

## (undated) DEVICE — DRAPE STERI U 1015

## (undated) DEVICE — LINEN TOWEL PACK X5 5464

## (undated) DEVICE — PREP CHLORAPREP 26ML TINTED ORANGE  260815

## (undated) DEVICE — PAD CHUX UNDERPAD 23X24" 7136

## (undated) DEVICE — BONE CLEANING TIP INTERPULSE  0210-010-000

## (undated) DEVICE — SU STRATAFIX MONOCRYL 2-0 SPIRAL SH 20CM SXMP1B409

## (undated) DEVICE — DRSG ADAPTIC 3X8" 6113

## (undated) DEVICE — SU ETHICON STRATAFIX SPR PS-2 3-0 30X30CM SXMP2B408

## (undated) DEVICE — SOL WATER IRRIG 1000ML BOTTLE 2F7114

## (undated) DEVICE — BLADE SAW SAGITTAL STRK 21X90X1.27MM HD SYS 6 6221-127-090

## (undated) DEVICE — SU STRATAFIX PDS PLUS 0 CT 45CM SXPP1A406

## (undated) DEVICE — CAST PADDING 6" UNSTERILE 9046

## (undated) DEVICE — HOOD SURG T7PLUS PEEL AWAY FACE SHIELD STRL LF 0416-801-100

## (undated) DEVICE — SOLUTION WOUND CLEANSING 3/4OZ 10% PVP EA-L3011FB-50

## (undated) DEVICE — GLOVE BIOGEL PI ORTHOPRO SZ 8.5 47685

## (undated) DEVICE — SYR 30ML LL W/O NDL 302832

## (undated) DEVICE — KIT ENDO FIRST STEP DISINFECTANT 200ML W/POUCH EP-4

## (undated) DEVICE — DRAPE ARTHROSCOPY 120X92" 9414

## (undated) RX ORDER — FENTANYL CITRATE 50 UG/ML
INJECTION, SOLUTION INTRAMUSCULAR; INTRAVENOUS
Status: DISPENSED
Start: 2023-09-14

## (undated) RX ORDER — CELECOXIB 200 MG/1
CAPSULE ORAL
Status: DISPENSED
Start: 2023-02-28

## (undated) RX ORDER — PROPOFOL 10 MG/ML
INJECTION, EMULSION INTRAVENOUS
Status: DISPENSED
Start: 2023-02-28

## (undated) RX ORDER — ACETAMINOPHEN 325 MG/1
TABLET ORAL
Status: DISPENSED
Start: 2023-02-28

## (undated) RX ORDER — CEFAZOLIN SODIUM/WATER 2 G/20 ML
SYRINGE (ML) INTRAVENOUS
Status: DISPENSED
Start: 2023-02-28

## (undated) RX ORDER — FENTANYL CITRATE 50 UG/ML
INJECTION, SOLUTION INTRAMUSCULAR; INTRAVENOUS
Status: DISPENSED
Start: 2023-02-28

## (undated) RX ORDER — TRANEXAMIC ACID 650 MG/1
TABLET ORAL
Status: DISPENSED
Start: 2023-02-28

## (undated) RX ORDER — OXYCODONE HYDROCHLORIDE 5 MG/1
TABLET ORAL
Status: DISPENSED
Start: 2023-02-28

## (undated) RX ORDER — FENTANYL CITRATE 0.05 MG/ML
INJECTION, SOLUTION INTRAMUSCULAR; INTRAVENOUS
Status: DISPENSED
Start: 2023-02-28